# Patient Record
Sex: FEMALE | Race: WHITE | Employment: UNEMPLOYED | URBAN - METROPOLITAN AREA
[De-identification: names, ages, dates, MRNs, and addresses within clinical notes are randomized per-mention and may not be internally consistent; named-entity substitution may affect disease eponyms.]

---

## 2018-11-28 ENCOUNTER — HOSPITAL ENCOUNTER (OUTPATIENT)
Age: 83
Setting detail: SPECIMEN
Discharge: HOME | End: 2018-11-28
Payer: COMMERCIAL

## 2018-11-28 LAB
BACTERIA: ABNORMAL /HPF
BILIRUBIN URINE: NEGATIVE
BLOOD, URINE: NEGATIVE
CLARITY: ABNORMAL
COLOR: YELLOW
EPITHELIAL CELLS, UA: 18 /HPF (ref 0–5)
GLUCOSE URINE: NEGATIVE MG/DL
HYALINE CASTS: 5 /LPF (ref 0–8)
KETONES, URINE: NEGATIVE MG/DL
LEUKOCYTE ESTERASE, URINE: ABNORMAL
MICROSCOPIC EXAMINATION: YES
NITRITE, URINE: NEGATIVE
PH UA: 6.5
PROTEIN UA: NEGATIVE MG/DL
RBC UA: 2 /HPF (ref 0–4)
SPECIFIC GRAVITY UA: 1.01
URINE REFLEX TO CULTURE: YES
URINE TYPE: ABNORMAL
UROBILINOGEN, URINE: 1 E.U./DL
WBC UA: 11 /HPF (ref 0–5)

## 2018-11-28 PROCEDURE — 87086 URINE CULTURE/COLONY COUNT: CPT

## 2018-11-28 PROCEDURE — 81001 URINALYSIS AUTO W/SCOPE: CPT

## 2018-11-29 LAB — URINE CULTURE, ROUTINE: NORMAL

## 2019-04-08 ENCOUNTER — APPOINTMENT (OUTPATIENT)
Dept: GENERAL RADIOLOGY | Age: 84
End: 2019-04-08
Payer: COMMERCIAL

## 2019-04-08 ENCOUNTER — HOSPITAL ENCOUNTER (EMERGENCY)
Age: 84
Discharge: HOME | End: 2019-04-08
Attending: EMERGENCY MEDICINE
Payer: COMMERCIAL

## 2019-04-08 VITALS
OXYGEN SATURATION: 93 % | SYSTOLIC BLOOD PRESSURE: 121 MMHG | WEIGHT: 293 LBS | TEMPERATURE: 98.8 F | HEIGHT: 61 IN | HEART RATE: 85 BPM | RESPIRATION RATE: 18 BRPM | DIASTOLIC BLOOD PRESSURE: 44 MMHG | BODY MASS INDEX: 55.32 KG/M2

## 2019-04-08 DIAGNOSIS — J44.1 COPD EXACERBATION (HCC): Primary | ICD-10-CM

## 2019-04-08 DIAGNOSIS — J31.0 RHINITIS, UNSPECIFIED TYPE: ICD-10-CM

## 2019-04-08 DIAGNOSIS — J18.9 PNEUMONIA DUE TO ORGANISM: ICD-10-CM

## 2019-04-08 LAB
ANION GAP SERPL CALCULATED.3IONS-SCNC: 8 MMOL/L (ref 3–16)
BASOPHILS ABSOLUTE: 0.1 K/UL (ref 0–0.2)
BASOPHILS RELATIVE PERCENT: 1 %
BILIRUBIN URINE: NEGATIVE
BLOOD, URINE: ABNORMAL
BUN BLDV-MCNC: 17 MG/DL (ref 7–20)
CALCIUM SERPL-MCNC: 9.2 MG/DL (ref 8.3–10.6)
CHLORIDE BLD-SCNC: 95 MMOL/L (ref 99–110)
CLARITY: CLEAR
CO2: 37 MMOL/L (ref 21–32)
COLOR: YELLOW
CREAT SERPL-MCNC: 0.6 MG/DL (ref 0.6–1.2)
EKG ATRIAL RATE: 102 BPM
EKG DIAGNOSIS: NORMAL
EKG P AXIS: 44 DEGREES
EKG P-R INTERVAL: 188 MS
EKG Q-T INTERVAL: 344 MS
EKG QRS DURATION: 106 MS
EKG QTC CALCULATION (BAZETT): 448 MS
EKG R AXIS: -26 DEGREES
EKG T AXIS: 75 DEGREES
EKG VENTRICULAR RATE: 102 BPM
EOSINOPHILS ABSOLUTE: 0.3 K/UL (ref 0–0.6)
EOSINOPHILS RELATIVE PERCENT: 3.2 %
EPITHELIAL CELLS, UA: 4 /HPF (ref 0–5)
GFR AFRICAN AMERICAN: >60
GFR NON-AFRICAN AMERICAN: >60
GLUCOSE BLD-MCNC: 111 MG/DL (ref 70–99)
GLUCOSE URINE: NEGATIVE MG/DL
HCT VFR BLD CALC: 33.6 % (ref 36–48)
HEMOGLOBIN: 10.7 G/DL (ref 12–16)
HYALINE CASTS: 2 /LPF (ref 0–8)
KETONES, URINE: NEGATIVE MG/DL
LEUKOCYTE ESTERASE, URINE: ABNORMAL
LYMPHOCYTES ABSOLUTE: 1.5 K/UL (ref 1–5.1)
LYMPHOCYTES RELATIVE PERCENT: 18.8 %
MCH RBC QN AUTO: 28.3 PG (ref 26–34)
MCHC RBC AUTO-ENTMCNC: 31.9 G/DL (ref 31–36)
MCV RBC AUTO: 88.8 FL (ref 80–100)
MICROSCOPIC EXAMINATION: YES
MONOCYTES ABSOLUTE: 0.6 K/UL (ref 0–1.3)
MONOCYTES RELATIVE PERCENT: 7.5 %
NEUTROPHILS ABSOLUTE: 5.6 K/UL (ref 1.7–7.7)
NEUTROPHILS RELATIVE PERCENT: 69.5 %
NITRITE, URINE: NEGATIVE
PDW BLD-RTO: 18.1 % (ref 12.4–15.4)
PH UA: 8 (ref 5–8)
PLATELET # BLD: 183 K/UL (ref 135–450)
PMV BLD AUTO: 8.8 FL (ref 5–10.5)
POTASSIUM REFLEX MAGNESIUM: 4.3 MMOL/L (ref 3.5–5.1)
PRO-BNP: 87 PG/ML (ref 0–449)
PROTEIN UA: NEGATIVE MG/DL
RBC # BLD: 3.78 M/UL (ref 4–5.2)
RBC UA: 2 /HPF (ref 0–4)
SODIUM BLD-SCNC: 140 MMOL/L (ref 136–145)
SPECIFIC GRAVITY UA: 1.01 (ref 1–1.03)
TROPONIN: <0.01 NG/ML
URINE REFLEX TO CULTURE: YES
URINE TYPE: ABNORMAL
UROBILINOGEN, URINE: 0.2 E.U./DL
WBC # BLD: 8 K/UL (ref 4–11)
WBC UA: 2 /HPF (ref 0–5)

## 2019-04-08 PROCEDURE — 36415 COLL VENOUS BLD VENIPUNCTURE: CPT

## 2019-04-08 PROCEDURE — 94640 AIRWAY INHALATION TREATMENT: CPT

## 2019-04-08 PROCEDURE — 93005 ELECTROCARDIOGRAM TRACING: CPT | Performed by: NURSE PRACTITIONER

## 2019-04-08 PROCEDURE — 71046 X-RAY EXAM CHEST 2 VIEWS: CPT

## 2019-04-08 PROCEDURE — 6370000000 HC RX 637 (ALT 250 FOR IP): Performed by: NURSE PRACTITIONER

## 2019-04-08 PROCEDURE — 87186 SC STD MICRODIL/AGAR DIL: CPT

## 2019-04-08 PROCEDURE — 87086 URINE CULTURE/COLONY COUNT: CPT

## 2019-04-08 PROCEDURE — 84484 ASSAY OF TROPONIN QUANT: CPT

## 2019-04-08 PROCEDURE — 85025 COMPLETE CBC W/AUTO DIFF WBC: CPT

## 2019-04-08 PROCEDURE — 80048 BASIC METABOLIC PNL TOTAL CA: CPT

## 2019-04-08 PROCEDURE — 94760 N-INVAS EAR/PLS OXIMETRY 1: CPT

## 2019-04-08 PROCEDURE — 81001 URINALYSIS AUTO W/SCOPE: CPT

## 2019-04-08 PROCEDURE — 96374 THER/PROPH/DIAG INJ IV PUSH: CPT

## 2019-04-08 PROCEDURE — 99285 EMERGENCY DEPT VISIT HI MDM: CPT

## 2019-04-08 PROCEDURE — 6360000002 HC RX W HCPCS: Performed by: NURSE PRACTITIONER

## 2019-04-08 PROCEDURE — 87077 CULTURE AEROBIC IDENTIFY: CPT

## 2019-04-08 PROCEDURE — 93010 ELECTROCARDIOGRAM REPORT: CPT | Performed by: INTERNAL MEDICINE

## 2019-04-08 PROCEDURE — 83880 ASSAY OF NATRIURETIC PEPTIDE: CPT

## 2019-04-08 RX ORDER — IPRATROPIUM BROMIDE AND ALBUTEROL SULFATE 2.5; .5 MG/3ML; MG/3ML
1 SOLUTION RESPIRATORY (INHALATION) ONCE
Status: COMPLETED | OUTPATIENT
Start: 2019-04-08 | End: 2019-04-08

## 2019-04-08 RX ORDER — GUAIFENESIN 600 MG/1
1200 TABLET, EXTENDED RELEASE ORAL 2 TIMES DAILY
Qty: 28 TABLET | Refills: 0 | Status: SHIPPED | OUTPATIENT
Start: 2019-04-08 | End: 2019-04-15

## 2019-04-08 RX ORDER — METHYLPREDNISOLONE SODIUM SUCCINATE 125 MG/2ML
125 INJECTION, POWDER, LYOPHILIZED, FOR SOLUTION INTRAMUSCULAR; INTRAVENOUS ONCE
Status: COMPLETED | OUTPATIENT
Start: 2019-04-08 | End: 2019-04-08

## 2019-04-08 RX ORDER — PREDNISONE 10 MG/1
60 TABLET ORAL DAILY
Qty: 30 TABLET | Refills: 0 | Status: SHIPPED | OUTPATIENT
Start: 2019-04-08 | End: 2019-04-13

## 2019-04-08 RX ORDER — LEVOFLOXACIN 750 MG/1
750 TABLET ORAL DAILY
Qty: 7 TABLET | Refills: 0 | Status: SHIPPED | OUTPATIENT
Start: 2019-04-08 | End: 2019-04-15

## 2019-04-08 RX ADMIN — ALBUTEROL SULFATE 5 MG: 2.5 SOLUTION RESPIRATORY (INHALATION) at 10:36

## 2019-04-08 RX ADMIN — METHYLPREDNISOLONE SODIUM SUCCINATE 125 MG: 125 INJECTION, POWDER, FOR SOLUTION INTRAMUSCULAR; INTRAVENOUS at 11:00

## 2019-04-08 RX ADMIN — IPRATROPIUM BROMIDE AND ALBUTEROL SULFATE 1 AMPULE: .5; 3 SOLUTION RESPIRATORY (INHALATION) at 10:36

## 2019-04-08 ASSESSMENT — ENCOUNTER SYMPTOMS
RHINORRHEA: 0
SHORTNESS OF BREATH: 1
BLOOD IN STOOL: 0
ABDOMINAL PAIN: 0
SORE THROAT: 0
DIARRHEA: 0
VOMITING: 0
NAUSEA: 0
CONSTIPATION: 0

## 2019-04-08 NOTE — ED PROVIDER NOTES
[facility]        Pt Name: Kin Yeh  MRN: [id number]  [place] 1935  Date of evaluation: 4/8/2019  Provider: ADRIANO Hickey CNP  PCP: ADRIANO Valencia CNP      CHIEF COMPLAINT       Chief Complaint   Patient presents with    Shortness of Breath     pt brought in from home by squad. pt with hx of CHF states she feels like she \"isn't able to get air in\". pt normally on 3L o2 at home but feels it \"wasn't getting through\". HISTORY OF PRESENT ILLNESS   (Location/Symptom, Timing/Onset,Context/Setting, Quality, Duration, Modifying Factors, Severity)  Note limiting factors. Kin Yeh is a 82 y.o. female who presents here with concern for shortness of breath. History of CHF and COPD. She is on 3 L nasal cannula at home. She also reports feeling a sense of obstruction in her right near. Present for several weeks now. She reports, \"nobody believes me. \"  She reports that it feels like, \"a tin can. \" She denies fever, rash, headaches, dizziness, chest pain, cough, congestion, abdominal pain, nausea, vomiting, diarrhea, constipation, blood in the stool, or painful urination. One friend/family member at bedside. Nursing Notes triage note reviewed and agreed with or any disagreements were addressed  in the HPI. REVIEW OF SYSTEMS    (2-9 systems for level 4, 10 or more for level 5)     Review of Systems   Constitutional: Negative for chills and fever. HENT: Negative for postnasal drip, rhinorrhea and sore throat. Eyes: Negative for visual disturbance. Respiratory: Positive for shortness of breath. Cardiovascular: Negative for chest pain. Gastrointestinal: Negative for abdominal pain, blood in stool, constipation, diarrhea, nausea and vomiting. Genitourinary: Negative for dysuria, flank pain and hematuria. Skin: Negative for rash.    Neurological: Negative for weakness and headaches. All other systems reviewed and are negative. Positives and Pertinent negatives as per HPI. Except as noted above in the ROS, all other systems were reviewed and negative. PAST MEDICAL HISTORY     Past Medical History:   Diagnosis Date    COPD (chronic obstructive pulmonary disease) ([facility] [address]     Hernia of unspecified site of abdominal cavity without mention of obstruction or gangrene     Incontinence     Knee pain, bilateral     pt states no cartilage    Spinal stenosis          SURGICAL HISTORY     History reviewed. No pertinent surgical history. CURRENT MEDICATIONS       Discharge Medication List as of 4/8/2019 11:58 AM      CONTINUE these medications which have NOT CHANGED    Details   Chlorphen-Phenyleph-APAP (CORICIDIN D COLD/FLU/SINUS) 2-5-325 MG TABS Take 2 tablets by mouth 4 times daily as needed (cough), Disp-20 tablet, R-0Print      furosemide (LASIX) 20 MG tablet Take 10 mg by mouth every other day      rosuvastatin (CRESTOR) 40 MG tablet Take 40 mg by mouth every evening      solifenacin (VESICARE) 10 MG tablet Take 10 mg by mouth daily      docusate sodium (COLACE) 100 MG capsule Take 100 mg by mouth daily      senna (SENOKOT) 8.6 MG tablet Take 1 tablet by mouth daily      diclofenac (VOLTAREN) 50 MG EC tablet Take 50 mg by mouth 2 times daily      magnesium hydroxide (MILK OF MAGNESIA) 400 MG/5ML suspension Take 30 mLs by mouth daily as needed for Constipation, Disp-1 Bottle, R-0      levothyroxine (SYNTHROID) 125 MCG tablet Take 125 mcg by mouth daily. Fluticasone-Salmeterol (ADVAIR DISKUS IN) Inhale  into the lungs. fluoxetine (PROZAC) 20 MG capsule Take 40 mg by mouth daily. sumatriptan (IMITREX) 25 MG tablet Take 50 mg by mouth once as needed. pregabalin (LYRICA) 50 MG capsule Take 100 mg by mouth 2 times daily       spironolactone (ALDACTONE) 25 MG tablet Take 25 mg by mouth 2 times daily.         esomeprazole (NEXIUM) 40 MG capsule Take 40 mg by mouth every morning (before breakfast). ALLERGIES     Sulfa antibiotics    FAMILY HISTORY     History reviewed. No pertinent family history. SOCIAL HISTORY       Social History     Socioeconomic History    Marital status:      Spouse name: None    Number of children: None    Years of education: None    Highest education level: None   Occupational History    None   Social Needs    Financial resource strain: None    Food insecurity:     Worry: None     Inability: None    Transportation needs:     Medical: None     Non-medical: None   Tobacco Use    Smoking status: Never Smoker    Smokeless tobacco: Never Used   Substance and Sexual Activity    Alcohol use: No    Drug use: No    Sexual activity: None   Lifestyle    Physical activity:     Days per week: None     Minutes per session: None    Stress: None   Relationships    Social connections:     Talks on phone: None     Gets together: None     Attends Congregation service: None     Active member of club or organization: None     Attends meetings of clubs or organizations: None     Relationship status: None    Intimate partner violence:     Fear of current or ex partner: None     Emotionally abused: None     Physically abused: None     Forced sexual activity: None   Other Topics Concern    None   Social History Narrative    None       SCREENINGS             PHYSICAL EXAM  (up to 7 for level 4, 8 or more for level 5)     ED Triage Vitals   BP Temp Temp Source Pulse Resp SpO2 Height Weight   04/08/19 0928 04/08/19 0933 04/08/19 0933 04/08/19 0928 04/08/19 0928 04/08/19 0928 04/08/19 0928 04/08/19 0928   (!) 122/40 98.8 °F (37.1 °C) Oral 80 20 100 % 5' 1\" (1.549 m) 300 lb (136.1 kg)       Physical Exam   Constitutional: She is oriented to person, place, and time. She appears well-developed and well-nourished. No distress. Nasal cannula in place. HENT:   Head: Normocephalic and atraumatic.    Nose: Mucosal edema and [facility]  [address],  [place], [address]   Phone [phone]   URINE RT REFLEX TO CULTURE - Abnormal; Notable for the following components:    Blood, Urine MODERATE (*)     Leukocyte Esterase, Urine SMALL (*)     All other components within normal limits    Narrative:     Performed at:  [facility]  [address],  [place], [address] [place] [employer]   Phone [phone]   [address]    Narrative:     Performed at:  [facility]  [address],  [place], [address] [place] [employer]   Phone [phone]   TROPONIN    Narrative:     Performed at:  [facility]  [address],  [place], [address]   Phone [phone]   MICROSCOPIC URINALYSIS    Narrative:     Performed at:  [facility]  [address],  [place], [address]   Phone [phone]       All other labs werewithin normal range or not returned as of this dictation. EKG: All EKG's are interpreted by the Emergency Department Physician who either signs or Co-signs this chart in the absence of acardiologist.  Please see their note for interpretation of EKG. RADIOLOGY:   Interpretation per the Radiologist below, if available at the time of this note:    XR CHEST STANDARD (2 VW)   Final Result   1. Right upper lobe airspace disease concerning for pneumonia. Recommend   chest radiograph in 8 weeks to confirm resolution. No results found.       PROCEDURES   Unless otherwise noted below, none     Procedures    CRITICAL CARE TIME     n/a    CONSULTS:  None      EMERGENCY DEPARTMENT COURSE and DIFFERENTIAL DIAGNOSIS/MDM:   Vitals:    Vitals:    04/08/19 1036 04/08/19 1045 04/08/19 1134 04/08/19 1200   BP:  (!) 105/33  (!) 121/44   Pulse:   82 85   Resp:   17 18   Temp:       TempSrc:       SpO2: 96% 97% 93% 93%   Weight:       Height:           Honey Renee was given the following medications:  Medications   methylPREDNISolone sodium (SOLU-MEDROL) injection 125 mg (125 mg Intravenous Given 4/8/19 1100)   albuterol (PROVENTIL) nebulizer solution 5 mg (5 mg Nebulization Given 4/8/19 1036)   ipratropium-albuterol (DUONEB) nebulizer solution 1 ampule (1 ampule Inhalation Given 4/8/19 1036)       Karli Kearney was evaluated in the emergency department with concern for shortness of breath. She is not hypoxic on her home oxygen, however. She does have a history of COPD and was tight on exam.  She with 3 back-to-back respiratory treatments and IV steroids. Chest x-ray suggests pneumonia. The patient does complain of a foreign body sensation in her nose. She is a little bit of the coastal irritation on exam of the right nares with no evidence of polyps or foreign body. I do not feel imaging is warranted at this time as the patient denies any possible foreign body in her nose. I estimate there is LOW risk for acute coronary syndrome, respiratory failure/arrest, acute congestive heart failure, cardiac tamponade, pneumonia, pneumothorax, pericarditis, PE, aortic dissection, and ARDS,  thus I consider the discharge disposition reasonable. Karli Kearney is stable in the ER and safe to follow as an outpatient. The patient is discharged on the following medications. They were counseled on how to take the newly prescribed medications:  Discharge Medication List as of 4/8/2019 11:58 AM      START taking these medications    Details   levofloxacin (LEVAQUIN) 750 MG tablet Take 1 tablet by mouth daily for 7 days, Disp-7 tablet, R-0Print      predniSONE (DELTASONE) 10 MG tablet Take 6 tablets by mouth daily for 5 doses, Disp-30 tablet, R-0Print      guaiFENesin (MUCINEX) 600 MG extended release tablet Take 2 tablets by mouth 2 times daily for 7 days, Disp-28 tablet, R-0Print          .   Instructed to follow-up with:  Vernon Ferro, ADRIANO - CNP  [address] [facility] [address] [facility] [address]  [phone]    Schedule an appointment as soon as possible for a visit in 3 days  For a recheck    Return to the ER for new or worsening symptoms. This plan was discussed with the patient and all family available in the room at discharge who are all in agreement with the plan. The patient tolerated their visit well. They were seen and evaluated by the attending physician, No att. providers found , who agreed with the assessment and plan. The patient and / or the family were informed of the results of any tests, a time was given to answer questions, a plan was proposed and they agreed with plan. FINAL IMPRESSION      1. COPD exacerbation ([facility] [address]    2. Pneumonia due to organism    3.  Rhinitis, unspecified type          DISPOSITION/PLAN   DISPOSITION Decision To Discharge 04/08/2019 11:51:28 AM        DISCONTINUED MEDICATIONS:  Discharge Medication List as of 4/8/2019 11:58 AM                   (Please note that portions of this note were completed with a voice recognition program.  Efforts were made to edit the dictations but occasionally words are mis-transcribed.)    ADRIANO Daniels CNP (electronically signed)        ADRIANO Daniels CNP  04/08/19 4824

## 2019-04-08 NOTE — ED PROVIDER NOTES
x-ray but is on her baseline cannula with safe vital signs and is in no distress. Nebulizers given in the ER today which did help decrease her symptoms. But her main focus was really more on having difficulty breathing through the right nostril/nares. I cannot appreciate any mass here. We'll discharge home with Levaquin, prednisone, guaifenesin. Recommended ENT follow-up for more definitive care and visualization. SEE LIVAN NOTE      Patient Referrals:  Estee Foster, APRN - CNP  [address] [facility] [address] [address]  [phone]    Schedule an appointment as soon as possible for a visit in 3 days  For a recheck      Discharge Medications:  New Prescriptions    GUAIFENESIN (MUCINEX) 600 MG EXTENDED RELEASE TABLET    Take 2 tablets by mouth 2 times daily for 7 days    LEVOFLOXACIN (LEVAQUIN) 750 MG TABLET    Take 1 tablet by mouth daily for 7 days    PREDNISONE (DELTASONE) 10 MG TABLET    Take 6 tablets by mouth daily for 5 doses       FINAL IMPRESSION  1. COPD exacerbation ([address]    2. Pneumonia due to organism    3. Rhinitis, unspecified type        Blood pressure (!) 105/33, pulse 82, temperature 98.8 °F (37.1 °C), temperature source Oral, resp. rate 17, height 5' 1\" (1.549 m), weight 300 lb (136.1 kg), SpO2 93 %. For further details of Roseanna Pearce [facility] emergency department encounter, please see documentation by advanced practice provider.         Sharon Stafford III, DO  04/08/19 1864

## 2019-04-10 LAB
ORGANISM: ABNORMAL
URINE CULTURE, ROUTINE: ABNORMAL
URINE CULTURE, ROUTINE: ABNORMAL

## 2019-04-24 ENCOUNTER — APPOINTMENT (OUTPATIENT)
Dept: GENERAL RADIOLOGY | Age: 84
DRG: 193 | End: 2019-04-24
Payer: COMMERCIAL

## 2019-04-24 ENCOUNTER — APPOINTMENT (OUTPATIENT)
Dept: CT IMAGING | Age: 84
DRG: 193 | End: 2019-04-24
Payer: COMMERCIAL

## 2019-04-24 ENCOUNTER — HOSPITAL ENCOUNTER (INPATIENT)
Age: 84
LOS: 4 days | Discharge: HOME HEALTH | DRG: 193 | End: 2019-04-28
Attending: EMERGENCY MEDICINE | Admitting: INTERNAL MEDICINE
Payer: COMMERCIAL

## 2019-04-24 DIAGNOSIS — Z78.9 FAILURE OF OUTPATIENT TREATMENT: ICD-10-CM

## 2019-04-24 DIAGNOSIS — J44.1 COPD EXACERBATION (HCC): Primary | ICD-10-CM

## 2019-04-24 DIAGNOSIS — J18.9 PNEUMONIA DUE TO ORGANISM: ICD-10-CM

## 2019-04-24 LAB
A/G RATIO: 1 (ref 1.1–2.2)
ALBUMIN SERPL-MCNC: 3 G/DL (ref 3.4–5)
ALP BLD-CCNC: 90 U/L (ref 40–129)
ALT SERPL-CCNC: 13 U/L (ref 10–40)
ANION GAP SERPL CALCULATED.3IONS-SCNC: 6 MMOL/L (ref 3–16)
AST SERPL-CCNC: 14 U/L (ref 15–37)
BASE EXCESS ARTERIAL: 7.7 MMOL/L (ref -3–3)
BASOPHILS ABSOLUTE: 0 K/UL (ref 0–0.2)
BASOPHILS RELATIVE PERCENT: 0.5 %
BILIRUB SERPL-MCNC: <0.2 MG/DL (ref 0–1)
BILIRUBIN URINE: NEGATIVE
BLOOD, URINE: NEGATIVE
BUN BLDV-MCNC: 17 MG/DL (ref 7–20)
CALCIUM SERPL-MCNC: 8.7 MG/DL (ref 8.3–10.6)
CARBOXYHEMOGLOBIN ARTERIAL: 1.2 % (ref 0–1.5)
CHLORIDE BLD-SCNC: 101 MMOL/L (ref 99–110)
CLARITY: CLEAR
CO2: 35 MMOL/L (ref 21–32)
COLOR: YELLOW
CREAT SERPL-MCNC: 0.8 MG/DL (ref 0.6–1.2)
EKG ATRIAL RATE: 89 BPM
EKG DIAGNOSIS: NORMAL
EKG P AXIS: 44 DEGREES
EKG P-R INTERVAL: 176 MS
EKG Q-T INTERVAL: 380 MS
EKG QRS DURATION: 116 MS
EKG QTC CALCULATION (BAZETT): 462 MS
EKG R AXIS: -29 DEGREES
EKG T AXIS: 57 DEGREES
EKG VENTRICULAR RATE: 89 BPM
EOSINOPHILS ABSOLUTE: 0.3 K/UL (ref 0–0.6)
EOSINOPHILS RELATIVE PERCENT: 2.7 %
GFR AFRICAN AMERICAN: >60
GFR NON-AFRICAN AMERICAN: >60
GLOBULIN: 3.1 G/DL
GLUCOSE BLD-MCNC: 131 MG/DL (ref 70–99)
GLUCOSE URINE: NEGATIVE MG/DL
HCO3 ARTERIAL: 34.5 MMOL/L (ref 21–29)
HCT VFR BLD CALC: 32 % (ref 36–48)
HEMOGLOBIN, ART, EXTENDED: 9.8 G/DL (ref 12–16)
HEMOGLOBIN: 10.2 G/DL (ref 12–16)
KETONES, URINE: NEGATIVE MG/DL
LACTIC ACID: 1.1 MMOL/L (ref 0.4–2)
LEUKOCYTE ESTERASE, URINE: NEGATIVE
LYMPHOCYTES ABSOLUTE: 1 K/UL (ref 1–5.1)
LYMPHOCYTES RELATIVE PERCENT: 10.4 %
MCH RBC QN AUTO: 28.5 PG (ref 26–34)
MCHC RBC AUTO-ENTMCNC: 32 G/DL (ref 31–36)
MCV RBC AUTO: 89.1 FL (ref 80–100)
METHEMOGLOBIN ARTERIAL: 0.6 %
MICROSCOPIC EXAMINATION: NORMAL
MONOCYTES ABSOLUTE: 0.7 K/UL (ref 0–1.3)
MONOCYTES RELATIVE PERCENT: 7 %
NEUTROPHILS ABSOLUTE: 7.9 K/UL (ref 1.7–7.7)
NEUTROPHILS RELATIVE PERCENT: 79.4 %
NITRITE, URINE: NEGATIVE
O2 CONTENT ARTERIAL: 13 ML/DL
O2 SAT, ARTERIAL: 92.4 %
O2 THERAPY: ABNORMAL
PCO2 ARTERIAL: 60.5 MMHG (ref 35–45)
PDW BLD-RTO: 17.9 % (ref 12.4–15.4)
PH ARTERIAL: 7.36 (ref 7.35–7.45)
PH UA: 6 (ref 5–8)
PLATELET # BLD: 173 K/UL (ref 135–450)
PMV BLD AUTO: 8.5 FL (ref 5–10.5)
PO2 ARTERIAL: 62.1 MMHG (ref 75–108)
POTASSIUM SERPL-SCNC: 4 MMOL/L (ref 3.5–5.1)
PRO-BNP: 487 PG/ML (ref 0–449)
PROTEIN UA: NEGATIVE MG/DL
RBC # BLD: 3.6 M/UL (ref 4–5.2)
SODIUM BLD-SCNC: 142 MMOL/L (ref 136–145)
SPECIFIC GRAVITY UA: 1.01 (ref 1–1.03)
TCO2 ARTERIAL: 81.4 MMOL/L
TOTAL CK: 139 U/L (ref 26–192)
TOTAL PROTEIN: 6.1 G/DL (ref 6.4–8.2)
TROPONIN: 0.01 NG/ML
URINE REFLEX TO CULTURE: NORMAL
URINE TYPE: NORMAL
UROBILINOGEN, URINE: 0.2 E.U./DL
WBC # BLD: 9.9 K/UL (ref 4–11)

## 2019-04-24 PROCEDURE — 2500000003 HC RX 250 WO HCPCS: Performed by: INTERNAL MEDICINE

## 2019-04-24 PROCEDURE — 6370000000 HC RX 637 (ALT 250 FOR IP): Performed by: PHYSICIAN ASSISTANT

## 2019-04-24 PROCEDURE — 87040 BLOOD CULTURE FOR BACTERIA: CPT

## 2019-04-24 PROCEDURE — 71045 X-RAY EXAM CHEST 1 VIEW: CPT

## 2019-04-24 PROCEDURE — 94760 N-INVAS EAR/PLS OXIMETRY 1: CPT

## 2019-04-24 PROCEDURE — 6370000000 HC RX 637 (ALT 250 FOR IP): Performed by: INTERNAL MEDICINE

## 2019-04-24 PROCEDURE — 2580000003 HC RX 258: Performed by: EMERGENCY MEDICINE

## 2019-04-24 PROCEDURE — 93005 ELECTROCARDIOGRAM TRACING: CPT | Performed by: EMERGENCY MEDICINE

## 2019-04-24 PROCEDURE — 6360000002 HC RX W HCPCS: Performed by: INTERNAL MEDICINE

## 2019-04-24 PROCEDURE — 82803 BLOOD GASES ANY COMBINATION: CPT

## 2019-04-24 PROCEDURE — 84484 ASSAY OF TROPONIN QUANT: CPT

## 2019-04-24 PROCEDURE — 71250 CT THORAX DX C-: CPT

## 2019-04-24 PROCEDURE — 6360000002 HC RX W HCPCS: Performed by: PHYSICIAN ASSISTANT

## 2019-04-24 PROCEDURE — 99285 EMERGENCY DEPT VISIT HI MDM: CPT

## 2019-04-24 PROCEDURE — 81003 URINALYSIS AUTO W/O SCOPE: CPT

## 2019-04-24 PROCEDURE — 83880 ASSAY OF NATRIURETIC PEPTIDE: CPT

## 2019-04-24 PROCEDURE — 73030 X-RAY EXAM OF SHOULDER: CPT

## 2019-04-24 PROCEDURE — 93010 ELECTROCARDIOGRAM REPORT: CPT | Performed by: INTERNAL MEDICINE

## 2019-04-24 PROCEDURE — 85025 COMPLETE CBC W/AUTO DIFF WBC: CPT

## 2019-04-24 PROCEDURE — 83605 ASSAY OF LACTIC ACID: CPT

## 2019-04-24 PROCEDURE — 96374 THER/PROPH/DIAG INJ IV PUSH: CPT

## 2019-04-24 PROCEDURE — 72125 CT NECK SPINE W/O DYE: CPT

## 2019-04-24 PROCEDURE — 94640 AIRWAY INHALATION TREATMENT: CPT

## 2019-04-24 PROCEDURE — 80053 COMPREHEN METABOLIC PANEL: CPT

## 2019-04-24 PROCEDURE — 82550 ASSAY OF CK (CPK): CPT

## 2019-04-24 PROCEDURE — 70450 CT HEAD/BRAIN W/O DYE: CPT

## 2019-04-24 PROCEDURE — 36415 COLL VENOUS BLD VENIPUNCTURE: CPT

## 2019-04-24 PROCEDURE — 2700000000 HC OXYGEN THERAPY PER DAY

## 2019-04-24 PROCEDURE — 1200000000 HC SEMI PRIVATE

## 2019-04-24 RX ORDER — FUROSEMIDE 40 MG/1
40 TABLET ORAL 2 TIMES DAILY
Status: DISCONTINUED | OUTPATIENT
Start: 2019-04-25 | End: 2019-04-24

## 2019-04-24 RX ORDER — IPRATROPIUM BROMIDE AND ALBUTEROL SULFATE 2.5; .5 MG/3ML; MG/3ML
1 SOLUTION RESPIRATORY (INHALATION) ONCE
Status: COMPLETED | OUTPATIENT
Start: 2019-04-24 | End: 2019-04-24

## 2019-04-24 RX ORDER — DOCUSATE SODIUM 100 MG/1
100 CAPSULE, LIQUID FILLED ORAL DAILY
Status: DISCONTINUED | OUTPATIENT
Start: 2019-04-24 | End: 2019-04-28 | Stop reason: HOSPADM

## 2019-04-24 RX ORDER — ALBUTEROL SULFATE 2.5 MG/3ML
2.5 SOLUTION RESPIRATORY (INHALATION)
Status: DISCONTINUED | OUTPATIENT
Start: 2019-04-24 | End: 2019-04-28 | Stop reason: HOSPADM

## 2019-04-24 RX ORDER — METHYLPREDNISOLONE SODIUM SUCCINATE 40 MG/ML
40 INJECTION, POWDER, LYOPHILIZED, FOR SOLUTION INTRAMUSCULAR; INTRAVENOUS EVERY 6 HOURS
Status: DISCONTINUED | OUTPATIENT
Start: 2019-04-24 | End: 2019-04-27

## 2019-04-24 RX ORDER — SENNA PLUS 8.6 MG/1
1 TABLET ORAL DAILY
Status: DISCONTINUED | OUTPATIENT
Start: 2019-04-24 | End: 2019-04-28 | Stop reason: HOSPADM

## 2019-04-24 RX ORDER — SODIUM CHLORIDE 0.9 % (FLUSH) 0.9 %
10 SYRINGE (ML) INJECTION EVERY 12 HOURS SCHEDULED
Status: DISCONTINUED | OUTPATIENT
Start: 2019-04-24 | End: 2019-04-28 | Stop reason: HOSPADM

## 2019-04-24 RX ORDER — PANTOPRAZOLE SODIUM 40 MG/1
40 TABLET, DELAYED RELEASE ORAL
Status: DISCONTINUED | OUTPATIENT
Start: 2019-04-25 | End: 2019-04-28 | Stop reason: HOSPADM

## 2019-04-24 RX ORDER — ALBUTEROL SULFATE 2.5 MG/3ML
5 SOLUTION RESPIRATORY (INHALATION) ONCE
Status: COMPLETED | OUTPATIENT
Start: 2019-04-24 | End: 2019-04-24

## 2019-04-24 RX ORDER — SODIUM CHLORIDE 0.9 % (FLUSH) 0.9 %
10 SYRINGE (ML) INJECTION PRN
Status: DISCONTINUED | OUTPATIENT
Start: 2019-04-24 | End: 2019-04-28 | Stop reason: HOSPADM

## 2019-04-24 RX ORDER — FLUOXETINE HYDROCHLORIDE 20 MG/1
40 CAPSULE ORAL DAILY
Status: DISCONTINUED | OUTPATIENT
Start: 2019-04-24 | End: 2019-04-28 | Stop reason: HOSPADM

## 2019-04-24 RX ORDER — PREGABALIN 75 MG/1
150 CAPSULE ORAL 2 TIMES DAILY
Status: DISCONTINUED | OUTPATIENT
Start: 2019-04-24 | End: 2019-04-28 | Stop reason: HOSPADM

## 2019-04-24 RX ORDER — AZITHROMYCIN 250 MG/1
1000 TABLET, FILM COATED ORAL ONCE
Status: COMPLETED | OUTPATIENT
Start: 2019-04-24 | End: 2019-04-24

## 2019-04-24 RX ORDER — LEVOTHYROXINE SODIUM 0.12 MG/1
125 TABLET ORAL DAILY
Status: DISCONTINUED | OUTPATIENT
Start: 2019-04-25 | End: 2019-04-28 | Stop reason: HOSPADM

## 2019-04-24 RX ORDER — LISINOPRIL 10 MG/1
10 TABLET ORAL DAILY
Status: DISCONTINUED | OUTPATIENT
Start: 2019-04-25 | End: 2019-04-28 | Stop reason: HOSPADM

## 2019-04-24 RX ORDER — TROSPIUM CHLORIDE 20 MG/1
20 TABLET, FILM COATED ORAL
Status: DISCONTINUED | OUTPATIENT
Start: 2019-04-24 | End: 2019-04-28 | Stop reason: HOSPADM

## 2019-04-24 RX ORDER — OXYCODONE AND ACETAMINOPHEN 10; 325 MG/1; MG/1
1 TABLET ORAL EVERY 6 HOURS PRN
Status: DISCONTINUED | OUTPATIENT
Start: 2019-04-24 | End: 2019-04-28 | Stop reason: HOSPADM

## 2019-04-24 RX ORDER — GUAIFENESIN 600 MG/1
600 TABLET, EXTENDED RELEASE ORAL 2 TIMES DAILY
Status: DISCONTINUED | OUTPATIENT
Start: 2019-04-24 | End: 2019-04-28 | Stop reason: HOSPADM

## 2019-04-24 RX ORDER — LISINOPRIL 10 MG/1
10 TABLET ORAL DAILY
Status: ON HOLD | COMMUNITY
End: 2019-05-18 | Stop reason: HOSPADM

## 2019-04-24 RX ORDER — OXYCODONE AND ACETAMINOPHEN 10; 325 MG/1; MG/1
1 TABLET ORAL EVERY 6 HOURS PRN
Status: ON HOLD | COMMUNITY
End: 2019-05-18 | Stop reason: SDUPTHER

## 2019-04-24 RX ORDER — 0.9 % SODIUM CHLORIDE 0.9 %
1000 INTRAVENOUS SOLUTION INTRAVENOUS ONCE
Status: COMPLETED | OUTPATIENT
Start: 2019-04-24 | End: 2019-04-24

## 2019-04-24 RX ORDER — FUROSEMIDE 40 MG/1
40 TABLET ORAL DAILY
Status: DISCONTINUED | OUTPATIENT
Start: 2019-04-24 | End: 2019-04-24

## 2019-04-24 RX ORDER — ONDANSETRON 2 MG/ML
4 INJECTION INTRAMUSCULAR; INTRAVENOUS EVERY 6 HOURS PRN
Status: DISCONTINUED | OUTPATIENT
Start: 2019-04-24 | End: 2019-04-28 | Stop reason: HOSPADM

## 2019-04-24 RX ORDER — SUMATRIPTAN 25 MG/1
50 TABLET, FILM COATED ORAL
Status: ACTIVE | OUTPATIENT
Start: 2019-04-24 | End: 2019-04-24

## 2019-04-24 RX ORDER — FUROSEMIDE 10 MG/ML
40 INJECTION INTRAMUSCULAR; INTRAVENOUS 2 TIMES DAILY
Status: DISCONTINUED | OUTPATIENT
Start: 2019-04-24 | End: 2019-04-28 | Stop reason: HOSPADM

## 2019-04-24 RX ORDER — ROSUVASTATIN CALCIUM 40 MG/1
40 TABLET, COATED ORAL EVERY EVENING
Status: DISCONTINUED | OUTPATIENT
Start: 2019-04-24 | End: 2019-04-28 | Stop reason: HOSPADM

## 2019-04-24 RX ORDER — OXYCODONE HYDROCHLORIDE AND ACETAMINOPHEN 5; 325 MG/1; MG/1
2 TABLET ORAL ONCE
Status: COMPLETED | OUTPATIENT
Start: 2019-04-24 | End: 2019-04-24

## 2019-04-24 RX ORDER — PREGABALIN 100 MG/1
100 CAPSULE ORAL 2 TIMES DAILY
Status: DISCONTINUED | OUTPATIENT
Start: 2019-04-24 | End: 2019-04-24

## 2019-04-24 RX ORDER — SPIRONOLACTONE 25 MG/1
25 TABLET ORAL 2 TIMES DAILY
Status: DISCONTINUED | OUTPATIENT
Start: 2019-04-24 | End: 2019-04-28 | Stop reason: HOSPADM

## 2019-04-24 RX ORDER — SODIUM CHLORIDE 9 MG/ML
INJECTION, SOLUTION INTRAVENOUS
Status: DISPENSED
Start: 2019-04-24 | End: 2019-04-25

## 2019-04-24 RX ADMIN — METHYLPREDNISOLONE SODIUM SUCCINATE 40 MG: 40 INJECTION, POWDER, FOR SOLUTION INTRAMUSCULAR; INTRAVENOUS at 18:16

## 2019-04-24 RX ADMIN — IPRATROPIUM BROMIDE AND ALBUTEROL SULFATE 1 AMPULE: .5; 3 SOLUTION RESPIRATORY (INHALATION) at 13:24

## 2019-04-24 RX ADMIN — SODIUM CHLORIDE 1000 ML: 9 INJECTION, SOLUTION INTRAVENOUS at 12:07

## 2019-04-24 RX ADMIN — IPRATROPIUM BROMIDE AND ALBUTEROL SULFATE 1 AMPULE: .5; 3 SOLUTION RESPIRATORY (INHALATION) at 10:02

## 2019-04-24 RX ADMIN — PREGABALIN 150 MG: 75 CAPSULE ORAL at 21:36

## 2019-04-24 RX ADMIN — DOCUSATE SODIUM 100 MG: 100 CAPSULE, LIQUID FILLED ORAL at 21:37

## 2019-04-24 RX ADMIN — ALBUTEROL SULFATE 2.5 MG: 2.5 SOLUTION RESPIRATORY (INHALATION) at 17:22

## 2019-04-24 RX ADMIN — Medication 1 G: at 12:48

## 2019-04-24 RX ADMIN — ROSUVASTATIN CALCIUM 40 MG: 40 TABLET, FILM COATED ORAL at 21:37

## 2019-04-24 RX ADMIN — OXYCODONE AND ACETAMINOPHEN 1 TABLET: 10; 325 TABLET ORAL at 20:18

## 2019-04-24 RX ADMIN — SENNOSIDES 8.6 MG: 8.6 TABLET, FILM COATED ORAL at 21:37

## 2019-04-24 RX ADMIN — SPIRONOLACTONE 25 MG: 25 TABLET ORAL at 21:36

## 2019-04-24 RX ADMIN — ENOXAPARIN SODIUM 40 MG: 40 INJECTION SUBCUTANEOUS at 21:36

## 2019-04-24 RX ADMIN — TAZOBACTAM SODIUM AND PIPERACILLIN SODIUM 3.38 G: 375; 3 INJECTION, SOLUTION INTRAVENOUS at 23:51

## 2019-04-24 RX ADMIN — FLUOXETINE 40 MG: 20 CAPSULE ORAL at 21:37

## 2019-04-24 RX ADMIN — OXYCODONE AND ACETAMINOPHEN 2 TABLET: 5; 325 TABLET ORAL at 13:00

## 2019-04-24 RX ADMIN — AZITHROMYCIN 1000 MG: 250 TABLET, FILM COATED ORAL at 12:47

## 2019-04-24 RX ADMIN — METHYLPREDNISOLONE SODIUM SUCCINATE 40 MG: 40 INJECTION, POWDER, FOR SOLUTION INTRAMUSCULAR; INTRAVENOUS at 21:36

## 2019-04-24 RX ADMIN — TROSPIUM CHLORIDE 20 MG: 20 TABLET ORAL at 21:40

## 2019-04-24 RX ADMIN — GUAIFENESIN 600 MG: 600 TABLET, EXTENDED RELEASE ORAL at 21:37

## 2019-04-24 RX ADMIN — ALBUTEROL SULFATE 5 MG: 2.5 SOLUTION RESPIRATORY (INHALATION) at 10:02

## 2019-04-24 RX ADMIN — ALBUTEROL SULFATE 2.5 MG: 2.5 SOLUTION RESPIRATORY (INHALATION) at 21:07

## 2019-04-24 RX ADMIN — Medication 2 PUFF: at 21:07

## 2019-04-24 RX ADMIN — FUROSEMIDE 40 MG: 10 INJECTION, SOLUTION INTRAMUSCULAR; INTRAVENOUS at 21:37

## 2019-04-24 RX ADMIN — TAZOBACTAM SODIUM AND PIPERACILLIN SODIUM 3.38 G: 375; 3 INJECTION, SOLUTION INTRAVENOUS at 18:16

## 2019-04-24 ASSESSMENT — PAIN DESCRIPTION - ONSET
ONSET: ON-GOING
ONSET: ON-GOING

## 2019-04-24 ASSESSMENT — PAIN SCALES - GENERAL
PAINLEVEL_OUTOF10: 7
PAINLEVEL_OUTOF10: 5
PAINLEVEL_OUTOF10: 8
PAINLEVEL_OUTOF10: 6
PAINLEVEL_OUTOF10: 10
PAINLEVEL_OUTOF10: 7

## 2019-04-24 ASSESSMENT — PAIN DESCRIPTION - LOCATION
LOCATION: BACK
LOCATION: SHOULDER
LOCATION: BACK
LOCATION: BACK

## 2019-04-24 ASSESSMENT — ENCOUNTER SYMPTOMS
VOMITING: 0
DIARRHEA: 0
COUGH: 1
SHORTNESS OF BREATH: 0
RHINORRHEA: 0
ABDOMINAL PAIN: 0
NAUSEA: 0

## 2019-04-24 ASSESSMENT — PAIN DESCRIPTION - PROGRESSION: CLINICAL_PROGRESSION: GRADUALLY IMPROVING

## 2019-04-24 ASSESSMENT — PAIN DESCRIPTION - PAIN TYPE
TYPE: CHRONIC PAIN
TYPE: ACUTE PAIN
TYPE: ACUTE PAIN
TYPE: CHRONIC PAIN

## 2019-04-24 ASSESSMENT — PAIN DESCRIPTION - FREQUENCY
FREQUENCY: CONTINUOUS
FREQUENCY: CONTINUOUS

## 2019-04-24 ASSESSMENT — PAIN DESCRIPTION - DESCRIPTORS
DESCRIPTORS: PATIENT UNABLE TO DESCRIBE
DESCRIPTORS: PATIENT UNABLE TO DESCRIBE

## 2019-04-24 ASSESSMENT — PAIN DESCRIPTION - ORIENTATION
ORIENTATION: LEFT
ORIENTATION: MID
ORIENTATION: MID

## 2019-04-24 NOTE — ED NOTES
Patient placed back on  3L of oxygen for Sp02 0f 84% on RA. Sp02 now 92%.        Ranulfo Steel RN  04/24/19 8950

## 2019-04-24 NOTE — PROGRESS NOTES
Patient admitted from emergency department via stretcher on monitor. Transferred to  bed. Placed on monitors. Vital signs obtained. Orders reviewed and acknowledged. Admission completed. Oriented to room, call light and environment. Questions answered. Bed placed in low position. Call light explained and within reach. Pt's daughter is the care giver. Pt has open wound on Lt buttock per daughter. Pt has visitors, refused to turn to check bottom this time.

## 2019-04-24 NOTE — ED NOTES
Pt. Reports that her pain level has improved rating it a 5/10. Pt. Updated on plan of care for admission. Pt. Denies any concerns. Pt's. Daughter with her at bedside. Pt. Has purse, green night gown, and light blue robe with her.       Belle Bhatti RN  04/24/19 1534

## 2019-04-24 NOTE — ED NOTES
ABG obtained at this time via right radial and sent to lab. Pt. Repositioned for comfort with a pillow under her bottom, pillow behind her back, and one under her head. Pt's. Bottom noted to be red, pt. States that she has an open sore on her bottom. Daughter at pt. Bedside with her, pt. Denies any needs at this time, call light within reach, will continue to monitor.      Kade Chauhan RN  04/24/19 4426

## 2019-04-24 NOTE — H&P
Hospital Medicine History & Physical      PCP: ADRIANO Espinoza - CNP    Date of Admission: 4/24/2019    Date of Service: Pt seen/examined on 4/24/2019 and Admitted to Inpatient    Chief Complaint:  SOB       History Of Present Illness:    Tarik Alves is a 82 y.o. female who presents here with concern for shortness of breath. History of CHF and COPD. She is on 3 L nasal cannula at home. She also reports feeling a sense of obstruction in her right near. Present for several weeks now. She reports, \"nobody believes me. \"  She reports that it feels like, \"a tin can. \" She denies fever, rash, headaches, dizziness, chest pain, cough, congestion, abdominal pain, nausea, vomiting, diarrhea, constipation, blood in the stool, or painful urination. One friend/family member at bedside.       Past Medical History:        Diagnosis Date    [facility]ch[facility] pulmonary disease) ([facility] [address]     Hernia of unspecified site of abdominal cavity without mention of obstruction or gangrene     Incontinence     Knee pain, bilateral     pt states no cartilage    Spinal stenosis        Past Surgical History:        Procedure Laterality Date    CHOLECYSTECTOMY      PARTIAL HYSTERECTOMY         Medications Prior to Admission:    Prior to Admission medications    Medication Sig Start Date End Date Taking? Authorizing Provider   lisinopril (PRINIVIL;ZESTRIL) 10 MG tablet Take 10 mg by mouth daily   Yes Historical Provider, MD   oxyCODONE-acetaminophen (PERCOCET)  MG per tablet Take 1 tablet by mouth every 6 hours as needed for Pain.    Yes Historical Provider, MD   furosemide (LASIX) 20 MG tablet Take 40 mg by mouth daily    Yes Historical Provider, MD   diclofenac (VOLTAREN) 50 MG EC tablet Take 50 mg by mouth 2 times daily   Yes Historical Provider, MD   magnesium hydroxide (MILK OF MAGNESIA) 400 MG/5ML suspension Take 30 mLs by mouth daily as needed for Constipation 4/30/16  Yes ADRIANO Oshea CNP   levothyroxine (SYNTHROID) 125 MCG tablet Take 125 mcg by mouth daily. Yes Historical Provider, MD   Fluticasone-Salmeterol (ADVAIR DISKUS IN) Inhale  into the lungs. Yes Historical Provider, MD   fluoxetine (PROZAC) 20 MG capsule Take 40 mg by mouth daily. Yes Historical Provider, MD   sumatriptan (IMITREX) 25 MG tablet Take 50 mg by mouth once as needed. Yes Historical Provider, MD   pregabalin (LYRICA) 50 MG capsule Take 150 mg by mouth 2 times daily. Yes Historical Provider, MD   esomeprazole (NEXIUM) 40 MG capsule Take 40 mg by mouth every morning (before breakfast). Yes Historical Provider, MD   Chlorphen-Phenyleph-APAP (CORICIDIN D COLD/FLU/SINUS) 2-5-325 MG TABS Take 2 tablets by mouth 4 times daily as needed (cough) 6/25/18   Lori Estrada PA-C   rosuvastatin (CRESTOR) 40 MG tablet Take 40 mg by mouth every evening    Historical Provider, MD   solifenacin (VESICARE) 10 MG tablet Take 10 mg by mouth daily    Historical Provider, MD   docusate sodium (COLACE) 100 MG capsule Take 100 mg by mouth daily    Historical Provider, MD   senna (SENOKOT) 8.6 MG tablet Take 1 tablet by mouth daily    Historical Provider, MD   spironolactone (ALDACTONE) 25 MG tablet Take 25 mg by mouth 2 times daily. Historical Provider, MD       Allergies:  Sulfa antibiotics    Social History:  The patient currently lives at home     TOBACCO:   reports that she has never smoked. She has never used smokeless tobacco.  ETOH:   reports that she does not drink alcohol. Family History:  Reviewed in detail and negative for DM, Early CAD, Cancer, CVA. Positive as follows:    History reviewed. No pertinent family history. REVIEW OF SYSTEMS:   Positive for  and as noted in the HPI. All other systems reviewed and negative.     PHYSICAL EXAM:    BP (!) 105/51   Pulse 88   Temp 99.1 °F (37.3 °C) (Temporal)   Resp 16   Ht 5' 1.5\" (1.562 m)   Wt 300 lb (136.1 kg)   SpO2 92%   BMI 55.77 kg/m²     General appearance: No apparent distress appears stated age and cooperative. HEENT Normal cephalic, atraumatic without obvious deformity. Pupils equal, round, and reactive to light. Extra ocular muscles intact. Conjunctivae/corneas clear. Neck: Supple, No jugular venous distention/bruits. Trachea midline without thyromegaly or adenopathy with full range of motion. Lungs: Clear to auscultation, decreased air movement, no rales or rhonchi  Heart: Regular rate and rhythm with Normal S1/S2 without murmurs, rubs or gallops, point of maximum impulse non-displaced, has trace edema   Abdomen: Soft, non-tender or non-distended without rigidity or guarding and positive bowel sounds all four quadrants. Extremities: No clubbing, cyanosis, 1 + edema bilaterally. Full range of motion without deformity, gait is not tested. Skin: Skin color, texture, turgor normal.  No rashes or lesions. Neurologic: Alert and oriented X 3, neurovascularly intact with sensory/motor intact upper extremities/lower extremities, bilaterally. Cranial nerves: II-XII intact, grossly non-focal.  Mental status: Alert, oriented, thought content appropriate. Capillary Refill: Acceptable  < 3 seconds  Peripheral Pulses: +3 Easily felt, not easily obliterated with pressure      CXR:  I have reviewed the CXR with the following interpretation: substantially changed likely related to congestive heart failure.   EKG:  I have reviewed the EKG with the following interpretation: Normal sinus rhythmNon-specific intra-ventricular conduction delayLeft ventricular hypertrophy with QRS widening  suggestedAbnormal ECGNo significant change    CBC   Recent Labs     04/24/19  1112   WBC 9.9   HGB 10.2*   HCT 32.0*         RENAL  Recent Labs     04/24/19  1112      K 4.0      CO2 35*   BUN 17   CREATININE 0.8     LFT'S  Recent Labs 04/24/19  1112   AST 14*   ALT 13   BILITOT <0.2   ALKPHOS 90     COAG  No results for input(s): INR in the last 72 hours. CARDIAC ENZYMES  Recent Labs     04/24/19  1112   CKTOTAL 139   TROPONINI 0.01       U/A:    Lab Results   Component Value Date    COLORU YELLOW 04/24/2019    WBCUA 2 04/08/2019    RBCUA 2 04/08/2019    BACTERIA RARE 11/28/2018    CLARITYU Clear 04/24/2019    SPECGRAV 1.015 04/24/2019    LEUKOCYTESUR Negative 04/24/2019    BLOODU Negative 04/24/2019    GLUCOSEU Negative 04/24/2019    GLUCOSEU NEGATIVE 03/21/2011       ABG    Lab Results   Component Value Date    MAF2WGH 34.5 04/24/2019    BEART 7.7 04/24/2019    B6GEKVZX 92.4 04/24/2019    PHART 7.364 04/24/2019    USW7WGS 60.5 04/24/2019    PO2ART 62.1 04/24/2019    WBI5CUP 81.4 04/24/2019           Active Hospital Problems    Diagnosis Date Noted    Pneumonia [J18.9] 04/24/2019         PHYSICIANS CERTIFICATION:    I certify that Carmine Chaves is expected to be hospitalized for greater  than 2 midnights based on the following assessment and plan:      ASSESSMENT/PLAN:    1. Acute on chronic respiratory failure  - due to copd and failure of outpatient treatment for COPD  -rocpehin and azithromycin. 2. Acute exac of COPD  - NEBS  -STEROIDS  -Rocephin and azithromycin. 3. Pulm htn  - has had increased fluid on the past  - HX   -Seems compensated. - will give IV lasix in house. 4. Pneumonia  - present on imaging  - no fever, no white count. -  Covered with abx     5. Pt has stage II ulcer on coccyx.  -present on admission. DVT Prophylaxis: lovneox  Diet: DIET GENERAL;  Code Status: Full Code  PT/OT Eval Status: eval and treat     Dispo - in patient        Linda Leija MD    Thank you Lawrence Hodgkin, APRN - [place] for the opportunity to be involved in this patient's care. If you have any questions or concerns please feel free to contact me at [phone].

## 2019-04-24 NOTE — ED PROVIDER NOTES
right shoulder, she is currently rating her pain as a 7/10, pain is worse with touch and movement, no neck pain or back pain     Nursing Notes were all reviewed and agreed with or any disagreements were addressed  in the HPI. REVIEW OF SYSTEMS    (2-9 systems for level 4, 10 or more for level 5)     Review of Systems   Constitutional: Positive for fatigue. Negative for activity change, appetite change, chills and fever. HENT: Negative for congestion and rhinorrhea. Respiratory: Positive for cough. Negative for shortness of breath. Cardiovascular: Negative for chest pain. Gastrointestinal: Negative for abdominal pain, diarrhea, nausea and vomiting. Genitourinary: Negative for difficulty urinating, dysuria and hematuria. Musculoskeletal: Positive for arthralgias. Neurological: Negative for weakness and numbness. Positives and Pertinent negatives as per HPI. Except as noted abovein the ROS, all other systems were reviewed and negative. PAST MEDICAL HISTORY     Past Medical History:   Diagnosis Date    COPD (chronic obstructive pulmonary disease) ([facility] [address]     Hernia of unspecified site of abdominal cavity without mention of obstruction or gangrene     Incontinence     Knee pain, bilateral     pt states no cartilage    Spinal stenosis          SURGICAL HISTORY     Past Surgical History:   Procedure Laterality Date    CHOLECYSTECTOMY      PARTIAL HYSTERECTOMY           CURRENTMEDICATIONS       Previous Medications    CHLORPHEN-PHENYLEPH-APAP (CORICIDIN D COLD/FLU/SINUS) 2-5-325 MG TABS    Take 2 tablets by mouth 4 times daily as needed (cough)    DICLOFENAC (VOLTAREN) 50 MG EC TABLET    Take 50 mg by mouth 2 times daily    DOCUSATE SODIUM (COLACE) 100 MG CAPSULE    Take 100 mg by mouth daily    ESOMEPRAZOLE (NEXIUM) 40 MG CAPSULE    Take 40 mg by mouth every morning (before breakfast). FLUOXETINE (PROZAC) 20 MG CAPSULE    Take 40 mg by mouth daily.       FLUTICASONE-SALMETEROL (ADVAIR DISKUS IN)    Inhale  into the lungs. FUROSEMIDE (LASIX) 20 MG TABLET    Take 40 mg by mouth daily     LEVOTHYROXINE (SYNTHROID) 125 MCG TABLET    Take 125 mcg by mouth daily. LISINOPRIL (PRINIVIL;ZESTRIL) 10 MG TABLET    Take 10 mg by mouth daily    MAGNESIUM HYDROXIDE (MILK OF MAGNESIA) 400 MG/5ML SUSPENSION    Take 30 mLs by mouth daily as needed for Constipation    OXYCODONE-ACETAMINOPHEN (PERCOCET)  MG PER TABLET    Take 1 tablet by mouth every 6 hours as needed for Pain. PREGABALIN (LYRICA) 50 MG CAPSULE    Take 100 mg by mouth 2 times daily     ROSUVASTATIN (CRESTOR) 40 MG TABLET    Take 40 mg by mouth every evening    SENNA (SENOKOT) 8.6 MG TABLET    Take 1 tablet by mouth daily    SOLIFENACIN (VESICARE) 10 MG TABLET    Take 10 mg by mouth daily    SPIRONOLACTONE (ALDACTONE) 25 MG TABLET    Take 25 mg by mouth 2 times daily. SUMATRIPTAN (IMITREX) 25 MG TABLET    Take 50 mg by mouth once as needed. ALLERGIES     Sulfa antibiotics    FAMILYHISTORY     History reviewed. No pertinent family history.        SOCIAL HISTORY       Social History     Socioeconomic History    Marital status:      Spouse name: None    Number of children: None    Years of education: None    Highest education level: None   Occupational History    None   Social Needs    Financial resource strain: None    Food insecurity:     Worry: None     Inability: None    Transportation needs:     Medical: None     Non-medical: None   Tobacco Use    Smoking status: Never Smoker    Smokeless tobacco: Never Used   Substance and Sexual Activity    Alcohol use: No    Drug use: No    Sexual activity: None   Lifestyle    Physical activity:     Days per week: None     Minutes per session: None    Stress: None   Relationships    Social connections:     Talks on phone: None     Gets together: None     Attends Buddhism service: None     Active member of club or organization: None     Attends meetings of clubs or organizations: None     Relationship status: None    Intimate partner violence:     Fear of current or ex partner: None     Emotionally abused: None     Physically abused: None     Forced sexual activity: None   Other Topics Concern    None   Social History Narrative    None       SCREENINGS             PHYSICAL EXAM    (up to 7 for level 4, 8 or more for level 5)     ED Triage Vitals [04/24/19 0926]   BP Temp Temp Source Pulse Resp SpO2 Height Weight   110/81 97.3 °F (36.3 °C) Oral 86 20 93 % 5' 1.5\" (1.562 m) 300 lb (136.1 kg)       Physical Exam   Constitutional: She is oriented to person, place, and time. She appears well-developed and well-nourished. HENT:   Head: Normocephalic and atraumatic. Right Ear: External ear normal.   Left Ear: External ear normal.   Nose: Nose normal.   Eyes: Right eye exhibits no discharge. Left eye exhibits no discharge. Neck: Normal range of motion. Neck supple. No tracheal deviation present. Cardiovascular: Normal rate, regular rhythm and normal heart sounds. No murmur heard. Pulmonary/Chest: Effort normal. No respiratory distress. She has wheezes. She has rales. Diminished aeration    Abdominal: Soft. Bowel sounds are normal. She exhibits no distension. There is no tenderness. There is no guarding. Musculoskeletal: Normal range of motion. There is tenderness to palpation diffusely over the right shoulder, she has full passive range of motion of the shoulder. Radial pulses 2+. Normal sensation light touch. Neurovascularly   Neurological: She is alert and oriented to person, place, and time. Skin: Skin is warm and dry. She is not diaphoretic. Psychiatric: She has a normal mood and affect. Her behavior is normal.   Nursing note and vitals reviewed.       DIAGNOSTIC RESULTS   LABS:    Labs Reviewed   CBC WITH AUTO DIFFERENTIAL - Abnormal; Notable for the following components:       Result Value    RBC 3.60 (*)     Hemoglobin 10.2 (*) [facility]  [address] [facility],  [place], [address]   Phone [phone]       All other labs were within normal range or not returned as of this dictation. EKG: All EKG's are interpreted by the Emergency Department Physician who either signs orCo-signs this chart in the absence of a cardiologist.  Please see their note for interpretation of EKG. RADIOLOGY:   Non-plain film images such as CT, Ultrasound and MRI are read by the radiologist. Plain radiographic images are visualized andpreliminarily interpreted by the  ED Provider with the below findings:        Interpretation perthe Radiologist below, if available at the time of this note:    CT CHEST WO CONTRAST   Final Result   Bibasilar airspace disease, right greater than left, either atelectasis or   pneumonia. Scattered areas of bronchial wall thickening are seen      Mild mosaic attenuation in the apices, suggesting small airways disease or   air trapping      Stable tiny punctate pulmonary nodules      Increase in size of left renal nodule, presumably a cyst         CT CERVICAL SPINE WO CONTRAST   Final Result   No acute abnormality of the cervical spine. CT HEAD WO CONTRAST   Final Result   Motion limited. No acute traumatic intracranial abnormality. Mild periventricular white matter disease, likely due to small-vessel   ischemic change      Trace paranasal sinus disease         XR SHOULDER RIGHT (MIN 2 VIEWS)   Final Result   Concern for nondisplaced humeral head fracture. Recommend further evaluation   with CT of the right shoulder. XR CHEST PORTABLE   Final Result   Findings not substantially changed likely related to congestive heart failure. No results found.       PROCEDURES   Unless otherwise noted below, none     Procedures    CRITICAL CARE TIME   N/A    CONSULTS:  IP CONSULT TO HOSPITALIST      EMERGENCY DEPARTMENT COURSE and DIFFERENTIALDIAGNOSIS/MDM:   Vitals:    Vitals:    04/24/19 1252 04/24/19 1300 04/24/19 1330 04/24/19 1400   BP: (!) 113/50 (!) 114/41 (!) 118/49 (!) 99/57   Pulse: 85 85 84 92   Resp: 22 22 20 16   Temp:       TempSrc:       SpO2: 92% 96% 98% 92%   Weight:       Height:           Patient was given thefollowing medications:  Medications   ipratropium-albuterol (DUONEB) nebulizer solution 1 ampule (1 ampule Inhalation Given 4/24/19 1002)   albuterol (PROVENTIL) nebulizer solution 5 mg (5 mg Nebulization Given 4/24/19 1002)   0.9 % sodium chloride bolus (1,000 mLs Intravenous New Bag 4/24/19 1207)   cefTRIAXone (ROCEPHIN) 1 g in sterile water 10 mL IV syringe (1 g Intravenous Given 4/24/19 1248)   azithromycin (ZITHROMAX) tablet 1,000 mg (1,000 mg Oral Given 4/24/19 1247)   oxyCODONE-acetaminophen (PERCOCET) 5-325 MG per tablet 2 tablet (2 tablets Oral Given 4/24/19 1300)   ipratropium-albuterol (DUONEB) nebulizer solution 1 ampule (1 ampule Inhalation Given 4/24/19 1324)       The patient presents to the emergency department for evaluation for fall. Patient states that she got up to use the restroom, and she states that her legs give out, and she states that she fell on the floor. Daughter states that she came in and she found the patient on the floor, unknown how long that she only believes that it was a couple hours. Patient states that she has been feeling fatigued increasingly over the past 3 days. She was in the ED at the beginning of April for COPD exacerbation she was sent home on antibiotics which she finished 3 days ago as well. Patient is on 3 L of oxygen continuously, she denies feeling chest pain, shortness of breath, dizziness or lightheadedness before her fall, she is just complaining of general fatigue. Patient has a dry intermittent cough, no sputum production or hemoptysis. She denies a fever or chills. No abdominal pain. No nausea, vomiting or diarrhea. She denies any dysuria hematuria. Patient is unsure if she hit her head, she is not on any blood thinners. She is having some pain to her right shoulder, she is currently rating her pain as a 7/10, pain is worse with touch and movement, no neck pain or back pain     On physical exam she has diminished aeration throughout with wheezing, patient was giving breathing treatments for this    ED does show some hypercapnia of 60 however patient is awake alert and talkative, do not feel that she needs BiPAP at this time. Urine shows no evidence of infection or blood. CBC shows no evidence of leukocytosis, mild anemia. CMP unremarkable. Troponin negative. Lactic acid is normal.  CT chest shows bibasilar airspace disease, she was covered with Rocephin and Zithromax. CT of head shows no acute process, x-ray initially possible humeral head fracture however there is no report of any fracture on the CT result. Due to patient's failure of outpatient treatment of pneumonia and COPD exacerbation I believe that she'll need to be admitted for further care and evaluation. The patient is updated and agreeable with plan. Stable for admission. Hospitalist has agreed for admission    FINAL IMPRESSION      1. COPD exacerbation ([address]    2. Pneumonia due to organism    3.  Failure of outpatient treatment          DISPOSITION/PLAN   DISPOSITION Admitted 04/24/2019 01:44:37 PM      PATIENT REFERREDTO:  Americo Montgomery, APRN - CNP  [address] [address]  [phone]            DISCHARGE MEDICATIONS:  New Prescriptions    No medications on file       DISCONTINUED MEDICATIONS:  Discontinued Medications    No medications on file              (Please note that portions ofthis note were completed with a voice recognition program.  Efforts were made to edit the dictations but occasionally words are mis-transcribed.)    Donny Figueredo PA-C (electronically signed)            Donny Figueredo PA-C  04/24/19 1500

## 2019-04-24 NOTE — ED PROVIDER NOTES
I independently performed a history and physical on Benson Vera. All diagnostic, treatment, and disposition decisions were made by myself in conjunction with the advanced practice provider. Briefly, this is a 82 y.o. female here for apparently a fall. Patient was on the ground for an unknown amount of time. Patient is quite sleepy at this time and is not a great historian. Full HPI is incredibly limited because of this. Daughter is at bedside. She came to check on her this morning and found her on the floor. See LIVAN note    On exam:  Constitutional:  Well developed, well nourished, sleepy  Eyes:  PERRL, conjunctiva normal , pupils 2mm and symmetrical  HENT:  Atraumatic, external ears normal, nosenormal, oropharynx DRY, no pharyngeal exudates. Neck- normal range of motion, supple   Respiratory:  No respiratory distress, no rales, no wheezing , coarse BS  Cardiovascular:  Normal rate, normal rhythm, no murmurs,   GI:  Soft, nondistended, nontender, obese  Musculoskeletal:  No tenderness, no deformities. Integument:  no rashes on exposed surfaces  Neurologic:  Alert & oriented x 3,  normalmotor function, normal sensory function, no focal deficits noted   Psychiatric:  Sleepy, but easily arouseable. Fiesty at times . Speech and behavior appropriate. No agitation.       EKG     EKG 12 Lead (Final result)    Component (Lab Inquiry)   Collection Time Result Time Ventricular Rate Atrial Rate P-R Interval QRS Duration Q-T Interval   04/24/19 11:23:49 04/24/19 13:44:09 89 89 176 116 380       Collection Time Result Time QTc Calculation (Bazett) P Axis R Axis T Axis Diagnosis   04/24/19 11:23:49 04/24/19 13:44:09 462 44 -29 57 Normal sinus rhythmNon-specific intra-ventricular conduction delayLeft ventricular hypertrophy with QRS widening suggestedAbnormal ECGNo significant changeConfirmed            Screenings   [place] Coma Scale  Eye Opening: Spontaneous  Best Verbal Response: Oriented  Best Motor Response: Obeys commands  [place] Coma Scale Score: 15        MDM  Plan to admit. Pt is O2 dependant we find, with prob COPD hx. CXR shows PNA with failure of outpt tx for her PNA. Will give ABx and admit. She seems easily arouseable enough in the ED setting today. SEE LIVAN NOTE      Critical Care  There was a high probability of life-threatening clinical deterioration in the patient's condition requiring my urgent intervention. Total critical care time with the patient was 35 minutes excluding separately reportable procedures. Critical care required due to patients multifocal PNA      Patient Referrals:  ADRIANO Seo   [phone]            Discharge Medications:  Current Discharge Medication List          FINAL IMPRESSION  1. COPD exacerbation ([facility] [address]    2. Pneumonia due to organism    3. Failure of outpatient treatment        Blood pressure (!) 105/51, pulse 88, temperature 99.1 °F (37.3 °C), temperature source Temporal, resp. rate 16, height 5' 1.5\" (1.562 m), weight 300 lb (136.1 kg), SpO2 96 %. For further details of [facility] emergency department encounter, please see documentation by advanced practice provider.        Von Show III, DO  04/24/19 9808

## 2019-04-25 LAB
ANION GAP SERPL CALCULATED.3IONS-SCNC: 9 MMOL/L (ref 3–16)
BUN BLDV-MCNC: 15 MG/DL (ref 7–20)
CALCIUM SERPL-MCNC: 8.6 MG/DL (ref 8.3–10.6)
CHLORIDE BLD-SCNC: 99 MMOL/L (ref 99–110)
CO2: 34 MMOL/L (ref 21–32)
CREAT SERPL-MCNC: 0.8 MG/DL (ref 0.6–1.2)
GFR AFRICAN AMERICAN: >60
GFR NON-AFRICAN AMERICAN: >60
GLUCOSE BLD-MCNC: 219 MG/DL (ref 70–99)
HCT VFR BLD CALC: 32.3 % (ref 36–48)
HEMOGLOBIN: 10.3 G/DL (ref 12–16)
MCH RBC QN AUTO: 28.4 PG (ref 26–34)
MCHC RBC AUTO-ENTMCNC: 31.9 G/DL (ref 31–36)
MCV RBC AUTO: 89.2 FL (ref 80–100)
PDW BLD-RTO: 17.6 % (ref 12.4–15.4)
PLATELET # BLD: 179 K/UL (ref 135–450)
PMV BLD AUTO: 8.7 FL (ref 5–10.5)
POTASSIUM REFLEX MAGNESIUM: 4.1 MMOL/L (ref 3.5–5.1)
RBC # BLD: 3.62 M/UL (ref 4–5.2)
SODIUM BLD-SCNC: 142 MMOL/L (ref 136–145)
WBC # BLD: 8.3 K/UL (ref 4–11)

## 2019-04-25 PROCEDURE — 1200000000 HC SEMI PRIVATE

## 2019-04-25 PROCEDURE — 94640 AIRWAY INHALATION TREATMENT: CPT

## 2019-04-25 PROCEDURE — 36415 COLL VENOUS BLD VENIPUNCTURE: CPT

## 2019-04-25 PROCEDURE — 6370000000 HC RX 637 (ALT 250 FOR IP): Performed by: INTERNAL MEDICINE

## 2019-04-25 PROCEDURE — 94760 N-INVAS EAR/PLS OXIMETRY 1: CPT

## 2019-04-25 PROCEDURE — 6360000002 HC RX W HCPCS: Performed by: INTERNAL MEDICINE

## 2019-04-25 PROCEDURE — 2700000000 HC OXYGEN THERAPY PER DAY

## 2019-04-25 PROCEDURE — 85027 COMPLETE CBC AUTOMATED: CPT

## 2019-04-25 PROCEDURE — 2500000003 HC RX 250 WO HCPCS: Performed by: INTERNAL MEDICINE

## 2019-04-25 PROCEDURE — 80048 BASIC METABOLIC PNL TOTAL CA: CPT

## 2019-04-25 PROCEDURE — 2580000003 HC RX 258: Performed by: INTERNAL MEDICINE

## 2019-04-25 RX ORDER — ACETAMINOPHEN 80 MG
TABLET,CHEWABLE ORAL
Status: DISPENSED
Start: 2019-04-25 | End: 2019-04-25

## 2019-04-25 RX ADMIN — FUROSEMIDE 40 MG: 10 INJECTION, SOLUTION INTRAMUSCULAR; INTRAVENOUS at 08:31

## 2019-04-25 RX ADMIN — SPIRONOLACTONE 25 MG: 25 TABLET ORAL at 18:02

## 2019-04-25 RX ADMIN — TROSPIUM CHLORIDE 20 MG: 20 TABLET ORAL at 07:01

## 2019-04-25 RX ADMIN — FLUOXETINE 40 MG: 20 CAPSULE ORAL at 08:31

## 2019-04-25 RX ADMIN — ALBUTEROL SULFATE 2.5 MG: 2.5 SOLUTION RESPIRATORY (INHALATION) at 21:15

## 2019-04-25 RX ADMIN — OXYCODONE AND ACETAMINOPHEN 1 TABLET: 10; 325 TABLET ORAL at 10:24

## 2019-04-25 RX ADMIN — LEVOTHYROXINE SODIUM 125 MCG: 0.12 TABLET ORAL at 07:01

## 2019-04-25 RX ADMIN — SENNOSIDES 8.6 MG: 8.6 TABLET, FILM COATED ORAL at 21:00

## 2019-04-25 RX ADMIN — Medication 10 ML: at 08:32

## 2019-04-25 RX ADMIN — ALBUTEROL SULFATE 2.5 MG: 2.5 SOLUTION RESPIRATORY (INHALATION) at 11:41

## 2019-04-25 RX ADMIN — ALBUTEROL SULFATE 2.5 MG: 2.5 SOLUTION RESPIRATORY (INHALATION) at 07:42

## 2019-04-25 RX ADMIN — OXYCODONE AND ACETAMINOPHEN 1 TABLET: 10; 325 TABLET ORAL at 04:22

## 2019-04-25 RX ADMIN — METHYLPREDNISOLONE SODIUM SUCCINATE 40 MG: 40 INJECTION, POWDER, FOR SOLUTION INTRAMUSCULAR; INTRAVENOUS at 12:05

## 2019-04-25 RX ADMIN — SPIRONOLACTONE 25 MG: 25 TABLET ORAL at 08:30

## 2019-04-25 RX ADMIN — TAZOBACTAM SODIUM AND PIPERACILLIN SODIUM 3.38 G: 375; 3 INJECTION, SOLUTION INTRAVENOUS at 16:33

## 2019-04-25 RX ADMIN — PREGABALIN 150 MG: 75 CAPSULE ORAL at 21:00

## 2019-04-25 RX ADMIN — OXYCODONE AND ACETAMINOPHEN 1 TABLET: 10; 325 TABLET ORAL at 16:33

## 2019-04-25 RX ADMIN — PANTOPRAZOLE SODIUM 40 MG: 40 TABLET, DELAYED RELEASE ORAL at 07:01

## 2019-04-25 RX ADMIN — ALBUTEROL SULFATE 2.5 MG: 2.5 SOLUTION RESPIRATORY (INHALATION) at 15:26

## 2019-04-25 RX ADMIN — DOCUSATE SODIUM 100 MG: 100 CAPSULE, LIQUID FILLED ORAL at 21:00

## 2019-04-25 RX ADMIN — METHYLPREDNISOLONE SODIUM SUCCINATE 40 MG: 40 INJECTION, POWDER, FOR SOLUTION INTRAMUSCULAR; INTRAVENOUS at 16:37

## 2019-04-25 RX ADMIN — ROSUVASTATIN CALCIUM 40 MG: 40 TABLET, FILM COATED ORAL at 16:40

## 2019-04-25 RX ADMIN — GUAIFENESIN 600 MG: 600 TABLET, EXTENDED RELEASE ORAL at 08:30

## 2019-04-25 RX ADMIN — TROSPIUM CHLORIDE 20 MG: 20 TABLET ORAL at 16:34

## 2019-04-25 RX ADMIN — Medication 2 PUFF: at 21:15

## 2019-04-25 RX ADMIN — GUAIFENESIN 600 MG: 600 TABLET, EXTENDED RELEASE ORAL at 21:01

## 2019-04-25 RX ADMIN — LISINOPRIL 10 MG: 10 TABLET ORAL at 08:30

## 2019-04-25 RX ADMIN — METHYLPREDNISOLONE SODIUM SUCCINATE 40 MG: 40 INJECTION, POWDER, FOR SOLUTION INTRAMUSCULAR; INTRAVENOUS at 04:12

## 2019-04-25 RX ADMIN — ENOXAPARIN SODIUM 40 MG: 40 INJECTION SUBCUTANEOUS at 21:01

## 2019-04-25 RX ADMIN — Medication 2 PUFF: at 07:42

## 2019-04-25 RX ADMIN — PREGABALIN 150 MG: 75 CAPSULE ORAL at 08:30

## 2019-04-25 RX ADMIN — FUROSEMIDE 40 MG: 10 INJECTION, SOLUTION INTRAMUSCULAR; INTRAVENOUS at 18:02

## 2019-04-25 RX ADMIN — TAZOBACTAM SODIUM AND PIPERACILLIN SODIUM 3.38 G: 375; 3 INJECTION, SOLUTION INTRAVENOUS at 08:31

## 2019-04-25 RX ADMIN — Medication 10 ML: at 21:02

## 2019-04-25 ASSESSMENT — PAIN DESCRIPTION - PAIN TYPE
TYPE: CHRONIC PAIN

## 2019-04-25 ASSESSMENT — PAIN DESCRIPTION - LOCATION
LOCATION: GENERALIZED
LOCATION: GENERALIZED
LOCATION: LEG
LOCATION: GENERALIZED
LOCATION: GENERALIZED

## 2019-04-25 ASSESSMENT — PAIN SCALES - GENERAL
PAINLEVEL_OUTOF10: 8
PAINLEVEL_OUTOF10: 7
PAINLEVEL_OUTOF10: 7
PAINLEVEL_OUTOF10: 6
PAINLEVEL_OUTOF10: 7
PAINLEVEL_OUTOF10: 8
PAINLEVEL_OUTOF10: 7
PAINLEVEL_OUTOF10: 6
PAINLEVEL_OUTOF10: 8
PAINLEVEL_OUTOF10: 5

## 2019-04-25 ASSESSMENT — PAIN DESCRIPTION - DESCRIPTORS: DESCRIPTORS: ACHING

## 2019-04-25 ASSESSMENT — PAIN DESCRIPTION - ORIENTATION: ORIENTATION: RIGHT;LEFT

## 2019-04-25 NOTE — PROGRESS NOTES
Temp 98.5 °F (36.9 °C) (Temporal)   Resp 18   Ht 5' 1.5\" (1.562 m)   Wt 289 lb 11 oz (131.4 kg)   SpO2 91%   BMI 53.85 kg/m²     General appearance: No apparent distress, appears stated age and cooperative. HEENT: Pupils equal, round, and reactive to light. Conjunctivae/corneas clear. Neck: Supple, with full range of motion. No jugular venous distention. Trachea midline. Respiratory:  Normal respiratory effort. Clear to auscultation, bilaterally without Rales/Wheezes/Rhonchi. Cardiovascular: Regular rate and rhythm with normal S1/S2 without murmurs, rubs or gallops. Abdomen: Soft, non-tender, non-distended with normal bowel sounds. Musculoskeletal: No clubbing, cyanosis or edema bilaterally. Full range of motion without deformity. Skin: Skin color, texture, turgor normal.  No rashes or lesions. Neurologic:  Neurovascularly intact without any focal sensory/motor deficits. Cranial nerves: II-XII intact, grossly non-focal.  Psychiatric: Alert and oriented, thought content appropriate, normal insight  Capillary Refill: Brisk,< 3 seconds   Peripheral Pulses: +2 palpable, equal bilaterally       Labs:   Recent Labs     04/24/19  1112 04/25/19  0550   WBC 9.9 8.3   HGB 10.2* 10.3*   HCT 32.0* 32.3*    179     Recent Labs     04/24/19  1112 04/25/19  0550    142   K 4.0 4.1    99   CO2 35* 34*   BUN 17 15   CREATININE 0.8 0.8   CALCIUM 8.7 8.6     Recent Labs     04/24/19  1112   AST 14*   ALT 13   BILITOT <0.2   ALKPHOS 90     No results for input(s): INR in the last 72 hours.   Recent Labs     04/24/19  1112   CKTOTAL 139   TROPONINI 0.01       Urinalysis:      Lab Results   Component Value Date    NITRU Negative 04/24/2019    WBCUA 2 04/08/2019    BACTERIA RARE 11/28/2018    RBCUA 2 04/08/2019    BLOODU Negative 04/24/2019    SPECGRAV 1.015 04/24/2019    GLUCOSEU Negative 04/24/2019    GLUCOSEU NEGATIVE 03/21/2011       Radiology:  CT CHEST WO CONTRAST   Final Result   Bibasilar airspace disease, right greater than left, either atelectasis or   pneumonia. Scattered areas of bronchial wall thickening are seen      Mild mosaic attenuation in the apices, suggesting small airways disease or   air trapping      Stable tiny punctate pulmonary nodules      Increase in size of left renal nodule, presumably a cyst         CT CERVICAL SPINE WO CONTRAST   Final Result   No acute abnormality of the cervical spine. CT HEAD WO CONTRAST   Final Result   Motion limited. No acute traumatic intracranial abnormality. Mild periventricular white matter disease, likely due to small-vessel   ischemic change      Trace paranasal sinus disease         XR SHOULDER RIGHT (MIN 2 VIEWS)   Final Result   Concern for nondisplaced humeral head fracture. Recommend further evaluation   with CT of the right shoulder. XR CHEST PORTABLE   Final Result   Findings not substantially changed likely related to congestive heart failure. Assessment/Plan:    Active Hospital Problems    Diagnosis Date Noted    Pneumonia [J18.9] 04/24/2019     . Acute on chronic respiratory failure  - due to copd and failure of outpatient treatment for COPD  -rocpehin and azithromycin.     2. Acute exac of COPD  - NEBS  -STEROIDS  -Rocephin and azithromycin.     3 Pulm HTN   - HX   -no sign of chf  - will give IV lasix in house.     4. Pneumonia  - present on imaging  - no fever, no white count. -  Covered with abx      5.  Pt has stage II ulcer on coccyx.  -present on admission.         DVT Prophylaxis: lovenox  Diet: DIET GENERAL;  Code Status: Full Code    PT/OT Eval Status: eval and treat     Dispo - Minerva Urias MD

## 2019-04-25 NOTE — PROGRESS NOTES
Shift assessment done, see flow sheet. Medication administrated, see MAR. Fall precautions in place, call light in reach. Pt [id number] needs at this time. Daughter at bedside.  Will continue to minitor

## 2019-04-25 NOTE — PROGRESS NOTES
Percocet given x 1 for pain. Ngo output noted. IV re-taped after tegaderm was loose. No changes noted and no needs expressed. Will continue to monitor.

## 2019-04-25 NOTE — PROGRESS NOTES
Wound picture taken by clinical admin. No visible drainage noted or on old mepilex. Area around wound bruised. New mepilex applied. No other areas noted. Assessment as documented. Zosyn infusing. No other needs or concerns expressed. Will continue to monitor.

## 2019-04-25 NOTE — PROGRESS NOTES
Shift assessment complete. A&O x4. Vital signs stable. 92% on 3L NC. Expiratory wheezes present bilaterally. +1, pitting edema in bilateral lower extremities. Open wound to coccyx. Mepilex placed. Repositioned for comfort. Dyspnea with exertion. Night meds given whole with water. The care plan and education has been reviewed and mutually agreed upon with the patient. Fall precautions in place. Bed alarm on. Call light within reach. All needs met at this time. Will continue to monitor.

## 2019-04-25 NOTE — PLAN OF CARE
Problem: Pain:  Goal: Pain level will decrease  Description  Pain level will decrease  4/25/2019 1731 by Cinda Walker RN  Outcome: Ongoing  4/25/2019 1436 by Cinda Walker RN  Outcome: Ongoing  Goal: Control of acute pain  Description  Control of acute pain  4/25/2019 1731 by Cinda Walker RN  Outcome: Ongoing  4/25/2019 1436 by Cinda Walker RN  Outcome: Ongoing  Goal: Control of chronic pain  Description  Control of chronic pain  4/25/2019 1731 by Cinda Walker RN  Outcome: Ongoing  4/25/2019 1436 by Cinda Walker RN  Outcome: Ongoing     Problem: Risk for Impaired Skin Integrity  Goal: Tissue integrity - skin and mucous membranes  Description  Structural intactness and normal physiological function of skin and  mucous membranes.   Outcome: Ongoing

## 2019-04-26 PROCEDURE — 2700000000 HC OXYGEN THERAPY PER DAY

## 2019-04-26 PROCEDURE — 6360000002 HC RX W HCPCS: Performed by: INTERNAL MEDICINE

## 2019-04-26 PROCEDURE — 2500000003 HC RX 250 WO HCPCS: Performed by: INTERNAL MEDICINE

## 2019-04-26 PROCEDURE — 2580000003 HC RX 258: Performed by: INTERNAL MEDICINE

## 2019-04-26 PROCEDURE — 97165 OT EVAL LOW COMPLEX 30 MIN: CPT

## 2019-04-26 PROCEDURE — 97530 THERAPEUTIC ACTIVITIES: CPT

## 2019-04-26 PROCEDURE — 1200000000 HC SEMI PRIVATE

## 2019-04-26 PROCEDURE — 97161 PT EVAL LOW COMPLEX 20 MIN: CPT

## 2019-04-26 PROCEDURE — 94760 N-INVAS EAR/PLS OXIMETRY 1: CPT

## 2019-04-26 PROCEDURE — 97535 SELF CARE MNGMENT TRAINING: CPT

## 2019-04-26 PROCEDURE — 6370000000 HC RX 637 (ALT 250 FOR IP): Performed by: INTERNAL MEDICINE

## 2019-04-26 PROCEDURE — 94640 AIRWAY INHALATION TREATMENT: CPT

## 2019-04-26 RX ADMIN — GUAIFENESIN 600 MG: 600 TABLET, EXTENDED RELEASE ORAL at 09:11

## 2019-04-26 RX ADMIN — SPIRONOLACTONE 25 MG: 25 TABLET ORAL at 17:29

## 2019-04-26 RX ADMIN — METHYLPREDNISOLONE SODIUM SUCCINATE 40 MG: 40 INJECTION, POWDER, FOR SOLUTION INTRAMUSCULAR; INTRAVENOUS at 17:30

## 2019-04-26 RX ADMIN — TROSPIUM CHLORIDE 20 MG: 20 TABLET ORAL at 17:29

## 2019-04-26 RX ADMIN — ENOXAPARIN SODIUM 40 MG: 40 INJECTION SUBCUTANEOUS at 19:56

## 2019-04-26 RX ADMIN — OXYCODONE AND ACETAMINOPHEN 1 TABLET: 10; 325 TABLET ORAL at 19:04

## 2019-04-26 RX ADMIN — ALBUTEROL SULFATE 2.5 MG: 2.5 SOLUTION RESPIRATORY (INHALATION) at 15:43

## 2019-04-26 RX ADMIN — ALBUTEROL SULFATE 2.5 MG: 2.5 SOLUTION RESPIRATORY (INHALATION) at 07:34

## 2019-04-26 RX ADMIN — LISINOPRIL 10 MG: 10 TABLET ORAL at 09:11

## 2019-04-26 RX ADMIN — TAZOBACTAM SODIUM AND PIPERACILLIN SODIUM 3.38 G: 375; 3 INJECTION, SOLUTION INTRAVENOUS at 17:30

## 2019-04-26 RX ADMIN — FLUOXETINE 40 MG: 20 CAPSULE ORAL at 09:11

## 2019-04-26 RX ADMIN — PANTOPRAZOLE SODIUM 40 MG: 40 TABLET, DELAYED RELEASE ORAL at 05:36

## 2019-04-26 RX ADMIN — ROSUVASTATIN CALCIUM 40 MG: 40 TABLET, FILM COATED ORAL at 17:29

## 2019-04-26 RX ADMIN — TROSPIUM CHLORIDE 20 MG: 20 TABLET ORAL at 05:36

## 2019-04-26 RX ADMIN — Medication 10 ML: at 19:57

## 2019-04-26 RX ADMIN — TAZOBACTAM SODIUM AND PIPERACILLIN SODIUM 3.38 G: 375; 3 INJECTION, SOLUTION INTRAVENOUS at 00:40

## 2019-04-26 RX ADMIN — OXYCODONE AND ACETAMINOPHEN 1 TABLET: 10; 325 TABLET ORAL at 12:43

## 2019-04-26 RX ADMIN — FUROSEMIDE 40 MG: 10 INJECTION, SOLUTION INTRAMUSCULAR; INTRAVENOUS at 17:30

## 2019-04-26 RX ADMIN — TAZOBACTAM SODIUM AND PIPERACILLIN SODIUM 3.38 G: 375; 3 INJECTION, SOLUTION INTRAVENOUS at 09:10

## 2019-04-26 RX ADMIN — PREGABALIN 150 MG: 75 CAPSULE ORAL at 19:55

## 2019-04-26 RX ADMIN — SENNOSIDES 8.6 MG: 8.6 TABLET, FILM COATED ORAL at 19:56

## 2019-04-26 RX ADMIN — Medication 2 PUFF: at 07:34

## 2019-04-26 RX ADMIN — LEVOTHYROXINE SODIUM 125 MCG: 0.12 TABLET ORAL at 05:37

## 2019-04-26 RX ADMIN — METHYLPREDNISOLONE SODIUM SUCCINATE 40 MG: 40 INJECTION, POWDER, FOR SOLUTION INTRAMUSCULAR; INTRAVENOUS at 05:36

## 2019-04-26 RX ADMIN — GUAIFENESIN 600 MG: 600 TABLET, EXTENDED RELEASE ORAL at 19:56

## 2019-04-26 RX ADMIN — SPIRONOLACTONE 25 MG: 25 TABLET ORAL at 09:17

## 2019-04-26 RX ADMIN — OXYCODONE AND ACETAMINOPHEN 1 TABLET: 10; 325 TABLET ORAL at 06:03

## 2019-04-26 RX ADMIN — METHYLPREDNISOLONE SODIUM SUCCINATE 40 MG: 40 INJECTION, POWDER, FOR SOLUTION INTRAMUSCULAR; INTRAVENOUS at 00:16

## 2019-04-26 RX ADMIN — Medication 2 PUFF: at 20:44

## 2019-04-26 RX ADMIN — ALBUTEROL SULFATE 2.5 MG: 2.5 SOLUTION RESPIRATORY (INHALATION) at 20:44

## 2019-04-26 RX ADMIN — METHYLPREDNISOLONE SODIUM SUCCINATE 40 MG: 40 INJECTION, POWDER, FOR SOLUTION INTRAMUSCULAR; INTRAVENOUS at 12:42

## 2019-04-26 RX ADMIN — METHYLPREDNISOLONE SODIUM SUCCINATE 40 MG: 40 INJECTION, POWDER, FOR SOLUTION INTRAMUSCULAR; INTRAVENOUS at 21:42

## 2019-04-26 RX ADMIN — FUROSEMIDE 40 MG: 10 INJECTION, SOLUTION INTRAMUSCULAR; INTRAVENOUS at 09:11

## 2019-04-26 RX ADMIN — ALBUTEROL SULFATE 2.5 MG: 2.5 SOLUTION RESPIRATORY (INHALATION) at 12:29

## 2019-04-26 RX ADMIN — PREGABALIN 150 MG: 75 CAPSULE ORAL at 09:11

## 2019-04-26 RX ADMIN — DOCUSATE SODIUM 100 MG: 100 CAPSULE, LIQUID FILLED ORAL at 19:56

## 2019-04-26 RX ADMIN — OXYCODONE AND ACETAMINOPHEN 1 TABLET: 10; 325 TABLET ORAL at 00:17

## 2019-04-26 RX ADMIN — Medication 10 ML: at 09:11

## 2019-04-26 ASSESSMENT — PAIN SCALES - GENERAL
PAINLEVEL_OUTOF10: 0
PAINLEVEL_OUTOF10: 7
PAINLEVEL_OUTOF10: 8
PAINLEVEL_OUTOF10: 7
PAINLEVEL_OUTOF10: 9
PAINLEVEL_OUTOF10: 7
PAINLEVEL_OUTOF10: 6
PAINLEVEL_OUTOF10: 7
PAINLEVEL_OUTOF10: 8
PAINLEVEL_OUTOF10: 0
PAINLEVEL_OUTOF10: 8

## 2019-04-26 ASSESSMENT — PAIN DESCRIPTION - LOCATION
LOCATION: GENERALIZED;BACK
LOCATION: GENERALIZED
LOCATION: LEG

## 2019-04-26 ASSESSMENT — PAIN DESCRIPTION - PAIN TYPE
TYPE: ACUTE PAIN
TYPE: CHRONIC PAIN
TYPE: CHRONIC PAIN

## 2019-04-26 NOTE — CONSULTS
diet, taking medications as prescribed, joining rehab when able. Patient provided with CHF Zone Management tool and CHF symptoms magnet. Discussed importance of lifestyle changes:  Agrees to calling early with symptoms    PATIENT/CAREGIVER TEACHING:    Level of patient/caregiver understanding able to:   [x ] Verbalize understanding [ ] Demonstrate understanding [ ] Teach back   [ ] Needs reinforcement [ ] Other:       Time spent teachin mins    1. WEIGHT: Admit Weight: 300 lb (136.1 kg)      Today  Weight: 280 lb (127 kg)   2. I/O     Intake/Output Summary (Last 24 hours) at 2019 1828  Last data filed at 2019 1347  Gross per 24 hour   Intake --   Output 3700 ml   Net -3700 ml       Recommendations:   1. She should follow with her MD within a week of discharge. 2. Educate further on fluid restriction 48 oz- 64 oz during inpatient stay so she can understand how to measure intake at home. 3. Continue to educate on S/S.   4. Emphasize daily weights, diet, and knowing when and who to call  5. Provided patient with CHF Resource Line for questions and concerns.        RADHA OTERO 2019 6:28 PM

## 2019-04-26 NOTE — PROGRESS NOTES
Physical/Occupational Therapy  Jocelyne Pump  Orders received, chart reviewed. Attempted PT/OT evaluation this date. Per chart review, XR imaging noted concern for nondisplaced fracture of R humeral head fracture however no follow-up imaging or orthopedic consult has occurred. Discussed imaging findings with RN, Galindo Shankar, who is reporting no restrictions noted for shoulder however agrees that no further follow-up has occurred. Will hold therapy evaluations at this time until status of R shoulder is established. Will re-attempt evaluation later this date as schedule allows.    Thanks,   Alycia Moura PT, DPT [id number]   Jane Guthrie, OTR/L, [id number]

## 2019-04-26 NOTE — PROGRESS NOTES
xray shows nondisplaced fracture of R humeral head fracture. Is it okay for PT & OT work with pt ?  Perfect serve message sent to hospitalist.

## 2019-04-26 NOTE — DISCHARGE INSTR - COC
Continuity of Care Form    Patient Name: Gage Harrington   :  1935  MRN:  [id number]    6 [facility] date:  2019  Discharge date:      Code Status Order: Full Code   Advance Directives:   Advance Care Flowsheet Documentation     Date/Time Healthcare Directive Type of Healthcare Directive Copy in [address] Agent's Name Healthcare Agent's Phone Number    19 [phone]  No, patient does not have an advance directive for healthcare treatment -- -- -- -- --          Admitting Physician:  Lynda Anderson MD  PCP: Meribeth Mcardle, APRN - CNP    Discharging Nurse: [facility] Unit/Room#: C2D-1946/7683-59  Discharging Unit Phone Number: [phone]    Emergency Contact:   Extended Emergency Contact Information  Primary Emergency Contact: Wiley Geiger Phone: [phone]  Relation: Child  Secondary Emergency Contact: [address] Phone: [phone]  Relation: Child    Past Surgical History:  Past Surgical History:   Procedure Laterality Date    CHOLECYSTECTOMY      PARTIAL HYSTERECTOMY         Immunization History:   Immunization History   Administered Date(s) Administered    Influenza Virus Vaccine 10/24/2018    Pneumococcal 13-valent Conjugate (Lenn Feli) 2013       Active Problems:  Patient Active Problem List   Diagnosis Code    Pneumonia J18.9       Isolation/Infection:   Isolation          No Isolation            Nurse Assessment:  Last Vital Signs: /65   Pulse 77   Temp 98.2 °F (36.8 °C) (Temporal)   Resp 18   Ht 5' 1.5\" (1.562 m)   Wt 280 lb (127 kg)   SpO2 90%   BMI 52.05 kg/m²     Last documented pain score (0-10 scale): Pain Level: 7(6-7)  Last Weight:   Wt Readings from Last 1 Encounters:   19 280 lb (127 kg)     Mental Status:  alert, coherent, logical and thought processes intact    IV Access:  - None    Nursing Mobility/ADLs:  Walking   Assisted  Transfer  Assisted  Bathing  Assisted  Dressing Assisted  Toileting  Assisted  Feeding  Assisted  Med Admin  Assisted  Med Delivery   whole    Wound Care Documentation and Therapy:  Wound 04/24/19 Coccyx Mid open  (Active)   Wound Image   4/24/2019 11:45 PM   Wound Pressure Stage  3 4/25/2019  3:47 PM   Dressing Status Clean;Dry; Intact 4/26/2019 12:00 PM   Dressing Changed Changed/New 4/26/2019 12:00 PM   Dressing/Treatment Foam 4/26/2019 12:00 PM   Wound Length (cm) 2.4 cm 4/24/2019  9:21 PM   Wound Width (cm) 1 cm 4/24/2019  9:21 PM   Wound Depth (cm) 0.2 cm 4/24/2019  9:21 PM   Wound Surface Area (cm^2) 2.4 cm^2 4/24/2019  9:21 PM   Wound Volume (cm^3) 0.48 cm^3 4/24/2019  9:21 PM   Wound Assessment Red 4/26/2019 12:00 PM   Drainage Amount None 4/26/2019 12:00 PM   Drainage Description Serous 4/24/2019  9:21 PM   Odor None 4/26/2019 12:00 PM   Jessica-wound Assessment Ecchymosis 4/26/2019 12:00 PM   Number of days: 1        Elimination:  Continence:   · Bowel: Yes  · Bladder: Yes  Urinary Catheter: Removal Date t   Colostomy/Ileostomy/Ileal Conduit: No       Date of Last BM: 4/28    Intake/Output Summary (Last 24 hours) at 4/26/2019 1832  Last data filed at 4/26/2019 1347  Gross per 24 hour   Intake --   Output 3700 ml   Net -3700 ml     I/O last 3 completed shifts: In: 5 [P.O.:420]  Out: 4300 [Urine:4300]    Safety Concerns: At Risk for Falls    Impairments/Disabilities:      None    Nutrition Therapy:  Current Nutrition Therapy:   - Oral Diet:  General    Routes of Feeding: Oral  Liquids: Thin Liquids  Daily Fluid Restriction: 900ml  Last Modified Barium Swallow with Video (Video Swallowing Test): not done    Treatments at the Time of Hospital Discharge:   Respiratory Treatments: nebulizer  Oxygen Therapy:  is on oxygen at 3 L/min per nasal cannula.   Ventilator:    - No ventilator support    Rehab Therapies: Physical Therapy and Occupational Therapy  Weight Bearing Status/Restrictions: No weight bearing restirctions  Other Medical Equipment (for information only, NOT a DME order):  walker  Other Treatments: Daily weights  Low sodium diet- 64 oz fluid limitation    Patient's personal belongings (please select all that are sent with patient):  None    RN SIGNATURE:  Electronically signed by Ambrosio Mark RN on 4/28/19 at 2:32 PM    CASE MANAGEMENT/SOCIAL WORK SECTION    Inpatient Status Date: ***    Readmission Risk Assessment Score:  Readmission Risk              Risk of Unplanned Readmission:        11           Discharging to Facility/ Agency   · Name:   · Address:  · Phone:  · Fax:    Dialysis Facility (if applicable)   · Name:  · Address:  · Dialysis Schedule:  · Phone:  · Fax:    / signature: {Esignature:[id number]}    PHYSICIAN SECTION    Prognosis: Good    Condition at Discharge: Stable    Rehab Potential (if transferring to Rehab): Fair    Recommended Labs or Other Treatments After Discharge: wear your cpap, follow up with pulm and or PCP    Physician Certification: I certify the above information and transfer of Luzma Gooden  is necessary for the continuing treatment of the diagnosis listed and that she requires Home Care for less 30 days.      Update Admission H&P: No change in H&P    PHYSICIAN SIGNATURE:  Electronically signed by Solo Jacobson MD on 4/28/19 at 1:48 PM

## 2019-04-26 NOTE — PLAN OF CARE
Problem: Falls - Risk of:  Goal: Will remain free from falls  Description  Will remain free from falls  Outcome: Ongoing   Fall precautions in place, hourly rounding, call light and belongings in reach, bed in lowest position, wheels locked in place, side rails up x 2, walkways free of clutter    Problem: Pain:  Goal: Pain level will decrease  Description  Pain level will decrease  4/26/2019 0100 by Zion White RN  Outcome: Ongoing  4/25/2019 1731 by Fabiano Burt RN  Outcome: Ongoing  4/25/2019 1436 by Fabiano Burt RN  Outcome: Ongoing   Patient able to report pain, 0-10 scale used, pharmacologic and non pharmacologic  Interventions in use and discussed with patient. Problem: Risk for Impaired Skin Integrity  Goal: Tissue integrity - skin and mucous membranes  Description  Structural intactness and normal physiological function of skin and  mucous membranes. 4/26/2019 0100 by Zion White RN  Outcome: Ongoing  4/25/2019 1436 by Fabiano Burt RN  Outcome: Ongoing   Bariatric bed with alternating pressure relief placed this shift. Stage 2 wound on buttocks covered with foam border. Patient encouraged to reposition every 2 hours and is assisted to do so when unable to reposition independently.

## 2019-04-26 NOTE — CARE COORDINATION
4/26/2019- Per therapy recommendations SNF, list provided for choices. Patient apprehensive, explained importance and benefits, will think about it and make decision SNF vs HHC.

## 2019-04-26 NOTE — PROGRESS NOTES
Shift assessment completed patient has her new bariatric bed. Lung sounds diminished in all lobes.  Fall precautions in place, hourly rounding, call light and belongings in reach, bed in lowest position, wheels locked in place, side rails up x 2, walkways free of clutter

## 2019-04-26 NOTE — PROGRESS NOTES
alarm in place;Nurse notified;Gait belt(Grandson's girlfriend present)           Patient Diagnosis(es): The primary encounter diagnosis was COPD exacerbation ([address] Diagnoses of Pneumonia due to organism and Failure of outpatient treatment were also pertinent to this visit. has a past medical history of COPD (chronic obstructive pulmonary disease) ([address] Hernia of unspecified site of abdominal cavity without mention of obstruction or gangrene, Incontinence, Knee pain, bilateral, and Spinal stenosis. has a past surgical history that includes Cholecystectomy and partial hysterectomy (cervix not removed). Treatment Diagnosis: Decreased ADL status, endurance, and functional mobility associated with PNA      Restrictions  Restrictions/Precautions  Restrictions/Precautions: Fall Risk(HIGH FALL RISK)  Position Activity Restriction  Other position/activity restrictions: Rina Gibson is a 82 y.o. female who presents to the emergency department for evaluation for fall. Patient states that she got up to use the restroom, and she states that her legs give out, and she states that she fell on the floor. Daughter states that she came in and she found the patient on the floor, unknown how long that she only believes that it was a couple hours. Patient states that she has been feeling fatigued increasingly over the past 3 days. She was in the ED at the beginning of April for COPD exacerbation she was sent home on antibiotics which she finished 3 days ago as well.   Patient is on 3 L of oxygen continuously    Subjective   General  Chart Reviewed: Yes  Family / Caregiver Present: Yes(Son's granddaughter, Alissa)  Referring Practitioner: Vanessa Carmona MD, for d/c planning  Diagnosis: PNA  Subjective  Subjective: Pt seated in chair, pleasant and agreeable to OT eval.   Pain Assessment  Pain Assessment: 0-10  Pain Level: 7(6-7)  Pain Type: Chronic pain  Pain Location: Generalized;Back  Social/Functional History  Social/Functional History  Lives With: Alone(daughter provides care ~53 hours per pt report)  Type of Home: Apartment  Home Layout: One level  Home Access: Level entry  Bathroom Shower/Tub: (pt performs sponge baths--states she use to take stair lift to walk-in tub shower)  Bathroom Toilet: Bedside commode  Home Equipment: Lift chair, Rolling walker, Hospital bed(pt reports she sleeps in lift chair and has BSC near it )  Receives Help From: Family(Daughter is present 830am-230pm and then 530pm-8pm)  ADL Assistance: Needs assistance(Daughter provides assist with sponge bathing and dressing as pt needs)  Homemaking Assistance: Needs assistance(Daughter does laundry, cooking, and cleaning)  Ambulation Assistance: Needs assistance(pt reports ambulation distance has decreased over last 6 months to year; states she does pivots to/from lift chair and BSC with RW and assist from daughter)  Transfer Assistance: Independent(lift chair; daughter helps as needed however pt typically able to perform pviots with RW without assist)  Active : No  Patient's  Info: daughter  Occupation: Retired  Leisure & Hobbies: reading, watch TV  Additional Comments: Pt reports she has \"not been walking outside of my lift chair too much cause I'm afraid. \" Pt reports no recent falls. Pt reports she is feeling weaker, especially in BLE. Pt's grandson's girlfriend reports that pt sits in her chair all day and never gets out of it.        Objective   Vision: Impaired  Vision Exceptions: Wears glasses at all times  Hearing: Exceptions to [facility]  Hearing Exceptions: Hard of hearing/hearing concerns    Orientation  Overall Orientation Status: Within Functional Limits     Balance  Sitting Balance: Contact guard assistance(on EOB d/t pt close to EOB)  Standing Balance: Contact guard assistance(w/RW)  Standing Balance  Time: ~30-45 sec X 3 trials  Activity: static standing & transfer recliner to EOB w/RW; transfer EOB to recliner; static all ADL surfaces w/RW  Short term goal 2: SBA for functional mobility w/RW for ADL activity  Short term goal 3: Min A for LB bathing w/AE as needed  Short term goal 4: Min A for LB dressing w/AE as needed  Short term goal 5: Pt to tolerate standing 2-3 min for ADL activity/functional mobility   Patient Goals   Patient goals :  To return home with daughter assisting as needed       Therapy Time   Individual Concurrent Group Co-treatment   Time In 5743         Time Out 1452         Minutes 57               Timed Code Treatment Minutes:   42    Total Treatment Minutes: [address] [facility], [place] [address], OTR/L, [id number]

## 2019-04-26 NOTE — PROGRESS NOTES
Physical Therapy    Facility/Department: [address]  Initial Assessment    NAME: Benson Vera  : 1935  MRN: [id number]    Date of Service: 2019    Discharge Recommendations: Benson Vera scored a  on the AM-PAC short mobility form. Current research shows that an AM-PAC score of 17 or less is typically not associated with a discharge to the patient's home setting. Based on the patients AM-PAC score, their current functional mobility deficits, lack of  supervision/assist at home, and recent fall, it is recommended that the patient have 3-5 sessions per week of Physical Therapy at d/c to increase the patients independence. Should pt refuse therapy recommendations, recommend HHPT (S3) to address functional mobility deficits in the home environment. HOME HEALTH CARE: LEVEL 3 SAFETY  - Initial home health evaluation to occur within 24-48 hours, in patient home   - Therapy evaluations in home within 24-48 hours of discharge; including DME and home safety   - Frontload therapy 5 days, then 3x a week   - Therapy to evaluate if patient has [address] needs for personal care   -  evaluation within 24-48 hours, includes evaluation of resources and insurance to determine [place], [place], LTC, and Medicaid options       PT Equipment Recommendations  Equipment Needed: No    Assessment   Body structures, Functions, Activity limitations: Decreased functional mobility ; Decreased strength;Decreased endurance;Decreased balance  Assessment: Pt presents with impaired functional strength and endurance and decreased standing balance impairing her ability to perform functional mobility safely and independently. Pt would benefit from acute PT to address deficits.    Treatment Diagnosis: impaired gait, transfers, and balance  Prognosis: Good  Decision Making: Low Complexity  Clinical Presentation: stable  Patient Education: PT POC and amy d/c recommendations, safe use of RW for transfers and ambulation  Barriers to Learning: none  REQUIRES PT FOLLOW UP: Yes  Activity Tolerance  Activity Tolerance: Patient limited by fatigue;Patient limited by endurance  Activity Tolerance: Required increased time to perform all tasks this date due to reported weakness in BLE. Patient Diagnosis(es): The primary encounter diagnosis was COPD exacerbation ([address] Diagnoses of Pneumonia due to organism and Failure of outpatient treatment were also pertinent to this visit. has a past medical history of COPD (chronic obstructive pulmonary disease) ([address] Hernia of unspecified site of abdominal cavity without mention of obstruction or gangrene, Incontinence, Knee pain, bilateral, and Spinal stenosis. has a past surgical history that includes Cholecystectomy and partial hysterectomy (cervix not removed). Restrictions  Restrictions/Precautions  Restrictions/Precautions: Fall Risk(HIGH FALL RISK)  Position Activity Restriction  Other position/activity restrictions: Luzma Gooden is a 82 y.o. female who presents to the emergency department for evaluation for fall. Patient states that she got up to use the restroom, and she states that her legs give out, and she states that she fell on the floor. Daughter states that she came in and she found the patient on the floor, unknown how long that she only believes that it was a couple hours. Patient states that she has been feeling fatigued increasingly over the past 3 days. She was in the ED at the beginning of April for COPD exacerbation she was sent home on antibiotics which she finished 3 days ago as well.   Patient is on 3 L of oxygen continuously     Vision/Hearing  Vision: Impaired  Vision Exceptions: Wears glasses at all times  Hearing: Exceptions to [facility]  Hearing Exceptions: Hard of hearing/hearing concerns       Subjective  General  Chart Reviewed: Yes  Family / Caregiver Present: No  Diagnosis: PNA  Follows Commands: Within Functional Limits  General Control  Gross Motor?: WFL  Sensation  Overall Sensation Status: WFL(pt reports history of neuropathy in BLE however intact to light touch upon testing)  Bed mobility  Supine to Sit: Maximum assistance(2x; pt required increased time to perform with max VC for positioning. Pt pulling on PT's arm during transfer despite VC/education to use bedrail )  Scooting: Contact guard assistance  Transfers  Sit to Stand: Moderate Assistance(x2 EOB; During initial stand pt reporting weakness in BLE and stating \"they're going to give out\" and returned to seated at EOB prior to transfer to recliner)  Stand to sit: Minimal Assistance(x1 EOB, x1 recliner)  Bed to Chair: Minimal assistance(required increased time for EOB>chair transfer with B knee bucklings 1-2x )  Comment: Performed all transfers with RW. Ambulation  Ambulation?: No  Stairs/Curb  Stairs?: No     Balance  Posture: Good  Sitting - Static: Good  Sitting - Dynamic: Good(CGA seated at EOB with feet unsupported ~2 minutes total while pt attempting to scoot)  Standing - Static: Fair;-(Min A standing with RW for UE support)  Standing - Dynamic: Poor        Plan   Plan  Times per week: 3-5x  Times per day: Daily  Current Treatment Recommendations: Strengthening, Balance Training, Functional Mobility Training, Transfer Training, Endurance Training, Gait Training, Neuromuscular Re-education, Safety Education & Training, Modalities, Positioning, Patient/Caregiver Education & Training  Safety Devices  Type of devices:  All fall risk precautions in place, Left in chair, Call light within reach, Chair alarm in place, Gait belt, Patient at risk for falls, Nurse notified  Restraints  Initially in place: No    G-Code       OutComes Score                                                  AM-PAC Score  AM-PAC Inpatient Mobility Raw Score : 11  AM-PAC Inpatient T-Scale Score : 33.86  Mobility Inpatient CMS 0-100% Score: 72.57  Mobility Inpatient CMS G-Code Modifier : CL

## 2019-04-26 NOTE — PROGRESS NOTES
Hospitalist Progress Note      PCP: Tyler Sawant, APRN - CNP    Date of Admission: 4/24/2019    Chief Complaint: [facility] Course:   Mega Rutherford a 82 y.o. female who presents here with concern for shortness of breath.  History of CHF and COPD.  She is on 3 L nasal cannula at home. Myles Godwin also reports feeling a sense of obstruction in her right near. [facility] for several weeks now. Myles Godwin reports, \"nobody believes me. \"  She reports that it feels like, \"a tin can. \" She denies fever, rash, headaches, dizziness, chest pain, cough, congestion, abdominal pain, nausea, vomiting, diarrhea, constipation, blood in the stool, or painful urination.  One friend/family member at bedside.           Subjective:   Not much improvement  Complains of knee pain bilat.       Medications:  Reviewed    Infusion Medications   Scheduled Medications    spironolactone  25 mg Oral BID    rosuvastatin  40 mg Oral QPM    trospium  20 mg Oral BID AC    docusate sodium  100 mg Oral Daily    senna  1 tablet Oral Daily    pantoprazole  40 mg Oral QAM AC    FLUoxetine  40 mg Oral Daily    levothyroxine  125 mcg Oral Daily    lisinopril  10 mg Oral Daily    sodium chloride flush  10 mL Intravenous 2 times per day    enoxaparin  40 mg Subcutaneous Nightly    albuterol  2.5 mg Nebulization Q4H WA    piperacillin-tazobactam  3.375 g Intravenous Q8H    mometasone-formoterol  2 puff Inhalation BID    pregabalin  150 mg Oral BID    methylPREDNISolone  40 mg Intravenous Q6H    guaiFENesin  600 mg Oral BID    furosemide  40 mg Intravenous BID     PRN Meds: Chlorphen-Phenyleph-APAP, magnesium hydroxide, oxyCODONE-acetaminophen, sodium chloride flush, magnesium hydroxide, ondansetron      Intake/Output Summary (Last 24 hours) at 4/26/2019 1750  Last data filed at 4/26/2019 1347  Gross per 24 hour   Intake 240 ml   Output 4300 ml   Net -4060 ml       Physical Exam Performed:    /65   Pulse 77   Temp 98.2 °F (36.8 °C) (Temporal)   Resp 18   Ht 5' 1.5\" (1.562 m)   Wt 280 lb (127 kg)   SpO2 90%   BMI 52.05 kg/m²     General appearance: No apparent distress, appears stated age and cooperative. HEENT: Pupils equal, round, and reactive to light. Conjunctivae/corneas clear. Neck: Supple, with full range of motion. No jugular venous distention. Trachea midline. Respiratory:  Normal respiratory effort. Clear to auscultation, bilaterally without Rales/Wheezes/Rhonchi. Cardiovascular: Regular rate and rhythm with normal S1/S2 without murmurs, rubs or gallops. Abdomen: Soft, non-tender, non-distended with normal bowel sounds. Musculoskeletal: No clubbing, cyanosis or edema bilaterally. Full range of motion without deformity. Skin: Skin color, texture, turgor normal.  No rashes or lesions. Neurologic:  Neurovascularly intact without any focal sensory/motor deficits. Cranial nerves: II-XII intact, grossly non-focal.  Psychiatric: Alert and oriented, thought content appropriate, normal insight  Capillary Refill: Brisk,< 3 seconds   Peripheral Pulses: +2 palpable, equal bilaterally       Labs:   Recent Labs     04/24/19  1112 04/25/19  0550   WBC 9.9 8.3   HGB 10.2* 10.3*   HCT 32.0* 32.3*    179     Recent Labs     04/24/19  1112 04/25/19  0550    142   K 4.0 4.1    99   CO2 35* 34*   BUN 17 15   CREATININE 0.8 0.8   CALCIUM 8.7 8.6     Recent Labs     04/24/19  1112   AST 14*   ALT 13   BILITOT <0.2   ALKPHOS 90     No results for input(s): INR in the last 72 hours.   Recent Labs     04/24/19  1112   CKTOTAL 139   TROPONINI 0.01       Urinalysis:      Lab Results   Component Value Date    NITRU Negative 04/24/2019    WBCUA 2 04/08/2019    BACTERIA RARE 11/28/2018    RBCUA 2 04/08/2019    BLOODU Negative 04/24/2019    SPECGRAV 1.015 04/24/2019    GLUCOSEU Negative 04/24/2019    GLUCOSEU NEGATIVE 03/21/2011       Radiology:  CT CHEST WO CONTRAST   Final Result   Bibasilar airspace disease, right greater than left, either atelectasis or   pneumonia. Scattered areas of bronchial wall thickening are seen      Mild mosaic attenuation in the apices, suggesting small airways disease or   air trapping      Stable tiny punctate pulmonary nodules      Increase in size of left renal nodule, presumably a cyst         CT CERVICAL SPINE WO CONTRAST   Final Result   No acute abnormality of the cervical spine. CT HEAD WO CONTRAST   Final Result   Motion limited. No acute traumatic intracranial abnormality. Mild periventricular white matter disease, likely due to small-vessel   ischemic change      Trace paranasal sinus disease         XR SHOULDER RIGHT (MIN 2 VIEWS)   Final Result   Concern for nondisplaced humeral head fracture. Recommend further evaluation   with CT of the right shoulder. XR CHEST PORTABLE   Final Result   Findings not substantially changed likely related to congestive heart failure. Assessment/Plan:    Active Hospital Problems    Diagnosis Date Noted    Pneumonia [J18.9] 04/24/2019     . Acute on chronic respiratory failure  - due to copd and failure of outpatient treatment for COPD  -rocpehin and azithromycin.     2. Acute exac of COPD  - NEBS  -STEROIDS  -Rocephin and azithromycin.     3. Pulm HTN   - HX   -no sign of chf  - will give IV lasix in house.     4. Pneumonia  - present on imaging  - no fever, no white count. -  Covered with abx      5.  Pt has stage II ulcer on coccyx.  -present on admission.     Right shoulder fracture ruled out after further study    DVT Prophylaxis: lovenox  Diet: DIET GENERAL;  Code Status: Full Code    PT/OT Eval Status: eval and treat     Dispo - Donnie Calderon MD

## 2019-04-27 PROCEDURE — 6360000002 HC RX W HCPCS: Performed by: INTERNAL MEDICINE

## 2019-04-27 PROCEDURE — 6370000000 HC RX 637 (ALT 250 FOR IP): Performed by: INTERNAL MEDICINE

## 2019-04-27 PROCEDURE — 2500000003 HC RX 250 WO HCPCS: Performed by: INTERNAL MEDICINE

## 2019-04-27 PROCEDURE — 2580000003 HC RX 258: Performed by: INTERNAL MEDICINE

## 2019-04-27 PROCEDURE — 94640 AIRWAY INHALATION TREATMENT: CPT

## 2019-04-27 PROCEDURE — 2700000000 HC OXYGEN THERAPY PER DAY

## 2019-04-27 PROCEDURE — 94760 N-INVAS EAR/PLS OXIMETRY 1: CPT

## 2019-04-27 PROCEDURE — 1200000000 HC SEMI PRIVATE

## 2019-04-27 RX ORDER — PREDNISONE 20 MG/1
40 TABLET ORAL 2 TIMES DAILY
Status: DISCONTINUED | OUTPATIENT
Start: 2019-04-27 | End: 2019-04-28 | Stop reason: HOSPADM

## 2019-04-27 RX ADMIN — GUAIFENESIN 600 MG: 600 TABLET, EXTENDED RELEASE ORAL at 08:35

## 2019-04-27 RX ADMIN — OXYCODONE AND ACETAMINOPHEN 1 TABLET: 10; 325 TABLET ORAL at 08:45

## 2019-04-27 RX ADMIN — TAZOBACTAM SODIUM AND PIPERACILLIN SODIUM 3.38 G: 375; 3 INJECTION, SOLUTION INTRAVENOUS at 08:45

## 2019-04-27 RX ADMIN — LEVOTHYROXINE SODIUM 125 MCG: 0.12 TABLET ORAL at 06:07

## 2019-04-27 RX ADMIN — SENNOSIDES 8.6 MG: 8.6 TABLET, FILM COATED ORAL at 20:19

## 2019-04-27 RX ADMIN — PREGABALIN 150 MG: 75 CAPSULE ORAL at 20:19

## 2019-04-27 RX ADMIN — OXYCODONE AND ACETAMINOPHEN 1 TABLET: 10; 325 TABLET ORAL at 14:58

## 2019-04-27 RX ADMIN — FUROSEMIDE 40 MG: 10 INJECTION, SOLUTION INTRAMUSCULAR; INTRAVENOUS at 17:31

## 2019-04-27 RX ADMIN — TAZOBACTAM SODIUM AND PIPERACILLIN SODIUM 3.38 G: 375; 3 INJECTION, SOLUTION INTRAVENOUS at 23:41

## 2019-04-27 RX ADMIN — ALBUTEROL SULFATE 2.5 MG: 2.5 SOLUTION RESPIRATORY (INHALATION) at 09:45

## 2019-04-27 RX ADMIN — SPIRONOLACTONE 25 MG: 25 TABLET ORAL at 08:45

## 2019-04-27 RX ADMIN — ROSUVASTATIN CALCIUM 40 MG: 40 TABLET, FILM COATED ORAL at 17:31

## 2019-04-27 RX ADMIN — PREGABALIN 150 MG: 75 CAPSULE ORAL at 08:35

## 2019-04-27 RX ADMIN — METHYLPREDNISOLONE SODIUM SUCCINATE 40 MG: 40 INJECTION, POWDER, FOR SOLUTION INTRAMUSCULAR; INTRAVENOUS at 04:17

## 2019-04-27 RX ADMIN — ENOXAPARIN SODIUM 40 MG: 40 INJECTION SUBCUTANEOUS at 20:19

## 2019-04-27 RX ADMIN — LISINOPRIL 10 MG: 10 TABLET ORAL at 08:35

## 2019-04-27 RX ADMIN — Medication 10 ML: at 08:36

## 2019-04-27 RX ADMIN — TAZOBACTAM SODIUM AND PIPERACILLIN SODIUM 3.38 G: 375; 3 INJECTION, SOLUTION INTRAVENOUS at 01:13

## 2019-04-27 RX ADMIN — Medication 2 PUFF: at 09:45

## 2019-04-27 RX ADMIN — PREDNISONE 40 MG: 20 TABLET ORAL at 12:49

## 2019-04-27 RX ADMIN — GUAIFENESIN 600 MG: 600 TABLET, EXTENDED RELEASE ORAL at 20:19

## 2019-04-27 RX ADMIN — PANTOPRAZOLE SODIUM 40 MG: 40 TABLET, DELAYED RELEASE ORAL at 06:07

## 2019-04-27 RX ADMIN — FLUOXETINE 40 MG: 20 CAPSULE ORAL at 08:35

## 2019-04-27 RX ADMIN — TAZOBACTAM SODIUM AND PIPERACILLIN SODIUM 3.38 G: 375; 3 INJECTION, SOLUTION INTRAVENOUS at 14:59

## 2019-04-27 RX ADMIN — Medication 10 ML: at 20:20

## 2019-04-27 RX ADMIN — FUROSEMIDE 40 MG: 10 INJECTION, SOLUTION INTRAMUSCULAR; INTRAVENOUS at 08:35

## 2019-04-27 RX ADMIN — TROSPIUM CHLORIDE 20 MG: 20 TABLET ORAL at 14:59

## 2019-04-27 RX ADMIN — PREDNISONE 40 MG: 20 TABLET ORAL at 20:19

## 2019-04-27 RX ADMIN — DOCUSATE SODIUM 100 MG: 100 CAPSULE, LIQUID FILLED ORAL at 20:19

## 2019-04-27 RX ADMIN — OXYCODONE AND ACETAMINOPHEN 1 TABLET: 10; 325 TABLET ORAL at 21:18

## 2019-04-27 RX ADMIN — ALBUTEROL SULFATE 2.5 MG: 2.5 SOLUTION RESPIRATORY (INHALATION) at 21:23

## 2019-04-27 RX ADMIN — Medication 2 PUFF: at 21:25

## 2019-04-27 RX ADMIN — TROSPIUM CHLORIDE 20 MG: 20 TABLET ORAL at 06:07

## 2019-04-27 RX ADMIN — SPIRONOLACTONE 25 MG: 25 TABLET ORAL at 17:31

## 2019-04-27 RX ADMIN — ALBUTEROL SULFATE 2.5 MG: 2.5 SOLUTION RESPIRATORY (INHALATION) at 13:16

## 2019-04-27 RX ADMIN — OXYCODONE AND ACETAMINOPHEN 1 TABLET: 10; 325 TABLET ORAL at 01:36

## 2019-04-27 ASSESSMENT — PAIN SCALES - GENERAL
PAINLEVEL_OUTOF10: 7
PAINLEVEL_OUTOF10: 6
PAINLEVEL_OUTOF10: 0
PAINLEVEL_OUTOF10: 8
PAINLEVEL_OUTOF10: 7
PAINLEVEL_OUTOF10: 8
PAINLEVEL_OUTOF10: 0
PAINLEVEL_OUTOF10: 8
PAINLEVEL_OUTOF10: 0

## 2019-04-27 ASSESSMENT — PAIN DESCRIPTION - LOCATION: LOCATION: BACK

## 2019-04-27 NOTE — PROGRESS NOTES
Patient alert and oriented. Lungs clear to auscultation, breathing even and unlabored, chest expansion symmetrical, no noted use of accessory muscles, and patient denies any SOB while on supplemental oxygen. Bowel sounds present in all quadrants, patient denies any nausea, vomiting and diarrhea at this time. Peripheral pulses present in all extremities, non pitting BLE edema, border dressing applied to coccyx, rest of skin intact and no other  area of concern to note at this time. Assist to a comfortable position in bed and call light within reach.

## 2019-04-27 NOTE — PLAN OF CARE
Problem: Falls - Risk of:  Goal: Will remain free from falls  Description  Will remain free from falls  Outcome: Ongoing     Problem: Pain:  Goal: Pain level will decrease  Description  Pain level will decrease  Outcome: Ongoing     Problem: Risk for Impaired Skin Integrity  Goal: Tissue integrity - skin and mucous membranes  Description  Structural intactness and normal physiological function of skin and  mucous membranes.   Outcome: Ongoing     Problem: OXYGENATION/RESPIRATORY FUNCTION  Goal: Patient will maintain patent airway  Outcome: Ongoing     Problem: HEMODYNAMIC STATUS  Goal: Patient has stable vital signs and fluid balance  Outcome: Ongoing     Problem: FLUID AND ELECTROLYTE IMBALANCE  Goal: Fluid and electrolyte balance are achieved/maintained  Outcome: Ongoing     Problem: ACTIVITY INTOLERANCE/IMPAIRED MOBILITY  Goal: Mobility/activity is maintained at optimum level for patient  Outcome: Ongoing

## 2019-04-27 NOTE — PROGRESS NOTES
Hospitalist Progress Note      PCP: Giovany Wallace, ADRIANO - CNP    Date of Admission: 4/24/2019    Chief Complaint: [facility] Course:   Evan Aguayo a 82 y.o. female who presents here with concern for shortness of breath.  History of CHF and COPD.  She is on 3 L nasal cannula at home. Omkar Urrutia also reports feeling a sense of obstruction in her right near. [facility] for several weeks now. Omkar Urrutia reports, \"nobody believes me. \"  She reports that it feels like, \"a tin can. \" She denies fever, rash, headaches, dizziness, chest pain, cough, congestion, abdominal pain, nausea, vomiting, diarrhea, constipation, blood in the stool, or painful urination.  One friend/family member at bedside.           Subjective:   Not much improvement  Complains of knee pain bilat.       Medications:  Reviewed    Infusion Medications   Scheduled Medications    predniSONE  40 mg Oral BID    spironolactone  25 mg Oral BID    rosuvastatin  40 mg Oral QPM    trospium  20 mg Oral BID AC    docusate sodium  100 mg Oral Daily    senna  1 tablet Oral Daily    pantoprazole  40 mg Oral QAM AC    FLUoxetine  40 mg Oral Daily    levothyroxine  125 mcg Oral Daily    lisinopril  10 mg Oral Daily    sodium chloride flush  10 mL Intravenous 2 times per day    enoxaparin  40 mg Subcutaneous Nightly    albuterol  2.5 mg Nebulization Q4H WA    piperacillin-tazobactam  3.375 g Intravenous Q8H    mometasone-formoterol  2 puff Inhalation BID    pregabalin  150 mg Oral BID    guaiFENesin  600 mg Oral BID    furosemide  40 mg Intravenous BID     PRN Meds: Chlorphen-Phenyleph-APAP, magnesium hydroxide, oxyCODONE-acetaminophen, sodium chloride flush, magnesium hydroxide, ondansetron      Intake/Output Summary (Last 24 hours) at 4/27/2019 1712  Last data filed at 4/27/2019 1505  Gross per 24 hour   Intake 720 ml   Output 5600 ml   Net -4880 ml       Physical Exam Performed:    BP (!) 110/52   Pulse 82   Temp 97.8 °F (36.6 °C) (Temporal) Resp 16   Ht 5' 1.5\" (1.562 m)   Wt 281 lb (127.5 kg)   SpO2 90%   BMI 52.23 kg/m²     General appearance: No apparent distress, appears stated age and cooperative. HEENT: Pupils equal, round, and reactive to light. Conjunctivae/corneas clear. Neck: Supple, with full range of motion. No jugular venous distention. Trachea midline. Respiratory:  Normal respiratory effort. Clear to auscultation, bilaterally without Rales/Wheezes/Rhonchi. Cardiovascular: Regular rate and rhythm with normal S1/S2 without murmurs, rubs or gallops. Abdomen: Soft, non-tender, non-distended with normal bowel sounds. Musculoskeletal: No clubbing, cyanosis or edema bilaterally. Full range of motion without deformity. Skin: Skin color, texture, turgor normal.  No rashes or lesions. Neurologic:  Neurovascularly intact without any focal sensory/motor deficits. Cranial nerves: II-XII intact, grossly non-focal.  Psychiatric: Alert and oriented, thought content appropriate, normal insight  Capillary Refill: Brisk,< 3 seconds   Peripheral Pulses: +2 palpable, equal bilaterally       Labs:   Recent Labs     04/25/19  0550   WBC 8.3   HGB 10.3*   HCT 32.3*        Recent Labs     04/25/19  0550      K 4.1   CL 99   CO2 34*   BUN 15   CREATININE 0.8   CALCIUM 8.6     No results for input(s): AST, ALT, BILIDIR, BILITOT, ALKPHOS in the last 72 hours. No results for input(s): INR in the last 72 hours. No results for input(s): Gudelia Homero in the last 72 hours. Urinalysis:      Lab Results   Component Value Date    NITRU Negative 04/24/2019    WBCUA 2 04/08/2019    BACTERIA RARE 11/28/2018    RBCUA 2 04/08/2019    BLOODU Negative 04/24/2019    SPECGRAV 1.015 04/24/2019    GLUCOSEU Negative 04/24/2019    GLUCOSEU NEGATIVE 03/21/2011       Radiology:  CT CHEST WO CONTRAST   Final Result   Bibasilar airspace disease, right greater than left, either atelectasis or   pneumonia.   Scattered areas of bronchial wall thickening are seen      Mild mosaic attenuation in the apices, suggesting small airways disease or   air trapping      Stable tiny punctate pulmonary nodules      Increase in size of left renal nodule, presumably a cyst         CT CERVICAL SPINE WO CONTRAST   Final Result   No acute abnormality of the cervical spine. CT HEAD WO CONTRAST   Final Result   Motion limited. No acute traumatic intracranial abnormality. Mild periventricular white matter disease, likely due to small-vessel   ischemic change      Trace paranasal sinus disease         XR SHOULDER RIGHT (MIN 2 VIEWS)   Final Result   Concern for nondisplaced humeral head fracture. Recommend further evaluation   with CT of the right shoulder. XR CHEST PORTABLE   Final Result   Findings not substantially changed likely related to congestive heart failure. Assessment/Plan:    Active Hospital Problems    Diagnosis Date Noted    Pneumonia [J18.9] 04/24/2019     . Acute on chronic respiratory failure  - due to copd and failure of outpatient treatment for COPD  -rocpehin and azithromycin.     2. Acute exac of COPD  - NEBS  -STEROIDS  -Rocephin and azithromycin.     3. Pulm HTN   - HX   -no sign of chf  - will give IV lasix in house.     4. Pneumonia  - present on imaging  - no fever, no white count. -  Covered with abx      5. Pt has stage II ulcer on coccyx.  -present on admission.     Right shoulder fracture ruled out after further study    DVT Prophylaxis: lovenox  Diet: DIET GENERAL; No Added Salt (3-4 GM)  Code Status: Full Code    PT/OT Eval Status: eval and treat     Dispo - she needs snf but patient and family refusing. Anticapte d/c home with home care soon.     Solo Jacobson MD

## 2019-04-28 VITALS
RESPIRATION RATE: 16 BRPM | OXYGEN SATURATION: 95 % | HEART RATE: 65 BPM | WEIGHT: 281.5 LBS | DIASTOLIC BLOOD PRESSURE: 74 MMHG | SYSTOLIC BLOOD PRESSURE: 113 MMHG | BODY MASS INDEX: 51.8 KG/M2 | TEMPERATURE: 97.6 F | HEIGHT: 62 IN

## 2019-04-28 PROCEDURE — 94640 AIRWAY INHALATION TREATMENT: CPT

## 2019-04-28 PROCEDURE — 6360000002 HC RX W HCPCS: Performed by: INTERNAL MEDICINE

## 2019-04-28 PROCEDURE — 6370000000 HC RX 637 (ALT 250 FOR IP): Performed by: INTERNAL MEDICINE

## 2019-04-28 PROCEDURE — 2500000003 HC RX 250 WO HCPCS: Performed by: INTERNAL MEDICINE

## 2019-04-28 PROCEDURE — 2700000000 HC OXYGEN THERAPY PER DAY

## 2019-04-28 PROCEDURE — 94760 N-INVAS EAR/PLS OXIMETRY 1: CPT

## 2019-04-28 PROCEDURE — 2580000003 HC RX 258: Performed by: INTERNAL MEDICINE

## 2019-04-28 RX ORDER — PREDNISONE 20 MG/1
40 TABLET ORAL DAILY
Qty: 10 TABLET | Refills: 0 | Status: SHIPPED | OUTPATIENT
Start: 2019-04-28 | End: 2019-05-03

## 2019-04-28 RX ORDER — FLUTICASONE PROPIONATE 50 MCG
1 SPRAY, SUSPENSION (ML) NASAL DAILY
Status: DISCONTINUED | OUTPATIENT
Start: 2019-04-28 | End: 2019-04-28 | Stop reason: HOSPADM

## 2019-04-28 RX ORDER — ALBUTEROL SULFATE 2.5 MG/3ML
2.5 SOLUTION RESPIRATORY (INHALATION)
Qty: 120 EACH | Refills: 3 | Status: ON HOLD | OUTPATIENT
Start: 2019-04-28 | End: 2019-05-18 | Stop reason: HOSPADM

## 2019-04-28 RX ORDER — FUROSEMIDE 20 MG/1
40 TABLET ORAL 2 TIMES DAILY
Qty: 60 TABLET | Refills: 3 | Status: ON HOLD | OUTPATIENT
Start: 2019-04-28 | End: 2019-05-18 | Stop reason: HOSPADM

## 2019-04-28 RX ORDER — LEVOFLOXACIN 750 MG/1
750 TABLET ORAL DAILY
Qty: 5 TABLET | Refills: 0 | Status: SHIPPED | OUTPATIENT
Start: 2019-04-28 | End: 2019-05-03

## 2019-04-28 RX ADMIN — PREGABALIN 150 MG: 75 CAPSULE ORAL at 08:29

## 2019-04-28 RX ADMIN — GUAIFENESIN 600 MG: 600 TABLET, EXTENDED RELEASE ORAL at 08:29

## 2019-04-28 RX ADMIN — PANTOPRAZOLE SODIUM 40 MG: 40 TABLET, DELAYED RELEASE ORAL at 06:03

## 2019-04-28 RX ADMIN — ALBUTEROL SULFATE 2.5 MG: 2.5 SOLUTION RESPIRATORY (INHALATION) at 16:26

## 2019-04-28 RX ADMIN — FLUTICASONE PROPIONATE 1 SPRAY: 50 SPRAY, METERED NASAL at 13:56

## 2019-04-28 RX ADMIN — LEVOTHYROXINE SODIUM 125 MCG: 0.12 TABLET ORAL at 06:03

## 2019-04-28 RX ADMIN — ALBUTEROL SULFATE 2.5 MG: 2.5 SOLUTION RESPIRATORY (INHALATION) at 13:37

## 2019-04-28 RX ADMIN — ALBUTEROL SULFATE 2.5 MG: 2.5 SOLUTION RESPIRATORY (INHALATION) at 09:55

## 2019-04-28 RX ADMIN — OXYCODONE AND ACETAMINOPHEN 1 TABLET: 10; 325 TABLET ORAL at 09:20

## 2019-04-28 RX ADMIN — OXYCODONE AND ACETAMINOPHEN 1 TABLET: 10; 325 TABLET ORAL at 03:22

## 2019-04-28 RX ADMIN — PREDNISONE 40 MG: 20 TABLET ORAL at 08:29

## 2019-04-28 RX ADMIN — Medication 2 PUFF: at 09:58

## 2019-04-28 RX ADMIN — TROSPIUM CHLORIDE 20 MG: 20 TABLET ORAL at 06:03

## 2019-04-28 RX ADMIN — OXYCODONE AND ACETAMINOPHEN 1 TABLET: 10; 325 TABLET ORAL at 15:13

## 2019-04-28 RX ADMIN — FLUOXETINE 40 MG: 20 CAPSULE ORAL at 08:29

## 2019-04-28 RX ADMIN — TAZOBACTAM SODIUM AND PIPERACILLIN SODIUM 3.38 G: 375; 3 INJECTION, SOLUTION INTRAVENOUS at 08:29

## 2019-04-28 RX ADMIN — LISINOPRIL 10 MG: 10 TABLET ORAL at 08:29

## 2019-04-28 RX ADMIN — FUROSEMIDE 40 MG: 10 INJECTION, SOLUTION INTRAMUSCULAR; INTRAVENOUS at 08:29

## 2019-04-28 RX ADMIN — SPIRONOLACTONE 25 MG: 25 TABLET ORAL at 08:29

## 2019-04-28 RX ADMIN — Medication 10 ML: at 06:04

## 2019-04-28 ASSESSMENT — PAIN SCALES - GENERAL
PAINLEVEL_OUTOF10: 6
PAINLEVEL_OUTOF10: 9
PAINLEVEL_OUTOF10: 9
PAINLEVEL_OUTOF10: 7

## 2019-04-28 ASSESSMENT — PAIN DESCRIPTION - LOCATION: LOCATION: BACK

## 2019-04-28 ASSESSMENT — PAIN DESCRIPTION - FREQUENCY: FREQUENCY: CONTINUOUS

## 2019-04-28 ASSESSMENT — PAIN DESCRIPTION - PAIN TYPE: TYPE: CHRONIC PAIN

## 2019-04-28 ASSESSMENT — PAIN DESCRIPTION - ORIENTATION: ORIENTATION: RIGHT;LEFT

## 2019-04-28 NOTE — DISCHARGE SUMMARY
Hospital Medicine Discharge Summary    Patient: Toro De La O     Gender: female  : 1935   Age: 82 y.o. MRN: [id number]    Admitting Physician: Chinmay Wise MD  Discharge Physician: Chinmay Wise MD     Code Status: Full Code     Admit Date: 2019   Discharge Date:   2019    Disposition:  Home with  [facility] [address]    Discharge Diagnoses: Active Hospital Problems    Diagnosis Date Noted    Pneumonia [J18.9] 2019       Follow-up appointments:  one week    Outpatient to do list: FOLLOW up with pcp and pulm if you have one. Condition at Discharge:  Stable    Hospital Course:   Bennett Hurtado a 82 y.o. female who presents here with concern for shortness of breath.  History of CHF and COPD.  She is on 3 L nasal cannula at home. Yue Hunt also reports feeling a sense of obstruction in her right near. [facility] for several weeks now. Yue Hunt reports, \"nobody believes me. \"  She reports that it feels like, \"a tin can. \" She denies fever, rash, headaches, dizziness, chest pain, cough, congestion, abdominal pain, nausea, vomiting, diarrhea, constipation, blood in the stool, or painful urination.  One friend/family member at bedside  She was diagnosed with acute on chronic respiratory failure, pneumonia, Acute exacerbation of COPD and Pulmonary HTN causing Pulm edema. She was admitted, started on iv ABX and started on IV lasix in addition to steroids and breathing treatment. She improved slowly over the next few days. She is not back at her baseline in regard to respiratory status. She was seen by PT/ot and scored 11/24 and 15 out of 24 respectively. She is refusing SNF placement as is her daughter. She will be arranged for home care and set up. I have ordered a home nebulizer as well.      Acute on chronic respiratory failure  - due to copd and failure of outpatient treatment for COPD  She was on zosyn, will change to oral levaquin at d/c     2.  Acute exac of COPD  - Fluticasone-Salmeterol (ADVAIR DISKUS IN) Inhale  into the lungs. fluoxetine (PROZAC) 20 MG capsule Take 40 mg by mouth daily. sumatriptan (IMITREX) 25 MG tablet Take 50 mg by mouth once as needed. pregabalin (LYRICA) 50 MG capsule Take 150 mg by mouth 2 times daily. esomeprazole (NEXIUM) 40 MG capsule Take 40 mg by mouth every morning (before breakfast). Chlorphen-Phenyleph-APAP (CORICIDIN D COLD/FLU/SINUS) 2-5-325 MG TABS Take 2 tablets by mouth 4 times daily as needed (cough)  Qty: 20 tablet, Refills: 0      rosuvastatin (CRESTOR) 40 MG tablet Take 40 mg by mouth every evening      solifenacin (VESICARE) 10 MG tablet Take 10 mg by mouth daily      docusate sodium (COLACE) 100 MG capsule Take 100 mg by mouth daily      senna (SENOKOT) 8.6 MG tablet Take 1 tablet by mouth daily      spironolactone (ALDACTONE) 25 MG tablet Take 25 mg by mouth 2 times daily. Current Discharge Medication List          Discharge ROS:  A complete review of systems was asked and negative except for little complaints like things she needs at home. Discharge Exam:    /74   Pulse 65   Temp 97.6 °F (36.4 °C) (Temporal)   Resp 16   Ht 5' 1.5\" (1.562 m)   Wt 281 lb 8 oz (127.7 kg)   SpO2 96%   BMI 52.33 kg/m²   General appearance:  NAD  HEENT:   Normal cephalic, atraumatic, moist mucous membranes, no oropharyngeal erythema or exudate  Neck: Supple, trachea midline, no anterior cervical or SC LAD  Heart[de-identified] Normal s1/s2, RRR, no murmurs, gallops, or rubs. 1+leg edema  Lungs:  Mostly clear, she is has some slight rhonchi. Slight rales on the left. Abdomen: Soft, non-tender, non-distended, bowel sounds present, no masses  Musculoskeletal:  No clubbing, no cyanosis, 1+ edema  Skin: No lesion or masses  Neurologic:  Neurovascularly intact without any focal sensory/motor deficits.  Cranial nerves: II-XII intact, grossly non-focal.  Psychiatric:  A & O x3  Neuro: Grossly intact, moves all nondisplaced humeral head fracture. Recommend further evaluation with CT of the right shoulder. Ct Head Wo Contrast    Result Date: 4/24/2019  EXAMINATION: CT OF THE HEAD WITHOUT CONTRAST  4/24/2019 10:23 am TECHNIQUE: CT of the head was performed without the administration of intravenous contrast. Dose modulation, iterative reconstruction, and/or weight based adjustment of the mA/kV was utilized to reduce the radiation dose to as low as reasonably achievable. COMPARISON: None. HISTORY: ORDERING SYSTEM PROVIDED HISTORY: fall TECHNOLOGIST PROVIDED HISTORY: Has a \"code stroke\" or \"stroke alert\" been called? ->No FINDINGS: Motion artifact degrades the study BRAIN/VENTRICLES: Ventricles are midline in position. No intra cerebral masses are identified. No mass effect. No midline shift. No acute intracranial hemorrhage is seen. There is mild periventricular white matter disease, likely due to small-vessel ischemic change ORBITS: The visualized portion of the orbits demonstrate no acute abnormality. SINUSES: No air-fluid level seen in the frontal sinuses. Trace mucosal thickening is seen in the ethmoid air cells. No significant mastoid opacification. SOFT TISSUES/SKULL:  There is hyperostosis frontalis     Motion limited. No acute traumatic intracranial abnormality. Mild periventricular white matter disease, likely due to small-vessel ischemic change Trace paranasal sinus disease     Ct Chest Wo Contrast    Result Date: 4/24/2019  EXAMINATION: CT OF THE CHEST WITHOUT CONTRAST 4/24/2019 9:46 am TECHNIQUE: CT of the chest was performed without the administration of intravenous contrast. Multiplanar reformatted images are provided for review. Dose modulation, iterative reconstruction, and/or weight based adjustment of the mA/kV was utilized to reduce the radiation dose to as low as reasonably achievable.  COMPARISON: 06/11/2013 HISTORY: ORDERING SYSTEM PROVIDED HISTORY: r/o pneumonia FINDINGS: Mediastinum: Atherosclerotic change seen in aorta. Thyroid gland appears normal.  Scattered small mediastinal nodes are noted. Coronary artery calcification is seen. Small hiatal hernia is seen. There is nonspecific thickening at the GE junction Lungs/pleura: Trace pleural fluid is seen bilaterally, right greater than left. There is adjacent consolidation of lung bases Respiratory motion artifact limits evaluation of fine pulmonary parenchymal change Mild mosaic attenuation is seen in the apices suggesting small airways disease or air trapping There is a tiny punctate pulmonary nodule in the right upper lobe measuring 2-3 mm, similar to prior Tiny punctate pulmonary nodule in left upper lobe is also unchanged Scattered areas of bronchial wall thickening are also seen Upper Abdomen: There is mild thickening of the adrenal glands either due to adenoma or hyperplasia. Hypodense nodule projects medially off the left kidney measuring 2.6 cm. This is slightly increased in size compared to prior. Hypodense nodule seen liver, left hepatic lobe measuring 7- 8 mm. This is too small to characterize, but most likely represents cyst or hemangioma in the absence of a known primary. This is unchanged Soft Tissues/Bones: Multilevel degenerative changes are seen in the spine. Degenerative changes are seen in the shoulder joints     Bibasilar airspace disease, right greater than left, either atelectasis or pneumonia. Scattered areas of bronchial wall thickening are seen Mild mosaic attenuation in the apices, suggesting small airways disease or air trapping Stable tiny punctate pulmonary nodules Increase in size of left renal nodule, presumably a cyst     Ct Cervical Spine Wo Contrast    Result Date: 4/24/2019  EXAMINATION: CT OF THE CERVICAL SPINE WITHOUT CONTRAST 4/24/2019 10:24 am TECHNIQUE: CT of the cervical spine was performed without the administration of intravenous contrast. Multiplanar reformatted images are provided for review. Dose modulation, iterative reconstruction, and/or weight based adjustment of the mA/kV was utilized to reduce the radiation dose to as low as reasonably achievable. COMPARISON: None. HISTORY: ORDERING SYSTEM PROVIDED HISTORY: fall Initial evaluation of acute traumatic cervical spine pain. FINDINGS: BONES/ALIGNMENT: There is no evidence of an acute cervical spine fracture. There is normal alignment of the cervical spine. DEGENERATIVE CHANGES: Multilevel severe degenerative changes are present in the cervical spine with severe disc height loss at C4-5, C5-6 and C6-7. Multilevel facet degenerative changes. SOFT TISSUES: There is no prevertebral soft tissue swelling. No acute abnormality of the cervical spine. Xr Chest Portable    Result Date: 4/24/2019  EXAMINATION: SINGLE XRAY VIEW OF THE CHEST 4/24/2019 9:46 am COMPARISON: April 8, 2019 HISTORY: ORDERING SYSTEM PROVIDED HISTORY: SOB TECHNOLOGIST PROVIDED HISTORY: Reason for exam:->SOB Ordering Physician Provided Reason for Exam: Fall (Pt. comes in by [facility] after she fell earlier this morning. Daughter came to check on pt. this morning and found pt. on floor. Pt. was on floor for unknown amount of time. Pt. reports left shoulder pain. Unsure if she hit her head or lost consciousness.) Pt is a large sized woman and was in and out of being aware of what was going on. Unable to help with getting xrays. Acuity: Acute Type of Exam: Unknown FINDINGS: Cardiac silhouette is upper limits of normal in size. No pneumothorax. No pleural effusion. Pulmonary vascular congestion and hazy airspace disease centrally, not substantially changed. No acute bony abnormality. Findings not substantially changed likely related to congestive heart failure. The patient was seen and examined on day of discharge and this discharge summary is in conjunction with any daily progress note from day of discharge. Time Spent on discharge is 45 minutes  in

## 2019-04-28 NOTE — PROGRESS NOTES
8:36 AM: Morning assessment completed, patient denies needs at this time, call light in reach, will continue to monitor. The care plan and education has been reviewed and mutually agreed upon with the patient. Educated patient on the potential for falls during their hospital stay. Reviewed current fall safety protocol. Reviewed indication for use of bed alarm. Patient verbalized understanding. Patient resting in bed, Eating breakfast. Puente patent.

## 2019-04-28 NOTE — PROGRESS NOTES
Complete bed bath given. Ngo care provided. Patient helped to chair with 2 people assist and a walker. Patient tolerated well. Up to chair with call light within reach.

## 2019-04-28 NOTE — PROGRESS NOTES
Provided patient with supplis for skin protection upon discharged. Gave d/c instructions with list of active meds and when they are next due. Reviewed discharge instructions at bedside and provided printed copy of same. Pt verbalized understanding of all instructions. Denied additional questions. To home as passenger in private vehicle, taken to vehicle by staff transporter.

## 2019-04-28 NOTE — CARE COORDINATION
Discharge order called faxed to [facility]   [phone]        Fax: [phone]        Patient is active with [facility], [phone], [phone] and her daughter is her caregiver.

## 2019-04-29 LAB
BLOOD CULTURE, ROUTINE: NORMAL
CULTURE, BLOOD 2: NORMAL

## 2019-04-29 NOTE — PROGRESS NOTES
Physical Therapy  Physical Therapy Discharge Summary    Name: Lanie Leal  : 1935    The pt was evaluated by PT on 19 and seen for 0 treatment sessions prior to DC to home on 19 per MD order. The pt's acute therapy goals were:  Short term goals  Time Frame for Short term goals: upon d/c  Short term goal 1: Pt will perform bed mobility with mod Ax1  Short term goal 2: Pt will perform transfers with RW and CGA  Short term goal 3: Pt will sit EOB ~10 minutes with SBA  Short term goal 4: Pt will ambulate 5' with RW and mod A       Patient met 0 goals during stay. Number of Refusals:0  Number of Holds: 1  During this hospitalization, the patient was educated on:  Patient Education: PT POC and amy, d/c recommendations, safe use of RW for transfers and ambulation    DC pt from PT caseload at this time. Thank you!     Thanks, Nory Dickey, [address] [facility], DPT [id number]

## 2019-04-30 NOTE — PROGRESS NOTES
Occupational Therapy Discharge Summary    Name: Angela Tyler  : 1935    The pt was evaluated by OT on 19 and seen for initial evaluation only, prior to DC home on 19, per MD order. The pt's acute therapy goals were:  Short term goals  Time Frame for Short term goals: Discharge  Short term goal 1: SBA for functional transfers to all ADL surfaces w/RW  Short term goal 2: SBA for functional mobility w/RW for ADL activity  Short term goal 3: Min A for LB bathing w/AE as needed  Short term goal 4: Min A for LB dressing w/AE as needed  Short term goal 5: Pt to tolerate standing 2-3 min for ADL activity/functional mobility         Patient met 0 goals during stay. Number of Refusals:0  Number of Holds: 1  During this hospitalization, the patient was educated on:  Patient Education: OT amy, SUNITA, d/c recommendation, safe functional transfer, benefits of rehab at a SNF    DC pt from OT caseload at this time. Thank you!     Cornelius Prado, OTR/L, [id number]

## 2019-05-02 ENCOUNTER — TELEPHONE (OUTPATIENT)
Dept: OTHER | Age: 84
End: 2019-05-02

## 2019-05-09 ENCOUNTER — HOSPITAL ENCOUNTER (INPATIENT)
Age: 84
LOS: 8 days | Discharge: HOME HEALTH | DRG: 291 | End: 2019-05-18
Attending: EMERGENCY MEDICINE | Admitting: INTERNAL MEDICINE
Payer: COMMERCIAL

## 2019-05-09 DIAGNOSIS — J44.1 COPD EXACERBATION (HCC): Primary | ICD-10-CM

## 2019-05-09 DIAGNOSIS — G89.4 CHRONIC PAIN SYNDROME: ICD-10-CM

## 2019-05-09 PROCEDURE — 99285 EMERGENCY DEPT VISIT HI MDM: CPT

## 2019-05-09 PROCEDURE — 94640 AIRWAY INHALATION TREATMENT: CPT

## 2019-05-09 PROCEDURE — 6360000002 HC RX W HCPCS: Performed by: PHYSICIAN ASSISTANT

## 2019-05-09 PROCEDURE — 94760 N-INVAS EAR/PLS OXIMETRY 1: CPT

## 2019-05-09 PROCEDURE — 6370000000 HC RX 637 (ALT 250 FOR IP): Performed by: PHYSICIAN ASSISTANT

## 2019-05-09 PROCEDURE — 93005 ELECTROCARDIOGRAM TRACING: CPT | Performed by: PHYSICIAN ASSISTANT

## 2019-05-09 PROCEDURE — 2700000000 HC OXYGEN THERAPY PER DAY

## 2019-05-09 RX ORDER — IPRATROPIUM BROMIDE AND ALBUTEROL SULFATE 2.5; .5 MG/3ML; MG/3ML
1 SOLUTION RESPIRATORY (INHALATION) ONCE
Status: COMPLETED | OUTPATIENT
Start: 2019-05-09 | End: 2019-05-09

## 2019-05-09 RX ORDER — ALBUTEROL SULFATE 2.5 MG/3ML
5 SOLUTION RESPIRATORY (INHALATION) ONCE
Status: COMPLETED | OUTPATIENT
Start: 2019-05-09 | End: 2019-05-09

## 2019-05-09 RX ORDER — METHYLPREDNISOLONE SODIUM SUCCINATE 125 MG/2ML
125 INJECTION, POWDER, LYOPHILIZED, FOR SOLUTION INTRAMUSCULAR; INTRAVENOUS ONCE
Status: COMPLETED | OUTPATIENT
Start: 2019-05-09 | End: 2019-05-10

## 2019-05-09 RX ADMIN — IPRATROPIUM BROMIDE AND ALBUTEROL SULFATE 1 AMPULE: .5; 3 SOLUTION RESPIRATORY (INHALATION) at 23:44

## 2019-05-09 RX ADMIN — ALBUTEROL SULFATE 5 MG: 2.5 SOLUTION RESPIRATORY (INHALATION) at 23:44

## 2019-05-09 ASSESSMENT — PAIN DESCRIPTION - PAIN TYPE: TYPE: CHRONIC PAIN

## 2019-05-09 ASSESSMENT — PAIN SCALES - GENERAL: PAINLEVEL_OUTOF10: 9

## 2019-05-10 ENCOUNTER — APPOINTMENT (OUTPATIENT)
Dept: GENERAL RADIOLOGY | Age: 84
DRG: 291 | End: 2019-05-10
Payer: COMMERCIAL

## 2019-05-10 ENCOUNTER — APPOINTMENT (OUTPATIENT)
Dept: CT IMAGING | Age: 84
DRG: 291 | End: 2019-05-10
Payer: COMMERCIAL

## 2019-05-10 PROBLEM — J44.1 COPD EXACERBATION (HCC): Status: ACTIVE | Noted: 2019-05-10

## 2019-05-10 PROBLEM — R60.9 PERIPHERAL EDEMA: Status: ACTIVE | Noted: 2019-05-10

## 2019-05-10 PROBLEM — J44.9 COPD (CHRONIC OBSTRUCTIVE PULMONARY DISEASE) (HCC): Status: ACTIVE | Noted: 2019-05-10

## 2019-05-10 PROBLEM — I50.31 ACUTE DIASTOLIC CHF (CONGESTIVE HEART FAILURE), NYHA CLASS 1 (HCC): Status: ACTIVE | Noted: 2019-05-10

## 2019-05-10 LAB
A/G RATIO: 1.1 (ref 1.1–2.2)
ALBUMIN SERPL-MCNC: 3.6 G/DL (ref 3.4–5)
ALP BLD-CCNC: 88 U/L (ref 40–129)
ALT SERPL-CCNC: 16 U/L (ref 10–40)
ANION GAP SERPL CALCULATED.3IONS-SCNC: 10 MMOL/L (ref 3–16)
ANION GAP SERPL CALCULATED.3IONS-SCNC: 9 MMOL/L (ref 3–16)
APTT: 22.3 SEC (ref 26–36)
AST SERPL-CCNC: 18 U/L (ref 15–37)
BASOPHILS ABSOLUTE: 0.1 K/UL (ref 0–0.2)
BASOPHILS RELATIVE PERCENT: 0.7 %
BILIRUB SERPL-MCNC: 0.4 MG/DL (ref 0–1)
BUN BLDV-MCNC: 12 MG/DL (ref 7–20)
BUN BLDV-MCNC: 14 MG/DL (ref 7–20)
CALCIUM SERPL-MCNC: 9.5 MG/DL (ref 8.3–10.6)
CALCIUM SERPL-MCNC: 9.6 MG/DL (ref 8.3–10.6)
CHLORIDE BLD-SCNC: 92 MMOL/L (ref 99–110)
CHLORIDE BLD-SCNC: 94 MMOL/L (ref 99–110)
CO2: 36 MMOL/L (ref 21–32)
CO2: 38 MMOL/L (ref 21–32)
CREAT SERPL-MCNC: 0.6 MG/DL (ref 0.6–1.2)
CREAT SERPL-MCNC: 0.7 MG/DL (ref 0.6–1.2)
EKG ATRIAL RATE: 107 BPM
EKG DIAGNOSIS: NORMAL
EKG P AXIS: 61 DEGREES
EKG P-R INTERVAL: 168 MS
EKG Q-T INTERVAL: 350 MS
EKG QRS DURATION: 106 MS
EKG QTC CALCULATION (BAZETT): 467 MS
EKG R AXIS: -22 DEGREES
EKG T AXIS: 75 DEGREES
EKG VENTRICULAR RATE: 107 BPM
EOSINOPHILS ABSOLUTE: 0.1 K/UL (ref 0–0.6)
EOSINOPHILS RELATIVE PERCENT: 1.1 %
GFR AFRICAN AMERICAN: >60
GFR AFRICAN AMERICAN: >60
GFR NON-AFRICAN AMERICAN: >60
GFR NON-AFRICAN AMERICAN: >60
GLOBULIN: 3.3 G/DL
GLUCOSE BLD-MCNC: 164 MG/DL (ref 70–99)
GLUCOSE BLD-MCNC: 219 MG/DL (ref 70–99)
HCT VFR BLD CALC: 35.3 % (ref 36–48)
HEMOGLOBIN: 11.1 G/DL (ref 12–16)
INR BLD: 0.91 (ref 0.86–1.14)
LACTIC ACID: 1.6 MMOL/L (ref 0.4–2)
LEFT VENTRICULAR EJECTION FRACTION HIGH VALUE: 60 %
LEFT VENTRICULAR EJECTION FRACTION MODE: NORMAL
LV EF: 55 %
LV EF: 58 %
LVEF MODALITY: NORMAL
LYMPHOCYTES ABSOLUTE: 1.4 K/UL (ref 1–5.1)
LYMPHOCYTES RELATIVE PERCENT: 9.8 %
MAGNESIUM: 2 MG/DL (ref 1.8–2.4)
MCH RBC QN AUTO: 28.3 PG (ref 26–34)
MCHC RBC AUTO-ENTMCNC: 31.3 G/DL (ref 31–36)
MCV RBC AUTO: 90.3 FL (ref 80–100)
MONOCYTES ABSOLUTE: 1.1 K/UL (ref 0–1.3)
MONOCYTES RELATIVE PERCENT: 8.1 %
NEUTROPHILS ABSOLUTE: 11.2 K/UL (ref 1.7–7.7)
NEUTROPHILS RELATIVE PERCENT: 80.3 %
PDW BLD-RTO: 16.8 % (ref 12.4–15.4)
PLATELET # BLD: 153 K/UL (ref 135–450)
PMV BLD AUTO: 8.5 FL (ref 5–10.5)
POTASSIUM SERPL-SCNC: 3.7 MMOL/L (ref 3.5–5.1)
POTASSIUM SERPL-SCNC: 4.2 MMOL/L (ref 3.5–5.1)
PRO-BNP: 392 PG/ML (ref 0–449)
PROTHROMBIN TIME: 10.4 SEC (ref 9.8–13)
RBC # BLD: 3.91 M/UL (ref 4–5.2)
SODIUM BLD-SCNC: 139 MMOL/L (ref 136–145)
SODIUM BLD-SCNC: 140 MMOL/L (ref 136–145)
TOTAL PROTEIN: 6.9 G/DL (ref 6.4–8.2)
TROPONIN: 0.01 NG/ML
TROPONIN: 0.02 NG/ML
TROPONIN: <0.01 NG/ML
TSH SERPL DL<=0.05 MIU/L-ACNC: 1.31 UIU/ML (ref 0.27–4.2)
WBC # BLD: 13.9 K/UL (ref 4–11)

## 2019-05-10 PROCEDURE — 6360000002 HC RX W HCPCS: Performed by: PHYSICIAN ASSISTANT

## 2019-05-10 PROCEDURE — 2700000000 HC OXYGEN THERAPY PER DAY

## 2019-05-10 PROCEDURE — 85610 PROTHROMBIN TIME: CPT

## 2019-05-10 PROCEDURE — 6370000000 HC RX 637 (ALT 250 FOR IP): Performed by: INTERNAL MEDICINE

## 2019-05-10 PROCEDURE — 6360000002 HC RX W HCPCS: Performed by: INTERNAL MEDICINE

## 2019-05-10 PROCEDURE — 83605 ASSAY OF LACTIC ACID: CPT

## 2019-05-10 PROCEDURE — 94760 N-INVAS EAR/PLS OXIMETRY 1: CPT

## 2019-05-10 PROCEDURE — 99223 1ST HOSP IP/OBS HIGH 75: CPT | Performed by: INTERNAL MEDICINE

## 2019-05-10 PROCEDURE — 83880 ASSAY OF NATRIURETIC PEPTIDE: CPT

## 2019-05-10 PROCEDURE — 6360000004 HC RX CONTRAST MEDICATION: Performed by: PHYSICIAN ASSISTANT

## 2019-05-10 PROCEDURE — 85025 COMPLETE CBC W/AUTO DIFF WBC: CPT

## 2019-05-10 PROCEDURE — 71260 CT THORAX DX C+: CPT

## 2019-05-10 PROCEDURE — 2060000000 HC ICU INTERMEDIATE R&B

## 2019-05-10 PROCEDURE — 96374 THER/PROPH/DIAG INJ IV PUSH: CPT

## 2019-05-10 PROCEDURE — 84484 ASSAY OF TROPONIN QUANT: CPT

## 2019-05-10 PROCEDURE — 71045 X-RAY EXAM CHEST 1 VIEW: CPT

## 2019-05-10 PROCEDURE — 36415 COLL VENOUS BLD VENIPUNCTURE: CPT

## 2019-05-10 PROCEDURE — 94640 AIRWAY INHALATION TREATMENT: CPT

## 2019-05-10 PROCEDURE — 2580000003 HC RX 258: Performed by: EMERGENCY MEDICINE

## 2019-05-10 PROCEDURE — 6370000000 HC RX 637 (ALT 250 FOR IP): Performed by: PHYSICIAN ASSISTANT

## 2019-05-10 PROCEDURE — 93010 ELECTROCARDIOGRAM REPORT: CPT | Performed by: INTERNAL MEDICINE

## 2019-05-10 PROCEDURE — 83735 ASSAY OF MAGNESIUM: CPT

## 2019-05-10 PROCEDURE — 6360000002 HC RX W HCPCS: Performed by: EMERGENCY MEDICINE

## 2019-05-10 PROCEDURE — 80053 COMPREHEN METABOLIC PANEL: CPT

## 2019-05-10 PROCEDURE — 96361 HYDRATE IV INFUSION ADD-ON: CPT

## 2019-05-10 PROCEDURE — 85730 THROMBOPLASTIN TIME PARTIAL: CPT

## 2019-05-10 PROCEDURE — 2580000003 HC RX 258: Performed by: INTERNAL MEDICINE

## 2019-05-10 PROCEDURE — 84443 ASSAY THYROID STIM HORMONE: CPT

## 2019-05-10 PROCEDURE — 93308 TTE F-UP OR LMTD: CPT

## 2019-05-10 RX ORDER — LEVOTHYROXINE SODIUM 0.12 MG/1
125 TABLET ORAL DAILY
Status: DISCONTINUED | OUTPATIENT
Start: 2019-05-10 | End: 2019-05-18 | Stop reason: HOSPADM

## 2019-05-10 RX ORDER — FUROSEMIDE 10 MG/ML
40 INJECTION INTRAMUSCULAR; INTRAVENOUS ONCE
Status: COMPLETED | OUTPATIENT
Start: 2019-05-10 | End: 2019-05-10

## 2019-05-10 RX ORDER — SENNA PLUS 8.6 MG/1
1 TABLET ORAL DAILY
Status: DISCONTINUED | OUTPATIENT
Start: 2019-05-10 | End: 2019-05-18 | Stop reason: HOSPADM

## 2019-05-10 RX ORDER — HYDROCODONE BITARTRATE AND ACETAMINOPHEN 5; 325 MG/1; MG/1
2 TABLET ORAL EVERY 6 HOURS PRN
Status: DISCONTINUED | OUTPATIENT
Start: 2019-05-10 | End: 2019-05-18 | Stop reason: HOSPADM

## 2019-05-10 RX ORDER — ALBUTEROL SULFATE 2.5 MG/3ML
2.5 SOLUTION RESPIRATORY (INHALATION) ONCE
Status: COMPLETED | OUTPATIENT
Start: 2019-05-10 | End: 2019-05-10

## 2019-05-10 RX ORDER — FLUOXETINE HYDROCHLORIDE 20 MG/1
40 CAPSULE ORAL DAILY
Status: DISCONTINUED | OUTPATIENT
Start: 2019-05-10 | End: 2019-05-18 | Stop reason: HOSPADM

## 2019-05-10 RX ORDER — ESOMEPRAZOLE MAGNESIUM 40 MG/1
40 CAPSULE, DELAYED RELEASE ORAL
Status: DISCONTINUED | OUTPATIENT
Start: 2019-05-10 | End: 2019-05-10 | Stop reason: CLARIF

## 2019-05-10 RX ORDER — SPIRONOLACTONE 25 MG/1
25 TABLET ORAL 2 TIMES DAILY
Status: DISCONTINUED | OUTPATIENT
Start: 2019-05-10 | End: 2019-05-18 | Stop reason: HOSPADM

## 2019-05-10 RX ORDER — FUROSEMIDE 10 MG/ML
40 INJECTION INTRAMUSCULAR; INTRAVENOUS 2 TIMES DAILY
Status: DISCONTINUED | OUTPATIENT
Start: 2019-05-10 | End: 2019-05-12

## 2019-05-10 RX ORDER — SODIUM CHLORIDE 0.9 % (FLUSH) 0.9 %
10 SYRINGE (ML) INJECTION PRN
Status: DISCONTINUED | OUTPATIENT
Start: 2019-05-10 | End: 2019-05-18 | Stop reason: HOSPADM

## 2019-05-10 RX ORDER — 0.9 % SODIUM CHLORIDE 0.9 %
1000 INTRAVENOUS SOLUTION INTRAVENOUS ONCE
Status: COMPLETED | OUTPATIENT
Start: 2019-05-10 | End: 2019-05-10

## 2019-05-10 RX ORDER — OXYMETAZOLINE HYDROCHLORIDE 0.05 G/100ML
2 SPRAY NASAL 2 TIMES DAILY
Status: DISPENSED | OUTPATIENT
Start: 2019-05-10 | End: 2019-05-13

## 2019-05-10 RX ORDER — ROSUVASTATIN CALCIUM 20 MG/1
40 TABLET, COATED ORAL EVERY EVENING
Status: DISCONTINUED | OUTPATIENT
Start: 2019-05-10 | End: 2019-05-18 | Stop reason: HOSPADM

## 2019-05-10 RX ORDER — ACETAMINOPHEN 80 MG
TABLET,CHEWABLE ORAL
Status: COMPLETED
Start: 2019-05-10 | End: 2019-05-10

## 2019-05-10 RX ORDER — ONDANSETRON 2 MG/ML
4 INJECTION INTRAMUSCULAR; INTRAVENOUS EVERY 6 HOURS PRN
Status: DISCONTINUED | OUTPATIENT
Start: 2019-05-10 | End: 2019-05-18 | Stop reason: HOSPADM

## 2019-05-10 RX ORDER — PREGABALIN 75 MG/1
150 CAPSULE ORAL 2 TIMES DAILY
Status: DISCONTINUED | OUTPATIENT
Start: 2019-05-10 | End: 2019-05-18 | Stop reason: HOSPADM

## 2019-05-10 RX ORDER — SODIUM CHLORIDE 0.9 % (FLUSH) 0.9 %
10 SYRINGE (ML) INJECTION EVERY 12 HOURS SCHEDULED
Status: DISCONTINUED | OUTPATIENT
Start: 2019-05-10 | End: 2019-05-18 | Stop reason: HOSPADM

## 2019-05-10 RX ORDER — LISINOPRIL 5 MG/1
2.5 TABLET ORAL DAILY
Status: DISCONTINUED | OUTPATIENT
Start: 2019-05-10 | End: 2019-05-10

## 2019-05-10 RX ORDER — PANTOPRAZOLE SODIUM 40 MG/1
40 TABLET, DELAYED RELEASE ORAL
Status: DISCONTINUED | OUTPATIENT
Start: 2019-05-10 | End: 2019-05-18 | Stop reason: HOSPADM

## 2019-05-10 RX ORDER — HYDROCODONE BITARTRATE AND ACETAMINOPHEN 5; 325 MG/1; MG/1
1 TABLET ORAL EVERY 6 HOURS PRN
Status: DISCONTINUED | OUTPATIENT
Start: 2019-05-10 | End: 2019-05-10

## 2019-05-10 RX ORDER — LISINOPRIL 5 MG/1
5 TABLET ORAL DAILY
Status: DISCONTINUED | OUTPATIENT
Start: 2019-05-11 | End: 2019-05-18 | Stop reason: HOSPADM

## 2019-05-10 RX ORDER — IPRATROPIUM BROMIDE AND ALBUTEROL SULFATE 2.5; .5 MG/3ML; MG/3ML
1 SOLUTION RESPIRATORY (INHALATION)
Status: DISCONTINUED | OUTPATIENT
Start: 2019-05-10 | End: 2019-05-18 | Stop reason: HOSPADM

## 2019-05-10 RX ADMIN — IPRATROPIUM BROMIDE AND ALBUTEROL SULFATE 1 AMPULE: .5; 3 SOLUTION RESPIRATORY (INHALATION) at 09:45

## 2019-05-10 RX ADMIN — FLUOXETINE 40 MG: 20 CAPSULE ORAL at 09:05

## 2019-05-10 RX ADMIN — FUROSEMIDE 40 MG: 10 INJECTION, SOLUTION INTRAMUSCULAR; INTRAVENOUS at 09:06

## 2019-05-10 RX ADMIN — METHYLPREDNISOLONE SODIUM SUCCINATE 125 MG: 125 INJECTION, POWDER, FOR SOLUTION INTRAMUSCULAR; INTRAVENOUS at 00:10

## 2019-05-10 RX ADMIN — PANTOPRAZOLE SODIUM 40 MG: 40 TABLET, DELAYED RELEASE ORAL at 06:03

## 2019-05-10 RX ADMIN — IPRATROPIUM BROMIDE AND ALBUTEROL SULFATE 1 AMPULE: .5; 3 SOLUTION RESPIRATORY (INHALATION) at 15:51

## 2019-05-10 RX ADMIN — SPIRONOLACTONE 25 MG: 25 TABLET ORAL at 09:05

## 2019-05-10 RX ADMIN — Medication 2 PUFF: at 20:28

## 2019-05-10 RX ADMIN — HYDROCODONE BITARTRATE AND ACETAMINOPHEN 2 TABLET: 5; 325 TABLET ORAL at 16:44

## 2019-05-10 RX ADMIN — IPRATROPIUM BROMIDE AND ALBUTEROL SULFATE 1 AMPULE: .5; 3 SOLUTION RESPIRATORY (INHALATION) at 12:17

## 2019-05-10 RX ADMIN — HYDROCODONE BITARTRATE AND ACETAMINOPHEN 2 TABLET: 5; 325 TABLET ORAL at 22:49

## 2019-05-10 RX ADMIN — LISINOPRIL 2.5 MG: 5 TABLET ORAL at 09:06

## 2019-05-10 RX ADMIN — FUROSEMIDE 40 MG: 10 INJECTION, SOLUTION INTRAMUSCULAR; INTRAVENOUS at 22:50

## 2019-05-10 RX ADMIN — OXYMETAZOLINE HYDROCHLORIDE 2 SPRAY: 5 SPRAY NASAL at 13:59

## 2019-05-10 RX ADMIN — FUROSEMIDE 40 MG: 10 INJECTION, SOLUTION INTRAMUSCULAR; INTRAVENOUS at 06:03

## 2019-05-10 RX ADMIN — IPRATROPIUM BROMIDE AND ALBUTEROL SULFATE 1 AMPULE: .5; 3 SOLUTION RESPIRATORY (INHALATION) at 20:28

## 2019-05-10 RX ADMIN — SENNOSIDES 8.6 MG: 8.6 TABLET, FILM COATED ORAL at 09:07

## 2019-05-10 RX ADMIN — Medication 2 PUFF: at 09:45

## 2019-05-10 RX ADMIN — PREGABALIN 150 MG: 75 CAPSULE ORAL at 09:05

## 2019-05-10 RX ADMIN — LEVOTHYROXINE SODIUM 125 MCG: 125 TABLET ORAL at 09:06

## 2019-05-10 RX ADMIN — ALBUTEROL SULFATE 2.5 MG: 2.5 SOLUTION RESPIRATORY (INHALATION) at 02:42

## 2019-05-10 RX ADMIN — IOPAMIDOL 75 ML: 755 INJECTION, SOLUTION INTRAVENOUS at 02:01

## 2019-05-10 RX ADMIN — SPIRONOLACTONE 25 MG: 25 TABLET ORAL at 22:50

## 2019-05-10 RX ADMIN — Medication: at 11:36

## 2019-05-10 RX ADMIN — Medication 10 ML: at 09:06

## 2019-05-10 RX ADMIN — Medication 10 ML: at 22:51

## 2019-05-10 RX ADMIN — ROSUVASTATIN CALCIUM 40 MG: 20 TABLET, FILM COATED ORAL at 22:50

## 2019-05-10 RX ADMIN — ENOXAPARIN SODIUM 40 MG: 40 INJECTION SUBCUTANEOUS at 09:06

## 2019-05-10 RX ADMIN — SODIUM CHLORIDE 1000 ML: 9 INJECTION, SOLUTION INTRAVENOUS at 02:40

## 2019-05-10 RX ADMIN — HYDROCODONE BITARTRATE AND ACETAMINOPHEN 1 TABLET: 5; 325 TABLET ORAL at 11:55

## 2019-05-10 RX ADMIN — PREGABALIN 150 MG: 75 CAPSULE ORAL at 22:50

## 2019-05-10 ASSESSMENT — ENCOUNTER SYMPTOMS
WHEEZING: 1
RHINORRHEA: 0
NAUSEA: 0
SHORTNESS OF BREATH: 1
CHEST TIGHTNESS: 1
COUGH: 1
ABDOMINAL PAIN: 0
VOMITING: 0
CHOKING: 0
DIARRHEA: 0

## 2019-05-10 ASSESSMENT — PAIN DESCRIPTION - LOCATION
LOCATION: GENERALIZED
LOCATION: BACK
LOCATION: GENERALIZED

## 2019-05-10 ASSESSMENT — PAIN DESCRIPTION - PAIN TYPE
TYPE: CHRONIC PAIN

## 2019-05-10 ASSESSMENT — PAIN SCALES - GENERAL
PAINLEVEL_OUTOF10: 9
PAINLEVEL_OUTOF10: 8
PAINLEVEL_OUTOF10: 10
PAINLEVEL_OUTOF10: 9
PAINLEVEL_OUTOF10: 6

## 2019-05-10 NOTE — ED PROVIDER NOTES
I have personally performed a face to face diagnostic evaluation on this patient. I have fully participated in the care of this patient. I have reviewed and agree with all pertinent clinical informationincluding history, physical exam, diagnostic tests, and the plan. History and Physical as follows:  HPI    To the emergency room worsening shortness of breath after she was unhooked from her oxygen for further period time. She has a nonproductive cough    Physical Exam    GENERAL: Patient appeared well-developed, well-nourished, vital signs reviewed. MENTAL STATUS: Patient is awake and alert   HEAD AND FACE :Head is normalcephalic. Face normal appearance  CV: Heart regular rate and rhythm without murmur,  LUNGS: Lungs decreased breath sounds with scattered rhonchi   CHEST WALL: normal appearance. normal motion  PSYCHIATRIC:mood and affect normal    NEUROLOGIC: no weakness or numbness noted   This is a computerized dictation.  I have made every effort to proofread this chart, however, transcription errors may exist.     Patient presents with COPD exacerbation improvement with ER treatment     Stephany Nichols DO  05/10/19 0425

## 2019-05-10 NOTE — DISCHARGE INSTR - COC
Continuity of Care Form    Diastolic HF    _x_ Daily Visits x 3     _x_Cardiovascular Assessment.  Titrate O2 to keep SaO2 greater than 90%    _x_ Daily Weights- Baseline Wt: 290lbs   Call MD if:   3 pound weight gain or loss in one day OR 5 pound weight gain in one week       _x_ Labs:   BMP,BNP     Please start on 19   Frequency: Weekly x 4              Fax results to: [facility]F Clinic: [phone]      _x_ Med List attached:   Hold Coreg/Metoprolol if HR less than 45 or patient symptomatic*   Hold ACE/ARB if SBP less than 85 or patient symptomatic*   Do not hold Spironolactone (aldactone) for hypotension/bradycardia   Call MD for questions: CHF Clinic: [phone]    _x_Follow up appointment with cardiology: date/time: 19            Patient Name: Carmine Chaves   :  1935  MRN:  [id number]    Admit date:  2019  Discharge date:  19    Code Status Order: Full Code   Advance Directives:   [place] [place] [address] Directive Type of Healthcare Directive Copy in [address]  [address] Agent's Name Healthcare Agent's Phone Number    05/10/19 8144  No, patient does not have an advance directive for healthcare treatment -- -- -- -- --          Admitting Physician:  Di Oliver MD  PCP: Lawrence Hodgkin, APRN - CNP    Discharging Nurse: [address] Unit/Room#: 0JX-1181/6986-15  Discharging Unit Phone Number: [phone]    Emergency Contact:   Extended Emergency Contact Information  Primary Emergency Contact: [address] Phone: [phone]  Relation: Child  Secondary Emergency Contact: [address] Phone: [phone]  Relation: Child    Past Surgical History:  Past Surgical History:   Procedure Laterality Date    CHOLECYSTECTOMY      PARTIAL HYSTERECTOMY         Immunization History:   Immunization History   Administered Date(s) Administered    Influenza Virus Vaccine 10/24/2018    Pneumococcal 13-valent Conjugate Charles Aceves) 04/24/2013       Active Problems:  Patient Active Problem List   Diagnosis Code    Pneumonia J18.9    Acute on chronic diastolic heart failure ([facility]) I50.33    Peripheral edema R60.9    COPD exacerbation ([facility]) J44.1    Essential hypertension I10    Hyperlipidemia E78.5    Morbid obesity with BMI of 50.0-59.9, adult ([facility]) E66.01, Z68.43       Isolation/Infection:   Isolation          No Isolation            Nurse Assessment:  Last Vital Signs: /64   Pulse 77   Temp 97.9 °F (36.6 °C) (Oral)   Resp 18   Ht 5' 1.5\" (1.562 m)   Wt 291 lb (132 kg)   SpO2 (!) 80% Comment: wuth up to [facility]   BMI 54.09 kg/m²     Last documented pain score (0-10 scale): Pain Level: 4  Last Weight:   Wt Readings from Last 1 Encounters:   05/14/19 291 lb (132 kg)     Mental Status:  oriented, alert, coherent, logical, thought processes intact and able to concentrate and follow conversation    IV Access:  - None    Nursing Mobility/ADLs:  Walking   Assisted  Transfer  Assisted  Bathing  Dependent  Dressing  Assisted  Toileting  Assisted  Feeding  Independent  Med Admin  Independent  Med Delivery   whole    Wound Care Documentation and Therapy:  Wound 04/24/19 Coccyx Mid open  (Active)   Wound Image   4/24/2019 11:45 PM   Wound Pressure Stage  3 4/25/2019  3:47 PM   Dressing Status Clean;Dry; Intact 4/28/2019  8:36 AM   Dressing Changed Changed/New 4/28/2019  8:36 AM   Dressing/Treatment Foam 4/28/2019  8:36 AM   Wound Length (cm) 2.4 cm 4/24/2019  9:21 PM   Wound Width (cm) 1 cm 4/24/2019  9:21 PM   Wound Depth (cm) 0.2 cm 4/24/2019  9:21 PM   Wound Surface Area (cm^2) 2.4 cm^2 4/24/2019  9:21 PM   Wound Volume (cm^3) 0.48 cm^3 4/24/2019  9:21 PM   Wound Assessment Red 4/28/2019  8:36 AM   Drainage Amount None 4/28/2019  8:36 AM   Drainage Description Serous 4/24/2019  9:21 PM   Odor None 4/28/2019  8:36 AM   Jessica-wound Assessment Ecchymosis 4/28/2019  8:36 AM   Number of days: 19 Wound 05/10/19 stage 2 left buttocks (Active)   Wound Pressure Stage  2 5/14/2019  8:12 AM   Dressing Status Clean;Dry; Intact 5/12/2019  4:00 AM   Dressing/Treatment Moisture barrier;Open to air 5/14/2019  8:12 AM   Wound Cleansed Wound cleanser 5/11/2019  3:14 PM   Wound Assessment Pink 5/12/2019  4:44 PM   Drainage Amount None 5/14/2019  8:12 AM   Odor None 5/12/2019  4:44 PM   Jessica-wound Assessment Clean; Intact; Pink 5/14/2019  8:12 AM   Number of days: 4       Wound 05/12/19 Abdomen Lateral;Lower;Right ubder abdominal pannus (Active)   Wound Skin Tear 5/12/2019 11:41 PM   Dressing Status Clean;Dry; Intact 5/14/2019  3:26 PM   Wound Cleansed Wound cleanser 5/12/2019 12:06 PM   Wound Assessment Clean;Dry;Bleeding;Red 5/12/2019  4:44 PM   Drainage Amount Small 5/12/2019  4:44 PM   Drainage Description Green 5/12/2019  4:44 PM   Odor None 5/12/2019  4:44 PM   Jessica-wound Assessment Pink 5/12/2019  4:44 PM   Number of days: 2        Elimination:  Continence:   · Bowel: Yes  · Bladder: Yes  Urinary Catheter: None   Colostomy/Ileostomy/Ileal Conduit: No       Date of Last BM: 5/17/19    Intake/Output Summary (Last 24 hours) at 5/14/2019 1553  Last data filed at 5/14/2019 1440  Gross per 24 hour   Intake 1450 ml   Output 2975 ml   Net -1525 ml     I/O last 3 completed shifts: In: 3063 [P.O.:1450]  Out: 2975 [Urine:2975]    Safety Concerns: At Risk for Falls    Impairments/Disabilities:      Hearing    Nutrition Therapy:  Current Nutrition Therapy:   - Oral Diet:  Cardiac    Routes of Feeding: Oral  Liquids: Thin Liquids  Daily Fluid Restriction: yes - amount 1500cc  Last Modified Barium Swallow with Video (Video Swallowing Test): not done    Treatments at the Time of Hospital Discharge:   Respiratory Treatments: Hand held nebulizers  Oxygen Therapy:  is on oxygen at 3 L/min per nasal cannula.   Ventilator:    - No ventilator support    Rehab Therapies: Physical Therapy, Occupational Therapy and Speech/Language

## 2019-05-10 NOTE — PLAN OF CARE
Problem: Falls - Risk of:  Goal: Absence of physical injury  Description  Absence of physical injury  Outcome: Ongoing     Problem: OXYGENATION/RESPIRATORY FUNCTION  Goal: Patient will achieve/maintain normal respiratory rate/effort  Description  Respiratory rate and effort will be within normal limits for the patient  Outcome: Ongoing  Intervention: ASSESS OXYGENATION AS ORDERED  Note:   91% on 5 liters O2 via nasal cannula     Problem: FLUID AND ELECTROLYTE IMBALANCE  Goal: Fluid and electrolyte balance are achieved/maintained  Outcome: Ongoing  Intervention: ADMINISTER TREATMENTS AS ORDERED  Note:   Pt given 40 mg iv lasxi, see MAR

## 2019-05-10 NOTE — ED PROVIDER NOTES
Cardiovascular: Negative for chest pain. Gastrointestinal: Negative for abdominal pain, diarrhea, nausea and vomiting. Genitourinary: Negative for difficulty urinating, dysuria and hematuria. Positives and Pertinent negatives as per HPI. Except as noted abovein the ROS, all other systems were reviewed and negative. PAST MEDICAL HISTORY     Past Medical History:   Diagnosis Date    COPD [facility]chronic obstructive pulmonary disease) ([facility] [address]     Hernia of unspecified site of abdominal cavity without mention of obstruction or gangrene     Incontinence     Knee pain, bilateral     pt states no cartilage    Spinal stenosis          SURGICAL HISTORY     Past Surgical History:   Procedure Laterality Date    CHOLECYSTECTOMY      PARTIAL HYSTERECTOMY           CURRENTMEDICATIONS       Previous Medications    ALBUTEROL (PROVENTIL) (2.5 MG/3ML) 0.083% NEBULIZER SOLUTION    Take 3 mLs by nebulization every 4 hours (while awake)    CHLORPHEN-PHENYLEPH-APAP (CORICIDIN D COLD/FLU/SINUS) 2-5-325 MG TABS    Take 2 tablets by mouth 4 times daily as needed (cough)    DICLOFENAC (VOLTAREN) 50 MG EC TABLET    Take 50 mg by mouth 2 times daily    DOCUSATE SODIUM (COLACE) 100 MG CAPSULE    Take 100 mg by mouth daily    ESOMEPRAZOLE (NEXIUM) 40 MG CAPSULE    Take 40 mg by mouth every morning (before breakfast). FLUOXETINE (PROZAC) 20 MG CAPSULE    Take 40 mg by mouth daily. FLUTICASONE-SALMETEROL (ADVAIR DISKUS IN)    Inhale  into the lungs. FUROSEMIDE (LASIX) 20 MG TABLET    Take 2 tablets by mouth 2 times daily    LEVOTHYROXINE (SYNTHROID) 125 MCG TABLET    Take 125 mcg by mouth daily. LISINOPRIL (PRINIVIL;ZESTRIL) 10 MG TABLET    Take 10 mg by mouth daily    MAGNESIUM HYDROXIDE (MILK OF MAGNESIA) 400 MG/5ML SUSPENSION    Take 30 mLs by mouth daily as needed for Constipation    OXYCODONE-ACETAMINOPHEN (PERCOCET)  MG PER TABLET    Take 1 tablet by mouth every 6 hours as needed for Pain. 05/09/19 2344 -- --   (!) 122/42   95 22 91 %         Physical Exam   Constitutional: She is oriented to person, place, and time. She appears well-developed and well-nourished. HENT:   Head: Normocephalic and atraumatic. Right Ear: External ear normal.   Left Ear: External ear normal.   Nose: Nose normal.   Eyes: Right eye exhibits no discharge. Left eye exhibits no discharge. Neck: Normal range of motion. Neck supple. No tracheal deviation present. Cardiovascular: Normal rate, regular rhythm and normal heart sounds. No murmur heard. Pulmonary/Chest: Effort normal. No respiratory distress. She has wheezes. Wheezing, coarse aeration throughout all lung field   Abdominal: Soft. Bowel sounds are normal. She exhibits no distension. There is no tenderness. There is no guarding. Musculoskeletal: Normal range of motion. Neurological: She is alert and oriented to person, place, and time. Skin: Skin is warm and dry. She is not diaphoretic. Psychiatric: She has a normal mood and affect. Her behavior is normal.   Nursing note and vitals reviewed.       DIAGNOSTIC RESULTS   LABS:    Labs Reviewed   CBC WITH AUTO DIFFERENTIAL - Abnormal; Notable for the following components:       Result Value    WBC 13.9 (*)     RBC 3.91 (*)     Hemoglobin 11.1 (*)     Hematocrit 35.3 (*)     RDW 16.8 (*)     Neutrophils # 11.2 (*)     All other components within normal limits    Narrative:     Performed at:  [facility] [address], [place], [facility] [place]   Phone [phone]   COMPREHENSIVE METABOLIC PANEL - Abnormal; Notable for the following components:    Chloride 92 (*)     CO2 38 (*)     Glucose 164 (*)     All other components within normal limits    Narrative:     Performed at:  [facility] [address], [place], [facility] [place]   Phone [phone]   TROPONIN - Abnormal; Notable for the following components:    Troponin 0.02 (*)     All other components within normal limits    Narrative:     Performed at:  [facility]  [address] [place],  [place], [address]   Phone [phone]   APTT - Abnormal; Notable for the following components:    aPTT 22.3 (*)     All other components within normal limits    Narrative:     Performed at:  [facility]  [address] [place],  [place], [address]   Phone [phone]   PROTIME-INR    Narrative:     Performed at:  [facility]  [address] [place],  [place], [address]   Phone [phone]   BRAIN NATRIURETIC PEPTIDE    Narrative:     Performed at:  [facility] [address] [place],  [place], [address]   Phone [phone]   LACTIC ACID, PLASMA    Narrative:     Performed at:  [facility] [address] [place],  [place], [address]   Phone [phone]       All other labs were within normal range or not returned as of this dictation. EKG: All EKG's are interpreted by the Emergency Department Physician who either signs orCo-signs this chart in the absence of a cardiologist.  Please see their note for interpretation of EKG. RADIOLOGY:   Non-plain film images such as CT, Ultrasound and MRI are read by the radiologist. Plain radiographic images are visualized andpreliminarily interpreted by the  ED Provider with the below findings:        Interpretation perthe Radiologist below, if available at the time of this note:    XR CHEST PORTABLE   Final Result   Possible pulmonary edema.          CT CHEST PULMONARY EMBOLISM W CONTRAST    (Results Pending)     Xr Chest Portable    Result Date: 5/10/2019  EXAMINATION: ONE XRAY VIEW OF THE CHEST 5/10/2019 12:07 am COMPARISON: 04/24/2019 HISTORY: ORDERING SYSTEM PROVIDED HISTORY: SOB TECHNOLOGIST PROVIDED HISTORY: Reason for exam:->SOB Ordering Physician Provided Reason for Exam: sob Acuity: Unknown Type of Exam: Unknown nausea or vomiting or diarrhea. No urinary symptoms. She has no other complaints at this time. Physical exam she has coarse aeration and wheezing throughout all lung fields. She is requiring 5 L of oxygen here, instead of her normal 3. She is a leukocytosis of 51.7, could certainly be due to her recent steroid use. CMP is unremarkable. She is hypercapnic with a CO2 of 38, troponin is elevated at 0.02 she has no chest pain. Chest x-ray shows no acute process however CTA was performed due to her elevated troponin, worsening hypoxia, and recent admission to the hospital.  Patient was given multiple breathing treatments, she continues to have wheezing believe that she will need to be admitted for further care and evaluation, this marks the end of my shift. Please see attending note for details. FINAL IMPRESSION      1. COPD exacerbation (HCC)          DISPOSITION/PLAN   DISPOSITION        PATIENT REFERREDTO:  No follow-up provider specified.     DISCHARGE MEDICATIONS:  New Prescriptions    No medications on file       DISCONTINUED MEDICATIONS:  Discontinued Medications    No medications on file              (Please note that portions ofthis note were completed with a voice recognition program.  Efforts were made to edit the dictations but occasionally words are mis-transcribed.)    Porsha Leblanc PA-C (electronically signed)            Porsha Leblanc PA-C  05/10/19 7311

## 2019-05-10 NOTE — PROGRESS NOTES
Patient arrived via stretcher from ED with nurse, Booker.  Patient moved to bed from stretcher by nursing staff. Tele in place , heart rate 80, sinus rhythm. Patient 4 liters via nasal cannula. SpO2 89%, O2 increased to 5 Liters, pt now 92%. Pt oriented to room, bed controls and call light. Pt verbalized understanding.  Rigoberto Herring RN

## 2019-05-10 NOTE — ED NOTES
Report given to Chidi Dias on 3T, pt confirmed HR 86 NSR on [address] [facility], RN  05/10/19 9726

## 2019-05-10 NOTE — ED NOTES
Attempted blood draw x2 by Cristin Richardson RN, unsuccessful, lab called to do blood.       [facility], RN  05/10/19 4050

## 2019-05-10 NOTE — CONSULTS
[facility]  Advanced CHF/Pulmonary Hypertension   Cardiac Evaluation      Jyoti Fernandes  YOB: 1935    REquesting PHysician:  Dr. Kristin Ramos      Chief Complaint   Patient presents with    Shortness of Breath     Pt found 82% on RA, unsure of when oxygen become unhooked, wears 3 LPM at all times and was disconnected at the hub. Pt given duo neb in squad, takes every 4 hours. History of Present Illness:  Rambo Bautista is an 83 yo female who presents to the ED today for evaluation for shortness of breath. She has a history of COPD and is on 3 liters oxygen continuously. She was just admitted and discharged last week for COPD. She had one episode of being disconnected from her oxygen and was without it for several hours. She has had a cough productive of dry sputum. No hemoptysis. No chest pressure. No fever or chills. No abdominal pain. No nausea, vomiting, diarrhea. She has a history of COPD, obesity, spinal stenosis. She has history of fluid overload on lasix and aldactone. Chest xray this admission suggests pulmonary edema. CTPA negative for PE. She has pitting peripheral edema. EKG showed sinus tachycardia. She diuresed overnight. She lives at home with her daughter. She is not very ambulatory.             Allergies   Allergen Reactions    Sulfa Antibiotics      Unknown reaction     Current Facility-Administered Medications   Medication Dose Route Frequency Provider Last Rate Last Dose    FLUoxetine (PROZAC) capsule 40 mg  40 mg Oral Daily Demetrius Stewart MD   40 mg at 05/10/19 0905    levothyroxine (SYNTHROID) tablet 125 mcg  125 mcg Oral Daily Demetrius Stewart MD   125 mcg at 05/10/19 0906    lisinopril (PRINIVIL;ZESTRIL) tablet 2.5 mg  2.5 mg Oral Daily Demetrius Stewart MD   2.5 mg at 05/10/19 0906    pregabalin (LYRICA) capsule 150 mg  150 mg Oral BID Demetrius Stewart MD   150 mg at 05/10/19 0905    rosuvastatin (CRESTOR) tablet pertinent family history. Social History     Socioeconomic History    Marital status:      Spouse name: Not on file    Number of children: Not on file    Years of education: Not on file    Highest education level: Not on file   Occupational History    Not on file   Social Needs    Financial resource strain: Not on file    Food insecurity:     Worry: Not on file     Inability: Not on file    Transportation needs:     Medical: Not on file     Non-medical: Not on file   Tobacco Use    Smoking status: Never Smoker    Smokeless tobacco: Never Used   Substance and Sexual Activity    Alcohol use: No    Drug use: No    Sexual activity: Not on file   Lifestyle    Physical activity:     Days per week: Not on file     Minutes per session: Not on file    Stress: Not on file   Relationships    Social connections:     Talks on phone: Not on file     Gets together: Not on file     Attends Denominational service: Not on file     Active member of club or organization: Not on file     Attends meetings of clubs or organizations: Not on file     Relationship status: Not on file    Intimate partner violence:     Fear of current or ex partner: Not on file     Emotionally abused: Not on file     Physically abused: Not on file     Forced sexual activity: Not on file   Other Topics Concern    Not on file   Social History Narrative    Not on file       Review of Systems:   · Constitutional: there has been no unanticipated weight loss. There's been no change in energy level, sleep pattern, or activity level. · Eyes: No visual changes or diplopia. No scleral icterus. · ENT: No Headaches, hearing loss or vertigo. No mouth sores or sore throat. · Cardiovascular: Reviewed in HPI  · Respiratory: No cough or wheezing, no sputum production. No hematemesis. · Gastrointestinal: No abdominal pain, appetite loss, blood in stools. No change in bowel or bladder habits.   · Genitourinary: No dysuria, trouble voiding, or hematuria. · Musculoskeletal:  No gait disturbance, weakness or joint complaints. · Integumentary: No rash or pruritis. · Neurological: No headache, diplopia, change in muscle strength, numbness or tingling. No change in gait, balance, coordination, mood, affect, memory, mentation, behavior. · Psychiatric: No anxiety, no depression. · Endocrine: No malaise, fatigue or temperature intolerance. No excessive thirst, fluid intake, or urination. No tremor. · Hematologic/Lymphatic: No abnormal bruising or bleeding, blood clots or swollen lymph nodes. · Allergic/Immunologic: No nasal congestion or hives. Physical Examination:    Vitals:    05/10/19 0745 05/10/19 0945 05/10/19 1217 05/10/19 1221   BP: 134/71      Pulse: 96      Resp: 18 20     Temp: 98.4 °F (36.9 °C)      TempSrc: Axillary      SpO2: 92% 92% (!) 85% 91%   Weight:       Height:         Body mass index is 54.21 kg/m². Wt Readings from Last 3 Encounters:   05/10/19 291 lb 9.6 oz (132.3 kg)   04/28/19 281 lb 8 oz (127.7 kg)   04/08/19 300 lb (136.1 kg)     BP Readings from Last 3 Encounters:   05/10/19 134/71   04/28/19 113/74   04/08/19 (!) 121/44     Constitutional and General Appearance:   Chronically ill appearing, obese female in NAD  HEENT:  NC/AT  SCAR[place]TT  No problems with hearing  Skin:  Warm, dry  Respiratory:  · Normal excursion and expansion without use of accessory muscles  · Resp Auscultation: Normal breath sounds without dullness  Cardiovascular:  · The apical impulses not displaced  · Heart tones are crisp and normal  · Cervical veins are not engorged  · The carotid upstroke is normal in amplitude and contour without delay or bruit  · JVP 9-10 cm H2O  RRR with nl S1 and S2 without m,r,g  · Peripheral pulses are symmetrical and full  · There is no clubbing, cyanosis of the extremities.   · 2+ bilateral edema  · Femoral Arteries: 2+ and equal  · Pedal Pulses: 2+ and equal   Neck:  · No thyromegaly  Abdomen:  · No masses or tenderness  · Liver/Spleen: No Abnormalities Noted  Neurological/Psychiatric:  · Alert and oriented in all spheres  · Moves all extremities well  · Exhibits normal gait balance and coordination  · No abnormalities of mood, affect, memory, mentation, or behavior are noted    Echo:  5/10/19:   This was a very technically difficult and limited exam. Patient was in pain   and unable to lay still.   -Normal left ventricle size, wall thickness, and systolic function with an   estimated ejection fraction of 55-60%.  -No obvious regional wall motion abnormalities are seen. There is abnormal   septal motion.   -The mitral valve normal in structure and function.   -No evidence of mitral regurgitation or stenosis.   -The right ventricle is normal in size and function. Labs were reviewed including labs from other hospital systems through [facility]. Cardiac testing was reviewed including echos, nuclear scans, cardiac catheterization, including from other hospital systems through [facility]. Assessment:    1. COPD exacerbation ([facility] [address]     2. Acute on chronic diastolic HF  3. Hypertension  4.  hyperlipidemia    Likely that steroid treatment of COPD contributed to fluid retention. Plan:  1. Continue IV lasix bid  2. Continue lisinopril, increase to 5 mg po qd (on 10 mg at home)  3. Continue spironolactone  4. No reason to do ischemia work up  5. CHF education reinforced. ~salt restriction  ~fluid restriction  ~medication compliance  ~daily weights and notify of any significant weight gain/loss  ~establish with CHF nurse  ~outpatient follow-up with our CHF team    I spent 70 minutes of face to face time with the patient with more than 50% of the time spent counseling and coordinating care for     I appreciate the opportunity of cooperating in the care of this patient.     Traci Root M.D., [address] [facility]

## 2019-05-10 NOTE — PROGRESS NOTES
4 Eyes Skin Assessment     The patient is being assess for  Admission    I agree that 2 RN's have performed a thorough Head to Toe Skin Assessment on the patient. ALL assessment sites listed below have been assessed. Areas assessed by both nurses: Shayla Ebben  [x]   Head, Face, and Ears   [x]   Shoulders, Back, and Chest  [x]   Arms, Elbows, and Hands   [x]   Coccyx, Sacrum, and IschIum  [x]   Legs, Feet, and Heels        Does the Patient have Skin Breakdown?   Yes LDA WOUND CARE was Initiated documentation include the Jessica-wound, Wound Assessment, Measurements, Dressing Treatment, Drainage, and Color\",         Jorge Luis Prevention initiated:  Yes   Wound Care Orders initiated:  Yes      [id number] nurse consulted for Pressure Injury (Stage 3,4, Unstageable, DTI, NWPT, and Complex wounds), New and Established Ostomies:  No      Nurse 1 eSignature: Electronically signed by Rigoberto Herring RN on 5/10/19 at 7:27 AM    **SHARE this note so that the co-signing nurse is able to place an eSignature**    Nurse 2 eSignature: Electronically signed by Daniel Lanza RN on 5/10/19 at 7:28 AM

## 2019-05-10 NOTE — PROGRESS NOTES
Attempted heart failure teaching. Unable to arouse patient. Will return at another time for teaching.

## 2019-05-10 NOTE — ED NOTES
Bed: 23  Expected date:   Expected time:   Means of arrival:   Comments:  Wendy Gotti RN  05/09/19 8850

## 2019-05-10 NOTE — H&P
nebulization every 4 hours (while awake) 4/28/19   Mimi Lyons MD   lisinopril (PRINIVIL;ZESTRIL) 10 MG tablet Take 10 mg by mouth daily    Historical Provider, MD   oxyCODONE-acetaminophen (PERCOCET)  MG per tablet Take 1 tablet by mouth every 6 hours as needed for Pain. Historical Provider, MD   Chlorphen-Phenyleph-APAP (CORICIDIN D COLD/FLU/SINUS) 2-5-325 MG TABS Take 2 tablets by mouth 4 times daily as needed (cough) 6/25/18   Amira Estrada PA-C   rosuvastatin (CRESTOR) 40 MG tablet Take 40 mg by mouth every evening    Historical Provider, MD   solifenacin (VESICARE) 10 MG tablet Take 10 mg by mouth daily    Historical Provider, MD   docusate sodium (COLACE) 100 MG capsule Take 100 mg by mouth daily    Historical Provider, MD   senna (SENOKOT) 8.6 MG tablet Take 1 tablet by mouth daily    Historical Provider, MD   diclofenac (VOLTAREN) 50 MG EC tablet Take 50 mg by mouth 2 times daily    Historical Provider, MD   magnesium hydroxide (MILK OF MAGNESIA) 400 MG/5ML suspension Take 30 mLs by mouth daily as needed for Constipation 4/30/16   ADRIANO Thrasher - CNP   levothyroxine (SYNTHROID) 125 MCG tablet Take 125 mcg by mouth daily. Historical Provider, MD   Fluticasone-Salmeterol (ADVAIR DISKUS IN) Inhale  into the lungs. Historical Provider, MD   fluoxetine (PROZAC) 20 MG capsule Take 40 mg by mouth daily. Historical Provider, MD   sumatriptan (IMITREX) 25 MG tablet Take 50 mg by mouth once as needed. Historical Provider, MD   pregabalin (LYRICA) 50 MG capsule Take 150 mg by mouth 2 times daily. Historical Provider, MD   spironolactone (ALDACTONE) 25 MG tablet Take 25 mg by mouth 2 times daily. Historical Provider, MD   esomeprazole (NEXIUM) 40 MG capsule Take 40 mg by mouth every morning (before breakfast).       Historical Provider, MD       Allergies:  Sulfa antibiotics    Social History:  The patient currently lives  Alone with daughter checking on her during Component Value Date    COLORU YELLOW 04/24/2019    WBCUA 2 04/08/2019    RBCUA 2 04/08/2019    BACTERIA RARE 11/28/2018    CLARITYU Clear 04/24/2019    SPECGRAV 1.015 04/24/2019    LEUKOCYTESUR Negative 04/24/2019    BLOODU Negative 04/24/2019    GLUCOSEU Negative 04/24/2019    GLUCOSEU NEGATIVE 03/21/2011       ABG    Lab Results   Component Value Date    SKB8XHO 34.5 04/24/2019    BEART 7.7 04/24/2019    J7MQXQJZ 92.4 04/24/2019    PHART 7.364 04/24/2019    FAR3JJR 60.5 04/24/2019    PO2ART 62.1 04/24/2019    PGH9TNK 81.4 04/24/2019           Active Hospital Problems    Diagnosis Date Noted    Acute diastolic CHF (congestive heart failure), NYHA class 1 ([facility]) [I50.31] 05/10/2019    Peripheral edema [R60.9] 05/10/2019    COPD (chronic obstructive pulmonary disease) ([facility] [address] [J44.9] 05/10/2019         ASSESSMENT/PLAN:  4 y.o. female  With a history of COPD, obesity, spinal stenosis , History of fluid overload on who presented with acute shortness of breath at rest with Orthopnea, Peripheral edema Chest x-ray was concerning for pulmonary edema. I suspect fluid overload with acute CHF exacerbation        Plan:  -IV diuretic with Lasix 40 mg twice a day  -Monitor intake and output  -Monitor daily weight  -Low-salt diet  -Cardiology consult  -CHF coordinator consult  -Monitor renal function and electrolytes and replace as appropriate  -Trend troponins  -get Echocardiogram   -Continue home nebulizer therapy        DVT Prophylaxis:  Subcutaneous enoxaparin  Diet:  General diet  Code Status:  Full code       Ann Mascorro MD    Thank you ADRIANO Christina - PAYTON for the opportunity to be involved in this patient's care. If you have any questions or concerns please feel free to contact me at [phone].

## 2019-05-10 NOTE — PROGRESS NOTES
[facility] PROGRESS NOTE    5/10/2019 11:07 AM        Name: Honey Chaudhary Admitted: 5/9/2019  Primary Care Provider: ADRIANO Dale CNP (Tel: [phone])      Subjective:  Shortness of breath at Rest . Has generalized body pain.  No Other Complaint        Reviewed interval ancillary notes    Current Medications    lidocaine-EPINEPHrine 1 percent-1:[id number] injection    FLUoxetine (PROZAC) capsule 40 mg Daily   levothyroxine (SYNTHROID) tablet 125 mcg Daily   lisinopril (PRINIVIL;ZESTRIL) tablet 2.5 mg Daily   pregabalin (LYRICA) capsule 150 mg BID   rosuvastatin (CRESTOR) tablet 40 mg QPM   senna (SENOKOT) tablet 8.6 mg Daily   spironolactone (ALDACTONE) tablet 25 mg BID   sodium chloride flush 0.9 % injection 10 mL 2 times per day   sodium chloride flush 0.9 % injection 10 mL PRN   magnesium hydroxide (MILK OF MAGNESIA) 400 MG/5ML suspension 30 mL Daily PRN   ondansetron (ZOFRAN) injection 4 mg Q6H PRN   enoxaparin (LOVENOX) injection 40 mg Daily   ipratropium-albuterol (DUONEB) nebulizer solution 1 ampule Q4H WA   furosemide (LASIX) injection 40 mg BID   pantoprazole (PROTONIX) tablet 40 mg QAM AC   mometasone-formoterol (DULERA) 100-5 MCG/ACT inhaler 2 puff BID   pill splitter    HYDROcodone-acetaminophen (NORCO) 5-325 MG per tablet 1 tablet Q6H PRN       Objective:  /71   Pulse 96   Temp 98.4 °F (36.9 °C) (Axillary) Comment (Src): pt would not close mouth for oral reading  Resp 20   Ht 5' 1.5\" (1.562 m)   Wt 291 lb 9.6 oz (132.3 kg)   SpO2 92%   BMI 54.21 kg/m²     Intake/Output Summary (Last 24 hours) at 5/10/2019 1107  Last data filed at 5/10/2019 0658  Gross per 24 hour   Intake --   Output 150 ml   Net -150 ml    Wt Readings from Last 3 Encounters:   05/10/19 291 lb 9.6 oz (132.3 kg)   04/28/19 281 lb 8 oz (127.7 kg)   04/08/19 300 lb (136.1 kg)       General appearance:  Appears comfortable  Eyes: Sclera clear. Pupils equal.  ENT: Moist oral mucosa. Trachea midline, no adenopathy. Cardiovascular: Regular rhythm, normal S1, S2. No murmur. No edema in lower extremities  Respiratory: Not using accessory muscles. Good inspiratory effort. Clear to auscultation bilaterally, no wheeze or crackles. GI: Abdomen soft, no tenderness, not distended, normal bowel sounds  Musculoskeletal: No cyanosis in digits, neck supple  Neurology: CN 2-12 grossly intact. No speech or motor deficits  Psych: Normal affect. Alert and oriented in time, place and person  Skin: Warm, dry, normal turgor  Extremity exam shows brisk capillary refill.  swelling    Labs and Tests:  CBC:   Recent Labs     05/10/19  0044   WBC 13.9*   HGB 11.1*        BMP:  Recent Labs     05/10/19  0044 05/10/19  0631    139   K 3.7 4.2   CL 92* 94*   CO2 38* 36*   BUN 14 12   CREATININE 0.7 0.6   GLUCOSE 164* 219*     Hepatic: Recent Labs     05/10/19  0044   AST 18   ALT 16   BILITOT 0.4   ALKPHOS 80     CT CHEST PULMONARY EMBOLISM W CONTRAST   Final Result   Evaluation of the pulmonary arteries is degraded by suboptimal contrast   opacification and motion artifact. Given this, no obvious evidence of acute   pulmonary thromboembolic disease. No pulmonary hypertension or right   ventricular strain. Low lung volume with respiratory motion and bilateral atelectasis. No   suspicious pulmonary mass or consolidation. Underlying pulmonary edema or   infection is difficult to entirely exclude. No pleural effusion or pneumothorax. XR CHEST PORTABLE   Final Result   Possible pulmonary edema. Problem List  Principal Problem:    Acute diastolic CHF (congestive heart failure), NYHA class 1 ([facility])  Active Problems:    Peripheral edema    COPD (chronic obstructive pulmonary disease) ([facility])  Resolved Problems:    * No resolved hospital problems.  *       Assessment & Plan:     82 y.o. female  With a history of

## 2019-05-11 LAB
ANION GAP SERPL CALCULATED.3IONS-SCNC: 7 MMOL/L (ref 3–16)
BUN BLDV-MCNC: 16 MG/DL (ref 7–20)
CALCIUM SERPL-MCNC: 9.2 MG/DL (ref 8.3–10.6)
CHLORIDE BLD-SCNC: 93 MMOL/L (ref 99–110)
CHOLESTEROL, TOTAL: 199 MG/DL (ref 0–199)
CO2: 44 MMOL/L (ref 21–32)
CREAT SERPL-MCNC: 0.7 MG/DL (ref 0.6–1.2)
GFR AFRICAN AMERICAN: >60
GFR NON-AFRICAN AMERICAN: >60
GLUCOSE BLD-MCNC: 129 MG/DL (ref 70–99)
HDLC SERPL-MCNC: 37 MG/DL (ref 40–60)
LDL CHOLESTEROL CALCULATED: 141 MG/DL
MAGNESIUM: 2.1 MG/DL (ref 1.8–2.4)
POTASSIUM SERPL-SCNC: 3.7 MMOL/L (ref 3.5–5.1)
SODIUM BLD-SCNC: 144 MMOL/L (ref 136–145)
TRIGL SERPL-MCNC: 103 MG/DL (ref 0–150)
VLDLC SERPL CALC-MCNC: 21 MG/DL

## 2019-05-11 PROCEDURE — 83735 ASSAY OF MAGNESIUM: CPT

## 2019-05-11 PROCEDURE — 94640 AIRWAY INHALATION TREATMENT: CPT

## 2019-05-11 PROCEDURE — 80048 BASIC METABOLIC PNL TOTAL CA: CPT

## 2019-05-11 PROCEDURE — 6370000000 HC RX 637 (ALT 250 FOR IP): Performed by: INTERNAL MEDICINE

## 2019-05-11 PROCEDURE — 94760 N-INVAS EAR/PLS OXIMETRY 1: CPT

## 2019-05-11 PROCEDURE — 2700000000 HC OXYGEN THERAPY PER DAY

## 2019-05-11 PROCEDURE — 36415 COLL VENOUS BLD VENIPUNCTURE: CPT

## 2019-05-11 PROCEDURE — 2060000000 HC ICU INTERMEDIATE R&B

## 2019-05-11 PROCEDURE — 6370000000 HC RX 637 (ALT 250 FOR IP): Performed by: PHYSICIAN ASSISTANT

## 2019-05-11 PROCEDURE — 99232 SBSQ HOSP IP/OBS MODERATE 35: CPT | Performed by: NURSE PRACTITIONER

## 2019-05-11 PROCEDURE — 80061 LIPID PANEL: CPT

## 2019-05-11 PROCEDURE — 6360000002 HC RX W HCPCS: Performed by: INTERNAL MEDICINE

## 2019-05-11 PROCEDURE — 2580000003 HC RX 258: Performed by: INTERNAL MEDICINE

## 2019-05-11 RX ADMIN — FLUOXETINE 40 MG: 20 CAPSULE ORAL at 09:40

## 2019-05-11 RX ADMIN — SENNOSIDES 8.6 MG: 8.6 TABLET, FILM COATED ORAL at 09:40

## 2019-05-11 RX ADMIN — PANTOPRAZOLE SODIUM 40 MG: 40 TABLET, DELAYED RELEASE ORAL at 06:30

## 2019-05-11 RX ADMIN — SPIRONOLACTONE 25 MG: 25 TABLET ORAL at 22:11

## 2019-05-11 RX ADMIN — SPIRONOLACTONE 25 MG: 25 TABLET ORAL at 09:42

## 2019-05-11 RX ADMIN — LISINOPRIL 5 MG: 5 TABLET ORAL at 09:40

## 2019-05-11 RX ADMIN — LEVOTHYROXINE SODIUM 125 MCG: 125 TABLET ORAL at 09:40

## 2019-05-11 RX ADMIN — HYDROCODONE BITARTRATE AND ACETAMINOPHEN 2 TABLET: 5; 325 TABLET ORAL at 06:13

## 2019-05-11 RX ADMIN — PREGABALIN 150 MG: 75 CAPSULE ORAL at 22:11

## 2019-05-11 RX ADMIN — Medication 2 PUFF: at 07:23

## 2019-05-11 RX ADMIN — OXYMETAZOLINE HYDROCHLORIDE 2 SPRAY: 5 SPRAY NASAL at 22:08

## 2019-05-11 RX ADMIN — IPRATROPIUM BROMIDE AND ALBUTEROL SULFATE 1 AMPULE: .5; 3 SOLUTION RESPIRATORY (INHALATION) at 11:42

## 2019-05-11 RX ADMIN — Medication 10 ML: at 09:41

## 2019-05-11 RX ADMIN — OXYMETAZOLINE HYDROCHLORIDE 2 SPRAY: 5 SPRAY NASAL at 09:43

## 2019-05-11 RX ADMIN — IPRATROPIUM BROMIDE AND ALBUTEROL SULFATE 1 AMPULE: .5; 3 SOLUTION RESPIRATORY (INHALATION) at 07:22

## 2019-05-11 RX ADMIN — PREGABALIN 150 MG: 75 CAPSULE ORAL at 09:40

## 2019-05-11 RX ADMIN — ENOXAPARIN SODIUM 40 MG: 40 INJECTION SUBCUTANEOUS at 09:39

## 2019-05-11 RX ADMIN — ROSUVASTATIN CALCIUM 40 MG: 20 TABLET, FILM COATED ORAL at 17:18

## 2019-05-11 RX ADMIN — Medication 2 PUFF: at 20:34

## 2019-05-11 RX ADMIN — Medication 10 ML: at 22:11

## 2019-05-11 RX ADMIN — HYDROCODONE BITARTRATE AND ACETAMINOPHEN 2 TABLET: 5; 325 TABLET ORAL at 12:23

## 2019-05-11 RX ADMIN — FUROSEMIDE 40 MG: 10 INJECTION, SOLUTION INTRAMUSCULAR; INTRAVENOUS at 22:11

## 2019-05-11 RX ADMIN — IPRATROPIUM BROMIDE AND ALBUTEROL SULFATE 1 AMPULE: .5; 3 SOLUTION RESPIRATORY (INHALATION) at 20:34

## 2019-05-11 RX ADMIN — IPRATROPIUM BROMIDE AND ALBUTEROL SULFATE 1 AMPULE: .5; 3 SOLUTION RESPIRATORY (INHALATION) at 15:19

## 2019-05-11 RX ADMIN — FUROSEMIDE 40 MG: 10 INJECTION, SOLUTION INTRAMUSCULAR; INTRAVENOUS at 09:40

## 2019-05-11 ASSESSMENT — PAIN SCALES - GENERAL
PAINLEVEL_OUTOF10: 0
PAINLEVEL_OUTOF10: 0
PAINLEVEL_OUTOF10: 4
PAINLEVEL_OUTOF10: 10
PAINLEVEL_OUTOF10: 3
PAINLEVEL_OUTOF10: 10

## 2019-05-11 ASSESSMENT — PAIN DESCRIPTION - DESCRIPTORS: DESCRIPTORS: ACHING

## 2019-05-11 ASSESSMENT — PAIN DESCRIPTION - LOCATION
LOCATION: GENERALIZED
LOCATION: GENERALIZED

## 2019-05-11 ASSESSMENT — PAIN DESCRIPTION - PAIN TYPE
TYPE: CHRONIC PAIN

## 2019-05-11 ASSESSMENT — PAIN DESCRIPTION - ORIENTATION: ORIENTATION: LOWER

## 2019-05-11 NOTE — PROGRESS NOTES
Purewick catheter removed pt pt request. She refused to wear it despite education of its purpose.  Electronically signed by Dede Tyler RN on 5/11/2019 at 10:22 AM

## 2019-05-11 NOTE — PLAN OF CARE
Problem: Falls - Risk of:  Goal: Will remain free from falls  Description  Will remain free from falls  Outcome: Met This Shift  Note:   Patient has remained safe. Bed alarm on      Problem: Risk for Impaired Skin Integrity  Goal: Tissue integrity - skin and mucous membranes  Description  Structural intactness and normal physiological function of skin and  mucous membranes. 5/11/2019 1750 by Yahir Franco RN  Outcome: Met This Shift  Note:   Has stage 2 pressure ulcer on left buttocks. Protective barrier cream applied.       Problem: OXYGENATION/RESPIRATORY FUNCTION  Goal: Patient will maintain patent airway  Outcome: Ongoing  Note:   O2 sats low 90s on 5-6 L O2 high flow NC

## 2019-05-11 NOTE — PROGRESS NOTES
[facility]              Progress Note      Admit Date 2019  HPI: presents to the ED for evaluation for shortness of breath. She has a history of COPD and is on 3 liters oxygen continuously. She was just admitted and discharged last week for COPD. She had one episode of being disconnected from her oxygen and was without it for several hours. She has had a cough productive of dry sputum.       She has history of fluid overload on lasix and aldactone. Chest xray this admission suggests pulmonary edema. CTPA negative for PE. She has pitting peripheral edema. EKG showed sinus tachycardia. Interval history:  desat to 85% yesterday; O2 increased to 7L at 93%     Net loss 1 L ; wt unchanged    Ms. Durán denies chest pain, palpitations  Subjective: feels sick and miserable. C/O SOB / wheezing. Would like to get up to chair as more comfortable sitting PTA. Has a prod cough of dark sputum       Scheduled Meds:   FLUoxetine  40 mg Oral Daily    levothyroxine  125 mcg Oral Daily    pregabalin  150 mg Oral BID    rosuvastatin  40 mg Oral QPM    senna  1 tablet Oral Daily    spironolactone  25 mg Oral BID    sodium chloride flush  10 mL Intravenous 2 times per day    enoxaparin  40 mg Subcutaneous Daily    ipratropium-albuterol  1 ampule Inhalation Q4H WA    furosemide  40 mg Intravenous BID    pantoprazole  40 mg Oral QAM AC    mometasone-formoterol  2 puff Inhalation BID    oxymetazoline  2 spray Each Nare BID    lisinopril  5 mg Oral Daily     Continuous Infusions:  PRN Meds:sodium chloride flush, magnesium hydroxide, ondansetron, HYDROcodone 5 mg - acetaminophen       Objective:      Wt Readings from Last 3 Encounters:   19 290 lb (131.5 kg)   19 281 lb 8 oz (127.7 kg)   19 300 lb (136.1 kg)   Admit weight: Weight: 291 lb 9.6 oz (132.3 kg)      Temperature range over 24hrs:   Temp  Av.9 °F (36.6 °C)  Min: 97.4 °F (36.3 °C)  Max: 98.1 °F (36.7 °C)  Current PT/ INR   Lab Results   Component Value Date    INR 0.91 05/10/2019    INR 0.90 08/20/2012    PROTIME 10.4 05/10/2019    PROTIME 10.3 08/20/2012     PTT No results found for: PTT   Lab Results   Component Value Date    MG 2.10 05/11/2019      Lab Results   Component Value Date    TSH 1.31 05/10/2019      Ref. Range 5/10/2019 00:44   Pro-BNP Latest Ref Range: 0 - 449 pg/mL 392       Assessment/Plan:     Patient Active Problem List   Diagnosis        Acute diastolic CHF (congestive heart failure), NYHA class 1 ([facility])  ~pul edema on XRY; BNP > 300  ~net loss 1 L   ~lisinopril increased to 5 mg daily ; aldactone 25 mg bid ; lasix 40 mg IV bid  ~unremarkable echo for LV dysfunction or valvular disease    Peripheral edema  ~improved / none appreciated to exam today    COPD exacerbation ([address]  ~O2 increased to 7 L for SaPO2 85% yesterday. At 6L 93%.    ~lungs coarse; loose cough  ~afebrile / WBC 13.9    Essential hypertension  ~controlled       Plan: continue IV lasix

## 2019-05-11 NOTE — PROGRESS NOTES
Pt resting between care. No needs expressed. Small mepilex applied to abrasion on buttock. Will continue to monitor.

## 2019-05-11 NOTE — PROGRESS NOTES
Message sent via [employer] to hospitalist: Pt normally on 3 L/min NC. Desat to 85, flow increased to 6. Currently at 5 L/min with sats 93%. Pt states mucous is thick. Can we have a Q4 PRN respiratory treatment and maybe mucinex to thin the secretions? Please advise.  Thank you    Waiting response

## 2019-05-11 NOTE — PROGRESS NOTES
[facility] PROGRESS NOTE    5/11/2019 4:12 PM        Name: Jose G Veloz . Admitted: 5/9/2019  Primary Care Provider: ADRIANO Sanabria CNP (Tel: [phone])        Subjective:  . Continues to have SOB on Minimal Exertion. Oxygen Requirement has Increased to 5 liters. No chest pain         Reviewed interval ancillary notes    Current Medications    FLUoxetine (PROZAC) capsule 40 mg Daily   levothyroxine (SYNTHROID) tablet 125 mcg Daily   pregabalin (LYRICA) capsule 150 mg BID   rosuvastatin (CRESTOR) tablet 40 mg QPM   senna (SENOKOT) tablet 8.6 mg Daily   spironolactone (ALDACTONE) tablet 25 mg BID   sodium chloride flush 0.9 % injection 10 mL 2 times per day   sodium chloride flush 0.9 % injection 10 mL PRN   magnesium hydroxide (MILK OF MAGNESIA) 400 MG/5ML suspension 30 mL Daily PRN   ondansetron (ZOFRAN) injection 4 mg Q6H PRN   enoxaparin (LOVENOX) injection 40 mg Daily   ipratropium-albuterol (DUONEB) nebulizer solution 1 ampule Q4H WA   furosemide (LASIX) injection 40 mg BID   pantoprazole (PROTONIX) tablet 40 mg QAM AC   mometasone-formoterol (DULERA) 100-5 MCG/ACT inhaler 2 puff BID   oxymetazoline (AFRIN) 0.05 % nasal spray 2 spray BID   HYDROcodone-acetaminophen (NORCO) 5-325 MG per tablet 2 tablet Q6H PRN   lisinopril (PRINIVIL;ZESTRIL) tablet 5 mg Daily       Objective:  /68   Pulse 86   Temp 98.2 °F (36.8 °C) (Oral)   Resp 18   Ht 5' 1.5\" (1.562 m)   Wt 290 lb (131.5 kg)   SpO2 96%   BMI 53.91 kg/m²     Intake/Output Summary (Last 24 hours) at 5/11/2019 1612  Last data filed at 5/11/2019 4081  Gross per 24 hour   Intake --   Output 100 ml   Net -100 ml    Wt Readings from Last 3 Encounters:   05/11/19 290 lb (131.5 kg)   04/28/19 281 lb 8 oz (127.7 kg)   04/08/19 300 lb (136.1 kg)       General appearance:  Appears comfortable  Eyes: Sclera clear.  Pupils equal.  ENT: Moist oral mucosa. Trachea midline, no adenopathy. Cardiovascular: Regular rhythm, normal S1, S2. No murmur. No edema in lower extremities  Respiratory Bilateral Few Crepts   GI: Abdomen soft, no tenderness, not distended, normal bowel sounds  Musculoskeletal: No cyanosis in digits, neck supple  Neurology: CN 2-12 grossly intact. No speech or motor deficits  Psych: Normal affect. Alert and oriented in time, place and person  Skin: Warm, dry, normal turgor  Extremity exam shows brisk capillary refill. Peripheral pulses are palpable in lower extremities     Labs and Tests:  CBC:   Recent Labs     05/10/19  0044   WBC 13.9*   HGB 11.1*        BMP:  Recent Labs     05/10/19  0044 05/10/19  0631 05/11/19  0514    139 144   K 3.7 4.2 3.7   CL 92* 94* 93*   CO2 38* 36* 44*   BUN 14 12 16   CREATININE 0.7 0.6 0.7   GLUCOSE 164* 219* 129*     Hepatic: Recent Labs     05/10/19  0044   AST 18   ALT 16   BILITOT 0.4   ALKPHOS 80     CT CHEST PULMONARY EMBOLISM W CONTRAST   Final Result   Evaluation of the pulmonary arteries is degraded by suboptimal contrast   opacification and motion artifact. Given this, no obvious evidence of acute   pulmonary thromboembolic disease. No pulmonary hypertension or right   ventricular strain. Low lung volume with respiratory motion and bilateral atelectasis. No   suspicious pulmonary mass or consolidation. Underlying pulmonary edema or   infection is difficult to entirely exclude. No pleural effusion or pneumothorax. XR CHEST PORTABLE   Final Result   Possible pulmonary edema. Problem List  Principal Problem:    Acute diastolic CHF (congestive heart failure), NYHA class 1 ([facility])  Active Problems:    Peripheral edema    COPD exacerbation ([facility])    Essential hypertension  Resolved Problems:    * No resolved hospital problems.  *       Assessment & Plan:     Acute diastolic CHF (congestive heart failure), NYHA class 1 (HCC)  ~pul edema on XRY; BNP > 600  ~net loss 1 L   ~lisinopril increased to 5 mg daily ; aldactone 25 mg bid ; lasix 40 mg IV bid  ~unremarkable echo for LV dysfunction or valvular disease     Peripheral edema  ~improved / none appreciated to exam today    COPD exacerbation ([address]  ~O2 increased to 7 L for SaPO2 85% yesterday. At 6L 93%. ~lungs coarse; loose cough  ~afebrile / WBC 13.9    Essential hypertension  ~controlled        COPD on Home oxygen Therapy. Will Continue. No evidence of PE    Morbid Obesity    Pain management     Pain management               Diet: DIET NO SALT ADDED (3-4 GM);   Code:Full Code  DVT PPX Lovenox  Disposition [address] MD Kian   5/11/2019 4:12 PM

## 2019-05-12 LAB
ANION GAP SERPL CALCULATED.3IONS-SCNC: 6 MMOL/L (ref 3–16)
BACTERIA: ABNORMAL /HPF
BILIRUBIN URINE: NEGATIVE
BLOOD, URINE: NEGATIVE
BUN BLDV-MCNC: 15 MG/DL (ref 7–20)
CALCIUM SERPL-MCNC: 9.4 MG/DL (ref 8.3–10.6)
CHLORIDE BLD-SCNC: 93 MMOL/L (ref 99–110)
CLARITY: ABNORMAL
CO2: 44 MMOL/L (ref 21–32)
COLOR: YELLOW
CREAT SERPL-MCNC: 0.7 MG/DL (ref 0.6–1.2)
EPITHELIAL CELLS, UA: 3 /HPF (ref 0–5)
GFR AFRICAN AMERICAN: >60
GFR NON-AFRICAN AMERICAN: >60
GLUCOSE BLD-MCNC: 130 MG/DL (ref 70–99)
GLUCOSE URINE: NEGATIVE MG/DL
HYALINE CASTS: 2 /LPF (ref 0–8)
KETONES, URINE: NEGATIVE MG/DL
LEUKOCYTE ESTERASE, URINE: ABNORMAL
MAGNESIUM: 2 MG/DL (ref 1.8–2.4)
MICROSCOPIC EXAMINATION: YES
NITRITE, URINE: NEGATIVE
PH UA: 7.5 (ref 5–8)
POTASSIUM SERPL-SCNC: 4.2 MMOL/L (ref 3.5–5.1)
PRO-BNP: 501 PG/ML (ref 0–449)
PROTEIN UA: NEGATIVE MG/DL
RBC UA: 57 /HPF (ref 0–4)
RBC UA: ABNORMAL /HPF (ref 0–2)
SODIUM BLD-SCNC: 143 MMOL/L (ref 136–145)
SPECIFIC GRAVITY UA: 1.01 (ref 1–1.03)
URINE REFLEX TO CULTURE: YES
URINE TYPE: ABNORMAL
UROBILINOGEN, URINE: 1 E.U./DL
WBC UA: 15 /HPF (ref 0–5)
YEAST: PRESENT /HPF

## 2019-05-12 PROCEDURE — 6370000000 HC RX 637 (ALT 250 FOR IP): Performed by: PHYSICIAN ASSISTANT

## 2019-05-12 PROCEDURE — 6370000000 HC RX 637 (ALT 250 FOR IP): Performed by: INTERNAL MEDICINE

## 2019-05-12 PROCEDURE — 87086 URINE CULTURE/COLONY COUNT: CPT

## 2019-05-12 PROCEDURE — 83880 ASSAY OF NATRIURETIC PEPTIDE: CPT

## 2019-05-12 PROCEDURE — 81001 URINALYSIS AUTO W/SCOPE: CPT

## 2019-05-12 PROCEDURE — 2060000000 HC ICU INTERMEDIATE R&B

## 2019-05-12 PROCEDURE — 87077 CULTURE AEROBIC IDENTIFY: CPT

## 2019-05-12 PROCEDURE — 6360000002 HC RX W HCPCS: Performed by: INTERNAL MEDICINE

## 2019-05-12 PROCEDURE — 2700000000 HC OXYGEN THERAPY PER DAY

## 2019-05-12 PROCEDURE — 99232 SBSQ HOSP IP/OBS MODERATE 35: CPT | Performed by: INTERNAL MEDICINE

## 2019-05-12 PROCEDURE — 80048 BASIC METABOLIC PNL TOTAL CA: CPT

## 2019-05-12 PROCEDURE — 36415 COLL VENOUS BLD VENIPUNCTURE: CPT

## 2019-05-12 PROCEDURE — 94760 N-INVAS EAR/PLS OXIMETRY 1: CPT

## 2019-05-12 PROCEDURE — 51702 INSERT TEMP BLADDER CATH: CPT

## 2019-05-12 PROCEDURE — 2580000003 HC RX 258: Performed by: INTERNAL MEDICINE

## 2019-05-12 PROCEDURE — 94640 AIRWAY INHALATION TREATMENT: CPT

## 2019-05-12 PROCEDURE — 87186 SC STD MICRODIL/AGAR DIL: CPT

## 2019-05-12 PROCEDURE — 83735 ASSAY OF MAGNESIUM: CPT

## 2019-05-12 RX ORDER — PHENAZOPYRIDINE HYDROCHLORIDE 100 MG/1
100 TABLET, FILM COATED ORAL
Status: DISPENSED | OUTPATIENT
Start: 2019-05-12 | End: 2019-05-14

## 2019-05-12 RX ORDER — FUROSEMIDE 10 MG/ML
80 INJECTION INTRAMUSCULAR; INTRAVENOUS 2 TIMES DAILY
Status: DISCONTINUED | OUTPATIENT
Start: 2019-05-12 | End: 2019-05-12

## 2019-05-12 RX ORDER — CEFUROXIME AXETIL 250 MG/1
500 TABLET ORAL EVERY 12 HOURS SCHEDULED
Status: DISCONTINUED | OUTPATIENT
Start: 2019-05-12 | End: 2019-05-14

## 2019-05-12 RX ADMIN — PHENAZOPYRIDINE HYDROCHLORIDE 100 MG: 100 TABLET ORAL at 17:27

## 2019-05-12 RX ADMIN — PHENAZOPYRIDINE HYDROCHLORIDE 100 MG: 100 TABLET ORAL at 12:41

## 2019-05-12 RX ADMIN — FUROSEMIDE 5 MG/HR: 10 INJECTION, SOLUTION INTRAVENOUS at 17:27

## 2019-05-12 RX ADMIN — CEFUROXIME AXETIL 500 MG: 250 TABLET ORAL at 09:14

## 2019-05-12 RX ADMIN — IPRATROPIUM BROMIDE AND ALBUTEROL SULFATE 1 AMPULE: .5; 3 SOLUTION RESPIRATORY (INHALATION) at 11:45

## 2019-05-12 RX ADMIN — SPIRONOLACTONE 25 MG: 25 TABLET ORAL at 20:46

## 2019-05-12 RX ADMIN — CEFUROXIME AXETIL 500 MG: 250 TABLET ORAL at 20:46

## 2019-05-12 RX ADMIN — ENOXAPARIN SODIUM 40 MG: 40 INJECTION SUBCUTANEOUS at 09:15

## 2019-05-12 RX ADMIN — PREGABALIN 150 MG: 75 CAPSULE ORAL at 20:46

## 2019-05-12 RX ADMIN — SENNOSIDES 8.6 MG: 8.6 TABLET, FILM COATED ORAL at 09:15

## 2019-05-12 RX ADMIN — HYDROCODONE BITARTRATE AND ACETAMINOPHEN 2 TABLET: 5; 325 TABLET ORAL at 22:42

## 2019-05-12 RX ADMIN — IPRATROPIUM BROMIDE AND ALBUTEROL SULFATE 1 AMPULE: .5; 3 SOLUTION RESPIRATORY (INHALATION) at 07:27

## 2019-05-12 RX ADMIN — PANTOPRAZOLE SODIUM 40 MG: 40 TABLET, DELAYED RELEASE ORAL at 06:17

## 2019-05-12 RX ADMIN — ROSUVASTATIN CALCIUM 40 MG: 20 TABLET, FILM COATED ORAL at 17:27

## 2019-05-12 RX ADMIN — Medication 2 PUFF: at 07:27

## 2019-05-12 RX ADMIN — Medication 2 PUFF: at 20:15

## 2019-05-12 RX ADMIN — HYDROCODONE BITARTRATE AND ACETAMINOPHEN 2 TABLET: 5; 325 TABLET ORAL at 06:14

## 2019-05-12 RX ADMIN — Medication 10 ML: at 09:16

## 2019-05-12 RX ADMIN — HYDROCODONE BITARTRATE AND ACETAMINOPHEN 2 TABLET: 5; 325 TABLET ORAL at 12:41

## 2019-05-12 RX ADMIN — SPIRONOLACTONE 25 MG: 25 TABLET ORAL at 09:15

## 2019-05-12 RX ADMIN — FUROSEMIDE 80 MG: 10 INJECTION, SOLUTION INTRAMUSCULAR; INTRAVENOUS at 09:14

## 2019-05-12 RX ADMIN — IPRATROPIUM BROMIDE AND ALBUTEROL SULFATE 1 AMPULE: .5; 3 SOLUTION RESPIRATORY (INHALATION) at 17:00

## 2019-05-12 RX ADMIN — PREGABALIN 150 MG: 75 CAPSULE ORAL at 09:15

## 2019-05-12 RX ADMIN — Medication 10 ML: at 20:47

## 2019-05-12 RX ADMIN — FLUOXETINE 40 MG: 20 CAPSULE ORAL at 09:15

## 2019-05-12 RX ADMIN — LISINOPRIL 5 MG: 5 TABLET ORAL at 09:15

## 2019-05-12 RX ADMIN — OXYMETAZOLINE HYDROCHLORIDE 2 SPRAY: 5 SPRAY NASAL at 09:16

## 2019-05-12 RX ADMIN — LEVOTHYROXINE SODIUM 125 MCG: 125 TABLET ORAL at 09:15

## 2019-05-12 RX ADMIN — IPRATROPIUM BROMIDE AND ALBUTEROL SULFATE 1 AMPULE: .5; 3 SOLUTION RESPIRATORY (INHALATION) at 20:15

## 2019-05-12 ASSESSMENT — PAIN DESCRIPTION - LOCATION
LOCATION: LEG
LOCATION: GENERALIZED

## 2019-05-12 ASSESSMENT — PAIN SCALES - GENERAL
PAINLEVEL_OUTOF10: 10
PAINLEVEL_OUTOF10: 7
PAINLEVEL_OUTOF10: 8
PAINLEVEL_OUTOF10: 10
PAINLEVEL_OUTOF10: 4
PAINLEVEL_OUTOF10: 10
PAINLEVEL_OUTOF10: 8
PAINLEVEL_OUTOF10: 0
PAINLEVEL_OUTOF10: 10

## 2019-05-12 ASSESSMENT — PAIN DESCRIPTION - PAIN TYPE
TYPE: CHRONIC PAIN

## 2019-05-12 ASSESSMENT — PAIN DESCRIPTION - ORIENTATION: ORIENTATION: LEFT;RIGHT

## 2019-05-12 NOTE — PROGRESS NOTES
[facility]   Progress Note  Cardiology    Chief Complaint   Patient presents with    Shortness of Breath     Pt found 82% on RA, unsure of when oxygen become unhooked, wears 3 LPM at all times and was disconnected at the hub. Pt given duo neb in squad, takes every 4 hours. HPI: appears chronically ill and debilitated. Rhonchi and states she cannot cough up mucous. She cannot stand and wants to go home. She is very obese. It is unclear if she has diuresed since admit since her weight is up and the I/O are not accurate.      Medications/Labs all Reviewed    Recent Labs     05/10/19  0044   WBC 13.9*   HGB 11.1*   HCT 35.3*   MCV 90.3        Recent Labs     05/12/19 0524   CREATININE 0.7   BUN 15      K 4.2   CL 93*   CO2 44*     Recent Labs     05/10/19  0044   INR 0.91   PROTIME 10.4        MEDICATIONS:    furosemide  80 mg Intravenous BID    cefUROXime  500 mg Oral 2 times per day    phenazopyridine  100 mg Oral TID WC    FLUoxetine  40 mg Oral Daily    levothyroxine  125 mcg Oral Daily    pregabalin  150 mg Oral BID    rosuvastatin  40 mg Oral QPM    senna  1 tablet Oral Daily    spironolactone  25 mg Oral BID    sodium chloride flush  10 mL Intravenous 2 times per day    enoxaparin  40 mg Subcutaneous Daily    ipratropium-albuterol  1 ampule Inhalation Q4H WA    pantoprazole  40 mg Oral QAM AC    mometasone-formoterol  2 puff Inhalation BID    oxymetazoline  2 spray Each Nare BID    lisinopril  5 mg Oral Daily         Physical Examination:    /68   Pulse 68   Temp 98 °F (36.7 °C) (Oral)   Resp 18   Ht 5' 1.5\" (1.562 m)   Wt 293 lb 6.9 oz (133.1 kg)   SpO2 91%   BMI 54.55 kg/m²     Respiratory:  · Resp Assessment: Normal respiratory effort  · Resp Auscultation: rhonchi  Cardiovascular:  · Auscultation: regular rate and rhythm, normal S1S2, no murmur, rub or gallop  · Palpation:  Nl PMI  · JVP:  normal  · Extremities: diffuse Edema  Abdomen:  · Soft, non-tender  · Normal bowel sounds  Extremities:  ·  No Cyanosis or Clubbing  Neurological/Psychiatric:  · Very [place]  Skin Warm and dry    Assessment:    Patient Active Hospital Problem List:  Diastolic heart failure - obesity COPD - not responsive to IV bolus lasix. Will try a drip. Obese and immobile - I do not see how she can be dismissed to her home.   Looks like she needs [facility]            Maurene Lennox, MD, 5/12/2019 1:04 PM

## 2019-05-12 NOTE — PLAN OF CARE
Problem: Falls - Risk of:  Goal: Will remain free from falls  Description  Will remain free from falls  5/11/2019 2354 by Grupo Manley RN  Outcome: Ongoing  5/11/2019 1750 by Rocky Coombs RN  Outcome: Met This Shift  Note:   Patient has remained safe. Bed alarm on   Goal: Absence of physical injury  Description  Absence of physical injury  Outcome: Ongoing     Problem: Risk for Impaired Skin Integrity  Goal: Tissue integrity - skin and mucous membranes  Description  Structural intactness and normal physiological function of skin and  mucous membranes. 5/11/2019 2354 by Grupo Manley RN  Outcome: Ongoing  5/11/2019 1750 by Rocky Coombs RN  Outcome: Met This Shift  Note:   Has stage 2 pressure ulcer on left buttocks. Protective barrier cream applied.       Problem: OXYGENATION/RESPIRATORY FUNCTION  Goal: Patient will maintain patent airway  5/11/2019 2354 by Grupo Manley RN  Outcome: Ongoing  5/11/2019 1750 by Rocky Coombs RN  Outcome: Ongoing  Note:   O2 sats low 90s on 5-6 L O2 high flow NC  Goal: Patient will achieve/maintain normal respiratory rate/effort  Description  Respiratory rate and effort will be within normal limits for the patient  Outcome: Ongoing     Problem: HEMODYNAMIC STATUS  Goal: Patient has stable vital signs and fluid balance  Outcome: Ongoing     Problem: FLUID AND ELECTROLYTE IMBALANCE  Goal: Fluid and electrolyte balance are achieved/maintained  Outcome: Ongoing     Problem: ACTIVITY INTOLERANCE/IMPAIRED MOBILITY  Goal: Mobility/activity is maintained at optimum level for patient  Outcome: Ongoing     Problem: Pain:  Goal: Pain level will decrease  Description  Pain level will decrease  Outcome: Ongoing  Goal: Control of acute pain  Description  Control of acute pain  Outcome: Ongoing  Goal: Control of chronic pain  Description  Control of chronic pain  Outcome: Ongoing

## 2019-05-12 NOTE — PROGRESS NOTES
Pt does have home unit with her but she does not want to be placed on home unit tonight she does understand that if oxygen levels drop or pt gets SOB she will have to be placed on home unit for the night.

## 2019-05-12 NOTE — PROGRESS NOTES
[facility] PROGRESS NOTE    5/12/2019 9:58 AM        Name: Yolette Zavala . Admitted: 5/9/2019  Primary Care Provider: ADRIANO Denton CNP (Tel: [phone])      Subjective:  Has SOB on minimal Exertion. Complaints of Burning in the Urine. Continues ti be On 5 Liters of Oxygen. .       Reviewed interval ancillary notes    Current Medications    furosemide (LASIX) injection 80 mg BID   cefUROXime (CEFTIN) tablet 500 mg 2 times per day   phenazopyridine (PYRIDIUM) tablet 100 mg TID WC   FLUoxetine (PROZAC) capsule 40 mg Daily   levothyroxine (SYNTHROID) tablet 125 mcg Daily   pregabalin (LYRICA) capsule 150 mg BID   rosuvastatin (CRESTOR) tablet 40 mg QPM   senna (SENOKOT) tablet 8.6 mg Daily   spironolactone (ALDACTONE) tablet 25 mg BID   sodium chloride flush 0.9 % injection 10 mL 2 times per day   sodium chloride flush 0.9 % injection 10 mL PRN   magnesium hydroxide (MILK OF MAGNESIA) 400 MG/5ML suspension 30 mL Daily PRN   ondansetron (ZOFRAN) injection 4 mg Q6H PRN   enoxaparin (LOVENOX) injection 40 mg Daily   ipratropium-albuterol (DUONEB) nebulizer solution 1 ampule Q4H WA   pantoprazole (PROTONIX) tablet 40 mg QAM AC   mometasone-formoterol (DULERA) 100-5 MCG/ACT inhaler 2 puff BID   oxymetazoline (AFRIN) 0.05 % nasal spray 2 spray BID   HYDROcodone-acetaminophen (NORCO) 5-325 MG per tablet 2 tablet Q6H PRN   lisinopril (PRINIVIL;ZESTRIL) tablet 5 mg Daily       Objective:  /75   Pulse 88   Temp 98.2 °F (36.8 °C) (Oral)   Resp 18   Ht 5' 1.5\" (1.562 m)   Wt 293 lb 6.9 oz (133.1 kg)   SpO2 94%   BMI 54.55 kg/m²     Intake/Output Summary (Last 24 hours) at 5/12/2019 0958  Last data filed at 5/12/2019 0433  Gross per 24 hour   Intake 10 ml   Output 50 ml   Net -40 ml    Wt Readings from Last 3 Encounters:   05/12/19 293 lb 6.9 oz (133.1 kg)   04/28/19 281 lb 8 oz (127.7 kg)   04/08/19 300 lb (136.1 kg)       General appearance:  Appears comfortable  Eyes: Sclera clear. Pupils equal.  ENT: Moist oral mucosa. Trachea midline, no adenopathy. Cardiovascular: Regular rhythm, normal S1, S2. No murmur. No edema in lower extremities  Respiratory: Not using accessory muscles. Good inspiratory effort. Clear to auscultation bilaterally, no wheeze or crackles. GI: Abdomen soft, no tenderness, not distended, normal bowel sounds  Musculoskeletal: No cyanosis in digits, neck supple  Neurology: CN 2-12 grossly intact. No speech or motor deficits  Psych: Normal affect. Alert and oriented in time, place and person  Skin: Warm, dry, normal turgor  Extremity exam shows brisk capillary refill. Peripheral pulses are palpable in lower extremities , edema Present    Labs and Tests:  CBC:   Recent Labs     05/10/19  0044   WBC 13.9*   HGB 11.1*        BMP:  Recent Labs     05/10/19  0631 05/11/19  0514 05/12/19  0524    144 143   K 4.2 3.7 4.2   CL 94* 93* 93*   CO2 36* 44* 44*   BUN 12 16 15   CREATININE 0.6 0.7 0.7   GLUCOSE 219* 129* 130*     Hepatic: Recent Labs     05/10/19  0044   AST 18   ALT 16   BILITOT 0.4   ALKPHOS 80     CT CHEST PULMONARY EMBOLISM W CONTRAST   Final Result   Evaluation of the pulmonary arteries is degraded by suboptimal contrast   opacification and motion artifact. Given this, no obvious evidence of acute   pulmonary thromboembolic disease. No pulmonary hypertension or right   ventricular strain. Low lung volume with respiratory motion and bilateral atelectasis. No   suspicious pulmonary mass or consolidation. Underlying pulmonary edema or   infection is difficult to entirely exclude. No pleural effusion or pneumothorax. XR CHEST PORTABLE   Final Result   Possible pulmonary edema.                Problem List  Principal Problem:    Acute diastolic CHF (congestive heart failure), NYHA class 1 ([facility])  Active Problems:    Peripheral edema    COPD exacerbation [facility])    Essential hypertension  Resolved Problems:    * No resolved hospital problems. *       Assessment & Plan:     Acute diastolic CHF (congestive heart failure), NYHA class 1 (HCC)  ~pul edema on XRY; BNP > 600  ~net loss 1 L   ~lisinopril increased to 5 mg daily ; aldactone 25 mg bid ; lasix 40 mg IV bid  ~unremarkable echo for LV dysfunction or valvular disease      Peripheral edema  ~improved / none appreciated to exam today    COPD exacerbation ([address]  ~O2 increased to 7 L for SaPO2 85% yesterday. At 6L 93%. ~lungs coarse; loose cough  ~afebrile / WBC 13.9    Essential hypertension  ~controlled     Increase Lasix To 80 Mg iv BID     COPD on Home oxygen Therapy. Will Continue. No evidence of PE     Morbid Obesity     Pain management      UTI will obtain urine Culture. Start Pyridium and Ceftin              Diet: DIET NO SALT ADDED (3-4 GM); Code:Full Code  DVT PPx Lovenox  Disposition Home Tomorrow.        Nan Mckeon MD   5/12/2019 9:58 AM

## 2019-05-12 NOTE — PROGRESS NOTES
Pt verbally calling out for help, this RN went in to check on her. Pt states she doesn't feel well, but was noted to have her oxygen off, currently supposed to be on 5 L. Replaced oxygen, offered to change and reposition pt, and offered pain medication. Pt states she is ok at this time and doesn't need anything else, \"just wants to go back to sleep\". Placed covers back over pt, turned off lights, left door open, call light within reach, will continue to monitor.   [employer]

## 2019-05-12 NOTE — PROGRESS NOTES
Messagegerson SMITH to review urinalysis results and updated him on pt's status and that she is still on 5L O2.  Electronically signed by Minerva Ly RN on 5/12/2019 at 7:54 AM

## 2019-05-13 PROBLEM — I50.33 ACUTE ON CHRONIC DIASTOLIC HEART FAILURE (HCC): Status: ACTIVE | Noted: 2019-05-10

## 2019-05-13 LAB
ANION GAP SERPL CALCULATED.3IONS-SCNC: 5 MMOL/L (ref 3–16)
BASE EXCESS ARTERIAL: 26.4 MMOL/L (ref -3–3)
BUN BLDV-MCNC: 15 MG/DL (ref 7–20)
CALCIUM SERPL-MCNC: 9.1 MG/DL (ref 8.3–10.6)
CARBOXYHEMOGLOBIN ARTERIAL: 1.5 % (ref 0–1.5)
CHLORIDE BLD-SCNC: 92 MMOL/L (ref 99–110)
CO2: 49 MMOL/L (ref 21–32)
CREAT SERPL-MCNC: 0.8 MG/DL (ref 0.6–1.2)
GFR AFRICAN AMERICAN: >60
GFR NON-AFRICAN AMERICAN: >60
GLUCOSE BLD-MCNC: 132 MG/DL (ref 70–99)
HCO3 ARTERIAL: 54.7 MMOL/L (ref 21–29)
HEMOGLOBIN, ART, EXTENDED: 10.5 G/DL (ref 12–16)
MAGNESIUM: 1.9 MG/DL (ref 1.8–2.4)
METHEMOGLOBIN ARTERIAL: 0.4 %
O2 CONTENT ARTERIAL: 13 ML/DL
O2 SAT, ARTERIAL: 89.6 %
O2 THERAPY: ABNORMAL
PCO2 ARTERIAL: 79.8 MMHG (ref 35–45)
PH ARTERIAL: 7.45 (ref 7.35–7.45)
PO2 ARTERIAL: 54.1 MMHG (ref 75–108)
POTASSIUM SERPL-SCNC: 3.5 MMOL/L (ref 3.5–5.1)
SODIUM BLD-SCNC: 146 MMOL/L (ref 136–145)
TCO2 ARTERIAL: 128.2 MMOL/L

## 2019-05-13 PROCEDURE — 6370000000 HC RX 637 (ALT 250 FOR IP): Performed by: INTERNAL MEDICINE

## 2019-05-13 PROCEDURE — 82803 BLOOD GASES ANY COMBINATION: CPT

## 2019-05-13 PROCEDURE — 2700000000 HC OXYGEN THERAPY PER DAY

## 2019-05-13 PROCEDURE — 2060000000 HC ICU INTERMEDIATE R&B

## 2019-05-13 PROCEDURE — 6370000000 HC RX 637 (ALT 250 FOR IP): Performed by: PHYSICIAN ASSISTANT

## 2019-05-13 PROCEDURE — 94762 N-INVAS EAR/PLS OXIMTRY CONT: CPT

## 2019-05-13 PROCEDURE — 94640 AIRWAY INHALATION TREATMENT: CPT

## 2019-05-13 PROCEDURE — 83735 ASSAY OF MAGNESIUM: CPT

## 2019-05-13 PROCEDURE — 2580000003 HC RX 258: Performed by: INTERNAL MEDICINE

## 2019-05-13 PROCEDURE — 99232 SBSQ HOSP IP/OBS MODERATE 35: CPT | Performed by: CLINICAL NURSE SPECIALIST

## 2019-05-13 PROCEDURE — 94760 N-INVAS EAR/PLS OXIMETRY 1: CPT

## 2019-05-13 PROCEDURE — 6360000002 HC RX W HCPCS: Performed by: INTERNAL MEDICINE

## 2019-05-13 PROCEDURE — 36600 WITHDRAWAL OF ARTERIAL BLOOD: CPT

## 2019-05-13 PROCEDURE — 36415 COLL VENOUS BLD VENIPUNCTURE: CPT

## 2019-05-13 PROCEDURE — 80048 BASIC METABOLIC PNL TOTAL CA: CPT

## 2019-05-13 PROCEDURE — 99222 1ST HOSP IP/OBS MODERATE 55: CPT | Performed by: INTERNAL MEDICINE

## 2019-05-13 PROCEDURE — 94660 CPAP INITIATION&MGMT: CPT

## 2019-05-13 RX ORDER — ALPRAZOLAM 0.5 MG/1
0.5 TABLET ORAL EVERY 6 HOURS PRN
Status: DISCONTINUED | OUTPATIENT
Start: 2019-05-13 | End: 2019-05-18 | Stop reason: HOSPADM

## 2019-05-13 RX ORDER — ACETAZOLAMIDE 250 MG/1
250 TABLET ORAL ONCE
Status: COMPLETED | OUTPATIENT
Start: 2019-05-13 | End: 2019-05-13

## 2019-05-13 RX ORDER — METHYLPREDNISOLONE SODIUM SUCCINATE 40 MG/ML
40 INJECTION, POWDER, LYOPHILIZED, FOR SOLUTION INTRAMUSCULAR; INTRAVENOUS EVERY 6 HOURS
Status: DISCONTINUED | OUTPATIENT
Start: 2019-05-13 | End: 2019-05-15

## 2019-05-13 RX ADMIN — PHENAZOPYRIDINE HYDROCHLORIDE 100 MG: 100 TABLET ORAL at 15:45

## 2019-05-13 RX ADMIN — METHYLPREDNISOLONE SODIUM SUCCINATE 40 MG: 40 INJECTION, POWDER, FOR SOLUTION INTRAMUSCULAR; INTRAVENOUS at 21:58

## 2019-05-13 RX ADMIN — LISINOPRIL 5 MG: 5 TABLET ORAL at 09:21

## 2019-05-13 RX ADMIN — SPIRONOLACTONE 25 MG: 25 TABLET ORAL at 09:21

## 2019-05-13 RX ADMIN — ALPRAZOLAM 0.5 MG: 0.5 TABLET ORAL at 15:45

## 2019-05-13 RX ADMIN — PREGABALIN 150 MG: 75 CAPSULE ORAL at 09:21

## 2019-05-13 RX ADMIN — HYDROCODONE BITARTRATE AND ACETAMINOPHEN 2 TABLET: 5; 325 TABLET ORAL at 09:21

## 2019-05-13 RX ADMIN — IPRATROPIUM BROMIDE AND ALBUTEROL SULFATE 1 AMPULE: .5; 3 SOLUTION RESPIRATORY (INHALATION) at 16:58

## 2019-05-13 RX ADMIN — PREGABALIN 150 MG: 75 CAPSULE ORAL at 21:03

## 2019-05-13 RX ADMIN — Medication 2 PUFF: at 19:52

## 2019-05-13 RX ADMIN — PHENAZOPYRIDINE HYDROCHLORIDE 100 MG: 100 TABLET ORAL at 09:21

## 2019-05-13 RX ADMIN — IPRATROPIUM BROMIDE AND ALBUTEROL SULFATE 1 AMPULE: .5; 3 SOLUTION RESPIRATORY (INHALATION) at 19:52

## 2019-05-13 RX ADMIN — Medication 2 PUFF: at 08:37

## 2019-05-13 RX ADMIN — Medication 10 ML: at 21:03

## 2019-05-13 RX ADMIN — SENNOSIDES 8.6 MG: 8.6 TABLET, FILM COATED ORAL at 09:22

## 2019-05-13 RX ADMIN — HYDROCODONE BITARTRATE AND ACETAMINOPHEN 2 TABLET: 5; 325 TABLET ORAL at 15:45

## 2019-05-13 RX ADMIN — IPRATROPIUM BROMIDE AND ALBUTEROL SULFATE 1 AMPULE: .5; 3 SOLUTION RESPIRATORY (INHALATION) at 08:37

## 2019-05-13 RX ADMIN — HYDROCODONE BITARTRATE AND ACETAMINOPHEN 2 TABLET: 5; 325 TABLET ORAL at 21:58

## 2019-05-13 RX ADMIN — METHYLPREDNISOLONE SODIUM SUCCINATE 40 MG: 40 INJECTION, POWDER, FOR SOLUTION INTRAMUSCULAR; INTRAVENOUS at 17:05

## 2019-05-13 RX ADMIN — ACETAZOLAMIDE 250 MG: 250 TABLET ORAL at 18:21

## 2019-05-13 RX ADMIN — ALPRAZOLAM 0.5 MG: 0.5 TABLET ORAL at 21:58

## 2019-05-13 RX ADMIN — IPRATROPIUM BROMIDE AND ALBUTEROL SULFATE 1 AMPULE: .5; 3 SOLUTION RESPIRATORY (INHALATION) at 12:19

## 2019-05-13 RX ADMIN — LEVOTHYROXINE SODIUM 125 MCG: 125 TABLET ORAL at 09:22

## 2019-05-13 RX ADMIN — FLUOXETINE 40 MG: 20 CAPSULE ORAL at 09:21

## 2019-05-13 RX ADMIN — SPIRONOLACTONE 25 MG: 25 TABLET ORAL at 21:03

## 2019-05-13 RX ADMIN — CEFUROXIME AXETIL 500 MG: 250 TABLET ORAL at 21:03

## 2019-05-13 RX ADMIN — CEFUROXIME AXETIL 500 MG: 250 TABLET ORAL at 09:22

## 2019-05-13 RX ADMIN — ENOXAPARIN SODIUM 40 MG: 40 INJECTION SUBCUTANEOUS at 09:22

## 2019-05-13 RX ADMIN — PANTOPRAZOLE SODIUM 40 MG: 40 TABLET, DELAYED RELEASE ORAL at 07:07

## 2019-05-13 ASSESSMENT — PAIN SCALES - GENERAL
PAINLEVEL_OUTOF10: 6
PAINLEVEL_OUTOF10: 9
PAINLEVEL_OUTOF10: 9
PAINLEVEL_OUTOF10: 7
PAINLEVEL_OUTOF10: 4
PAINLEVEL_OUTOF10: 6
PAINLEVEL_OUTOF10: 6

## 2019-05-13 ASSESSMENT — PAIN DESCRIPTION - ORIENTATION
ORIENTATION: RIGHT

## 2019-05-13 ASSESSMENT — PAIN DESCRIPTION - PROGRESSION
CLINICAL_PROGRESSION: RAPIDLY IMPROVING
CLINICAL_PROGRESSION: RAPIDLY IMPROVING

## 2019-05-13 ASSESSMENT — PAIN DESCRIPTION - PAIN TYPE
TYPE: CHRONIC PAIN

## 2019-05-13 ASSESSMENT — PAIN DESCRIPTION - LOCATION
LOCATION: SHOULDER
LOCATION: GENERALIZED
LOCATION: SHOULDER

## 2019-05-13 ASSESSMENT — PAIN DESCRIPTION - FREQUENCY: FREQUENCY: CONTINUOUS

## 2019-05-13 ASSESSMENT — PAIN DESCRIPTION - ONSET: ONSET: ON-GOING

## 2019-05-13 ASSESSMENT — PAIN DESCRIPTION - DESCRIPTORS: DESCRIPTORS: ACHING

## 2019-05-13 NOTE — PROGRESS NOTES
ABG drawn x  1 attempt(s) from Left Radial. Patient had positive modified Mo's Test with good collateral circulation. Patient was on 50% O2 via Venturi face mask at time of puncture. Pressure held for 5 minutes. No bleeding or bruising noted at puncture site.   Patient tolerated procedure well

## 2019-05-13 NOTE — CONSULTS
problems, tremors and weakness  Behavioral/Psych: negative for anxiety, behavior problems, depression, fatigue and sleep disturbance  Endocrine: negative for diabetic symptoms including none, neuropathy, polyphagia, polyuria, polydipsia, vomiting and diarrhea and temperature intolerance  Allergic/Immunologic: negative for anaphylaxis, angioedema, hay fever and urticaria      Objective:     Patient Vitals for the past 8 hrs:   BP Temp Temp src Pulse Resp SpO2   05/13/19 1515 (!) 101/53 98 °F (36.7 °C) Axillary 70 20 98 %   05/13/19 1247 -- -- -- -- 17 95 %   05/13/19 1230 104/67 98.2 °F (36.8 °C) Axillary 72 20 96 %   05/13/19 0931 -- -- -- -- 20 94 %   05/13/19 0930 116/67 98.2 °F (36.8 °C) Axillary 68 20 (!) 89 %   05/13/19 0840 -- -- -- -- 20 --     I/O last 3 completed shifts: In: 99.9 [I.V.:99.9]  Out: 2950 [Urine:2950]  No intake/output data recorded.     Physical Exam:  General Appearance: alert and oriented to person, place and time, well developed and well- nourished, in no acute distress  Skin: warm and dry, no rash or erythema  Head: normocephalic and atraumatic  Eyes: pupils equal, round, and reactive to light, extraocular eye movements intact, conjunctivae normal  ENT: external ear and ear canal normal bilaterally, nose without deformity, nasal mucosa and turbinates normal  Neck: supple and non-tender without mass, no cervical lymphadenopathy  Pulmonary/Chest: wheezing present- bilateral, poor air entry  Cardiovascular: normal rate, regular rhythm,  no murmurs, rubs, distal pulses intact, no carotid bruits  Abdomen: soft, non-tender, non-distended, normal bowel sounds, no masses or organomegaly  Lymph Nodes: Cervical, supraclavicular normal  Extremities: no cyanosis, clubbing or edema  Musculoskeletal: normal range of motion, no joint swelling, deformity or tenderness  Neurologic: alert, no focal neurologic deficits    Data Review:  CBC:   Lab Results   Component Value Date    WBC 13.9 05/10/2019    RBC 3.91 05/10/2019     BMP:   Lab Results   Component Value Date    GLUCOSE 132 05/13/2019    CO2 49 05/13/2019    BUN 15 05/13/2019    CREATININE 0.8 05/13/2019    CALCIUM 9.1 05/13/2019     ABG:   Lab Results   Component Value Date    LMU7WVS 54.7 05/13/2019    BEART 26.4 05/13/2019    Q0DXZJLX 89.6 05/13/2019    PHART 7.445 05/13/2019    HLX8NBT 79.8 05/13/2019    PO2ART 54.1 05/13/2019    XFC9IGD 128.2 05/13/2019       Radiology: All pertinent images / reports were reviewed as a part of this visit. Narrative   EXAMINATION:   CTA OF THE CHEST 5/10/2019 1:27 am       TECHNIQUE:   CTA of the chest was performed after the administration of intravenous   contrast.  Multiplanar reformatted images are provided for review.  MIP   images are provided for review. Dose modulation, iterative reconstruction,   and/or weight based adjustment of the mA/kV was utilized to reduce the   radiation dose to as low as reasonably achievable.       COMPARISON:   CT chest done 04/24/2019.       HISTORY:   ORDERING SYSTEM PROVIDED HISTORY: r/o pe   TECHNOLOGIST PROVIDED HISTORY:   Ordering Physician Provided Reason for Exam: shortness of breath   Acuity: Unknown   Relevant Medical/Surgical History: (Pt found 82% on RA, unsure of when oxygen   become unhooked, wears 3 LPM at all times and was disconnected at the hub.  Pt   given duo neb in squad, takes every 4 hours. )       FINDINGS:   Pulmonary Arteries: Evaluation of the pulmonary arteries is degraded by   suboptimal contrast opacification.  Measured average attenuation values in   the main pulmonary artery of 220-240 Hounsfield units.  Suboptimal evaluation   of the subsegmental and more peripheral pulmonary arteries due to motion   artifact.  Given this, no obvious evidence of acute pulmonary thromboembolic   disease.  No pulmonary hypertension.  No right ventricular strain.       Mediastinum: No cardiomegaly or pericardial effusion.  Coronary artery   calcifications.  Atherosclerosis use BiPAP as needed and at nighttime. Patient is awake and alert, suggestive of the fact that hypercapnia is chronic. Reviewed CT imaging which shows no clear evidence of infiltrates. Discussed with hospitalist about the plan.     Dejuan Sosa MD

## 2019-05-13 NOTE — PLAN OF CARE
Problem: Falls - Risk of:  Goal: Will remain free from falls  Description  Will remain free from falls  5/12/2019 2151 by Iván Grimm RN  Outcome: Ongoing  Note:   Pt remains free from falls. Safety precautions in place. Bed in lowest position, bed wheels locked, side rails up 2x call light within reach, bed alarm on, yellow cloth in place, fall risk wrist band on. Will continue to monitor. Problem: Falls - Risk of:  Goal: Absence of physical injury  Description  Absence of physical injury  Outcome: Ongoing  Note:   No incidents throughout shift.

## 2019-05-13 NOTE — PLAN OF CARE
On bipap - awake oriented to person place and situation -some confusion to time- 02 sat improved on bipap 02 sat now 96% - blood pressure stable- pain improvement after po Norco given for pain-call light in reach

## 2019-05-13 NOTE — PROGRESS NOTES
[facility] PROGRESS NOTE    5/13/2019 10:51 AM        Name: Delmi Jackson . Admitted: 5/9/2019  Primary Care Provider: ADRIANO Mirza CNP (Tel: [phone])      Subjective:   Developed Hypoxemia. On 4 Liters. Put on 40% face mask. Now On BIPAP. Lasix Drip was started yesterday. Patient does not have SOB at rest. No Other Complaints. Swelling of legs have subsided. Reviewed interval ancillary notes    Current Medications    cefUROXime (CEFTIN) tablet 500 mg 2 times per day   phenazopyridine (PYRIDIUM) tablet 100 mg TID WC   FLUoxetine (PROZAC) capsule 40 mg Daily   levothyroxine (SYNTHROID) tablet 125 mcg Daily   pregabalin (LYRICA) capsule 150 mg BID   rosuvastatin (CRESTOR) tablet 40 mg QPM   senna (SENOKOT) tablet 8.6 mg Daily   spironolactone (ALDACTONE) tablet 25 mg BID   sodium chloride flush 0.9 % injection 10 mL 2 times per day   sodium chloride flush 0.9 % injection 10 mL PRN   magnesium hydroxide (MILK OF MAGNESIA) 400 MG/5ML suspension 30 mL Daily PRN   ondansetron (ZOFRAN) injection 4 mg Q6H PRN   enoxaparin (LOVENOX) injection 40 mg Daily   ipratropium-albuterol (DUONEB) nebulizer solution 1 ampule Q4H WA   pantoprazole (PROTONIX) tablet 40 mg QAM AC   mometasone-formoterol (DULERA) 100-5 MCG/ACT inhaler 2 puff BID   HYDROcodone-acetaminophen (NORCO) 5-325 MG per tablet 2 tablet Q6H PRN   lisinopril (PRINIVIL;ZESTRIL) tablet 5 mg Daily       Objective:  /67   Pulse 68   Temp 98.2 °F (36.8 °C) (Axillary)   Resp 20   Ht 5' 1.5\" (1.562 m)   Wt 291 lb 5 oz (132.1 kg)   SpO2 94% Comment: Simultaneous filing. User may not have seen previous data.   BMI 54.15 kg/m²     Intake/Output Summary (Last 24 hours) at 5/13/2019 1051  Last data filed at 5/13/2019 0715  Gross per 24 hour   Intake 10 ml   Output 2950 ml   Net -2940 ml    Wt Readings from Last 3 Encounters:   05/13/19 291 lb 5 oz (132.1 kg)   04/28/19 281 lb 8 oz (127.7 kg)   04/08/19 300 lb (136.1 kg)       General appearance:  Appears comfortable  Eyes: Sclera clear. Pupils equal.  ENT: Moist oral mucosa. Trachea midline, no adenopathy. Cardiovascular: Regular rhythm, normal S1, S2. No murmur. No edema in lower extremities  Respiratory: Not using accessory muscles. Good inspiratory effort. Clear to auscultation bilaterally, no wheeze or crackles. GI: Abdomen soft, no tenderness, not distended, normal bowel sounds  Musculoskeletal: No cyanosis in digits, neck supple  Neurology: CN 2-12 grossly intact. No speech or motor deficits  Psych: Normal affect. Alert and oriented in time, place and person  Skin: Warm, dry, normal turgor  Extremity exam shows brisk capillary refill. Peripheral pulses are palpable in lower extremities     Labs and Tests:  CBC: No results for input(s): WBC, HGB, PLT in the last 72 hours. BMP:  Recent Labs     05/11/19  0514 05/12/19  0524 05/13/19  0422    143 146*   K 3.7 4.2 3.5   CL 93* 93* 92*   CO2 44* 44* 49*   BUN 16 15 15   CREATININE 0.7 0.7 0.8   GLUCOSE 129* 130* 132*     Hepatic: No results for input(s): AST, ALT, ALB, BILITOT, ALKPHOS in the last 72 hours. CT CHEST PULMONARY EMBOLISM W CONTRAST   Final Result   Evaluation of the pulmonary arteries is degraded by suboptimal contrast   opacification and motion artifact. Given this, no obvious evidence of acute   pulmonary thromboembolic disease. No pulmonary hypertension or right   ventricular strain. Low lung volume with respiratory motion and bilateral atelectasis. No   suspicious pulmonary mass or consolidation. Underlying pulmonary edema or   infection is difficult to entirely exclude. No pleural effusion or pneumothorax. XR CHEST PORTABLE   Final Result   Possible pulmonary edema.                Problem List  Principal Problem:    Acute diastolic CHF (congestive heart failure), NYHA class 1 (HCC)  Active Problems: Peripheral edema    COPD exacerbation ([facility]    Essential hypertension  Resolved Problems:    * No resolved hospital problems. *       Assessment & Plan:   The patient is a 82 y.o. female  With a history of COPD, obesity, spinal stenosis  was recently  discharged from hospital with pneumonia and COPD exacerbation and presents with acute shortness of breath at rest with Orthopnea        Acute diastolic CHF (congestive heart failure), NYHA class 1 (HCC)  ~pul edema on XRY; BNP > 600    ~lisinopril increased to 5 mg daily ; aldactone 25 mg bid ; lasix 40 mg IV bid  ~unremarkable echo for LV dysfunction or valvular disease      Peripheral edema  ~improved / none appreciated to exam today    COPD exacerbation ([address]      Essential hypertension  ~controlled     Started On lasix Drip which was stopped as Per Pulmonary recommendation as patient was Dry      COPD on Home oxygen Therapy. Will Continue. No evidence of PE. Acute Hypoxemia. Started on BIPAP. Does not appear To be in COPD exacerbation. Pulmonary Consult     Morbid Obesity     Pain management    urine Culture. Start Pyridium and Ceftin      ABG noted    Acute On Chronic Hypoxic Respiratory failure with Hypoxia and hypercapnia. Diet: DIET NO SALT ADDED (3-4 GM);   Code:Full Code  DVT PPX Lovenox  Disposition  Pending Clinical Recovery      Opal Fonseca MD   5/13/2019 10:51 AM

## 2019-05-13 NOTE — PROGRESS NOTES
[address]   Daily Progress Note      Admit Date:  5/9/2019    HPI:    Ms. GONSALEZ [facility] is an 82year old female with history of COPD and is on 3 L of oxygen continuously at home. Obesity, spinal stenosis and dHF    She is admitted for SOB and was just discharged last week for COPD. She had been disconnected from her oxygen and was without it for several hours. cxr suggests pulmonary edema. CTPA negative for PE. She is being diuresed. probnp [id number]->[id number]    Subjective:  Patient is being seen for acute on chronic dHF. There were no acute overnight cardiac events. Creat 0.8 potassium 3.5 and sodium 146 weight stable at 191 (down 2 pounds from yesterday), off lasix drip. She is currently on bipap and states she is feeling better today. Objective:   /67   Pulse 72   Temp 98.2 °F (36.8 °C) (Axillary)   Resp 17   Ht 5' 1.5\" (1.562 m)   Wt 291 lb 5 oz (132.1 kg)   SpO2 95%   BMI 54.15 kg/m²       Intake/Output Summary (Last 24 hours) at 5/13/2019 1258  Last data filed at 5/13/2019 1126  Gross per 24 hour   Intake 99.92 ml   Output 2950 ml   Net -2850.08 ml          Physical Exam:  General:  Awake, alert, oriented in NAD  Skin:  Warm and dry. No unusual bruising or rash  Neck:  Supple. No JVD or carotid bruit appreciated  Chest:  Normal effort.   Clear to auscultation, expiratory wheezing  Cardiovascular:  RRR, S1/S2, no murmur/gallop/rub  Abdomen:  Soft, nontender, +bowel sounds, morbidly obese  Extremities:  No edema  Neurological: No focal deficits  Psychological: Normal mood and affect      Medications:    cefUROXime  500 mg Oral 2 times per day    phenazopyridine  100 mg Oral TID WC    FLUoxetine  40 mg Oral Daily    levothyroxine  125 mcg Oral Daily    pregabalin  150 mg Oral BID    rosuvastatin  40 mg Oral QPM    senna  1 tablet Oral Daily    spironolactone  25 mg Oral BID    sodium chloride flush  10 mL Intravenous 2 times per day    enoxaparin  40 mg Subcutaneous Daily  ipratropium-albuterol  1 ampule Inhalation Q4H WA    pantoprazole  40 mg Oral QAM AC    mometasone-formoterol  2 puff Inhalation BID    lisinopril  5 mg Oral Daily         Lab Data:  CBC: No results for input(s): WBC, HGB, PLT in the last 72 hours. BMP:    Recent Labs     05/11/19  0514 05/12/19  0524 05/13/19  0422    143 146*   K 3.7 4.2 3.5   CO2 44* 44* 49*   BUN 16 15 15   CREATININE 0.7 0.7 0.8     INR:  No results for input(s): INR in the last 72 hours. BNP:    Recent Labs     05/12/19  0524   PROBNP 501*         Diagnostics:  Echo 5/9/19  This was a very technically difficult and limited exam. Patient was in pain   and unable to lay still.   -Normal left ventricle size, wall thickness, and systolic function with an   estimated ejection fraction of 55-60%.  -No obvious regional wall motion abnormalities are seen. There is abnormal   septal motion.   -The mitral valve normal in structure and function.   -No evidence of mitral regurgitation or stenosis.   -The right ventricle is normal in size and function. Assessment:    1. COPD exacerbation  2. Acute on chronic diastolic heart failure, compensated  3. Essential hypertension, controlled  4. Hyperlipidemia  5. Morbid obesity    Plan:    1. Continue to hold Lasix   2. Lisinopril 5 mg daily (10 mg at home)  3. Continue Aldactone 25 mg bid  4. CHF nurse following for diet education, fluid restriction and daily weights  5. Pulmonary following    Recommend facility at discharge    Discussed with patient who is agreeable with plan of care. Thank you for allowing me to participate in the care of your patient.     Maribel Garcia, CNS

## 2019-05-13 NOTE — PROGRESS NOTES
CO2 49 MD notified. At bedside,    \"Critical lab value: CO2 49 pt sleeping easily aroused. Please advise! \"

## 2019-05-13 NOTE — PLAN OF CARE
Awake but drowsy - RT in and 02 sats down in 60's -placed on venturi mask at 50 % 02 sat up to 91 % -repositioned in bed 02 sat up to 92%- am meds given whole with water one at a time- 2 po Norco given for right shoulder pain 9/10- tolerated pills with no choking or coughing - hospitalist in to assess and ordered stat ABGs also ordered bipap to see if patient can tolerate- - lungs diminished bilateral - blood pressure stable- RT at bedside - call light in reach - bed alarm on

## 2019-05-14 LAB
ANION GAP SERPL CALCULATED.3IONS-SCNC: 7 MMOL/L (ref 3–16)
BASE EXCESS ARTERIAL: 15 MMOL/L (ref -3–3)
BUN BLDV-MCNC: 20 MG/DL (ref 7–20)
CALCIUM SERPL-MCNC: 9.8 MG/DL (ref 8.3–10.6)
CARBOXYHEMOGLOBIN ARTERIAL: 1.1 % (ref 0–1.5)
CHLORIDE BLD-SCNC: 90 MMOL/L (ref 99–110)
CO2: 43 MMOL/L (ref 21–32)
CREAT SERPL-MCNC: 0.9 MG/DL (ref 0.6–1.2)
GFR AFRICAN AMERICAN: >60
GFR NON-AFRICAN AMERICAN: 60
GLUCOSE BLD-MCNC: 209 MG/DL (ref 70–99)
HCO3 ARTERIAL: 43.5 MMOL/L (ref 21–29)
HEMOGLOBIN, ART, EXTENDED: 10.6 G/DL (ref 12–16)
MAGNESIUM: 2.1 MG/DL (ref 1.8–2.4)
METHEMOGLOBIN ARTERIAL: 0.7 %
O2 CONTENT ARTERIAL: 14 ML/DL
O2 SAT, ARTERIAL: 93.6 %
O2 THERAPY: ABNORMAL
ORGANISM: ABNORMAL
ORGANISM: ABNORMAL
PCO2 ARTERIAL: 78.4 MMHG (ref 35–45)
PH ARTERIAL: 7.35 (ref 7.35–7.45)
PO2 ARTERIAL: 70.1 MMHG (ref 75–108)
POTASSIUM SERPL-SCNC: 3.7 MMOL/L (ref 3.5–5.1)
PRO-BNP: 294 PG/ML (ref 0–449)
SODIUM BLD-SCNC: 140 MMOL/L (ref 136–145)
TCO2 ARTERIAL: 102.9 MMOL/L
URINE CULTURE, ROUTINE: ABNORMAL

## 2019-05-14 PROCEDURE — 6370000000 HC RX 637 (ALT 250 FOR IP): Performed by: INTERNAL MEDICINE

## 2019-05-14 PROCEDURE — 6360000002 HC RX W HCPCS: Performed by: INTERNAL MEDICINE

## 2019-05-14 PROCEDURE — 82803 BLOOD GASES ANY COMBINATION: CPT

## 2019-05-14 PROCEDURE — 94762 N-INVAS EAR/PLS OXIMTRY CONT: CPT

## 2019-05-14 PROCEDURE — 83735 ASSAY OF MAGNESIUM: CPT

## 2019-05-14 PROCEDURE — 94640 AIRWAY INHALATION TREATMENT: CPT

## 2019-05-14 PROCEDURE — 2700000000 HC OXYGEN THERAPY PER DAY

## 2019-05-14 PROCEDURE — 36600 WITHDRAWAL OF ARTERIAL BLOOD: CPT

## 2019-05-14 PROCEDURE — 94660 CPAP INITIATION&MGMT: CPT

## 2019-05-14 PROCEDURE — 36415 COLL VENOUS BLD VENIPUNCTURE: CPT

## 2019-05-14 PROCEDURE — 99232 SBSQ HOSP IP/OBS MODERATE 35: CPT | Performed by: INTERNAL MEDICINE

## 2019-05-14 PROCEDURE — 2580000003 HC RX 258: Performed by: INTERNAL MEDICINE

## 2019-05-14 PROCEDURE — 99232 SBSQ HOSP IP/OBS MODERATE 35: CPT | Performed by: CLINICAL NURSE SPECIALIST

## 2019-05-14 PROCEDURE — 83880 ASSAY OF NATRIURETIC PEPTIDE: CPT

## 2019-05-14 PROCEDURE — 6370000000 HC RX 637 (ALT 250 FOR IP): Performed by: FAMILY MEDICINE

## 2019-05-14 PROCEDURE — 6370000000 HC RX 637 (ALT 250 FOR IP): Performed by: PHYSICIAN ASSISTANT

## 2019-05-14 PROCEDURE — 2060000000 HC ICU INTERMEDIATE R&B

## 2019-05-14 PROCEDURE — 6360000002 HC RX W HCPCS: Performed by: FAMILY MEDICINE

## 2019-05-14 PROCEDURE — 80048 BASIC METABOLIC PNL TOTAL CA: CPT

## 2019-05-14 RX ORDER — GUAIFENESIN 600 MG/1
600 TABLET, EXTENDED RELEASE ORAL 2 TIMES DAILY
Status: DISCONTINUED | OUTPATIENT
Start: 2019-05-14 | End: 2019-05-18 | Stop reason: HOSPADM

## 2019-05-14 RX ORDER — BISACODYL 10 MG
10 SUPPOSITORY, RECTAL RECTAL DAILY PRN
Status: DISCONTINUED | OUTPATIENT
Start: 2019-05-14 | End: 2019-05-18 | Stop reason: HOSPADM

## 2019-05-14 RX ADMIN — Medication 2 PUFF: at 19:55

## 2019-05-14 RX ADMIN — HYDROCODONE BITARTRATE AND ACETAMINOPHEN 2 TABLET: 5; 325 TABLET ORAL at 04:48

## 2019-05-14 RX ADMIN — ALPRAZOLAM 0.5 MG: 0.5 TABLET ORAL at 23:25

## 2019-05-14 RX ADMIN — PHENAZOPYRIDINE HYDROCHLORIDE 100 MG: 100 TABLET ORAL at 08:41

## 2019-05-14 RX ADMIN — HYDROCODONE BITARTRATE AND ACETAMINOPHEN 2 TABLET: 5; 325 TABLET ORAL at 23:24

## 2019-05-14 RX ADMIN — HYDROCODONE BITARTRATE AND ACETAMINOPHEN 2 TABLET: 5; 325 TABLET ORAL at 17:03

## 2019-05-14 RX ADMIN — ROSUVASTATIN CALCIUM 40 MG: 20 TABLET, FILM COATED ORAL at 22:18

## 2019-05-14 RX ADMIN — ALPRAZOLAM 0.5 MG: 0.5 TABLET ORAL at 11:06

## 2019-05-14 RX ADMIN — HYDROCODONE BITARTRATE AND ACETAMINOPHEN 2 TABLET: 5; 325 TABLET ORAL at 11:06

## 2019-05-14 RX ADMIN — IPRATROPIUM BROMIDE AND ALBUTEROL SULFATE 1 AMPULE: .5; 3 SOLUTION RESPIRATORY (INHALATION) at 11:41

## 2019-05-14 RX ADMIN — SPIRONOLACTONE 25 MG: 25 TABLET ORAL at 22:19

## 2019-05-14 RX ADMIN — IPRATROPIUM BROMIDE AND ALBUTEROL SULFATE 1 AMPULE: .5; 3 SOLUTION RESPIRATORY (INHALATION) at 19:54

## 2019-05-14 RX ADMIN — SPIRONOLACTONE 25 MG: 25 TABLET ORAL at 08:41

## 2019-05-14 RX ADMIN — Medication 10 ML: at 08:42

## 2019-05-14 RX ADMIN — BISACODYL 10 MG: 10 SUPPOSITORY RECTAL at 14:57

## 2019-05-14 RX ADMIN — METHYLPREDNISOLONE SODIUM SUCCINATE 40 MG: 40 INJECTION, POWDER, FOR SOLUTION INTRAMUSCULAR; INTRAVENOUS at 04:48

## 2019-05-14 RX ADMIN — MAGNESIUM HYDROXIDE 30 ML: 400 SUSPENSION ORAL at 15:48

## 2019-05-14 RX ADMIN — IPRATROPIUM BROMIDE AND ALBUTEROL SULFATE 1 AMPULE: .5; 3 SOLUTION RESPIRATORY (INHALATION) at 07:25

## 2019-05-14 RX ADMIN — PANTOPRAZOLE SODIUM 40 MG: 40 TABLET, DELAYED RELEASE ORAL at 06:22

## 2019-05-14 RX ADMIN — GUAIFENESIN 600 MG: 600 TABLET, EXTENDED RELEASE ORAL at 22:19

## 2019-05-14 RX ADMIN — CEFUROXIME AXETIL 500 MG: 250 TABLET ORAL at 08:39

## 2019-05-14 RX ADMIN — LEVOTHYROXINE SODIUM 125 MCG: 125 TABLET ORAL at 08:40

## 2019-05-14 RX ADMIN — METHYLPREDNISOLONE SODIUM SUCCINATE 40 MG: 40 INJECTION, POWDER, FOR SOLUTION INTRAMUSCULAR; INTRAVENOUS at 12:11

## 2019-05-14 RX ADMIN — IPRATROPIUM BROMIDE AND ALBUTEROL SULFATE 1 AMPULE: .5; 3 SOLUTION RESPIRATORY (INHALATION) at 16:50

## 2019-05-14 RX ADMIN — Medication 1 G: at 22:20

## 2019-05-14 RX ADMIN — ENOXAPARIN SODIUM 40 MG: 40 INJECTION SUBCUTANEOUS at 08:42

## 2019-05-14 RX ADMIN — Medication 10 ML: at 22:20

## 2019-05-14 RX ADMIN — ALPRAZOLAM 0.5 MG: 0.5 TABLET ORAL at 04:57

## 2019-05-14 RX ADMIN — Medication 2 PUFF: at 07:25

## 2019-05-14 RX ADMIN — PREGABALIN 150 MG: 75 CAPSULE ORAL at 22:19

## 2019-05-14 RX ADMIN — ALPRAZOLAM 0.5 MG: 0.5 TABLET ORAL at 17:03

## 2019-05-14 RX ADMIN — SENNOSIDES 8.6 MG: 8.6 TABLET, FILM COATED ORAL at 08:41

## 2019-05-14 RX ADMIN — METHYLPREDNISOLONE SODIUM SUCCINATE 40 MG: 40 INJECTION, POWDER, FOR SOLUTION INTRAMUSCULAR; INTRAVENOUS at 17:03

## 2019-05-14 RX ADMIN — FLUOXETINE 40 MG: 20 CAPSULE ORAL at 08:40

## 2019-05-14 RX ADMIN — PREGABALIN 150 MG: 75 CAPSULE ORAL at 08:40

## 2019-05-14 ASSESSMENT — PAIN SCALES - GENERAL
PAINLEVEL_OUTOF10: 10
PAINLEVEL_OUTOF10: 9
PAINLEVEL_OUTOF10: 4
PAINLEVEL_OUTOF10: 6
PAINLEVEL_OUTOF10: 9
PAINLEVEL_OUTOF10: 6
PAINLEVEL_OUTOF10: 4
PAINLEVEL_OUTOF10: 9
PAINLEVEL_OUTOF10: 4

## 2019-05-14 ASSESSMENT — PAIN DESCRIPTION - PAIN TYPE
TYPE: CHRONIC PAIN

## 2019-05-14 ASSESSMENT — PAIN DESCRIPTION - LOCATION
LOCATION: BACK;GENERALIZED;SHOULDER
LOCATION: GENERALIZED
LOCATION: BACK;GENERALIZED
LOCATION: BACK;GENERALIZED
LOCATION: BACK;GENERALIZED;SHOULDER
LOCATION: SHOULDER

## 2019-05-14 ASSESSMENT — PAIN DESCRIPTION - PROGRESSION
CLINICAL_PROGRESSION: RAPIDLY IMPROVING
CLINICAL_PROGRESSION: RAPIDLY IMPROVING

## 2019-05-14 ASSESSMENT — PAIN DESCRIPTION - ORIENTATION
ORIENTATION: RIGHT

## 2019-05-14 NOTE — PROGRESS NOTES
alert, no focal neurologic deficits    Data Review:  CBC:   Lab Results   Component Value Date    WBC 13.9 05/10/2019    RBC 3.91 05/10/2019     BMP:   Lab Results   Component Value Date    GLUCOSE 209 05/14/2019    CO2 43 05/14/2019    BUN 20 05/14/2019    CREATININE 0.9 05/14/2019    CALCIUM 9.8 05/14/2019     ABG:   Lab Results   Component Value Date    KIK3BMN 43.5 05/14/2019    BEART 15.0 05/14/2019    X2PIHHJJ 93.6 05/14/2019    PHART 7.353 05/14/2019    NWQ7IPK 78.4 05/14/2019    PO2ART 70.1 05/14/2019    XQB0SDG 102.9 05/14/2019       Radiology: All pertinent images / reports were reviewed as a part of this visit. Narrative   EXAMINATION:   CTA OF THE CHEST 5/10/2019 1:27 am       TECHNIQUE:   CTA of the chest was performed after the administration of intravenous   contrast.  Multiplanar reformatted images are provided for review.  MIP   images are provided for review. Dose modulation, iterative reconstruction,   and/or weight based adjustment of the mA/kV was utilized to reduce the   radiation dose to as low as reasonably achievable.       COMPARISON:   CT chest done 04/24/2019.       HISTORY:   ORDERING SYSTEM PROVIDED HISTORY: r/o pe   TECHNOLOGIST PROVIDED HISTORY:   Ordering Physician Provided Reason for Exam: shortness of breath   Acuity: Unknown   Relevant Medical/Surgical History: (Pt found 82% on RA, unsure of when oxygen   become unhooked, wears 3 LPM at all times and was disconnected at the hub.  Pt   given duo neb in squad, takes every 4 hours. )       FINDINGS:   Pulmonary Arteries: Evaluation of the pulmonary arteries is degraded by   suboptimal contrast opacification.  Measured average attenuation values in   the main pulmonary artery of 220-240 Hounsfield units.  Suboptimal evaluation   of the subsegmental and more peripheral pulmonary arteries due to motion   artifact.  Given this, no obvious evidence of acute pulmonary thromboembolic   disease.  No pulmonary hypertension.  No right ventricular strain.       Mediastinum: No cardiomegaly or pericardial effusion.  Coronary artery   calcifications.  Atherosclerosis of the tortuous thoracic aorta.  No thoracic   aortic aneurysm.  Calcified left hilar/perihilar lymph nodes.  No   intrathoracic lymphadenopathy.  Small thyroid gland may be atrophic.  Normal   esophagus.       Lungs/pleura: Respiratory motion.  Low lung volume.  Expiratory imaging. Bilateral dependent and basilar subsegmental atelectasis.  No suspicious   pulmonary mass or consolidation.  No endoluminal masslike lesion.  No pleural   effusion or pneumothorax.       Upper Abdomen: Atherosclerosis of the visualized abdominal aorta and its   branches.  Partially visualized cyst in the superior left kidney.  Otherwise,   the visualized upper abdomen demonstrates no obvious acute abnormality.       Soft Tissues/Bones: Multilevel degenerative disc disease.  Osseous fusion of   T6-T7.           Impression   Evaluation of the pulmonary arteries is degraded by suboptimal contrast   opacification and motion artifact.  Given this, no obvious evidence of acute   pulmonary thromboembolic disease.  No pulmonary hypertension or right   ventricular strain.       Low lung volume with respiratory motion and bilateral atelectasis.  No   suspicious pulmonary mass or consolidation.  Underlying pulmonary edema or   infection is difficult to entirely exclude.       No pleural effusion or pneumothorax. Problem List:     Chronic hypoxic/hypercapnic respiratory failure  COPD exacerbation  Diastolic CHF    Assessment/Plan:     Patient is more alert today, ABGs suggest persistent hypercapnia. Bicarbonate is improved to 43, Lasix drip was discontinued yesterday. Patient also received Diamox 250 mg ×1. Hypercapnia appears to be multi-factorial, related to OHS/COPD. Continue BiPAP with sleep and at nighttime. Doubt patient's compliance. Continue to hold Lasix.   Continue Solu-Medrol and DuoNeb breathing treatments. Pulmonary will follow.     João Jorge MD

## 2019-05-14 NOTE — PLAN OF CARE
Am medications given whole with water- no choking or coughing- daughter at bedside- call light in reach

## 2019-05-14 NOTE — PROGRESS NOTES
Hospital Medicine Progress Note     Date:  5/14/2019    PCP: ADRIANO Larios CNP (Tel: [phone])    Date of Admission: 5/9/2019      Brief hospital course: Pt presented to the ER w/ acute SOB w/ orthopnea. She was DC from [facility] on 4/29/19 after being treated for COPD AE, acute on chronic resp failure w/ hypoxia. Pt being treated with Bipap for hypercapnia. Subjective  On Bipap, resting, no new complaints. Objective  Physical exam:  Vitals: /64   Pulse 77   Temp 97.9 °F (36.6 °C) (Oral)   Resp 18   Ht 5' 1.5\" (1.562 m)   Wt 291 lb (132 kg)   SpO2 98%   BMI 54.09 kg/m²   Gen: Not in distress. Alert. Head: Normocephalic. Atraumatic. Eyes: EOMI. Good acuity. ENT: Oral mucosa moist  Neck: No JVD. No obvious thyromegaly. CVS: Nml S1S2, no MRG, RRR  Pulmomary: diminished bilaterally. No w, r, r  Gastrointestinal: Soft, NT/ND. Positive bowel sounds. Musculoskeletal: No edema. Warm  Neuro: No focal deficit. Moves extremity spontaneously. Psychiatry: Appropriate affect. Not agitated. Skin: Warm, dry with normal turgor. No rash      24HR INTAKE/OUTPUT:      Intake/Output Summary (Last 24 hours) at 5/14/2019 1734  Last data filed at 5/14/2019 1705  Gross per 24 hour   Intake 1690 ml   Output 2975 ml   Net -1285 ml     I/O last 3 completed shifts:   In: 1450 [P.O.:1450]  Out: 2975 [Urine:2975]  I/O this shift:  In: 240 [P.O.:240]  Out: -       Meds:    cefTRIAXone (ROCEPHIN) IV  1 g Intravenous Q24H    methylPREDNISolone  40 mg Intravenous Q6H    FLUoxetine  40 mg Oral Daily    levothyroxine  125 mcg Oral Daily    pregabalin  150 mg Oral BID    rosuvastatin  40 mg Oral QPM    senna  1 tablet Oral Daily    spironolactone  25 mg Oral BID    sodium chloride flush  10 mL Intravenous 2 times per day    enoxaparin  40 mg Subcutaneous Daily    ipratropium-albuterol  1 ampule Inhalation Q4H WA    pantoprazole  40 mg Oral QAM AC    mometasone-formoterol  2 puff Inhalation BID

## 2019-05-14 NOTE — PROGRESS NOTES
ABG drawn x  1 attempt(s) from Right Radial. Patient had positive modified Mo's Test with good collateral circulation. Patient was on 9 liters/min via nasal cannula at time of puncture. Pressure held for 5 minutes. No bleeding or bruising noted at puncture site.   Patient tolerated procedure well

## 2019-05-14 NOTE — PLAN OF CARE
Up to [facility] with max assist and use of standard walker- no BM - asking for suppository - message sent to physician - new order for suppository - suppository now given to help stimulate BM -return to chair -02 sat dropped with ambulation increased to 14 L high flow with ambulation due to 02 sat down to 76% with up to [facility]- back up to 95 %on 14 L high flow-return to chair -reduced back to 10 L high flow-02 sat now 97% on 10 L high flow -reduced to 8 L high flow -chair alarm on call light in reach

## 2019-05-14 NOTE — PROGRESS NOTES
Patient up to [facility] - large hard BM  - states she feels she has to go more (after suppository already given) - now with MOM given along with 4 oz prune juice- still up on [facility]- nurse at chairside- call light in reach

## 2019-05-14 NOTE — PLAN OF CARE
Blood pressure improvement 124/70 prior to up to chair with max assist and use of walker - now in chair -chair alarm in place- 02 sat 92 % on 9 L high flow -pillow support - call light in reach - alert and oriented x 4-

## 2019-05-14 NOTE — CARE COORDINATION
Spoke with patient's daughter again today to offer assistance with discharge planning. The daughter is still expecting to take her home by car, and resume [facility] pending a home care order.

## 2019-05-14 NOTE — PLAN OF CARE
With chronic right shoulder and back and generalized pain 9/10- 2 po Norco now given for pain along with po xanax to relax- daughter at bedside- call light in reach

## 2019-05-14 NOTE — PROGRESS NOTES
[facility]   Daily Progress Note      Admit Date:  5/9/2019    HPI:    Ms. Beth Guthrie is an 82year old female with history of COPD and is on 3 L of oxygen continuously at home. Obesity, spinal stenosis and dHF    She is admitted for SOB and was just discharged last week for COPD. She had been disconnected from her oxygen and was without it for several hours. cxr suggests pulmonary edema. CTPA negative for PE. She was diuresed. probnp 392->501->294  Ecoli in urine on antibiotics    Subjective:  Patient is being seen for acute on chronic dHF. There were no acute overnight cardiac events. Creat 0.9 potassium 3.7 probnp 294 co2 43 and being treated with diamox, weight stable at 291, sitting up in the chair and is very sleepy with pco2 78 and o2 70 currently on bipap    Objective:   /70   Pulse 70   Temp 98.2 °F (36.8 °C) (Axillary)   Resp 18   Ht 5' 1.5\" (1.562 m)   Wt 291 lb (132 kg)   SpO2 95%   BMI 54.09 kg/m²       Intake/Output Summary (Last 24 hours) at 5/14/2019 1256  Last data filed at 5/14/2019 0900  Gross per 24 hour   Intake 1450 ml   Output 2425 ml   Net -975 ml          Physical Exam:  General:  Awakens but very sleepy  Skin:  Warm and dry. No unusual bruising or rash  Neck:  Supple. No JVD or carotid bruit appreciated  Chest:  Normal effort.   Clear to auscultation, expiratory wheezing  Cardiovascular:  RRR, S1/S2, no murmur/gallop/rub  Abdomen:  Soft, nontender, +bowel sounds, morbidly obese  Extremities:  No edema  Neurological: No focal deficits  Psychological: Normal mood and affect      Medications:    cefTRIAXone (ROCEPHIN) IV  1 g Intravenous Q24H    methylPREDNISolone  40 mg Intravenous Q6H    FLUoxetine  40 mg Oral Daily    levothyroxine  125 mcg Oral Daily    pregabalin  150 mg Oral BID    rosuvastatin  40 mg Oral QPM    senna  1 tablet Oral Daily    spironolactone  25 mg Oral BID    sodium chloride flush  10 mL Intravenous 2 times per day    enoxaparin  40 mg Subcutaneous Daily    ipratropium-albuterol  1 ampule Inhalation Q4H WA    pantoprazole  40 mg Oral QAM AC    mometasone-formoterol  2 puff Inhalation BID    lisinopril  5 mg Oral Daily         Lab Data:  CBC: No results for input(s): WBC, HGB, PLT in the last 72 hours. BMP:    Recent Labs     05/12/19  0524 05/13/19  0422 05/14/19  0432    146* 140   K 4.2 3.5 3.7   CO2 44* 49* 43*   BUN 15 15 20   CREATININE 0.7 0.8 0.9     INR:  No results for input(s): INR in the last 72 hours. BNP:    Recent Labs     05/12/19  0524 05/14/19  0432   PROBNP 501* 294         Diagnostics:  Echo 5/9/19  This was a very technically difficult and limited exam. Patient was in pain   and unable to lay still.   -Normal left ventricle size, wall thickness, and systolic function with an   estimated ejection fraction of 55-60%.  -No obvious regional wall motion abnormalities are seen. There is abnormal   septal motion.   -The mitral valve normal in structure and function.   -No evidence of mitral regurgitation or stenosis.   -The right ventricle is normal in size and function. Assessment:    1. COPD exacerbation  2. Acute on chronic diastolic heart failure, compensated  3. Essential hypertension, controlled  4. Hyperlipidemia  5. Morbid obesity  6. UTI on antibiotics    Plan:    1. Continue to hold Lasix. Received some diamox yesterday  2. Continue Lisinopril 5 mg daily (10 mg at home)  3. Continue Aldactone 25 mg bid  4. CHF nurse following for diet education, fluid restriction and daily weights  5. Pulmonary following    Recommend facility at discharge    Discussed with patient who is agreeable with plan of care. Thank you for allowing me to participate in the care of your patient.     Guzman Ware, CNS

## 2019-05-14 NOTE — PLAN OF CARE
PC02 78.4 --placed back on bipap - up in chair - currently with bipap in place-02 sat 94%- sleeping- call light in reach

## 2019-05-15 LAB
ANION GAP SERPL CALCULATED.3IONS-SCNC: 7 MMOL/L (ref 3–16)
BUN BLDV-MCNC: 30 MG/DL (ref 7–20)
CALCIUM SERPL-MCNC: 9.6 MG/DL (ref 8.3–10.6)
CHLORIDE BLD-SCNC: 89 MMOL/L (ref 99–110)
CO2: 40 MMOL/L (ref 21–32)
CREAT SERPL-MCNC: 0.8 MG/DL (ref 0.6–1.2)
GFR AFRICAN AMERICAN: >60
GFR NON-AFRICAN AMERICAN: >60
GLUCOSE BLD-MCNC: 214 MG/DL (ref 70–99)
HCT VFR BLD CALC: 33.5 % (ref 36–48)
HEMOGLOBIN: 10.5 G/DL (ref 12–16)
MCH RBC QN AUTO: 27.9 PG (ref 26–34)
MCHC RBC AUTO-ENTMCNC: 31.3 G/DL (ref 31–36)
MCV RBC AUTO: 89.2 FL (ref 80–100)
PDW BLD-RTO: 15.9 % (ref 12.4–15.4)
PLATELET # BLD: 192 K/UL (ref 135–450)
PMV BLD AUTO: 8.8 FL (ref 5–10.5)
POTASSIUM REFLEX MAGNESIUM: 3.9 MMOL/L (ref 3.5–5.1)
RBC # BLD: 3.76 M/UL (ref 4–5.2)
SODIUM BLD-SCNC: 136 MMOL/L (ref 136–145)
WBC # BLD: 15.3 K/UL (ref 4–11)

## 2019-05-15 PROCEDURE — 6360000002 HC RX W HCPCS: Performed by: INTERNAL MEDICINE

## 2019-05-15 PROCEDURE — 6370000000 HC RX 637 (ALT 250 FOR IP): Performed by: INTERNAL MEDICINE

## 2019-05-15 PROCEDURE — 99232 SBSQ HOSP IP/OBS MODERATE 35: CPT | Performed by: CLINICAL NURSE SPECIALIST

## 2019-05-15 PROCEDURE — 94762 N-INVAS EAR/PLS OXIMTRY CONT: CPT

## 2019-05-15 PROCEDURE — 80048 BASIC METABOLIC PNL TOTAL CA: CPT

## 2019-05-15 PROCEDURE — 97165 OT EVAL LOW COMPLEX 30 MIN: CPT

## 2019-05-15 PROCEDURE — 97162 PT EVAL MOD COMPLEX 30 MIN: CPT

## 2019-05-15 PROCEDURE — 99233 SBSQ HOSP IP/OBS HIGH 50: CPT | Performed by: INTERNAL MEDICINE

## 2019-05-15 PROCEDURE — 2700000000 HC OXYGEN THERAPY PER DAY

## 2019-05-15 PROCEDURE — 97535 SELF CARE MNGMENT TRAINING: CPT

## 2019-05-15 PROCEDURE — 97530 THERAPEUTIC ACTIVITIES: CPT

## 2019-05-15 PROCEDURE — 94640 AIRWAY INHALATION TREATMENT: CPT

## 2019-05-15 PROCEDURE — 6360000002 HC RX W HCPCS: Performed by: FAMILY MEDICINE

## 2019-05-15 PROCEDURE — 36415 COLL VENOUS BLD VENIPUNCTURE: CPT

## 2019-05-15 PROCEDURE — 6370000000 HC RX 637 (ALT 250 FOR IP): Performed by: PHYSICIAN ASSISTANT

## 2019-05-15 PROCEDURE — 2580000003 HC RX 258: Performed by: INTERNAL MEDICINE

## 2019-05-15 PROCEDURE — 94660 CPAP INITIATION&MGMT: CPT

## 2019-05-15 PROCEDURE — 2060000000 HC ICU INTERMEDIATE R&B

## 2019-05-15 PROCEDURE — 85027 COMPLETE CBC AUTOMATED: CPT

## 2019-05-15 PROCEDURE — 6370000000 HC RX 637 (ALT 250 FOR IP): Performed by: FAMILY MEDICINE

## 2019-05-15 RX ORDER — METHYLPREDNISOLONE SODIUM SUCCINATE 40 MG/ML
40 INJECTION, POWDER, LYOPHILIZED, FOR SOLUTION INTRAMUSCULAR; INTRAVENOUS EVERY 12 HOURS
Status: DISCONTINUED | OUTPATIENT
Start: 2019-05-15 | End: 2019-05-16

## 2019-05-15 RX ADMIN — PREGABALIN 150 MG: 75 CAPSULE ORAL at 23:41

## 2019-05-15 RX ADMIN — METHYLPREDNISOLONE SODIUM SUCCINATE 40 MG: 40 INJECTION, POWDER, FOR SOLUTION INTRAMUSCULAR; INTRAVENOUS at 04:55

## 2019-05-15 RX ADMIN — IPRATROPIUM BROMIDE AND ALBUTEROL SULFATE 1 AMPULE: .5; 3 SOLUTION RESPIRATORY (INHALATION) at 16:13

## 2019-05-15 RX ADMIN — LISINOPRIL 5 MG: 5 TABLET ORAL at 09:17

## 2019-05-15 RX ADMIN — METHYLPREDNISOLONE SODIUM SUCCINATE 40 MG: 40 INJECTION, POWDER, FOR SOLUTION INTRAMUSCULAR; INTRAVENOUS at 00:09

## 2019-05-15 RX ADMIN — HYDROCODONE BITARTRATE AND ACETAMINOPHEN 2 TABLET: 5; 325 TABLET ORAL at 09:16

## 2019-05-15 RX ADMIN — ENOXAPARIN SODIUM 40 MG: 40 INJECTION SUBCUTANEOUS at 09:31

## 2019-05-15 RX ADMIN — IPRATROPIUM BROMIDE AND ALBUTEROL SULFATE 1 AMPULE: .5; 3 SOLUTION RESPIRATORY (INHALATION) at 19:56

## 2019-05-15 RX ADMIN — Medication 1 G: at 23:45

## 2019-05-15 RX ADMIN — GUAIFENESIN 600 MG: 600 TABLET, EXTENDED RELEASE ORAL at 23:41

## 2019-05-15 RX ADMIN — Medication 10 ML: at 09:15

## 2019-05-15 RX ADMIN — SPIRONOLACTONE 25 MG: 25 TABLET ORAL at 23:42

## 2019-05-15 RX ADMIN — ROSUVASTATIN CALCIUM 40 MG: 20 TABLET, FILM COATED ORAL at 23:41

## 2019-05-15 RX ADMIN — SPIRONOLACTONE 25 MG: 25 TABLET ORAL at 09:16

## 2019-05-15 RX ADMIN — PANTOPRAZOLE SODIUM 40 MG: 40 TABLET, DELAYED RELEASE ORAL at 06:28

## 2019-05-15 RX ADMIN — IPRATROPIUM BROMIDE AND ALBUTEROL SULFATE 1 AMPULE: .5; 3 SOLUTION RESPIRATORY (INHALATION) at 07:59

## 2019-05-15 RX ADMIN — Medication 2 PUFF: at 19:56

## 2019-05-15 RX ADMIN — ALPRAZOLAM 0.5 MG: 0.5 TABLET ORAL at 09:32

## 2019-05-15 RX ADMIN — METHYLPREDNISOLONE SODIUM SUCCINATE 40 MG: 40 INJECTION, POWDER, FOR SOLUTION INTRAMUSCULAR; INTRAVENOUS at 09:31

## 2019-05-15 RX ADMIN — SENNOSIDES 8.6 MG: 8.6 TABLET, FILM COATED ORAL at 09:16

## 2019-05-15 RX ADMIN — PREGABALIN 150 MG: 75 CAPSULE ORAL at 09:16

## 2019-05-15 RX ADMIN — FLUOXETINE 40 MG: 20 CAPSULE ORAL at 09:16

## 2019-05-15 RX ADMIN — METHYLPREDNISOLONE SODIUM SUCCINATE 40 MG: 40 INJECTION, POWDER, FOR SOLUTION INTRAMUSCULAR; INTRAVENOUS at 23:41

## 2019-05-15 RX ADMIN — Medication 2 PUFF: at 07:59

## 2019-05-15 RX ADMIN — GUAIFENESIN 600 MG: 600 TABLET, EXTENDED RELEASE ORAL at 09:16

## 2019-05-15 RX ADMIN — Medication 10 ML: at 23:42

## 2019-05-15 RX ADMIN — HYDROCODONE BITARTRATE AND ACETAMINOPHEN 2 TABLET: 5; 325 TABLET ORAL at 23:44

## 2019-05-15 RX ADMIN — IPRATROPIUM BROMIDE AND ALBUTEROL SULFATE 1 AMPULE: .5; 3 SOLUTION RESPIRATORY (INHALATION) at 11:50

## 2019-05-15 RX ADMIN — LEVOTHYROXINE SODIUM 125 MCG: 125 TABLET ORAL at 09:16

## 2019-05-15 ASSESSMENT — PAIN SCALES - GENERAL
PAINLEVEL_OUTOF10: 4
PAINLEVEL_OUTOF10: 8
PAINLEVEL_OUTOF10: 5
PAINLEVEL_OUTOF10: 8
PAINLEVEL_OUTOF10: 7
PAINLEVEL_OUTOF10: 8
PAINLEVEL_OUTOF10: 9
PAINLEVEL_OUTOF10: 7

## 2019-05-15 ASSESSMENT — PAIN DESCRIPTION - LOCATION
LOCATION: BACK;FOOT;LEG;SHOULDER
LOCATION: FOOT;LEG
LOCATION: GENERALIZED
LOCATION: GENERALIZED

## 2019-05-15 ASSESSMENT — PAIN DESCRIPTION - ORIENTATION: ORIENTATION: LEFT;RIGHT;LOWER

## 2019-05-15 ASSESSMENT — PAIN DESCRIPTION - FREQUENCY
FREQUENCY: CONTINUOUS
FREQUENCY: CONTINUOUS

## 2019-05-15 ASSESSMENT — PAIN DESCRIPTION - PROGRESSION: CLINICAL_PROGRESSION: NOT CHANGED

## 2019-05-15 ASSESSMENT — PAIN DESCRIPTION - ONSET
ONSET: ON-GOING
ONSET: ON-GOING

## 2019-05-15 ASSESSMENT — PAIN DESCRIPTION - PAIN TYPE: TYPE: CHRONIC PAIN

## 2019-05-15 NOTE — PROGRESS NOTES
Physical Therapy    Facility/Department: [address]  Initial Assessment    NAME: Renato Grimaldo  : 1935  MRN: [id number]    Date of Service: 5/15/2019    Discharge Recommendations: Renato Grimaldo scored a 13/24 on the AM-PAC short mobility form. Current research shows that an AM-PAC score of 17 or less is typically not associated with a discharge to the patient's home setting. Based on the patients AM-PAC score and their current functional mobility deficits, it is recommended that the patient have 3-5 sessions per week of Physical Therapy at d/c to increase the patients independence. Pt. Family will likely refuse if so, recommendation is as follows with 24 hr assist/supervision:   HOME HEALTH CARE: LEVEL 3 SAFETY     - Initial home health evaluation to occur within 24-48 hours, in patient home   - Therapy evaluations in home within 24-48 hours of discharge; including DME and home safety   - Frontload therapy 5 days, then 3x a week   - Therapy to evaluate if patient has [address] needs for personal care   -  evaluation within 24-48 hours, includes evaluation of resources and insurance to determine [place], [place], LTC, and Medicaid options       PT Equipment Recommendations  Equipment Needed: No    Assessment   Body structures, Functions, Activity limitations: Decreased functional mobility ; Decreased endurance;Decreased strength;Decreased cognition;Decreased balance;Decreased safe awareness;Decreased coordination; Increased Pain  Assessment: pt. presents with above deficits and will benefit from skilled PT to improve overall function and safely return to PLOF  Treatment Diagnosis: impaired strength, endurance, and functional mobility   Prognosis: Good  Decision Making: Medium Complexity  History: COPD, CHF, lives alone   Exam: functional mobility, balance, AMPAC  Clinical Presentation: evolving   Patient Education: reason for PT eval, discharge plan   Barriers to Learning: cognitive   REQUIRES PT FOLLOW UP: Yes  Activity Tolerance  Activity Tolerance: Patient limited by fatigue;Patient limited by pain; Patient limited by endurance       Patient Diagnosis(es): The encounter diagnosis was COPD exacerbation ([facility] [address] has a past medical history of COPD (chronic obstructive pulmonary disease) ([facility] [address] Hernia of unspecified site of abdominal cavity without mention of obstruction or gangrene, Incontinence, Knee pain, bilateral, and Spinal stenosis. has a past surgical history that includes Cholecystectomy and partial hysterectomy (cervix not removed). Restrictions  Restrictions/Precautions  Restrictions/Precautions: Fall Risk(high)  Required Braces or Orthoses?: No  Position Activity Restriction  Other position/activity restrictions:  82 y.o. female presents to the emergency department today for evaluation for shortness of breath. The patient has a history of COPD, and she is on 3 L of oxygen continuously. The patient states that today her to got disconnected from the oxygen and she states was without oxygen for several hours. Patient states that because she did not have her oxygen she noticed some wheezing, chest tightness and increased work of breathing. Patient states that she has had a cough which is productive of dry sputum, she denies any hemoptysis. She denies any chest pressure. No fever or chills. She denies any abdominal pain. No nausea or vomiting or diarrhea. No urinary symptoms. She has no other complaints at this time.    Vision/Hearing  Hearing: Exceptions to [facility]  Hearing Exceptions: Hard of hearing/hearing concerns(very hard of hearing-- makes it hard to communicate )     Subjective  General  Chart Reviewed: Yes  Patient assessed for rehabilitation services?: Yes  Additional Pertinent Hx: hernia, incotinence, chronic back pain, pulmonary edema, COPD  Response To Previous Treatment: Not applicable  Family / Caregiver Present: Yes(daughter at end of session)  Diagnosis: COPD exacerbation, CHF   Follows Commands: Within Functional Limits  General Comment  Comments: pt. was in recliner upon arrival; pt able to continue with evaluation   Subjective  Subjective: pt. was agreeable to PT eval on this date   Pain Screening  Patient Currently in Pain: Yes  (Pain Assessment  Pain Assessment: 0-10  Pain Level: 9  Pain Type: Chronic painPain Location: Back;Foot;Leg;Shoulder(reports always hurting all over )  Pain Orientation: Left;Right; Lower  Pain Frequency: Continuous  Pain Onset: On-going  Clinical Progression: Not changed  Vital Signs  Patient Currently in Pain: Yes     Orientation  Orientation  Overall Orientation Status: Within Normal Limits(slow to respond, jumbles stories but a&o x4)  Social/Functional History  Social/Functional History  Lives With: Alone(pt lives alone, daughter provides 53 hours)  Type of Home: Apartment  Home Layout: Two level(pt has stair lift)  Home Access: Level entry  Bathroom Shower/Tub: (pt takes sponge baths, used to use chair lift to shower)  Bathroom Toilet: Bedside commode  Home Equipment: Oxygen, Rolling walker, Hospital bed  Receives Help From: Family(daughter assists [phone] and from 530-8pm)  ADL Assistance: Needs assistance(daughter assists with sponge bath and dressing)  Homemaking Assistance: Needs assistance(daughter assists with cooking cleaning laundry)  Ambulation Assistance: Needs assistance(pt reports ambulation has decreased over the past 6 months. pt performs stand pivot transfers to and from [facility] with use of RW)  Transfer Assistance: Independent(pt reports she is able to complete transfers with RW)  Occupation: Retired  Leisure & Hobbies: reading, watching tv  Additional Comments: Pt reports she has \"not been walking outside of my lift chair too much cause I'm afraid. \" Pt reports no recent falls. Pt reports she is feeling weaker, especially in BLE.  Pt's grandson's girlfriend reports that pt sits in her chair all day and never gets out of it. Objective     Observation/Palpation  Posture: Fair  Observation: flexed at the hips in stance at recliner, rounded shoulders     AROM RLE (degrees)  RLE AROM: WFL  AROM LLE (degrees)  LLE AROM : WFL  Strength RLE  Strength RLE: WFL  Comment: not specifically tested, stood with CGA from recliner- has not been ambulating for awhile   Strength LLE  Strength LLE: WFL  Comment: not specifically tested, stood with CGA from recliner- has not been ambulating for awhile      Sensation  Overall Sensation Status: WFL  Bed mobility  Comment: not observed, pt. was in recliner upon arrival and returned to recliner at end of session  Transfers  Sit to Stand: Contact guard assistance  Stand to sit: Contact guard assistance  Stand Pivot Transfers: Contact guard assistance(stand pivot transfer to [address] with CGA, needed arms of BSC and recliner to assist herself with BSC right in front of recliner )  Comment: increased time required for transfers, arm of chair and BSC needed for transfer assistance-- o2 sats dropped to 81% with transfer back to recliner, after 1 min rest it returned to 93% (o2 sats did not drop with transfer to [address])  Ambulation  Ambulation?: No(ambulation not safe at this time-- pt. has not ambulated in awhile )  Stairs/Curb  Stairs?: No     Balance  Posture: Fair  Sitting - Static: Fair;+  Sitting - Dynamic: Fair  Standing - Static: Fair  Standing - Dynamic: Fair;-(needs to be holding onto a rail or something to keep self standing )        Plan   Plan  Times per week: 3-5  Times per day: Daily  Current Treatment Recommendations: Strengthening, Transfer Training, Endurance Training, Pain Management, Balance Training, Gait Training, Home Exercise Program, Functional Mobility Training, Positioning  Plan Comment: pt. remains in chair most of time, wishes she never stopped using RW to ambulate-- has not ambulated in awhile   Safety Devices  Type of devices:  All fall risk precautions in place, Call light within reach, Chair alarm in place, Gait belt, Nurse notified, Left in chair, Patient at risk for falls  Restraints  Initially in place: No    AM-PAC Score  AM-PAC Inpatient Mobility Raw Score : 13  AM-PAC Inpatient T-Scale Score : 36.74  Mobility Inpatient CMS 0-100% Score: 64.91  Mobility Inpatient CMS G-Code Modifier : CL          Goals  Short term goals  Time Frame for Short term goals: by discharge   Short term goal 1: pt. will perform bed mobility with mod A   Short term goal 2: pt. will perform transfer to [facility] with LRAD and SBA   Short term goal 3: pt. will perform STS transfer with LRAD and SBA   Short term goal 4: pt. will ambulate 5ft with LRAD and min A   Long term goals  Time Frame for Long term goals : STG=LTG  Patient Goals   Patient goals : to return home        Therapy Time   Individual Concurrent Group Co-treatment   Time In 0825         Time Out 0904         Minutes 39         Timed Code Treatment Minutes: [address] [facility], [id number]  PT present/supervised evaluation. Agrees with above note.     Chanda Adorno, SPT

## 2019-05-15 NOTE — PROGRESS NOTES
Hospital Medicine Progress Note     Date:  5/15/2019    PCP: ADRIANO Fink CNP (Tel: [phone])    Date of Admission: 5/9/2019      Brief hospital course: Pt presented to the ER w/ acute SOB w/ orthopnea. She was DC from [facility] on 4/29/19 after being treated for COPD AE, acute on chronic resp failure w/ hypoxia. Pt being treated with Bipap for hypercapnia. Subjective  On Bipap, sitting up in chair. Objective  Physical exam:  Vitals: /77   Pulse 62   Temp 98.5 °F (36.9 °C) (Oral)   Resp 18   Ht 5' 1.5\" (1.562 m)   Wt 291 lb (132 kg)   SpO2 94%   BMI 54.09 kg/m²   Gen: Not in distress. Alert. Head: Normocephalic. Atraumatic. Eyes: EOMI. Good acuity. ENT: Oral mucosa moist  Neck: No JVD. No obvious thyromegaly. CVS: Nml S1S2, no MRG, RRR  Pulmomary: diminished bilaterally. No w, r, r  Gastrointestinal: Soft, NT/ND. Positive bowel sounds. Musculoskeletal: No edema. Warm  Neuro: No focal deficit. Moves extremity spontaneously. Psychiatry: Appropriate affect. Not agitated. Skin: Warm, dry with normal turgor. No rash      24HR INTAKE/OUTPUT:      Intake/Output Summary (Last 24 hours) at 5/15/2019 1149  Last data filed at 5/15/2019 0725  Gross per 24 hour   Intake 740 ml   Output 2025 ml   Net -1285 ml     I/O last 3 completed shifts:   In: 1220 [P.O.:1220]  Out: 1000 [Urine:1000]  I/O this shift:  In: -   Out: 1025 [Urine:1025]      Meds:    methylPREDNISolone  40 mg Intravenous Q12H    cefTRIAXone (ROCEPHIN) IV  1 g Intravenous Q24H    guaiFENesin  600 mg Oral BID    FLUoxetine  40 mg Oral Daily    levothyroxine  125 mcg Oral Daily    pregabalin  150 mg Oral BID    rosuvastatin  40 mg Oral QPM    senna  1 tablet Oral Daily    spironolactone  25 mg Oral BID    sodium chloride flush  10 mL Intravenous 2 times per day    enoxaparin  40 mg Subcutaneous Daily    ipratropium-albuterol  1 ampule Inhalation Q4H WA    pantoprazole  40 mg Oral QAM AC    mometasone-formoterol  2 puff Inhalation BID    lisinopril  5 mg Oral Daily       Infusions:       PRN Meds: bisacodyl, ALPRAZolam, sodium chloride flush, magnesium hydroxide, ondansetron, HYDROcodone 5 mg - acetaminophen    Labs/imaging:  CBC:   Recent Labs     05/15/19  0433   WBC 15.3*   HGB 10.5*            BMP:    Recent Labs     05/13/19  0422 05/14/19 0432 05/15/19  0432   * 140 136   K 3.5 3.7 3.9   CL 92* 90* 89*   CO2 49* 43* 40*   BUN 15 20 30*   CREATININE 0.8 0.9 0.8   GLUCOSE 132* 209* 214*         Hepatic: No results for input(s): AST, ALT, ALB, BILITOT, ALKPHOS in the last 72 hours. Troponin: No results for input(s): TROPONINI in the last 72 hours. BNP: No results for input(s): BNP in the last 72 hours. INR: No results for input(s): INR in the last 72 hours. Reviewed imaging and reports noted      Assessment:  Principal Problem:    Acute on chronic diastolic heart failure (HCC)  Active Problems:    Peripheral edema    COPD exacerbation (HCC)    Essential hypertension    Hyperlipidemia    Morbid obesity with BMI of 50.0-59.9, adult (HCC)    Acute on chronic respiratory failure with hypercapnia (HCC)  Resolved Problems:    * No resolved hospital problems. *        Plan:  COPD AE  - Pulm following  - cont abx, duonebs, steroids, dulera, mucinex, prn Bipap      Acute on Chronic resp failure w/ hypoxia and hypercapnia  - treat as above, Pulm following      Acute on Chronic dCHF  - stable, Cardio following, CPM, CTM      HTN  - stable, CPM, CTM      UTI  - on rocephin, U cx grew E COli > 100K      Morbid Obesity      Diet: DIET NO SALT ADDED (3-4 GM);     Activity: up with assist  Prophylaxis: lovenox    Code status: Full Code     ----------        Reema Medina MD  -------------------------------  Rounding hospitalist

## 2019-05-15 NOTE — PROGRESS NOTES
[address]   Daily Progress Note      Admit Date:  5/9/2019    HPI:    Ms. Brittany Dickey is an 82year old female with history of COPD and is on 3 L of oxygen continuously at home. Obesity, spinal stenosis and dHF    She is admitted for SOB and was just discharged last week for COPD. She had been disconnected from her oxygen and was without it for several hours. cxr suggests pulmonary edema. CTPA negative for PE. She was diuresed. probnp 392->501->294  Ecoli in urine on antibiotics  Very [place]    Subjective:  Patient is being seen for acute on chronic dHF. There were no acute overnight cardiac events. She is sitting up in the chair on 6 L of oxygen with sats 94%. She complains that \"I hurt everywhere. \"  Requesting more pain medications. Weight is stable, -6L out potassium 3.9 creat 0.8    Objective:   BP (!) 93/53   Pulse 78   Temp 98.2 °F (36.8 °C) (Oral)   Resp 18   Ht 5' 1.5\" (1.562 m)   Wt 291 lb (132 kg)   SpO2 94%   BMI 54.09 kg/m²       Intake/Output Summary (Last 24 hours) at 5/15/2019 1457  Last data filed at 5/15/2019 0725  Gross per 24 hour   Intake 740 ml   Output 1475 ml   Net -735 ml          Physical Exam:  General:  Awake and alert, very [place]  Skin:  Warm and dry. No unusual bruising or rash  Neck:  Supple. No JVD or carotid bruit appreciated  Chest:  Normal effort.   Clear to auscultation, no rales or rhonchi  Cardiovascular:  RRR, S1/S2, no murmur/gallop/rub  Abdomen:  Soft, nontender, +bowel sounds, morbidly obese  Extremities:  No edema  Neurological: No focal deficits  Psychological: Normal mood and affect      Medications:    methylPREDNISolone  40 mg Intravenous Q12H    cefTRIAXone (ROCEPHIN) IV  1 g Intravenous Q24H    guaiFENesin  600 mg Oral BID    FLUoxetine  40 mg Oral Daily    levothyroxine  125 mcg Oral Daily    pregabalin  150 mg Oral BID    rosuvastatin  40 mg Oral QPM    senna  1 tablet Oral Daily    spironolactone  25 mg Oral BID    sodium chloride flush  10 mL Intravenous 2 times per day    enoxaparin  40 mg Subcutaneous Daily    ipratropium-albuterol  1 ampule Inhalation Q4H WA    pantoprazole  40 mg Oral QAM AC    mometasone-formoterol  2 puff Inhalation BID    lisinopril  5 mg Oral Daily         Lab Data:  CBC:   Recent Labs     05/15/19  0433   WBC 15.3*   HGB 10.5*        BMP:    Recent Labs     05/13/19 0422 05/14/19  0432 05/15/19  0432   * 140 136   K 3.5 3.7 3.9   CO2 49* 43* 40*   BUN 15 20 30*   CREATININE 0.8 0.9 0.8     INR:  No results for input(s): INR in the last 72 hours. BNP:    Recent Labs     05/14/19 0432   PROBNP 294         Diagnostics:  Echo 5/9/19  This was a very technically difficult and limited exam. Patient was in pain   and unable to lay still.   -Normal left ventricle size, wall thickness, and systolic function with an   estimated ejection fraction of 55-60%.  -No obvious regional wall motion abnormalities are seen. There is abnormal   septal motion.   -The mitral valve normal in structure and function.   -No evidence of mitral regurgitation or stenosis.   -The right ventricle is normal in size and function. Assessment:    1. COPD exacerbation, improving  2. Acute on chronic diastolic heart failure, compensated  3. Essential hypertension, controlled  4. Hyperlipidemia  5. Morbid obesity  6. UTI on antibiotics    Plan:    1. Continue to hold Lasix  2. Continue Lisinopril 5 mg daily (10 mg at home)  3. Continue Aldactone 25 mg bid  4. CHF nurse following for diet education, fluid restriction and daily weights  5. Pulmonary following    Recommend facility at discharge which she is declining. High risk for readmission. No family at bedside    Discussed with patient who is agreeable with plan of care. Thank you for allowing me to participate in the care of your patient.     Warden Jalloh, CNS

## 2019-05-15 NOTE — CARE COORDINATION
5/15/2019-   Per conversation with daughter declines SNF active with [facility] and would like patient to discharge with resumption of care. Will need home care order.

## 2019-05-15 NOTE — PROGRESS NOTES
[facility] Pulmonary [facility]    Follow Up Note    Subjective:   CHIEF COMPLAINT / HPI:   Chief Complaint   Patient presents with    Shortness of Breath     Pt found 82% on RA, unsure of when oxygen become unhooked, wears 3 LPM at all times and was disconnected at the hub. Pt given duo neb in squad, takes every 4 hours. She is sitting up in the chair. Answers questions appropriately. She is very hard of hearing. Past Medical History:    Reviewed; no changes    Social History:    Reviewed; no changes    REVIEW OF SYSTEMS:    CONSTITUTIONAL:  negative for fevers and chills  RESPIRATORY:  See HPI  CARDIOVASCULAR:  negative for chest pain, palpitations, edema  GASTROINTESTINAL:  negative for nausea, vomiting, diarrhea, constipation and abdominal pain    Objective:   PHYSICAL EXAM:        VITALS:  /77   Pulse 62   Temp 98.5 °F (36.9 °C) (Oral)   Resp 18   Ht 5' 1.5\" (1.562 m)   Wt 291 lb (132 kg)   SpO2 94%   BMI 54.09 kg/m²  on 3L NC    24HR INTAKE/OUTPUT:      Intake/Output Summary (Last 24 hours) at 5/15/2019 1042  Last data filed at 5/15/2019 0725  Gross per 24 hour   Intake 740 ml   Output 2025 ml   Net -1285 ml       CONSTITUTIONAL:  awake, alert,  no apparent distress, and appears stated age  LUNGS:  No increased work of breathing and clear to auscultation, no crackles or wheezes  CARDIOVASCULAR: S1 and S2 and no JVD  ABDOMEN:  normal bowel sounds, non-distended and non-tender to palpation  EXT: No edema, no calf tenderness. Pulses are present bilaterally. NEUROLOGIC:  Mental Status Exam:  Level of Alertness:   awake  Orientation:   person, place, time.  Non focal  SKIN:  normal skin color, texture, turgor, no redness, warmth, or swelling at IV sites    DATA:    CBC:  Recent Labs     05/15/19  0433   WBC 15.3*   RBC 3.76*   HGB 10.5*   HCT 33.5*      MCV 89.2   MCH 27.9   MCHC 31.3   RDW 15.9*      BMP:  Recent Labs     05/13/19  0422 05/14/19  0432 05/15/19  0432   * 140 136 K 3.5 3.7 3.9   CL 92* 90* 89*   CO2 49* 43* 40*   BUN 15 20 30*   CREATININE 0.8 0.9 0.8   CALCIUM 9.1 9.8 9.6   GLUCOSE 132* 209* 214*      ABG:  Recent Labs     05/13/19  0921 05/14/19  1120   PHART 7.445 7. 353   TIM5OVZ 79.8* 78.4*   PO2ART 54.1* 70.1*   BWP7ZFG 54.7* 43.5*   [id number] 89.6* 93.6   BEART 26.4* 15.0*       Cultures:    Abx:    Radiology Review:  Pertinent images / reports were reviewed as a part of this visit. Assessment:     1. Chronic hypoxemic and hypercapnic respiratory failure  2. COPD exacerbation  3. Diastolic congestive heart failure    Has improved with BiPAP mask ventilation at night. She has a BiPAP mask at home but is unable to wear it.   It may not be functioning properly  She is off the Lasix drip  Minimal wheeze  Taper steroids

## 2019-05-15 NOTE — PROGRESS NOTES
Attempted heart failure teaching. Patient dosing off and on and did not remain awake for teaching. Will return.

## 2019-05-15 NOTE — PROGRESS NOTES
any chest pressure. No fever or chills. She denies any abdominal pain. No nausea or vomiting or diarrhea. No urinary symptoms. She has no other complaints at this time. Subjective   General  Chart Reviewed: Yes  Patient assessed for rehabilitation services?: Yes(Simultaneous filing. User may not have seen previous data.)  Additional Pertinent Hx: COPD, spinal stenosis, pt on O2 at home  Family / Caregiver Present: Yes(daughter arrived at end of session)  Diagnosis: CHF exacerbation. pt just discharged from hospital with PNA. reports falling at home and was without O2 for time. Subjective  Subjective: pt up in recliner upon arrival and finishing breakfast. agreeable to OT/PT eval   Pain Assessment  Pain Assessment: 0-10  Pain Level: 9  Patient's Stated Pain Goal: 3  Pain Type: Chronic pain  Pain Location: Back;Foot;Leg;Shoulder(reports always hurting all over   Pain Orientation: Left;Right; Lower  Pain Frequency: Continuous  Pain Onset: On-going  Clinical Progression: Not changed  Response to Pain Intervention: Patient Satisfied; Other (Comment)(norco given )  Social/Functional History  Social/Functional History  Lives With: Alone(pt lives alone, daughter provides 53 hours)  Type of Home: Apartment  Home Layout: Two level(pt has stair lift)  Home Access: Level entry  Bathroom Shower/Tub: (pt takes sponge baths, used to use chair lift to shower)  Bathroom Toilet: Bedside commode  Home Equipment: Oxygen, Rolling walker, Hospital bed  Receives Help From: Family(daughter assists [phone] and from 530-8pm)  ADL Assistance: Needs assistance(daughter assists with sponge bath and dressing)  Homemaking Assistance: Needs assistance(daughter assists with cooking cleaning laundry)  Ambulation Assistance: Needs assistance(pt reports ambulation has decreased over the past 6 months.  pt performs stand pivot transfers to and from [facility] with use of RW)  Transfer Assistance: Independent(pt reports she is able to complete transfers with RW)  Occupation: Retired  Leisure & Hobbies: reading, watching tv  Additional Comments: Pt reports she has \"not been walking outside of my lift chair too much cause I'm afraid. \" Pt reports no recent falls. Pt reports she is feeling weaker, especially in BLE. Pt's grandson's girlfriend reports that pt sits in her chair all day and never gets out of it. Objective        Orientation  Overall Orientation Status: Within Functional Limits  Observation/Palpation  Posture: Fair  Observation: flexed at the hips in stance at recliner, rounded shoulders   Balance  Sitting Balance: Supervision  Standing Balance: Contact guard assistance  Standing Balance  Time: ~30 seconds x 2   Activity: stand pivot transfer to/from [facility] from the recliner  Comment: pt reports that commode is placed in front of recliner for transfers. pt uses arm rests for transfer and no longer transfers with RW  Functional Mobility  Functional Mobility Comments: pt reports she does not use the RW anymore but would like to be able to walk a few steps with the rw  ADL  Feeding: Setup  LE Dressing: Maximum assistance(maxA with donning brief)  Toileting: Dependent/Total(venessa care, pt requesting mepilex to be placed on pt)  Additional Comments: pt transferred to/from [facility] with CGA. O2 saturation decreasing to 75% returning back to the recliner. pt able to recover in ~1-2 minutes with cues for pursed lip breathing  Tone RUE  RUE Tone: Normotonic  Tone LUE  LUE Tone: Normotonic  Coordination  Movements Are Fluid And Coordinated: Yes     Bed mobility  Comment: not assessed, pt in chair at beginning and end of session        Cognition  Overall Cognitive Status: Exceptions  Arousal/Alertness: Delayed responses to stimuli  Following Commands:  Follows one step commands consistently  Attention Span: Attends with cues to redirect  Memory: Decreased recall of recent events  Insights: Decreased awareness of deficits  Initiation: Requires cues for

## 2019-05-16 LAB — PRO-BNP: 148 PG/ML (ref 0–449)

## 2019-05-16 PROCEDURE — 6360000002 HC RX W HCPCS: Performed by: INTERNAL MEDICINE

## 2019-05-16 PROCEDURE — 97530 THERAPEUTIC ACTIVITIES: CPT

## 2019-05-16 PROCEDURE — 6370000000 HC RX 637 (ALT 250 FOR IP): Performed by: INTERNAL MEDICINE

## 2019-05-16 PROCEDURE — 99232 SBSQ HOSP IP/OBS MODERATE 35: CPT | Performed by: INTERNAL MEDICINE

## 2019-05-16 PROCEDURE — 94640 AIRWAY INHALATION TREATMENT: CPT

## 2019-05-16 PROCEDURE — 6370000000 HC RX 637 (ALT 250 FOR IP): Performed by: FAMILY MEDICINE

## 2019-05-16 PROCEDURE — 36415 COLL VENOUS BLD VENIPUNCTURE: CPT

## 2019-05-16 PROCEDURE — 2060000000 HC ICU INTERMEDIATE R&B

## 2019-05-16 PROCEDURE — 2580000003 HC RX 258: Performed by: INTERNAL MEDICINE

## 2019-05-16 PROCEDURE — 6370000000 HC RX 637 (ALT 250 FOR IP): Performed by: PHYSICIAN ASSISTANT

## 2019-05-16 PROCEDURE — 97535 SELF CARE MNGMENT TRAINING: CPT

## 2019-05-16 PROCEDURE — 6360000002 HC RX W HCPCS: Performed by: FAMILY MEDICINE

## 2019-05-16 PROCEDURE — 83880 ASSAY OF NATRIURETIC PEPTIDE: CPT

## 2019-05-16 PROCEDURE — 99232 SBSQ HOSP IP/OBS MODERATE 35: CPT | Performed by: CLINICAL NURSE SPECIALIST

## 2019-05-16 RX ORDER — METHYLPREDNISOLONE SODIUM SUCCINATE 40 MG/ML
40 INJECTION, POWDER, LYOPHILIZED, FOR SOLUTION INTRAMUSCULAR; INTRAVENOUS DAILY
Status: DISCONTINUED | OUTPATIENT
Start: 2019-05-16 | End: 2019-05-17

## 2019-05-16 RX ADMIN — IPRATROPIUM BROMIDE AND ALBUTEROL SULFATE 1 AMPULE: .5; 3 SOLUTION RESPIRATORY (INHALATION) at 11:52

## 2019-05-16 RX ADMIN — Medication 2 PUFF: at 19:59

## 2019-05-16 RX ADMIN — BISACODYL 10 MG: 10 SUPPOSITORY RECTAL at 13:23

## 2019-05-16 RX ADMIN — LEVOTHYROXINE SODIUM 125 MCG: 125 TABLET ORAL at 11:40

## 2019-05-16 RX ADMIN — HYDROCODONE BITARTRATE AND ACETAMINOPHEN 2 TABLET: 5; 325 TABLET ORAL at 19:25

## 2019-05-16 RX ADMIN — PREGABALIN 150 MG: 75 CAPSULE ORAL at 11:40

## 2019-05-16 RX ADMIN — IPRATROPIUM BROMIDE AND ALBUTEROL SULFATE 1 AMPULE: .5; 3 SOLUTION RESPIRATORY (INHALATION) at 19:59

## 2019-05-16 RX ADMIN — GUAIFENESIN 600 MG: 600 TABLET, EXTENDED RELEASE ORAL at 19:18

## 2019-05-16 RX ADMIN — METHYLPREDNISOLONE SODIUM SUCCINATE 40 MG: 40 INJECTION, POWDER, FOR SOLUTION INTRAMUSCULAR; INTRAVENOUS at 11:42

## 2019-05-16 RX ADMIN — Medication 10 ML: at 22:13

## 2019-05-16 RX ADMIN — GUAIFENESIN 600 MG: 600 TABLET, EXTENDED RELEASE ORAL at 11:41

## 2019-05-16 RX ADMIN — ENOXAPARIN SODIUM 40 MG: 40 INJECTION SUBCUTANEOUS at 11:40

## 2019-05-16 RX ADMIN — SENNOSIDES 8.6 MG: 8.6 TABLET, FILM COATED ORAL at 11:41

## 2019-05-16 RX ADMIN — SALINE NASAL SPRAY 1 SPRAY: 1.5 SOLUTION NASAL at 19:20

## 2019-05-16 RX ADMIN — LISINOPRIL 5 MG: 5 TABLET ORAL at 11:41

## 2019-05-16 RX ADMIN — ROSUVASTATIN CALCIUM 40 MG: 20 TABLET, FILM COATED ORAL at 19:18

## 2019-05-16 RX ADMIN — FLUOXETINE 40 MG: 20 CAPSULE ORAL at 11:41

## 2019-05-16 RX ADMIN — Medication 1 G: at 19:19

## 2019-05-16 RX ADMIN — SPIRONOLACTONE 25 MG: 25 TABLET ORAL at 19:18

## 2019-05-16 RX ADMIN — IPRATROPIUM BROMIDE AND ALBUTEROL SULFATE 1 AMPULE: .5; 3 SOLUTION RESPIRATORY (INHALATION) at 09:45

## 2019-05-16 RX ADMIN — ALPRAZOLAM 0.5 MG: 0.5 TABLET ORAL at 02:47

## 2019-05-16 RX ADMIN — HYDROCODONE BITARTRATE AND ACETAMINOPHEN 2 TABLET: 5; 325 TABLET ORAL at 11:41

## 2019-05-16 RX ADMIN — SPIRONOLACTONE 25 MG: 25 TABLET ORAL at 11:41

## 2019-05-16 RX ADMIN — PREGABALIN 150 MG: 75 CAPSULE ORAL at 19:18

## 2019-05-16 RX ADMIN — Medication 10 ML: at 09:14

## 2019-05-16 RX ADMIN — Medication 2 PUFF: at 09:51

## 2019-05-16 ASSESSMENT — PAIN SCALES - GENERAL
PAINLEVEL_OUTOF10: 10
PAINLEVEL_OUTOF10: 9

## 2019-05-16 ASSESSMENT — PAIN DESCRIPTION - PAIN TYPE
TYPE: CHRONIC PAIN

## 2019-05-16 ASSESSMENT — PAIN DESCRIPTION - LOCATION
LOCATION: GENERALIZED
LOCATION: GENERALIZED
LOCATION: BACK;GENERALIZED;LEG

## 2019-05-16 ASSESSMENT — PAIN DESCRIPTION - ORIENTATION: ORIENTATION: RIGHT;LEFT

## 2019-05-16 NOTE — PROGRESS NOTES
Hospital Medicine Progress Note     Date:  5/16/2019    PCP: ADRIANO Dubois CNP (Tel: [phone])    Date of Admission: 5/9/2019      Brief hospital course: Pt presented to the ER w/ acute SOB w/ orthopnea. She was DC from [facility] on 4/29/19 after being treated for COPD AE, acute on chronic resp failure w/ hypoxia. Pt being treated with Bipap for hypercapnia. Subjective  On O2 NC, sitting up in chair. Objective  Physical exam:  Vitals: /68   Pulse 78   Temp 97.4 °F (36.3 °C) (Temporal)   Resp 16   Ht 5' 1.5\" (1.562 m)   Wt 265 lb 3.4 oz (120.3 kg)   SpO2 94%   BMI 49.30 kg/m²   Gen: Not in distress. Alert. Head: Normocephalic. Atraumatic. Eyes: EOMI. Good acuity. ENT: Oral mucosa moist  Neck: No JVD. No obvious thyromegaly. CVS: Nml S1S2, no MRG, RRR  Pulmomary: diminished bilaterally. No w, r, r  Gastrointestinal: Soft, NT/ND. Positive bowel sounds. Musculoskeletal: No edema. Warm  Neuro: No focal deficit. Moves extremity spontaneously. Psychiatry: Appropriate affect. Not agitated. Skin: Warm, dry with normal turgor. No rash      24HR INTAKE/OUTPUT:      Intake/Output Summary (Last 24 hours) at 5/16/2019 1101  Last data filed at 5/16/2019 0716  Gross per 24 hour   Intake 1260 ml   Output 2750 ml   Net -1490 ml     I/O last 3 completed shifts:   In: 1440 [P.O.:1440]  Out: 3775 [[id number]:[id number]]  I/O this shift:  In: 180 [P.O.:180]  Out: -       Meds:    methylPREDNISolone  40 mg Intravenous Daily    cefTRIAXone (ROCEPHIN) IV  1 g Intravenous Q24H    guaiFENesin  600 mg Oral BID    FLUoxetine  40 mg Oral Daily    levothyroxine  125 mcg Oral Daily    pregabalin  150 mg Oral BID    rosuvastatin  40 mg Oral QPM    senna  1 tablet Oral Daily    spironolactone  25 mg Oral BID    sodium chloride flush  10 mL Intravenous 2 times per day    enoxaparin  40 mg Subcutaneous Daily    ipratropium-albuterol  1 ampule Inhalation Q4H WA    pantoprazole  40 mg Oral QAM AC    mometasone-formoterol  2 puff Inhalation BID    lisinopril  5 mg Oral Daily       Infusions:       PRN Meds: bisacodyl, ALPRAZolam, sodium chloride flush, magnesium hydroxide, ondansetron, HYDROcodone 5 mg - acetaminophen    Labs/imaging:  CBC:   Recent Labs     05/15/19  0433   WBC 15.3*   HGB 10.5*            BMP:    Recent Labs     05/14/19  0432 05/15/19  0432    136   K 3.7 3.9   CL 90* 89*   CO2 43* 40*   BUN 20 30*   CREATININE 0.9 0.8   GLUCOSE 209* 214*         Hepatic: No results for input(s): AST, ALT, ALB, BILITOT, ALKPHOS in the last 72 hours. Troponin: No results for input(s): TROPONINI in the last 72 hours. BNP: No results for input(s): BNP in the last 72 hours. INR: No results for input(s): INR in the last 72 hours. Reviewed imaging and reports noted      Assessment:  Principal Problem:    Acute on chronic diastolic heart failure (HCC)  Active Problems:    Peripheral edema    COPD exacerbation (HCC)    Essential hypertension    Hyperlipidemia    Morbid obesity with BMI of 50.0-59.9, adult (HCC)    Acute on chronic respiratory failure with hypercapnia (HCC)  Resolved Problems:    * No resolved hospital problems. *        Plan:  COPD AE  - Pulm following  - cont abx, duonebs, steroids, dulera, mucinex, prn Bipap      Acute on Chronic resp failure w/ hypoxia and hypercapnia  - treat as above, Pulm following      Acute on Chronic dCHF  - stable, Cardio following, CPM, CTM      HTN  - stable, CPM, CTM      UTI  - on rocephin, U cx grew E COli > 100K      Morbid Obesity      Diet: DIET NO SALT ADDED (3-4 GM);     Activity: up with assist  Prophylaxis: lovenox    Code status: Full Code     ----------        Ani Caruso MD  -------------------------------  Rounding hospitalist

## 2019-05-16 NOTE — PROGRESS NOTES
Occupational Therapy  Facility/Department: [address]  Daily Treatment Note  NAME: Sona Trejo  : 1935  MRN: [id number]    Date of Service: 2019    Discharge Recommendations:  Sona Trejo scored a 15/24 on the AM-PAC ADL Inpatient form. Current research shows that an AM-PAC score of 17 or less is typically not associated with a discharge to the patient's home setting. Based on the patients AM-PAC score and their current ADL deficits, it is recommended that the patient have 3-5 sessions per week of Occupational Therapy at d/c to increase the patients independence. Pt continues to required assist and is not at baseline. Due to decreased endurance and increased O2 requirement pt would benefit from above stated recommendation. Pt and daughter likely to refuse. If family and pt refuse: 24 hour supervision    HOME HEALTH CARE: LEVEL 3 SAFETY     - Initial home health evaluation to occur within 24-48 hours, in patient home   - Therapy evaluations in home within 24-48 hours of discharge; including DME and home safety   - Frontload therapy 5 days, then 3x a week   - Therapy to evaluate if patient has [address] needs for personal care   -  evaluation within 24-48 hours, includes evaluation of resources and insurance to determine AL, [place], LTC, and Medicaid options        OT Equipment Recommendations  Equipment Needed: No    Assessment   Performance deficits / Impairments: Decreased ADL status; Decreased functional mobility ; Decreased endurance  Assessment: pt not at baseline and would benefit from skilled OT services at this time. pt recently admitted for PNA and returned home with home health care. pt had fall at home and was without O2 for a time. pt unable to recall how long.    Treatment Diagnosis: pt presents with decreased functional mobility, ADL status, and endurance due to CHF, UTI, and PNA  Prognosis: Fair  History: pt lives at home alone but daughter is there for 53 hours a week. pt on O2 at baseline. family reporting they will take pt home at discharge. pt needs assist for ADL/IADL tasks  Assistance / Modification: SBA transfers, decreased endurance, cues for safety awareness  Patient Education: OT eval, POC, discharge, transfers, pursed lip breathing  Barriers to Learning: hard of hearing  REQUIRES OT FOLLOW UP: Yes  Activity Tolerance  Activity Tolerance: Treatment limited secondary to medical complications (free text); Patient limited by pain  Activity Tolerance: limited by decreased endurance  Safety Devices  Safety Devices in place: Yes  Type of devices: All fall risk precautions in place; Left in chair;Nurse notified;Call light within reach; Chair alarm in place; Patient at risk for falls         Patient Diagnosis(es): The encounter diagnosis was COPD exacerbation ([facility] [address] has a past medical history of COPD (chronic obstructive pulmonary disease) ([facility] [address] Hernia of unspecified site of abdominal cavity without mention of obstruction or gangrene, Incontinence, Knee pain, bilateral, and Spinal stenosis. has a past surgical history that includes Cholecystectomy and partial hysterectomy (cervix not removed). Restrictions  Restrictions/Precautions  Restrictions/Precautions: Fall Risk(high)  Required Braces or Orthoses?: No  Position Activity Restriction  Other position/activity restrictions:  82 y.o. female presents to the emergency department today for evaluation for shortness of breath. The patient has a history of COPD, and she is on 3 L of oxygen continuously. The patient states that today her to got disconnected from the oxygen and she states was without oxygen for several hours. Patient states that because she did not have her oxygen she noticed some wheezing, chest tightness and increased work of breathing. Patient states that she has had a cough which is productive of dry sputum, she denies any hemoptysis. She denies any chest pressure.   No fever or chills. She denies any abdominal pain. No nausea or vomiting or diarrhea. No urinary symptoms. She has no other complaints at this time. Subjective   General  Chart Reviewed: Yes  Patient assessed for rehabilitation services?: Yes  Additional Pertinent Hx: COPD, spinal stenosis, pt on O2 at home-3 L   Response to previous treatment: Patient with no complaints from previous session  Family / Caregiver Present: No  Diagnosis: CHF exacerbation. pt just discharged from hospital with PNA. reports falling at home and was without O2 for time. pulmonary edema suspected   Subjective  Subjective: pt up in recliner upon arrival and asleep. pt difficult to wake. pt agreeable to OT treatment and toileting this date  Pain Assessment  Pain Assessment: 0-10  Pain Level: 9  Pain Type: Chronic pain  Pain Location: Back;Generalized;Leg (RN notified, repositioned)  Pain Orientation: Right;Left  Vital Signs  Patient Currently in Pain: Yes   Orientation  Orientation  Overall Orientation Status: Within Functional Limits  Objective    ADL  Feeding: Setup(for coffee, RN approval)  Grooming: Setup;Maximum assistance(oral care- set up and hair washing-maxA)  LE Dressing: Maximum assistance  Toileting: Dependent/Total  Additional Comments: pt seated on BSC for BM. increased time to complete. O2 saturations decreasing to 82% as pt attempted to bear down for BM. Cues for pursed lip breathing. O2 returning to 90% and above within ~20 seconds.  pt on 4 L O2. pt declined other ADLs, reports she already had a bath this AM        Balance  Sitting Balance: Modified independent   Toilet Transfers  Toilet - Technique: Stand pivot  Equipment Used: Extra wide bedside commode  Toilet Transfer: Stand by assistance     Transfers  Stand Pivot Transfers: Stand by assistance  Sit to stand: Stand by assistance(x2 stand from chair, pt attempting to pull up from RW, cues to correct)  Stand to sit: Stand by assistance  Transfer Comments: EOB>BSC. pt's desatting to 80% after transferring to the [facility]. Pt performed transfer back to recliner from [facility] with O2 dropping to 75%. ~20 seconds to recover. pt performed sit to stand x2 for ~10-15 seconds. O2 saturation decreasing to 75%. Pt able to recover ~10-15 seconds      Cognition  Overall Cognitive Status: [facility]  Cognition Comment: cues for safety awareness, pt extremely hard of hearing     Perception  Overall Perceptual Status: WFL         Plan   Plan  Times per week: 3-5  Current Treatment Recommendations: Functional Mobility Training, Patient/Caregiver Education & Training, Endurance Training, Balance Training, Safety Education & Training, Self-Care / ADL    AM-PAC Score        AM-PAC Inpatient Daily Activity Raw Score: 15  AM-PAC Inpatient ADL T-Scale Score : 34.69  ADL Inpatient CMS 0-100% Score: 56.46  ADL Inpatient CMS G-Code Modifier : CK    Goals  Short term goals  Time Frame for Short term goals: discharge  Short term goal 1: bed mobility supervision--goal not addressed   Short term goal 2: functional ADL transfer with supervision--SBA 5/16  Short term goal 3: LB ADLs mod A--pt declined   Short term goal 4: UB ADLs with set up--pt declined   Short term goal 5: grooming with set up --maxA hair, set up for oral care 5/16  Long term goals  Time Frame for Long term goals : STG=LTG  Long term goal 1: functional mobility ~3 ft with RW and CGA--attempted stand x 2, saturations decreasing.  unable to take steps 5/16  Patient Goals   Patient goals : return home       Therapy Time   Individual Concurrent Group Co-treatment   Time In 8327         Time Out 1534         Minutes 38           Timed Code Treatment Minutes:  38 Minutes    Total Treatment Minutes:  Katelynn Ferguson 1696, OTR/L [address], OT

## 2019-05-16 NOTE — PROGRESS NOTES
[address]   Daily Progress Note      Admit Date:  5/9/2019    HPI:    Ms. Marj Rutherford is an 82year old female with history of COPD and is on 3 L of oxygen continuously at home. Obesity, spinal stenosis and dHF    She is admitted for SOB and was just discharged last week for COPD. She had been disconnected from her oxygen and was without it for several hours. cxr suggests pulmonary edema. CTPA negative for PE. She was diuresed. probnp 392->501->294  Ecoli in urine on antibiotics  Very [place]    Subjective:  Patient is being seen for acute on chronic dHF. There were no acute overnight cardiac events. probnp 148   She is sitting up in the chair on 4 L of oxygen (baseline at home is 3 L). Her daughter, Kvng Casiano is a bedside. She is feeding herself and continues to state that she is going home with her daughter. She denies any increased sob, edema or chest pain. Weight today is 265?? Objective:   /68   Pulse 73   Temp 97.6 °F (36.4 °C) (Temporal)   Resp 16   Ht 5' 1.5\" (1.562 m)   Wt 265 lb 3.4 oz (120.3 kg)   SpO2 93%   BMI 49.30 kg/m²       Intake/Output Summary (Last 24 hours) at 5/16/2019 1250  Last data filed at 5/16/2019 0716  Gross per 24 hour   Intake 1260 ml   Output 2750 ml   Net -1490 ml          Physical Exam:  General:  Awake and alert, very [place]  Skin:  Warm and dry. No unusual bruising or rash  Neck:  Supple. No JVD or carotid bruit appreciated  Chest:  Normal effort.   Clear to auscultation, no rales or rhonchi  Cardiovascular:  RRR, S1/S2, no murmur/gallop/rub  Abdomen:  Soft, nontender, +bowel sounds, morbidly obese  Extremities:  No edema  Neurological: No focal deficits  Psychological: Normal mood and affect      Medications:    methylPREDNISolone  40 mg Intravenous Daily    cefTRIAXone (ROCEPHIN) IV  1 g Intravenous Q24H    guaiFENesin  600 mg Oral BID    FLUoxetine  40 mg Oral Daily    levothyroxine  125 mcg Oral Daily    pregabalin  150 mg Oral BID    bedside    Discussed with patient who is agreeable with plan of care. Thank you for allowing me to participate in the care of your patient.     Iva Pleitez, CNS

## 2019-05-16 NOTE — PROGRESS NOTES
P Pulmonary and Critical Care    Follow Up Note    Subjective:   CHIEF COMPLAINT / HPI:   Chief Complaint   Patient presents with    Shortness of Breath     Pt found 82% on RA, unsure of when oxygen become unhooked, wears 3 LPM at all times and was disconnected at the hub. Pt given duo neb in squad, takes every 4 hours. She is sitting up in the chair. Answers questions appropriately. She is very hard of hearing. Past Medical History:    Reviewed; no changes    Social History:    Reviewed; no changes    REVIEW OF SYSTEMS:    CONSTITUTIONAL:  negative for fevers and chills  RESPIRATORY:  See HPI  CARDIOVASCULAR:  negative for chest pain, palpitations, edema  GASTROINTESTINAL:  negative for nausea, vomiting, diarrhea, constipation and abdominal pain    Objective:   PHYSICAL EXAM:        VITALS:  /66   Pulse 71   Temp 97.5 °F (36.4 °C) (Temporal)   Resp 18   Ht 5' 1.5\" (1.562 m)   Wt 265 lb 3.4 oz (120.3 kg)   SpO2 94%   BMI 49.30 kg/m²  on 3L NC    24HR INTAKE/OUTPUT:      Intake/Output Summary (Last 24 hours) at 5/16/2019 0802  Last data filed at 5/15/2019 2325  Gross per 24 hour   Intake 960 ml   Output 1800 ml   Net -840 ml       CONSTITUTIONAL:  awake, alert,  no apparent distress, and appears stated age  LUNGS:  No increased work of breathing and clear to auscultation, no crackles or wheezes  CARDIOVASCULAR: S1 and S2 and no JVD  ABDOMEN:  normal bowel sounds, non-distended and non-tender to palpation  EXT: No edema, no calf tenderness. Pulses are present bilaterally. NEUROLOGIC:  Mental Status Exam:  Level of Alertness:   awake  Orientation:   person, place, time.  Non focal  SKIN:  normal skin color, texture, turgor, no redness, warmth, or swelling at IV sites    DATA:    CBC:  Recent Labs     05/15/19  0433   WBC 15.3*   RBC 3.76*   HGB 10.5*   HCT 33.5*      MCV 89.2   MCH 27.9   MCHC 31.3   RDW 15.9*      BMP:  Recent Labs     05/14/19  0432 05/15/19  0432    136   K 3.7 3.9   CL 90* 89*   CO2 43* 40*   BUN 20 30*   CREATININE 0.9 0.8   CALCIUM 9.8 9.6   GLUCOSE 209* 214*      ABG:  Recent Labs     05/13/19  0921 05/14/19  1120   PHART 7.445 7. 353   CHG7QPN 79.8* 78.4*   PO2ART 54.1* 70.1*   YBY7BIC 54.7* 43.5*   G1YDOJJV 89.6* 93.6   BEART 26.4* 15.0*       Cultures:    Abx:    Radiology Review:  Pertinent images / reports were reviewed as a part of this visit. Assessment:     1. Chronic hypoxemic and hypercapnic respiratory failure  2. COPD exacerbation  3. Diastolic congestive heart failure    Has improved with BiPAP mask ventilation at night. She has a BiPAP mask at home but is unable to wear it.   It may not be functioning properly  Taper steroids further

## 2019-05-16 NOTE — PROGRESS NOTES
Pt agitated on and off overnight. Found pt without 02 on at 2030, pt agreeable to bipap. Pt wore bipap for 3 hours then 6L NC remainder of shift. Pt requested pain medication and antianxiety medication every 2-3 hours   during shift.

## 2019-05-16 NOTE — CONSULTS
[facility] FAILURE PROGRAM      Fabrizio Crawford 1935    History:  Past Medical History:   Diagnosis Date    COPD (chronic obstructive pulmonary disease) ([address]     Hernia of unspecified site of abdominal cavity without mention of obstruction or gangrene     Incontinence     Knee pain, bilateral     pt states no cartilage    Spinal stenosis        ECHO:     Conclusions      Summary   This was a very technically difficult and limited exam. Patient was in pain   and unable to lay still.   -Normal left ventricle size, wall thickness, and systolic function with an   estimated ejection fraction of 55-60%.  -No obvious regional wall motion abnormalities are seen. There is abnormal   septal motion.   -The mitral valve normal in structure and function.   -No evidence of mitral regurgitation or stenosis.   -The right ventricle is normal in size and function.      Signature      ------------------------------------------------------------------   Electronically signed by Raymond Miranda MD, [facility] (Interpreting   physician) on 05/10/2019 at 02:43 PM   ------------------------------------------------------------------    ACE/ARB: .Lisinopril 5 mg daily  BB: No beta blocker EF=55-60%  Aldosterone Antagonist: Spironolactone 25 mg bid     History of sleep apnea: yes Type: SHORTY  Equipment used at home: No  [place] Screen ordered:No. Has CPAP at home. States doesn't work well and is not compliant      [address] for COPD  Code Status: Full   Discharge plans:Patient to return home. Lives alone. Had visiting nurses through [facility]. Family Present: Daughter    Patient seen for a history of diastolic heart failure and COPD. She lives alone, although her daughter lives close and is with her during the day. She had [facility] when discharged with last admission and will resume care with them. A physician comes to the home to see the patient .  She only goes out for to her pain medicine doctor. Her daughter is going through [facility] to have someone stay with her in the evenings. She does not weigh daily, but has a scale and the importance of doing so was discussed. She follows a low sodium diet, but not compliant with her fluids. Importance of wearing her CPAP. medications and incorporating activity into her day was explained. Patient provided with both written and verbal education on CHF signs/ symptoms, causes, discharge medications, smoking cessation, daily weights, low sodium diet, activity, and follow-up. Pt to call if gains 3 pounds in one day or 5 pounds in one week. Mutually agreed upon goals were discussed such as calling the MD as soon as they recognize symptoms and weight gain, maintaining his proper diet, taking medications as prescribed, joining rehab when able. Patient provided with CHF Zone Management tool and CHF symptoms magnet. Discussed importance of lifestyle changes: daily weights, low sodium and fluid restriction diet as well as wearing her CPAP. PATIENT/CAREGIVER TEACHING:    Level of patient/caregiver understanding able to:   [x ] Verbalize understanding [ ] Demonstrate understanding [ x]Teach back   [ ] Needs reinforcement [ ] Other:       Time spent teachin isidra    1. WEIGHT: Admit Weight: 291 lb 9.6 oz (132.3 kg)      Today  Weight: 265 lb 3.4 oz (120.3 kg)   2. I/O     Intake/Output Summary (Last 24 hours) at 2019 1141  Last data filed at 2019 0716  Gross per 24 hour   Intake 1260 ml   Output 2750 ml   Net -1490 ml       Recommendations:   1. Patient will need a follow up appointment  2. Educate further on fluid restriction 48 oz- 64 oz during inpatient stay so he can understand how to measure intake at home. 3. Continue to educate on S/S.   4. Emphasize daily weights, diet, and knowing when and who to call  5. Provided patient with CHF Resource Line for questions and concerns.    6. CHF Pathway placed on the

## 2019-05-16 NOTE — PROGRESS NOTES
Patient has been sleeping all morning. She woke up at this time requesting her pain medication and stated her pain was \"10\". This nurse explained to the patient that since she has been sleeping all morning and due to her hypercapnia/hypoxia I am hesitant to give her pain medication. Patient demanded her pain medication. Patient's norco was administered at this time. Patient has been informed that if she continues to be sleepy the remainder of the day, I cannot give her pain medication. However, patient does not verbalize understanding. Will continue to monitor.

## 2019-05-17 LAB
ANION GAP SERPL CALCULATED.3IONS-SCNC: 7 MMOL/L (ref 3–16)
BUN BLDV-MCNC: 39 MG/DL (ref 7–20)
CALCIUM SERPL-MCNC: 9.1 MG/DL (ref 8.3–10.6)
CHLORIDE BLD-SCNC: 95 MMOL/L (ref 99–110)
CO2: 39 MMOL/L (ref 21–32)
CREAT SERPL-MCNC: 0.9 MG/DL (ref 0.6–1.2)
GFR AFRICAN AMERICAN: >60
GFR NON-AFRICAN AMERICAN: 60
GLUCOSE BLD-MCNC: 170 MG/DL (ref 70–99)
HCT VFR BLD CALC: 33.4 % (ref 36–48)
HEMOGLOBIN: 10.7 G/DL (ref 12–16)
MAGNESIUM: 2.6 MG/DL (ref 1.8–2.4)
MCH RBC QN AUTO: 28.5 PG (ref 26–34)
MCHC RBC AUTO-ENTMCNC: 32 G/DL (ref 31–36)
MCV RBC AUTO: 89.1 FL (ref 80–100)
PDW BLD-RTO: 16.3 % (ref 12.4–15.4)
PLATELET # BLD: 223 K/UL (ref 135–450)
PMV BLD AUTO: 8.4 FL (ref 5–10.5)
POTASSIUM SERPL-SCNC: 4.2 MMOL/L (ref 3.5–5.1)
RBC # BLD: 3.75 M/UL (ref 4–5.2)
SODIUM BLD-SCNC: 141 MMOL/L (ref 136–145)
WBC # BLD: 9.5 K/UL (ref 4–11)

## 2019-05-17 PROCEDURE — 6360000002 HC RX W HCPCS: Performed by: INTERNAL MEDICINE

## 2019-05-17 PROCEDURE — 99232 SBSQ HOSP IP/OBS MODERATE 35: CPT | Performed by: INTERNAL MEDICINE

## 2019-05-17 PROCEDURE — 6370000000 HC RX 637 (ALT 250 FOR IP): Performed by: INTERNAL MEDICINE

## 2019-05-17 PROCEDURE — 83735 ASSAY OF MAGNESIUM: CPT

## 2019-05-17 PROCEDURE — 6370000000 HC RX 637 (ALT 250 FOR IP): Performed by: PHYSICIAN ASSISTANT

## 2019-05-17 PROCEDURE — 6370000000 HC RX 637 (ALT 250 FOR IP): Performed by: FAMILY MEDICINE

## 2019-05-17 PROCEDURE — 94762 N-INVAS EAR/PLS OXIMTRY CONT: CPT

## 2019-05-17 PROCEDURE — 97530 THERAPEUTIC ACTIVITIES: CPT

## 2019-05-17 PROCEDURE — 2060000000 HC ICU INTERMEDIATE R&B

## 2019-05-17 PROCEDURE — 80048 BASIC METABOLIC PNL TOTAL CA: CPT

## 2019-05-17 PROCEDURE — 85027 COMPLETE CBC AUTOMATED: CPT

## 2019-05-17 PROCEDURE — 2700000000 HC OXYGEN THERAPY PER DAY

## 2019-05-17 PROCEDURE — 2580000003 HC RX 258: Performed by: INTERNAL MEDICINE

## 2019-05-17 PROCEDURE — 94760 N-INVAS EAR/PLS OXIMETRY 1: CPT

## 2019-05-17 PROCEDURE — 94640 AIRWAY INHALATION TREATMENT: CPT

## 2019-05-17 PROCEDURE — 36415 COLL VENOUS BLD VENIPUNCTURE: CPT

## 2019-05-17 PROCEDURE — 99232 SBSQ HOSP IP/OBS MODERATE 35: CPT | Performed by: CLINICAL NURSE SPECIALIST

## 2019-05-17 PROCEDURE — 6360000002 HC RX W HCPCS: Performed by: FAMILY MEDICINE

## 2019-05-17 PROCEDURE — 97535 SELF CARE MNGMENT TRAINING: CPT

## 2019-05-17 RX ORDER — PREDNISONE 20 MG/1
20 TABLET ORAL DAILY
Status: DISCONTINUED | OUTPATIENT
Start: 2019-05-17 | End: 2019-05-18 | Stop reason: HOSPADM

## 2019-05-17 RX ADMIN — PREDNISONE 20 MG: 20 TABLET ORAL at 14:29

## 2019-05-17 RX ADMIN — PREGABALIN 150 MG: 75 CAPSULE ORAL at 21:46

## 2019-05-17 RX ADMIN — METHYLPREDNISOLONE SODIUM SUCCINATE 40 MG: 40 INJECTION, POWDER, FOR SOLUTION INTRAMUSCULAR; INTRAVENOUS at 09:58

## 2019-05-17 RX ADMIN — HYDROCODONE BITARTRATE AND ACETAMINOPHEN 2 TABLET: 5; 325 TABLET ORAL at 09:55

## 2019-05-17 RX ADMIN — LISINOPRIL 5 MG: 5 TABLET ORAL at 10:30

## 2019-05-17 RX ADMIN — SENNOSIDES 8.6 MG: 8.6 TABLET, FILM COATED ORAL at 10:30

## 2019-05-17 RX ADMIN — GUAIFENESIN 600 MG: 600 TABLET, EXTENDED RELEASE ORAL at 10:11

## 2019-05-17 RX ADMIN — Medication 2 PUFF: at 07:52

## 2019-05-17 RX ADMIN — LEVOTHYROXINE SODIUM 125 MCG: 125 TABLET ORAL at 09:57

## 2019-05-17 RX ADMIN — GUAIFENESIN 600 MG: 600 TABLET, EXTENDED RELEASE ORAL at 21:47

## 2019-05-17 RX ADMIN — ROSUVASTATIN CALCIUM 40 MG: 20 TABLET, FILM COATED ORAL at 21:46

## 2019-05-17 RX ADMIN — SPIRONOLACTONE 25 MG: 25 TABLET ORAL at 21:46

## 2019-05-17 RX ADMIN — FLUOXETINE 40 MG: 20 CAPSULE ORAL at 10:11

## 2019-05-17 RX ADMIN — IPRATROPIUM BROMIDE AND ALBUTEROL SULFATE 1 AMPULE: .5; 3 SOLUTION RESPIRATORY (INHALATION) at 07:51

## 2019-05-17 RX ADMIN — IPRATROPIUM BROMIDE AND ALBUTEROL SULFATE 1 AMPULE: .5; 3 SOLUTION RESPIRATORY (INHALATION) at 11:45

## 2019-05-17 RX ADMIN — HYDROCODONE BITARTRATE AND ACETAMINOPHEN 2 TABLET: 5; 325 TABLET ORAL at 16:11

## 2019-05-17 RX ADMIN — Medication 2 PUFF: at 20:38

## 2019-05-17 RX ADMIN — IPRATROPIUM BROMIDE AND ALBUTEROL SULFATE 1 AMPULE: .5; 3 SOLUTION RESPIRATORY (INHALATION) at 20:38

## 2019-05-17 RX ADMIN — PREGABALIN 150 MG: 75 CAPSULE ORAL at 09:56

## 2019-05-17 RX ADMIN — SPIRONOLACTONE 25 MG: 25 TABLET ORAL at 09:56

## 2019-05-17 RX ADMIN — ENOXAPARIN SODIUM 40 MG: 40 INJECTION SUBCUTANEOUS at 09:57

## 2019-05-17 RX ADMIN — Medication 10 ML: at 09:58

## 2019-05-17 RX ADMIN — HYDROCODONE BITARTRATE AND ACETAMINOPHEN 2 TABLET: 5; 325 TABLET ORAL at 03:25

## 2019-05-17 RX ADMIN — Medication 1 G: at 21:47

## 2019-05-17 RX ADMIN — Medication 10 ML: at 21:47

## 2019-05-17 RX ADMIN — PANTOPRAZOLE SODIUM 40 MG: 40 TABLET, DELAYED RELEASE ORAL at 09:59

## 2019-05-17 RX ADMIN — IPRATROPIUM BROMIDE AND ALBUTEROL SULFATE 1 AMPULE: .5; 3 SOLUTION RESPIRATORY (INHALATION) at 15:34

## 2019-05-17 ASSESSMENT — PAIN SCALES - GENERAL
PAINLEVEL_OUTOF10: 10
PAINLEVEL_OUTOF10: 4
PAINLEVEL_OUTOF10: 1
PAINLEVEL_OUTOF10: 10
PAINLEVEL_OUTOF10: 10
PAINLEVEL_OUTOF10: 9
PAINLEVEL_OUTOF10: 8
PAINLEVEL_OUTOF10: 7
PAINLEVEL_OUTOF10: 8

## 2019-05-17 ASSESSMENT — PAIN DESCRIPTION - LOCATION
LOCATION: GENERALIZED

## 2019-05-17 ASSESSMENT — PAIN DESCRIPTION - PAIN TYPE
TYPE: CHRONIC PAIN

## 2019-05-17 ASSESSMENT — PAIN DESCRIPTION - PROGRESSION: CLINICAL_PROGRESSION: NOT CHANGED

## 2019-05-17 ASSESSMENT — PAIN DESCRIPTION - FREQUENCY: FREQUENCY: CONTINUOUS

## 2019-05-17 ASSESSMENT — PAIN DESCRIPTION - DESCRIPTORS: DESCRIPTORS: ACHING

## 2019-05-17 ASSESSMENT — PAIN DESCRIPTION - ONSET: ONSET: ON-GOING

## 2019-05-17 NOTE — PLAN OF CARE
Problem: Falls - Risk of:  Goal: Absence of physical injury  Description  Absence of physical injury  Outcome: Ongoing  Note:   Patient has remained free of falls at the time of this note. Preventative measures in place: bed in lowest position, non skid socks, call light within reach, bed alarm, patient's room near nurse's station  Patient's ambulatory status:Extensive x2 assist, stand and pivot. Calls out appropriately for needs. High fall risk. Problem: Musculor/Skeletal Functional Status  Goal: Absence of falls  Outcome: Ongoing     Problem: Musculor/Skeletal Functional Status  Goal: Highest potential functional level  Outcome: Ongoing  Note:   Assessed by therapy, see PT/OT notes. Problem: Pain:  Goal: Control of acute pain  Description  Control of acute pain  Outcome: Ongoing  Note:   PRN Norco provided for 10/10 chronic pain in back. Patient is satisfied with current interventions at this time. Problem: Pain:  Goal: Control of acute pain  Description  Control of acute pain  Outcome: Ongoing  Note:   PRN Norco provided for 10/10 chronic pain in back. Patient is satisfied with current interventions at this time.

## 2019-05-17 NOTE — PLAN OF CARE
Pt a high fall risk due to weakness of lower extremities. Hospital protocol for high fall risk patients be followed. Pt will call for assist into and out of bed. Pt at risk for skin breakdown. Pt has low air loss mattress, will turn from side to side with assist and has low air los cushion in chair when up in chair. Pt also reminded to shift weight every 1-2 hours when up in chair. Pt up in chair to improve oxygenation and exercise tolerance. Encouraged to cough and deep breath every 2 hours. PT/OT seeing pt daily to strengthen muscle and increase activity tolerance. Pt maintaining stable vital signs and intake and output. Reinforced education on fluid limitation for CHF. Using alternate methods to keep mouth moist instead of drinking lots of fluids. Pt continues with chronic pain. Pt being repositioned, mobilized into the chair to help with decrease in pain. Being medicated as needed as noted in [facility]

## 2019-05-17 NOTE — PROGRESS NOTES
[facility] Pul[facility]    Follow Up Note    Subjective:   CHIEF COMPLAINT / HPI:   Chief Complaint   Patient presents with    Shortness of Breath     Pt found 82% on RA, unsure of when oxygen become unhooked, wears 3 LPM at all times and was disconnected at the hub. Pt given duo neb in squad, takes every 4 hours. She states that she is feeling pretty good. She is asking when she can go home. .    Past Medical History:    Reviewed; no changes    Social History:    Reviewed; no changes    REVIEW OF SYSTEMS:    CONSTITUTIONAL:  negative for fevers and chills  RESPIRATORY:  See HPI  CARDIOVASCULAR:  negative for chest pain, palpitations, edema  GASTROINTESTINAL:  negative for nausea, vomiting, diarrhea, constipation and abdominal pain    Objective:   PHYSICAL EXAM:        VITALS:  BP (!) 114/53   Pulse 80   Temp 97.8 °F (36.6 °C) (Temporal)   Resp 18   Ht 5' 1.5\" (1.562 m)   Wt 268 lb 15.4 oz (122 kg)   SpO2 93%   BMI 50.00 kg/m²  on 3L NC    24HR INTAKE/OUTPUT:      Intake/Output Summary (Last 24 hours) at 5/17/2019 1228  Last data filed at 5/17/2019 0844  Gross per 24 hour   Intake 420 ml   Output 1775 ml   Net -1355 ml       CONSTITUTIONAL:  awake, alert,  no apparent distress, and appears stated age  LUNGS:  No increased work of breathing and clear to auscultation, no crackles or wheezes  CARDIOVASCULAR: S1 and S2 and no JVD  ABDOMEN:  normal bowel sounds, non-distended and non-tender to palpation  EXT: No edema, no calf tenderness. Pulses are present bilaterally. NEUROLOGIC:  Mental Status Exam:  Level of Alertness:   awake  Orientation:   person, place, time.  Non focal  SKIN:  normal skin color, texture, turgor, no redness, warmth, or swelling at IV sites    DATA:    CBC:  Recent Labs     05/15/19  0433 05/17/19  0430   WBC 15.3* 9.5   RBC 3.76* 3.75*   HGB 10.5* 10.7*   HCT 33.5* 33.4*    223   MCV 89.2 89.1   MCH 27.9 28.5   MCHC 31.3 32.0   RDW 15.9* 16.3*      BMP:  Recent Labs 05/15/19  0432 05/17/19  0430    141   K 3.9 4.2   CL 89* 95*   CO2 40* 39*   BUN 30* 39*   CREATININE 0.8 0.9   CALCIUM 9.6 9.1   GLUCOSE 214* 170*      ABG:  No results for input(s): PHART, URR4GYS, PO2ART, GQU0GFL, B8UKUNNE, BEART in the last 72 hours. Cultures:    Abx:    Radiology Review:  Pertinent images / reports were reviewed as a part of this visit. Assessment:     1. Chronic hypoxemic and hypercapnic respiratory failure  2. COPD exacerbation  3. Diastolic congestive heart failure    I changed her to p.o. steroids  Recommend she continue BiPAP mask ventilation at night  Okay with me if she is discharged when okay with her other MDs  I will sign off.   Please call if there are further questions

## 2019-05-17 NOTE — PROGRESS NOTES
Pt rested well most of night. VSS. Pt able to maintain 90-92% while sleeping overnight on 4L NC. Pt continues to call out often for pain medication and ice chips. Pt verbalizes understanding to limit fluids and importance of daily weights.

## 2019-05-17 NOTE — PROGRESS NOTES
[facility], [place]   Daily Progress Note      Admit Date:  5/9/2019    HPI:    Ms. Renae Meek is an 82year old female with history of COPD and is on 3 L of oxygen continuously at home. Obesity, spinal stenosis and dHF    She is admitted for SOB and was just discharged last week for COPD. She had been disconnected from her oxygen and was without it for several hours. cxr suggests pulmonary edema. CTPA negative for PE. She was diuresed. probnp 392->501->294->148  Ecoli in urine on antibiotics  Very [place]    Subjective:  Patient is being seen for acute on chronic dHF. There were no acute overnight cardiac events. Bun 39 creat 0.9 co2 39 potassium 4.2 weight 268? ? And -8 L out. She is working with PT (2 to just transfer her to the chair). She always ask for pain medications. She is on 4 L and denies any chest pain or sob. Main complaint is pain all over    Objective:   /65   Pulse 67   Temp 98 °F (36.7 °C) (Oral)   Resp 18   Ht 5' 1.5\" (1.562 m)   Wt 268 lb 15.4 oz (122 kg)   SpO2 95%   BMI 50.00 kg/m²       Intake/Output Summary (Last 24 hours) at 5/17/2019 0915  Last data filed at 5/17/2019 0844  Gross per 24 hour   Intake 180 ml   Output 1775 ml   Net -1595 ml          Physical Exam:  General:  Awake and alert, very [place]  Skin:  Warm and dry. No unusual bruising or rash  Neck:  Supple. No JVD or carotid bruit appreciated  Chest:  Normal effort.   Clear to auscultation, no rales or rhonchi  Cardiovascular:  RRR, S1/S2, no murmur/gallop/rub  Abdomen:  Soft, nontender, +bowel sounds, morbidly obese  Extremities:  No edema  Neurological: No focal deficits  Psychological: Normal mood and affect      Medications:    methylPREDNISolone  40 mg Intravenous Daily    cefTRIAXone (ROCEPHIN) IV  1 g Intravenous Q24H    guaiFENesin  600 mg Oral BID    FLUoxetine  40 mg Oral Daily    levothyroxine  125 mcg Oral Daily    pregabalin  150 mg Oral BID    rosuvastatin  40 mg Oral QPM    senna  1 tablet Oral Daily    spironolactone  25 mg Oral BID    sodium chloride flush  10 mL Intravenous 2 times per day    enoxaparin  40 mg Subcutaneous Daily    ipratropium-albuterol  1 ampule Inhalation Q4H WA    pantoprazole  40 mg Oral QAM AC    mometasone-formoterol  2 puff Inhalation BID    lisinopril  5 mg Oral Daily         Lab Data:  CBC:   Recent Labs     05/15/19  0433 05/17/19 0430   WBC 15.3* 9.5   HGB 10.5* 10.7*    223     BMP:    Recent Labs     05/15/19  0432 05/17/19 0430    141   K 3.9 4.2   CO2 40* 39*   BUN 30* 39*   CREATININE 0.8 0.9     INR:  No results for input(s): INR in the last 72 hours. BNP:    Recent Labs     05/16/19 0423   PROBNP 148         Diagnostics:  Echo 5/9/19  This was a very technically difficult and limited exam. Patient was in pain   and unable to lay still.   -Normal left ventricle size, wall thickness, and systolic function with an   estimated ejection fraction of 55-60%.  -No obvious regional wall motion abnormalities are seen. There is abnormal   septal motion.   -The mitral valve normal in structure and function.   -No evidence of mitral regurgitation or stenosis.   -The right ventricle is normal in size and function. Assessment:    1. COPD exacerbation, improving  2. Acute on chronic diastolic heart failure, compensated  3. Essential hypertension, controlled  4. Hyperlipidemia  5. Morbid obesity  6. UTI on antibiotics  7. Chronic pain     Plan:    1. Continue to hold Lasix. Will need to be started on some lasix prior to discharge. 2.  Continue Lisinopril 5 mg daily (10 mg at home)  3. Continue Aldactone 25 mg bid  4. CHF nurse following for diet education, fluid restriction and daily weights  5. Pulmonary following    Recommend facility at discharge which she is declining. Hopefully discharge soon. No family at bedside    Discussed with patient who is agreeable with plan of care.      Thank you for allowing me to participate in the care of your patient.     Misael Simmons, CNS

## 2019-05-17 NOTE — CARE COORDINATION
Please notify [facility],  [address] [place], [place] [address]ury [place] [phone], fax [phone] when patient is discharged home to resume her 53 hours of non-medical home health care through [facility], & fax discharge summary/ AVS.

## 2019-05-17 NOTE — PROGRESS NOTES
Occupational Therapy  Facility/Department: [address]  Daily Treatment Note  NAME: Fabrizio Crawford  : 1935  MRN: [id number]    Date of Service: 2019    Discharge Recommendations:  Fabrizio Crawford scored a 17/24 on the AM-PAC ADL Inpatient form. Current research shows that an AM-PAC score of 17 or less is typically not associated with a discharge to the patient's home setting. Based on the patients AM-PAC score and their current ADL deficits, it is recommended that the patient have 3-5 sessions per week of Occupational Therapy at d/c to increase the patients independence. Pt likely to refuse recommendation: HOME HEALTH CARE: LEVEL 3 SAFETY     - Initial home health evaluation to occur within 24-48 hours, in patient home   - Therapy evaluations in home within 24-48 hours of discharge; including DME and home safety   - Frontload therapy 5 days, then 3x a week   - Therapy to evaluate if patient has [address] needs for personal care   -  evaluation within 24-48 hours, includes evaluation of resources and insurance to determine AL, [place], LTC, and Medicaid options        OT Equipment Recommendations  Equipment Needed: No    Assessment   Performance deficits / Impairments: Decreased ADL status; Decreased functional mobility ; Decreased endurance  Assessment: pt not at baseline and would benefit from skilled OT services at this time. pt recently admitted for PNA and returned home with home health care. pt had fall at home and was without O2 for a time. pt continues to be limited by decreased endurance but is making progress with therapy. pt able to hold conversations which she was previously unable to do without redirection   Treatment Diagnosis: pt presents with decreased functional mobility, ADL status, and endurance due to CHF, UTI, and PNA  Prognosis: Good  History: pt lives at home alone but daughter is there for 53 hours a week. pt on O2 at baseline.  family reporting they will take pt home at discharge. pt needs assist for ADL/IADL tasks  Assistance / Modification: SBA to CGA transfers, decreased endurance, bed mobility assist   Patient Education: OT eval, POC, discharge, transfers, pursed lip breathing, building endurance  REQUIRES OT FOLLOW UP: Yes  Activity Tolerance  Activity Tolerance: Patient Tolerated treatment well  Activity Tolerance: limited by decreased endurance  Safety Devices  Safety Devices in place: Yes  Type of devices: All fall risk precautions in place; Left in chair;Nurse notified;Call light within reach; Chair alarm in place; Patient at risk for falls         Patient Diagnosis(es): The encounter diagnosis was COPD exacerbation ([facility] [address] has a past medical history of COPD (chronic obstructive pulmonary disease) ([facility] [address] Hernia of unspecified site of abdominal cavity without mention of obstruction or gangrene, Incontinence, Knee pain, bilateral, and Spinal stenosis. has a past surgical history that includes Cholecystectomy and partial hysterectomy (cervix not removed). Restrictions  Restrictions/Precautions  Restrictions/Precautions: Fall Risk(high)  Required Braces or Orthoses?: No  Position Activity Restriction  Other position/activity restrictions:  82 y.o. female presents to the emergency department today for evaluation for shortness of breath. The patient has a history of COPD, and she is on 3 L of oxygen continuously. The patient states that today her to got disconnected from the oxygen and she states was without oxygen for several hours. Patient states that because she did not have her oxygen she noticed some wheezing, chest tightness and increased work of breathing. Patient states that she has had a cough which is productive of dry sputum, she denies any hemoptysis. She denies any chest pressure. No fever or chills. She denies any abdominal pain. No nausea or vomiting or diarrhea. No urinary symptoms. She has no other complaints at this time. Subjective   General  Chart Reviewed: Yes  Patient assessed for rehabilitation services?: Yes  Additional Pertinent Hx: COPD, spinal stenosis, pt on O2 at home-3 L   Response to previous treatment: Patient with no complaints from previous session  Family / Caregiver Present: No  Diagnosis: CHF exacerbation. pt just discharged from hospital with PNA. reports falling at home and was without O2 for time. pulmonary edema suspected   Subjective  Subjective: pt supine in bed upon arrival and agreeable to OT session with encouragement. pt reporting 10/10 generalized pain. RN notified to administer pain meds, repositioned  Pain Assessment  Pain Assessment: 0-10  Pain Level: 10  Pain Type: Chronic pain  Pain Location: Generalized  Vital Signs  Patient Currently in Pain: Yes   Orientation  Orientation  Overall Orientation Status: Within Functional Limits  Objective    ADL  Grooming: Setup(face washig/oral care)  UE Bathing: Increased time to complete; Moderate assistance  UE Dressing: Verbal cueing;Setup(donning gown)  Additional Comments: pt transferred to recliner for bathing, SPO2 at/above 90% during session        Balance  Sitting Balance: Modified independent   Standing Balance: Contact guard assistance  Standing Balance  Time: ~20 seconds x 3  Activity: stand pivot transfer to/from EOB>recliner>EOB. pt transferred for bathing in the recliner  Comment: pt fatigues easily with transfers  Bed mobility  Supine to Sit: Contact guard assistance  Sit to Supine: Moderate assistance  Scooting: Supervision  Comment: cues for sequencing bed mobility, pt reporting need for assistance this date.  Able to complete with encouragement  Transfers  Stand Pivot Transfers: Stand by assistance(SBA to chair, CGA back to EOB secondary to fatigue)  Sit to stand: Stand by assistance  Stand to sit: Stand by assistance  Transfer Comments: saturations remained above 90% on 4 L O2                       Cognition  Overall Cognitive Status: [facility] Perception  Overall Perceptual Status: WFL                                   Plan   Plan  Times per week: 3-5  Current Treatment Recommendations: Functional Mobility Training, Patient/Caregiver Education & Training, Endurance Training, Balance Training, Safety Education & Training, Self-Care / ADL  G-Code     OutComes Score                                                  AM-PAC Score        AM-PAC Inpatient Daily Activity Raw Score: 17  AM-PAC Inpatient ADL T-Scale Score : 37.26  ADL Inpatient CMS 0-100% Score: 50.11  ADL Inpatient CMS G-Code Modifier : CK    Goals  Short term goals  Time Frame for Short term goals: discharge  Short term goal 1: bed mobility supervision--CGA supine to sit, sit to supine mod A  Short term goal 2: functional ADL transfer with supervision--SBA to [facility] 5/17  Short term goal 3: LB ADLs mod A--pt declined   Short term goal 4: UB ADLs with set up--modA 5/17  Short term goal 5: grooming with set up --GOAL MET 5/17  Long term goals  Time Frame for Long term goals : STG=LTG  Long term goal 1: functional mobility ~3 ft with RW and CGA--pt declined d/t fatigue from transfers and partial stand this date  Patient Goals   Patient goals : return home       Therapy Time   Individual Concurrent Group Co-treatment   Time In 1353         Time Out 1423         Minutes 30              Timed Code Treatment Minutes:  30 Minutes    Total Treatment Minutes:  [facility], OTR/L [address] [address], OT

## 2019-05-17 NOTE — PROGRESS NOTES
Nutrition Assessment    Type and Reason for Visit: Initial    Nutrition Recommendations:   1. Ensure High Protein BID  2. CHF education     Nutrition Assessment: Pt nutritionally compromised AEB poor po intake. Unable to visit with pt d/t therapy session. Intake recorded as 0 & 1-25% x 2 meals. Noted skin breakdown, with st 2 on buttocks. Will offer Ensure HP & monitor for further compromise. Malnutrition Assessment:  · Malnutrition Status: At risk for malnutrition  · Context: Acute illness or injury  · Findings of the 6 clinical characteristics of malnutrition (Minimum of 2 out of 6 clinical characteristics is required to make the diagnosis of moderate or severe Protein Calorie Malnutrition based on AND/ASPEN Guidelines):  1. Energy Intake-Less than or equal to 50% of estimated energy requirement, Greater than or equal to 5 days    2. Weight Loss-Unable to assess,    3. Fat Loss-No significant subcutaneous fat loss,    4. Muscle Loss-No significant muscle mass loss,    5. Fluid Accumulation-Mild fluid accumulation, Extremities  6.  Strength-Not measured    Nutrition Risk Level:  Moderate    Nutrient Needs:  · Estimated Daily Total Kcal: 976- 1342  · Estimated Daily Protein (g): 74- 98 g(1.5-2.0)  · Estimated Daily Total Fluid (ml/day): 1 ml/kcal or defer to MD    Nutrition Diagnosis:   · Problem: Increased nutrient needs  · Etiology: related to Increased demand for energy/nutrients     Signs and symptoms:  as evidenced by Presence of wounds    Objective Information:  · Nutrition-Focused Physical Findings: +1 pitting edema BLE  · Wound Type: Stage II  · Current Nutrition Therapies:  · Oral Diet Orders: No Added Salt (3-4gm)   · Oral Diet intake: 1-25%, 0%  · Oral Nutrition Supplement (ONS) Orders: None  · ONS intake:    · Anthropometric Measures:  · Ht: 5' 1.5\" (156.2 cm)   · Current Body Wt: 268 lb (121.6 kg)  · Ideal Body Wt: 108 lb (49 kg), % Ideal Body    · BMI Classification: BMI > or equal to 40.0 Obese Class III    Nutrition Interventions:   Continue current diet, Start ONS  Continued Inpatient Monitoring, Education not appropriate at this time    Nutrition Evaluation:   · Evaluation: Goals set   · Goals: po intake at least 50% of meals & supplements    · Monitoring: Meal Intake, Supplement Intake, Wound Healing, Weight, Patient/Family Education      Electronically signed by Monty Palomo RD, LD on 5/17/19 at 2:16 PM    Contact Number: [phone]

## 2019-05-17 NOTE — PROGRESS NOTES
Hospital Medicine Progress Note     Date:  5/17/2019    PCP: ADRIANO Nguyen CNP (Tel: [phone])    Date of Admission: 5/9/2019      Brief hospital course: Pt presented to the ER w/ acute SOB w/ orthopnea. She was DC from [facility] on 4/29/19 after being treated for COPD AE, acute on chronic resp failure w/ hypoxia. Pt being treated with Bipap for hypercapnia. Subjective  Lying in bed comfortably, on 4L O2 NC. Objective  Physical exam:  Vitals: BP (!) 114/53   Pulse 80   Temp 97.8 °F (36.6 °C) (Temporal)   Resp 18   Ht 5' 1.5\" (1.562 m)   Wt 268 lb 15.4 oz (122 kg)   SpO2 93%   BMI 50.00 kg/m²   Gen: Not in distress. Alert. Head: Normocephalic. Atraumatic. Eyes: EOMI. Good acuity. ENT: Oral mucosa moist  Neck: No JVD. No obvious thyromegaly. CVS: Nml S1S2, no MRG, RRR  Pulmomary: diminished bilaterally. No w, r, r  Gastrointestinal: Soft, NT/ND. Positive bowel sounds. Musculoskeletal: No edema. Warm  Neuro: No focal deficit. Moves extremity spontaneously. Psychiatry: Appropriate affect. Not agitated. Skin: Warm, dry with normal turgor. No rash      24HR INTAKE/OUTPUT:      Intake/Output Summary (Last 24 hours) at 5/17/2019 1503  Last data filed at 5/17/2019 0844  Gross per 24 hour   Intake 240 ml   Output 1775 ml   Net -1535 ml     I/O last 3 completed shifts: In: 240 [P.O.:240]  Out: 1775 [Urine:1775]  No intake/output data recorded.       Meds:    predniSONE  20 mg Oral Daily    cefTRIAXone (ROCEPHIN) IV  1 g Intravenous Q24H    guaiFENesin  600 mg Oral BID    FLUoxetine  40 mg Oral Daily    levothyroxine  125 mcg Oral Daily    pregabalin  150 mg Oral BID    rosuvastatin  40 mg Oral QPM    senna  1 tablet Oral Daily    spironolactone  25 mg Oral BID    sodium chloride flush  10 mL Intravenous 2 times per day    enoxaparin  40 mg Subcutaneous Daily    ipratropium-albuterol  1 ampule Inhalation Q4H WA    pantoprazole  40 mg Oral QAM AC    mometasone-formoterol  2 puff Inhalation BID    lisinopril  5 mg Oral Daily       Infusions:       PRN Meds: sodium chloride, bisacodyl, ALPRAZolam, sodium chloride flush, magnesium hydroxide, ondansetron, HYDROcodone 5 mg - acetaminophen    Labs/imaging:  CBC:   Recent Labs     05/15/19  0433 05/17/19 0430   WBC 15.3* 9.5   HGB 10.5* 10.7*    223         BMP:    Recent Labs     05/15/19  0432 05/17/19 0430    141   K 3.9 4.2   CL 89* 95*   CO2 40* 39*   BUN 30* 39*   CREATININE 0.8 0.9   GLUCOSE 214* 170*         Hepatic: No results for input(s): AST, ALT, ALB, BILITOT, ALKPHOS in the last 72 hours. Troponin: No results for input(s): TROPONINI in the last 72 hours. BNP: No results for input(s): BNP in the last 72 hours. INR: No results for input(s): INR in the last 72 hours. Reviewed imaging and reports noted      Assessment:  Principal Problem:    Acute on chronic diastolic heart failure (HCC)  Active Problems:    Peripheral edema    COPD exacerbation (HCC)    Essential hypertension    Hyperlipidemia    Morbid obesity with BMI of 50.0-59.9, adult (HCC)    Acute on chronic respiratory failure with hypercapnia (HCC)  Resolved Problems:    * No resolved hospital problems. *        Plan:  COPD AE  - Pulm following  - cont abx, duonebs, steroids, dulera, mucinex, prn Bipap      Acute on Chronic resp failure w/ hypoxia and hypercapnia  - treat as above, Pulm following      Acute on Chronic dCHF  - stable, Cardio following, CPM, CTM      HTN  - stable, CPM, CTM      UTI  - on rocephin, U cx grew E COli > 100K      Morbid Obesity      Dispo: DC likely tomm if remains stable and cleared by other services. Diet: DIET NO SALT ADDED (3-4 GM);   Dietary Nutrition Supplements: Low Calorie High Protein Supplement    Activity: up with assist  Prophylaxis: lovenox    Code status: Full Code     ----------        Tunde Ambrosio MD  -------------------------------  Rounding hospitalist

## 2019-05-17 NOTE — PROGRESS NOTES
benefit from skilled PT to improve overall function and safely return to OF. Treatment Diagnosis: impaired strength, endurance, and functional mobility   Prognosis: Good  Patient Education: reason for PT eval, discharge plan   REQUIRES PT FOLLOW UP: Yes  Activity Tolerance  Activity Tolerance: Patient limited by fatigue;Patient limited by endurance; Patient limited by pain(O2 sats were between 90-91% throughout. Adrianoasaelanna Cardas )       AM-PAC Score  AM-PAC Inpatient Mobility Raw Score : 14  AM-PAC Inpatient T-Scale Score : 38.1  Mobility Inpatient CMS 0-100% Score: 61.29  Mobility Inpatient CMS G-Code Modifier : CL          Goals  Short term goals  Time Frame for Short term goals: by discharge   Short term goal 1: pt. will perform bed mobility with mod A   Short term goal 2: pt. will perform transfer to [facility] with LRAD and SBA   Short term goal 3: pt. will perform STS transfer with LRAD and SBA   Short term goal 4: pt. will ambulate 5ft with LRAD and min A   Long term goals  Time Frame for Long term goals : STG=LTG  Patient Goals   Patient goals : to return home    Goals not met at this time. Plan    Plan  Times per week: 3-5  Times per day: Daily  Current Treatment Recommendations: Strengthening, Transfer Training, Endurance Training, Pain Management, Balance Training, Gait Training, Home Exercise Program, Functional Mobility Training, Positioning  Plan Comment: Pt does not wish to go to SNF even after education and recommendations. Safety Devices  Type of devices:  All fall risk precautions in place, Call light within reach, Chair alarm in place, Gait belt, Nurse notified, Left in chair, Patient at risk for falls  Restraints  Initially in place: No     Therapy Time   Individual Concurrent Group Co-treatment   Time In 0905         Time Out 0933         Minutes 28         Timed Code Treatment Minutes: [address] [facility], [facility]  Lavone Peabody  This treatment was observed and supervised by Lavone Peabody, [address] [facility]

## 2019-05-18 VITALS
HEART RATE: 82 BPM | SYSTOLIC BLOOD PRESSURE: 108 MMHG | OXYGEN SATURATION: 91 % | WEIGHT: 270 LBS | DIASTOLIC BLOOD PRESSURE: 85 MMHG | RESPIRATION RATE: 14 BRPM | HEIGHT: 62 IN | TEMPERATURE: 97.7 F | BODY MASS INDEX: 49.69 KG/M2

## 2019-05-18 PROCEDURE — 94640 AIRWAY INHALATION TREATMENT: CPT

## 2019-05-18 PROCEDURE — 94760 N-INVAS EAR/PLS OXIMETRY 1: CPT

## 2019-05-18 PROCEDURE — 6370000000 HC RX 637 (ALT 250 FOR IP): Performed by: INTERNAL MEDICINE

## 2019-05-18 PROCEDURE — 99232 SBSQ HOSP IP/OBS MODERATE 35: CPT | Performed by: CLINICAL NURSE SPECIALIST

## 2019-05-18 PROCEDURE — 6370000000 HC RX 637 (ALT 250 FOR IP): Performed by: CLINICAL NURSE SPECIALIST

## 2019-05-18 PROCEDURE — 2580000003 HC RX 258: Performed by: INTERNAL MEDICINE

## 2019-05-18 PROCEDURE — 6370000000 HC RX 637 (ALT 250 FOR IP): Performed by: FAMILY MEDICINE

## 2019-05-18 PROCEDURE — 6360000002 HC RX W HCPCS: Performed by: INTERNAL MEDICINE

## 2019-05-18 PROCEDURE — 2700000000 HC OXYGEN THERAPY PER DAY

## 2019-05-18 PROCEDURE — 6370000000 HC RX 637 (ALT 250 FOR IP): Performed by: PHYSICIAN ASSISTANT

## 2019-05-18 RX ORDER — IPRATROPIUM BROMIDE AND ALBUTEROL SULFATE 2.5; .5 MG/3ML; MG/3ML
3 SOLUTION RESPIRATORY (INHALATION) EVERY 6 HOURS
Qty: 360 ML | Refills: 1 | Status: SHIPPED | OUTPATIENT
Start: 2019-05-18 | End: 2019-06-11 | Stop reason: ALTCHOICE

## 2019-05-18 RX ORDER — FUROSEMIDE 20 MG/1
20 TABLET ORAL DAILY
Qty: 60 TABLET | Refills: 3 | Status: SHIPPED | OUTPATIENT
Start: 2019-05-19 | End: 2019-06-11 | Stop reason: DRUGHIGH

## 2019-05-18 RX ORDER — OXYCODONE AND ACETAMINOPHEN 10; 325 MG/1; MG/1
1 TABLET ORAL EVERY 6 HOURS PRN
Qty: 12 TABLET | Refills: 0 | Status: SHIPPED | OUTPATIENT
Start: 2019-05-18 | End: 2019-05-21

## 2019-05-18 RX ORDER — FUROSEMIDE 20 MG/1
20 TABLET ORAL DAILY
Status: DISCONTINUED | OUTPATIENT
Start: 2019-05-18 | End: 2019-05-18 | Stop reason: HOSPADM

## 2019-05-18 RX ORDER — LISINOPRIL 5 MG/1
5 TABLET ORAL DAILY
Qty: 30 TABLET | Refills: 0 | Status: ON HOLD | OUTPATIENT
Start: 2019-05-19 | End: 2019-07-03 | Stop reason: HOSPADM

## 2019-05-18 RX ORDER — PREDNISONE 10 MG/1
TABLET ORAL
Qty: 14 TABLET | Refills: 0 | Status: SHIPPED | OUTPATIENT
Start: 2019-05-18 | End: 2019-06-11 | Stop reason: ALTCHOICE

## 2019-05-18 RX ADMIN — PANTOPRAZOLE SODIUM 40 MG: 40 TABLET, DELAYED RELEASE ORAL at 09:34

## 2019-05-18 RX ADMIN — ENOXAPARIN SODIUM 40 MG: 40 INJECTION SUBCUTANEOUS at 09:30

## 2019-05-18 RX ADMIN — HYDROCODONE BITARTRATE AND ACETAMINOPHEN 2 TABLET: 5; 325 TABLET ORAL at 09:34

## 2019-05-18 RX ADMIN — IPRATROPIUM BROMIDE AND ALBUTEROL SULFATE 1 AMPULE: .5; 3 SOLUTION RESPIRATORY (INHALATION) at 11:42

## 2019-05-18 RX ADMIN — Medication 2 PUFF: at 07:36

## 2019-05-18 RX ADMIN — HYDROCODONE BITARTRATE AND ACETAMINOPHEN 2 TABLET: 5; 325 TABLET ORAL at 15:13

## 2019-05-18 RX ADMIN — IPRATROPIUM BROMIDE AND ALBUTEROL SULFATE 1 AMPULE: .5; 3 SOLUTION RESPIRATORY (INHALATION) at 07:36

## 2019-05-18 RX ADMIN — HYDROCODONE BITARTRATE AND ACETAMINOPHEN 2 TABLET: 5; 325 TABLET ORAL at 03:44

## 2019-05-18 RX ADMIN — SPIRONOLACTONE 25 MG: 25 TABLET ORAL at 09:31

## 2019-05-18 RX ADMIN — PREGABALIN 150 MG: 75 CAPSULE ORAL at 09:30

## 2019-05-18 RX ADMIN — GUAIFENESIN 600 MG: 600 TABLET, EXTENDED RELEASE ORAL at 09:31

## 2019-05-18 RX ADMIN — LISINOPRIL 5 MG: 5 TABLET ORAL at 09:31

## 2019-05-18 RX ADMIN — Medication 10 ML: at 09:32

## 2019-05-18 RX ADMIN — PREDNISONE 20 MG: 20 TABLET ORAL at 09:31

## 2019-05-18 RX ADMIN — FUROSEMIDE 20 MG: 20 TABLET ORAL at 09:31

## 2019-05-18 RX ADMIN — SENNOSIDES 8.6 MG: 8.6 TABLET, FILM COATED ORAL at 09:31

## 2019-05-18 RX ADMIN — FLUOXETINE 40 MG: 20 CAPSULE ORAL at 09:30

## 2019-05-18 RX ADMIN — LEVOTHYROXINE SODIUM 125 MCG: 125 TABLET ORAL at 09:31

## 2019-05-18 ASSESSMENT — PAIN DESCRIPTION - FREQUENCY: FREQUENCY: CONTINUOUS

## 2019-05-18 ASSESSMENT — PAIN DESCRIPTION - LOCATION
LOCATION: GENERALIZED

## 2019-05-18 ASSESSMENT — PAIN SCALES - GENERAL
PAINLEVEL_OUTOF10: 0
PAINLEVEL_OUTOF10: 8
PAINLEVEL_OUTOF10: 7
PAINLEVEL_OUTOF10: 8
PAINLEVEL_OUTOF10: 5

## 2019-05-18 ASSESSMENT — PAIN DESCRIPTION - DESCRIPTORS: DESCRIPTORS: ACHING

## 2019-05-18 ASSESSMENT — PAIN DESCRIPTION - PAIN TYPE
TYPE: ACUTE PAIN
TYPE: CHRONIC PAIN
TYPE: CHRONIC PAIN

## 2019-05-18 ASSESSMENT — PAIN DESCRIPTION - ONSET: ONSET: ON-GOING

## 2019-05-18 NOTE — DISCHARGE SUMMARY
cells. No significant mastoid opacification. SOFT TISSUES/SKULL:  There is hyperostosis frontalis     Motion limited. No acute traumatic intracranial abnormality. Mild periventricular white matter disease, likely due to small-vessel ischemic change Trace paranasal sinus disease     Ct Chest Wo Contrast    Result Date: 4/24/2019  EXAMINATION: CT OF THE CHEST WITHOUT CONTRAST 4/24/2019 9:46 am TECHNIQUE: CT of the chest was performed without the administration of intravenous contrast. Multiplanar reformatted images are provided for review. Dose modulation, iterative reconstruction, and/or weight based adjustment of the mA/kV was utilized to reduce the radiation dose to as low as reasonably achievable. COMPARISON: 06/11/2013 HISTORY: ORDERING SYSTEM PROVIDED HISTORY: r/o pneumonia FINDINGS: Mediastinum: Atherosclerotic change seen in aorta. Thyroid gland appears normal.  Scattered small mediastinal nodes are noted. Coronary artery calcification is seen. Small hiatal hernia is seen. There is nonspecific thickening at the GE junction Lungs/pleura: Trace pleural fluid is seen bilaterally, right greater than left. There is adjacent consolidation of lung bases Respiratory motion artifact limits evaluation of fine pulmonary parenchymal change Mild mosaic attenuation is seen in the apices suggesting small airways disease or air trapping There is a tiny punctate pulmonary nodule in the right upper lobe measuring 2-3 mm, similar to prior Tiny punctate pulmonary nodule in left upper lobe is also unchanged Scattered areas of bronchial wall thickening are also seen Upper Abdomen: There is mild thickening of the adrenal glands either due to adenoma or hyperplasia. Hypodense nodule projects medially off the left kidney measuring 2.6 cm. This is slightly increased in size compared to prior. Hypodense nodule seen liver, left hepatic lobe measuring 7- 8 mm.   This is too small to characterize, but most likely represents cyst or hemangioma in the absence of a known primary. This is unchanged Soft Tissues/Bones: Multilevel degenerative changes are seen in the spine. Degenerative changes are seen in the shoulder joints     Bibasilar airspace disease, right greater than left, either atelectasis or pneumonia. Scattered areas of bronchial wall thickening are seen Mild mosaic attenuation in the apices, suggesting small airways disease or air trapping Stable tiny punctate pulmonary nodules Increase in size of left renal nodule, presumably a cyst     Ct Cervical Spine Wo Contrast    Result Date: 4/24/2019  EXAMINATION: CT OF THE CERVICAL SPINE WITHOUT CONTRAST 4/24/2019 10:24 am TECHNIQUE: CT of the cervical spine was performed without the administration of intravenous contrast. Multiplanar reformatted images are provided for review. Dose modulation, iterative reconstruction, and/or weight based adjustment of the mA/kV was utilized to reduce the radiation dose to as low as reasonably achievable. COMPARISON: None. HISTORY: ORDERING SYSTEM PROVIDED HISTORY: fall Initial evaluation of acute traumatic cervical spine pain. FINDINGS: BONES/ALIGNMENT: There is no evidence of an acute cervical spine fracture. There is normal alignment of the cervical spine. DEGENERATIVE CHANGES: Multilevel severe degenerative changes are present in the cervical spine with severe disc height loss at C4-5, C5-6 and C6-7. Multilevel facet degenerative changes. SOFT TISSUES: There is no prevertebral soft tissue swelling. No acute abnormality of the cervical spine. Xr Chest Portable    Result Date: 5/10/2019  EXAMINATION: ONE XRAY VIEW OF THE CHEST 5/10/2019 12:07 am COMPARISON: 04/24/2019 HISTORY: ORDERING SYSTEM PROVIDED HISTORY: SOB TECHNOLOGIST PROVIDED HISTORY: Reason for exam:->SOB Ordering Physician Provided Reason for Exam: sob Acuity: Unknown Type of Exam: Unknown FINDINGS: Evaluation is limited by the patient's body habitus and the portable technique.   There is no focal airspace disease. Pulmonary edema may be present. Mild cardiomegaly is unchanged. There is no large pleural effusion or definite evidence for pneumothorax. Possible pulmonary edema. Xr Chest Portable    Result Date: 4/24/2019  EXAMINATION: SINGLE XRAY VIEW OF THE CHEST 4/24/2019 9:46 am COMPARISON: April 8, 2019 HISTORY: ORDERING SYSTEM PROVIDED HISTORY: SOB TECHNOLOGIST PROVIDED HISTORY: Reason for exam:->SOB Ordering Physician Provided Reason for Exam: Fall (Pt. comes in by [facility] after she fell earlier this morning. Daughter came to check on pt. this morning and found pt. on floor. Pt. was on floor for unknown amount of time. Pt. reports left shoulder pain. Unsure if she hit her head or lost consciousness.) Pt is a large sized woman and was in and out of being aware of what was going on. Unable to help with getting xrays. Acuity: Acute Type of Exam: Unknown FINDINGS: Cardiac silhouette is upper limits of normal in size. No pneumothorax. No pleural effusion. Pulmonary vascular congestion and hazy airspace disease centrally, not substantially changed. No acute bony abnormality. Findings not substantially changed likely related to congestive heart failure. Ct Chest Pulmonary Embolism W Contrast    Result Date: 5/10/2019  EXAMINATION: CTA OF THE CHEST 5/10/2019 1:27 am TECHNIQUE: CTA of the chest was performed after the administration of intravenous contrast.  Multiplanar reformatted images are provided for review. MIP images are provided for review. Dose modulation, iterative reconstruction, and/or weight based adjustment of the mA/kV was utilized to reduce the radiation dose to as low as reasonably achievable. COMPARISON: CT chest done 04/24/2019.  HISTORY: ORDERING SYSTEM PROVIDED HISTORY: r/o pe TECHNOLOGIST PROVIDED HISTORY: Ordering Physician Provided Reason for Exam: shortness of breath Acuity: Unknown Relevant Medical/Surgical History: (Pt found 82% on RA, unsure of when oxygen become unhooked, wears 3 LPM at all times and was disconnected at the hub. Pt given duo neb in squad, takes every 4 hours. ) FINDINGS: Pulmonary Arteries: Evaluation of the pulmonary arteries is degraded by suboptimal contrast opacification. Measured average attenuation values in the main pulmonary artery of 220-240 Hounsfield units. Suboptimal evaluation of the subsegmental and more peripheral pulmonary arteries due to motion artifact. Given this, no obvious evidence of acute pulmonary thromboembolic disease. No pulmonary hypertension. No right ventricular strain. Mediastinum: No cardiomegaly or pericardial effusion. Coronary artery calcifications. Atherosclerosis of the tortuous thoracic aorta. No thoracic aortic aneurysm. Calcified left hilar/perihilar lymph nodes. No intrathoracic lymphadenopathy. Small thyroid gland may be atrophic. Normal esophagus. Lungs/pleura: Respiratory motion. Low lung volume. Expiratory imaging. Bilateral dependent and basilar subsegmental atelectasis. No suspicious pulmonary mass or consolidation. No endoluminal masslike lesion. No pleural effusion or pneumothorax. Upper Abdomen: Atherosclerosis of the visualized abdominal aorta and its branches. Partially visualized cyst in the superior left kidney. Otherwise, the visualized upper abdomen demonstrates no obvious acute abnormality. Soft Tissues/Bones: Multilevel degenerative disc disease. Osseous fusion of T6-T7. Evaluation of the pulmonary arteries is degraded by suboptimal contrast opacification and motion artifact. Given this, no obvious evidence of acute pulmonary thromboembolic disease. No pulmonary hypertension or right ventricular strain. Low lung volume with respiratory motion and bilateral atelectasis. No suspicious pulmonary mass or consolidation. Underlying pulmonary edema or infection is difficult to entirely exclude. No pleural effusion or pneumothorax. Treatments: As above. Discharge Medications:     Medication List      ASK your doctor about these medications    ADVAIR DISKUS IN     albuterol (2.5 MG/3ML) 0.083% nebulizer solution  Commonly known as:  PROVENTIL  Take 3 mLs by nebulization every 4 hours (while awake)     Chlorphen-Phenyleph-APAP 2-5-325 MG Tabs  Commonly known as:  CORICIDIN D COLD/FLU/SINUS  Take 2 tablets by mouth 4 times daily as needed (cough)     diclofenac 50 MG EC tablet  Commonly known as:  VOLTAREN     docusate sodium 100 MG capsule  Commonly known as:  COLACE     esomeprazole 40 MG delayed release capsule  Commonly known as:  NEXIUM     FLUoxetine 20 MG capsule  Commonly known as:  PROZAC     furosemide 20 MG tablet  Commonly known as:  LASIX  Take 2 tablets by mouth 2 times daily     levothyroxine 125 MCG tablet  Commonly known as:  SYNTHROID     lisinopril 10 MG tablet  Commonly known as:  PRINIVIL;ZESTRIL     magnesium hydroxide 400 MG/5ML suspension  Commonly known as:  MILK OF MAGNESIA  Take 30 mLs by mouth daily as needed for Constipation     oxyCODONE-acetaminophen  MG per tablet  Commonly known as:  PERCOCET     pregabalin 50 MG capsule  Commonly known as:  LYRICA     rosuvastatin 40 MG tablet  Commonly known as:  CRESTOR     senna 8.6 MG tablet  Commonly known as:  SENOKOT     solifenacin 10 MG tablet  Commonly known as:  VESICARE     spironolactone 25 MG tablet  Commonly known as:  ALDACTONE     SUMAtriptan 25 MG tablet  Commonly known as:  IMITREX            Activity: activity as tolerated  Diet: DIET NO SALT ADDED (3-4 GM);   Dietary Nutrition Supplements: Low Calorie High Protein Supplement      Disposition: home  Discharged Condition: Stable  Follow Up:   Americo Montgomery, APRN - CNP  [address] [facility] [address]  [phone]          [facility]  [address]  [address] [address] [facility] [place] [facility] [address] [phone]              Code status:  Full Code         Total time spent on discharge, finalizing medications, referrals and arranging outpatient follow up was more than 1 hour      Thank you ADRIANO Franklin - PAYTON for the opportunity to be involved in this patients care.

## 2019-05-18 NOTE — PROGRESS NOTES
Assessment complete, vitals stable and Hs meds given. Pt denies any other needs at this time, callbell in reach and bed alarm on.   Ant Fisher RN

## 2019-05-18 NOTE — PLAN OF CARE
Problem: Pain:  Goal: Pain level will decrease  Description  Pain level will decrease  5/18/2019 0347 by Alejandra Rucker, MARCIA  Outcome: Ongoing  Note:   C/O generalized pain, PRN pain medication given, see MAR.   Assisted with repositioning for additional comfort

## 2019-05-18 NOTE — PLAN OF CARE
Problem: Falls - Risk of:  Goal: Will remain free from falls  Description  Will remain free from falls  5/17/2019 2142 by Tran Clement RN  Outcome: Ongoing     Problem: Falls - Risk of:  Goal: Absence of physical injury  Description  Absence of physical injury  5/17/2019 2142 by Tran Clement RN  Outcome: Ongoing  Note:   High FALL RISK. SAFE to door, fall blanket on bed, siderails up x2, bed in lowest position and wheels locked. Pt is SBA to pivot with walker. Callbell in reach, nonskid socks on, and bed alarm on. Problem: Risk for Impaired Skin Integrity  Goal: Tissue integrity - skin and mucous membranes  Description  Structural intactness and normal physiological function of skin and  mucous membranes.   5/17/2019 2142 by Tran Clement RN  Note:   Documented area to left buttock, no drainage - barrier cream applied - pt repositioned to side     Problem: OXYGENATION/RESPIRATORY FUNCTION  Goal: Patient will maintain patent airway  5/17/2019 2142 by Tran Clement RN  Outcome: Ongoing     Problem: OXYGENATION/RESPIRATORY FUNCTION  Goal: Patient will achieve/maintain normal respiratory rate/effort  Description  Respiratory rate and effort will be within normal limits for the patient  5/17/2019 2142 by Tran Clement RN  Outcome: Ongoing  Note:   O2 sat 93% on 4L per nasal cannula - lung sounds clear diminished     Problem: HEMODYNAMIC STATUS  Goal: Patient has stable vital signs and fluid balance  5/17/2019 2142 by Tran Clement RN  Outcome: Ongoing  Note:   Vitals stable     Problem: ACTIVITY INTOLERANCE/IMPAIRED MOBILITY  Goal: Mobility/activity is maintained at optimum level for patient  5/17/2019 2142 by Tran Clement RN  Note:   Continue with SOB with exertion - encourage cough/deep breathing     Problem:   Goal: Adequate urinary output  5/17/2019 2142 by Tran Clement RN  Outcome: Ongoing     Problem:   Goal: No urinary complication  Outcome: Ongoing     Problem: Musculor/Skeletal Functional Status  Goal: Highest potential functional level  5/17/2019 2142 by Aubree Glover RN  Outcome: Ongoing     Problem: Musculor/Skeletal Functional Status  Goal: Absence of falls  5/17/2019 2142 by Aubree Glover RN  Outcome: Ongoing     Problem: Nutrition  Goal: Optimal nutrition therapy  5/17/2019 2142 by Aubree Glover RN  Outcome: Ongoing  Note:   Pt states she has been eating well

## 2019-05-18 NOTE — PROGRESS NOTES
[facility]   Daily Progress Note      Admit Date:  5/9/2019    HPI:    Ms. GONSALEZ [facility] is an 82year old female with history of COPD and is on 3 L of oxygen continuously at home. Obesity, spinal stenosis and dHF    She is admitted for SOB and was just discharged last week for COPD. She had been disconnected from her oxygen and was without it for several hours. cxr suggests pulmonary edema. CTPA negative for PE. She was diuresed. probnp 392->501->294->148  Ecoli in urine on antibiotics  Very [place]    Subjective:  Patient is being seen for acute on chronic dHF. There were no acute overnight cardiac events. -11 L out but intake not complete no labs today. She continues on 4 L of oxygen and wore her bipap last night. She is lying in bed. PT recommends nursing home (2 patient assist with pivoting). Objective:   /70   Pulse 74   Temp 98.1 °F (36.7 °C) (Oral)   Resp 18   Ht 5' 1.5\" (1.562 m)   Wt 268 lb 15.4 oz (122 kg)   SpO2 96%   BMI 50.00 kg/m²       Intake/Output Summary (Last 24 hours) at 5/18/2019 0814  Last data filed at 5/18/2019 0539  Gross per 24 hour   Intake 240 ml   Output 3250 ml   Net -3010 ml          Physical Exam:  General:  Awake and alert, very [place]  Skin:  Warm and dry. No unusual bruising or rash  Neck:  Supple. No JVD or carotid bruit appreciated  Chest:  Normal effort.   Clear to auscultation, no rales or rhonchi  Cardiovascular:  RRR, S1/S2, no murmur/gallop/rub  Abdomen:  Soft, nontender, +bowel sounds, morbidly obese  Extremities:  No edema  Neurological: No focal deficits  Psychological: Normal mood and affect      Medications:    predniSONE  20 mg Oral Daily    cefTRIAXone (ROCEPHIN) IV  1 g Intravenous Q24H    guaiFENesin  600 mg Oral BID    FLUoxetine  40 mg Oral Daily    levothyroxine  125 mcg Oral Daily    pregabalin  150 mg Oral BID    rosuvastatin  40 mg Oral QPM    senna  1 tablet Oral Daily    spironolactone  25 mg Oral BID    sodium

## 2019-05-18 NOTE — PROGRESS NOTES
Data- discharge order received, pt verbalized agreement to discharge, needs for [facility] with [facility] for PT/OT/SN, GERALDINE reviewed and signed by MD, to be completed by RN. Action- AVS prepared, discharge instructions prepared and given to patient, medication information packet given r/t NEW or CHANGED prescriptions, pt verbalized understanding further self-review. D/C instruction summary: Diet- Cardiac, Activity- as tolerated, follow up with Primary Care Physician Tyler Sawant, [address] [place] [address] Road [phone] appointment within one week, immunizations reviewed and updated, medications prescriptions to be filled at [facility]. Contact information provided to above agencies used. Response- Case Management/ reported faxing completed GEARLDINE and AVS to needed HHC/DME services stated above. Pt belongings gathered, IV removed, pt dressed with assistance. Disposition is home with HHC/DME as stated above, transported with daughter, taken to lobby via w/c with RN, no complications.

## 2019-05-20 ENCOUNTER — TELEPHONE (OUTPATIENT)
Dept: OTHER | Age: 84
End: 2019-05-20

## 2019-05-20 NOTE — TELEPHONE ENCOUNTER
[address] [facility] [address] FAILURE PROGRAM  TELEPHONE ENCOUNTER FORM    Charanjit Counter 1935    Attempted to call patient for HF follow-up. No answer at this time.       Next MD/ Clinic appointment: 5/24 with [address] 5/20/2019 12:23 PM

## 2019-05-21 ENCOUNTER — TELEPHONE (OUTPATIENT)
Dept: OTHER | Age: 84
End: 2019-05-21

## 2019-05-21 NOTE — TELEPHONE ENCOUNTER
[facility]  TELEPHONE ENCOUNTER FORM    Renato Grimaldo 1935    ASSESSMENT:   1. Baseline weight: 270 lbs  2. Current weight: 271 lbs  3. Patient weighing daily: Yes  4. Symptoms: dyspnea, edema  5. Patient knows who to call with symptoms: Yes  6. Diet history:      Patient states sodium limitation is : 3,000 mg( daughter helps)      Patient states fluid limitation is 64 ounces( daughter helps)  Patient following diet as instructed: Yes  7. Medication history: taking medications as instructed Yes; medication side effects noted No  8. Patient is involved in exercise program: No,getting physical therapy  9. Patient knows date and time of follow up appointment: Yes  10. Patient has transportation to appointments: Yes, daughter    RECOMMENDATIONS:   1. Medication: none  2. Diet: 3,000 mg a day  3. MD/ Clinic appointment: 5/24/19 with Barrera Aragon NP  4. Other: \"I'm really trying this time\", patient states. Daughter is helping me.

## 2019-05-22 NOTE — PROGRESS NOTES
Physical Therapy  Physical Therapy Discharge Summary    Name: Carmine Chaves  : 1935    The pt was evaluated by PT on 5/15/16 and seen for 1 treatment sessions prior to DC to home on 19 per MD order. The pt's acute therapy goals were:  Short term goals  Time Frame for Short term goals: by discharge   Short term goal 1: pt. will perform bed mobility with mod A   Short term goal 2: pt. will perform transfer to [facility] with LRAD and SBA   Short term goal 3: pt. will perform STS transfer with LRAD and SBA   Short term goal 4: pt. will ambulate 5ft with LRAD and min A   Long term goals  Time Frame for Long term goals : STG=LTG    Patient met 0 goals during stay. Number of Refusals:0  Number of Holds: 0  During this hospitalization, the patient was educated on:  Patient Education: reason for PT eval, discharge plan     DC pt from PT caseload at this time. Thank you!     Thanks, Akira Wolf [place], DPT [id number]

## 2019-06-11 ENCOUNTER — APPOINTMENT (OUTPATIENT)
Dept: GENERAL RADIOLOGY | Age: 84
DRG: 189 | End: 2019-06-11
Payer: COMMERCIAL

## 2019-06-11 ENCOUNTER — HOSPITAL ENCOUNTER (INPATIENT)
Age: 84
LOS: 3 days | Discharge: HOME HEALTH | DRG: 189 | End: 2019-06-14
Attending: EMERGENCY MEDICINE | Admitting: INTERNAL MEDICINE
Payer: COMMERCIAL

## 2019-06-11 DIAGNOSIS — J96.02 ACUTE RESPIRATORY FAILURE WITH HYPERCAPNIA (HCC): Primary | ICD-10-CM

## 2019-06-11 DIAGNOSIS — R77.8 ELEVATED TROPONIN: ICD-10-CM

## 2019-06-11 DIAGNOSIS — N28.9 ACUTE RENAL INSUFFICIENCY: ICD-10-CM

## 2019-06-11 DIAGNOSIS — J44.1 COPD EXACERBATION (HCC): ICD-10-CM

## 2019-06-11 LAB
A/G RATIO: 1 (ref 1.1–2.2)
ALBUMIN SERPL-MCNC: 3.3 G/DL (ref 3.4–5)
ALP BLD-CCNC: 96 U/L (ref 40–129)
ALT SERPL-CCNC: 34 U/L (ref 10–40)
ANION GAP SERPL CALCULATED.3IONS-SCNC: 7 MMOL/L (ref 3–16)
AST SERPL-CCNC: 27 U/L (ref 15–37)
BASE EXCESS ARTERIAL: 8 MMOL/L (ref -3–3)
BASOPHILS ABSOLUTE: 0.1 K/UL (ref 0–0.2)
BASOPHILS RELATIVE PERCENT: 0.8 %
BILIRUB SERPL-MCNC: <0.2 MG/DL (ref 0–1)
BILIRUBIN URINE: NEGATIVE
BLOOD, URINE: NEGATIVE
BUN BLDV-MCNC: 35 MG/DL (ref 7–20)
CALCIUM SERPL-MCNC: 9.1 MG/DL (ref 8.3–10.6)
CARBOXYHEMOGLOBIN ARTERIAL: 1.3 % (ref 0–1.5)
CHLORIDE BLD-SCNC: 96 MMOL/L (ref 99–110)
CLARITY: CLEAR
CO2: 32 MMOL/L (ref 21–32)
COLOR: YELLOW
CREAT SERPL-MCNC: 1.5 MG/DL (ref 0.6–1.2)
EKG ATRIAL RATE: 89 BPM
EKG DIAGNOSIS: NORMAL
EKG P AXIS: 45 DEGREES
EKG P-R INTERVAL: 166 MS
EKG Q-T INTERVAL: 360 MS
EKG QRS DURATION: 104 MS
EKG QTC CALCULATION (BAZETT): 438 MS
EKG R AXIS: -16 DEGREES
EKG T AXIS: 33 DEGREES
EKG VENTRICULAR RATE: 89 BPM
EOSINOPHILS ABSOLUTE: 0.2 K/UL (ref 0–0.6)
EOSINOPHILS RELATIVE PERCENT: 3.1 %
GFR AFRICAN AMERICAN: 40
GFR NON-AFRICAN AMERICAN: 33
GLOBULIN: 3.2 G/DL
GLUCOSE BLD-MCNC: 140 MG/DL (ref 70–99)
GLUCOSE URINE: NEGATIVE MG/DL
HCO3 ARTERIAL: 37 MMOL/L (ref 21–29)
HCT VFR BLD CALC: 32.7 % (ref 36–48)
HEMOGLOBIN, ART, EXTENDED: 10.3 G/DL (ref 12–16)
HEMOGLOBIN: 10.4 G/DL (ref 12–16)
KETONES, URINE: NEGATIVE MG/DL
LEUKOCYTE ESTERASE, URINE: NEGATIVE
LYMPHOCYTES ABSOLUTE: 1.5 K/UL (ref 1–5.1)
LYMPHOCYTES RELATIVE PERCENT: 22.6 %
MCH RBC QN AUTO: 29.1 PG (ref 26–34)
MCHC RBC AUTO-ENTMCNC: 31.9 G/DL (ref 31–36)
MCV RBC AUTO: 91.2 FL (ref 80–100)
METHEMOGLOBIN ARTERIAL: 0.4 %
MICROSCOPIC EXAMINATION: NORMAL
MONOCYTES ABSOLUTE: 0.6 K/UL (ref 0–1.3)
MONOCYTES RELATIVE PERCENT: 9.4 %
NEUTROPHILS ABSOLUTE: 4.1 K/UL (ref 1.7–7.7)
NEUTROPHILS RELATIVE PERCENT: 64.1 %
NITRITE, URINE: NEGATIVE
O2 CONTENT ARTERIAL: 14 ML/DL
O2 SAT, ARTERIAL: 95.7 %
O2 THERAPY: ABNORMAL
PCO2 ARTERIAL: 79.7 MMHG (ref 35–45)
PDW BLD-RTO: 15.9 % (ref 12.4–15.4)
PH ARTERIAL: 7.28 (ref 7.35–7.45)
PH UA: 5.5 (ref 5–8)
PLATELET # BLD: 194 K/UL (ref 135–450)
PMV BLD AUTO: 9.1 FL (ref 5–10.5)
PO2 ARTERIAL: 79.8 MMHG (ref 75–108)
POTASSIUM SERPL-SCNC: 5.5 MMOL/L (ref 3.5–5.1)
PRO-BNP: 418 PG/ML (ref 0–449)
PROTEIN UA: NEGATIVE MG/DL
RBC # BLD: 3.59 M/UL (ref 4–5.2)
SODIUM BLD-SCNC: 135 MMOL/L (ref 136–145)
SPECIFIC GRAVITY UA: 1.01 (ref 1–1.03)
TCO2 ARTERIAL: 88.3 MMOL/L
TOTAL PROTEIN: 6.5 G/DL (ref 6.4–8.2)
TROPONIN: 0.04 NG/ML
TROPONIN: <0.01 NG/ML
URINE REFLEX TO CULTURE: NORMAL
URINE TYPE: NORMAL
UROBILINOGEN, URINE: 0.2 E.U./DL
WBC # BLD: 6.5 K/UL (ref 4–11)

## 2019-06-11 PROCEDURE — 96374 THER/PROPH/DIAG INJ IV PUSH: CPT

## 2019-06-11 PROCEDURE — 36600 WITHDRAWAL OF ARTERIAL BLOOD: CPT

## 2019-06-11 PROCEDURE — 6360000002 HC RX W HCPCS: Performed by: PHYSICIAN ASSISTANT

## 2019-06-11 PROCEDURE — 93010 ELECTROCARDIOGRAM REPORT: CPT | Performed by: INTERNAL MEDICINE

## 2019-06-11 PROCEDURE — 85025 COMPLETE CBC W/AUTO DIFF WBC: CPT

## 2019-06-11 PROCEDURE — 6370000000 HC RX 637 (ALT 250 FOR IP): Performed by: INTERNAL MEDICINE

## 2019-06-11 PROCEDURE — 82803 BLOOD GASES ANY COMBINATION: CPT

## 2019-06-11 PROCEDURE — 94761 N-INVAS EAR/PLS OXIMETRY MLT: CPT

## 2019-06-11 PROCEDURE — 80053 COMPREHEN METABOLIC PANEL: CPT

## 2019-06-11 PROCEDURE — 81003 URINALYSIS AUTO W/O SCOPE: CPT

## 2019-06-11 PROCEDURE — 96361 HYDRATE IV INFUSION ADD-ON: CPT

## 2019-06-11 PROCEDURE — 2580000003 HC RX 258: Performed by: PHYSICIAN ASSISTANT

## 2019-06-11 PROCEDURE — 83880 ASSAY OF NATRIURETIC PEPTIDE: CPT

## 2019-06-11 PROCEDURE — 36415 COLL VENOUS BLD VENIPUNCTURE: CPT

## 2019-06-11 PROCEDURE — 2700000000 HC OXYGEN THERAPY PER DAY

## 2019-06-11 PROCEDURE — 99285 EMERGENCY DEPT VISIT HI MDM: CPT

## 2019-06-11 PROCEDURE — 51702 INSERT TEMP BLADDER CATH: CPT

## 2019-06-11 PROCEDURE — 93005 ELECTROCARDIOGRAM TRACING: CPT | Performed by: EMERGENCY MEDICINE

## 2019-06-11 PROCEDURE — 2060000000 HC ICU INTERMEDIATE R&B

## 2019-06-11 PROCEDURE — 6370000000 HC RX 637 (ALT 250 FOR IP): Performed by: PHYSICIAN ASSISTANT

## 2019-06-11 PROCEDURE — 2580000003 HC RX 258: Performed by: INTERNAL MEDICINE

## 2019-06-11 PROCEDURE — 6360000002 HC RX W HCPCS: Performed by: INTERNAL MEDICINE

## 2019-06-11 PROCEDURE — 94660 CPAP INITIATION&MGMT: CPT

## 2019-06-11 PROCEDURE — 94640 AIRWAY INHALATION TREATMENT: CPT

## 2019-06-11 PROCEDURE — 71045 X-RAY EXAM CHEST 1 VIEW: CPT

## 2019-06-11 PROCEDURE — 84484 ASSAY OF TROPONIN QUANT: CPT

## 2019-06-11 RX ORDER — ASPIRIN 81 MG/1
324 TABLET, CHEWABLE ORAL ONCE
Status: COMPLETED | OUTPATIENT
Start: 2019-06-11 | End: 2019-06-11

## 2019-06-11 RX ORDER — ALBUTEROL SULFATE 2.5 MG/3ML
5 SOLUTION RESPIRATORY (INHALATION) ONCE
Status: COMPLETED | OUTPATIENT
Start: 2019-06-11 | End: 2019-06-11

## 2019-06-11 RX ORDER — SODIUM CHLORIDE 0.9 % (FLUSH) 0.9 %
10 SYRINGE (ML) INJECTION EVERY 12 HOURS SCHEDULED
Status: DISCONTINUED | OUTPATIENT
Start: 2019-06-11 | End: 2019-06-14 | Stop reason: HOSPADM

## 2019-06-11 RX ORDER — FLUOXETINE HYDROCHLORIDE 40 MG/1
1 CAPSULE ORAL
Status: ON HOLD | COMMUNITY
End: 2019-06-14 | Stop reason: HOSPADM

## 2019-06-11 RX ORDER — IPRATROPIUM BROMIDE AND ALBUTEROL SULFATE 2.5; .5 MG/3ML; MG/3ML
1 SOLUTION RESPIRATORY (INHALATION)
Status: DISCONTINUED | OUTPATIENT
Start: 2019-06-11 | End: 2019-06-14 | Stop reason: HOSPADM

## 2019-06-11 RX ORDER — ONDANSETRON 2 MG/ML
4 INJECTION INTRAMUSCULAR; INTRAVENOUS EVERY 6 HOURS PRN
Status: DISCONTINUED | OUTPATIENT
Start: 2019-06-11 | End: 2019-06-14 | Stop reason: HOSPADM

## 2019-06-11 RX ORDER — LEVOTHYROXINE SODIUM 0.12 MG/1
125 TABLET ORAL DAILY
Status: DISCONTINUED | OUTPATIENT
Start: 2019-06-11 | End: 2019-06-14 | Stop reason: HOSPADM

## 2019-06-11 RX ORDER — BUDESONIDE AND FORMOTEROL FUMARATE DIHYDRATE 160; 4.5 UG/1; UG/1
AEROSOL RESPIRATORY (INHALATION)
Refills: 3 | COMMUNITY
Start: 2019-05-30

## 2019-06-11 RX ORDER — LEVOTHYROXINE SODIUM 125 UG/1
125 CAPSULE ORAL
Status: ON HOLD | COMMUNITY
End: 2019-06-20

## 2019-06-11 RX ORDER — 0.9 % SODIUM CHLORIDE 0.9 %
500 INTRAVENOUS SOLUTION INTRAVENOUS ONCE
Status: COMPLETED | OUTPATIENT
Start: 2019-06-11 | End: 2019-06-11

## 2019-06-11 RX ORDER — NALOXEGOL OXALATE 25 MG/1
25 TABLET, FILM COATED ORAL DAILY PRN
Refills: 5 | Status: ON HOLD | COMMUNITY
Start: 2019-05-11 | End: 2021-10-30

## 2019-06-11 RX ORDER — FUROSEMIDE 40 MG/1
20 TABLET ORAL DAILY
Refills: 3 | Status: ON HOLD | COMMUNITY
Start: 2019-05-14 | End: 2021-12-04

## 2019-06-11 RX ORDER — LEVOTHYROXINE SODIUM 112 UG/1
TABLET ORAL
Refills: 3 | COMMUNITY
Start: 2019-05-15

## 2019-06-11 RX ORDER — METHYLPREDNISOLONE SODIUM SUCCINATE 125 MG/2ML
125 INJECTION, POWDER, LYOPHILIZED, FOR SOLUTION INTRAMUSCULAR; INTRAVENOUS ONCE
Status: COMPLETED | OUTPATIENT
Start: 2019-06-11 | End: 2019-06-11

## 2019-06-11 RX ORDER — IPRATROPIUM BROMIDE AND ALBUTEROL SULFATE 2.5; .5 MG/3ML; MG/3ML
1 SOLUTION RESPIRATORY (INHALATION) ONCE
Status: COMPLETED | OUTPATIENT
Start: 2019-06-11 | End: 2019-06-11

## 2019-06-11 RX ORDER — PREGABALIN 75 MG/1
75 CAPSULE ORAL 2 TIMES DAILY
Status: DISCONTINUED | OUTPATIENT
Start: 2019-06-11 | End: 2019-06-14 | Stop reason: HOSPADM

## 2019-06-11 RX ORDER — METHYLPREDNISOLONE SODIUM SUCCINATE 40 MG/ML
40 INJECTION, POWDER, LYOPHILIZED, FOR SOLUTION INTRAMUSCULAR; INTRAVENOUS EVERY 12 HOURS
Status: DISCONTINUED | OUTPATIENT
Start: 2019-06-11 | End: 2019-06-13

## 2019-06-11 RX ORDER — IPRATROPIUM BROMIDE AND ALBUTEROL SULFATE 2.5; .5 MG/3ML; MG/3ML
1 SOLUTION RESPIRATORY (INHALATION) EVERY 6 HOURS PRN
COMMUNITY

## 2019-06-11 RX ORDER — LORATADINE 10 MG/1
1 TABLET ORAL DAILY
Status: ON HOLD | COMMUNITY
End: 2020-10-12 | Stop reason: HOSPADM

## 2019-06-11 RX ORDER — OXYCODONE AND ACETAMINOPHEN 10; 325 MG/1; MG/1
1 TABLET ORAL EVERY 6 HOURS PRN
Status: ON HOLD | COMMUNITY
End: 2019-06-25 | Stop reason: SDUPTHER

## 2019-06-11 RX ORDER — ROSUVASTATIN CALCIUM 40 MG/1
1 TABLET, COATED ORAL
Status: ON HOLD | COMMUNITY
End: 2019-06-20

## 2019-06-11 RX ORDER — LUBIPROSTONE 24 UG/1
24 CAPSULE, GELATIN COATED ORAL 2 TIMES DAILY
Status: ON HOLD | COMMUNITY
End: 2019-06-20

## 2019-06-11 RX ORDER — FLUOXETINE HYDROCHLORIDE 20 MG/1
40 CAPSULE ORAL DAILY
Status: DISCONTINUED | OUTPATIENT
Start: 2019-06-11 | End: 2019-06-14 | Stop reason: HOSPADM

## 2019-06-11 RX ORDER — SODIUM CHLORIDE 0.9 % (FLUSH) 0.9 %
10 SYRINGE (ML) INJECTION PRN
Status: DISCONTINUED | OUTPATIENT
Start: 2019-06-11 | End: 2019-06-14 | Stop reason: HOSPADM

## 2019-06-11 RX ORDER — PREGABALIN 200 MG/1
200 CAPSULE ORAL DAILY
Status: ON HOLD | COMMUNITY
End: 2019-06-20

## 2019-06-11 RX ADMIN — ENOXAPARIN SODIUM 40 MG: 40 INJECTION SUBCUTANEOUS at 17:31

## 2019-06-11 RX ADMIN — IPRATROPIUM BROMIDE AND ALBUTEROL SULFATE 1 AMPULE: .5; 3 SOLUTION RESPIRATORY (INHALATION) at 15:37

## 2019-06-11 RX ADMIN — METHYLPREDNISOLONE SODIUM SUCCINATE 40 MG: 40 INJECTION, POWDER, FOR SOLUTION INTRAMUSCULAR; INTRAVENOUS at 17:31

## 2019-06-11 RX ADMIN — METHYLPREDNISOLONE SODIUM SUCCINATE 125 MG: 125 INJECTION, POWDER, FOR SOLUTION INTRAMUSCULAR; INTRAVENOUS at 11:32

## 2019-06-11 RX ADMIN — ASPIRIN 81 MG 324 MG: 81 TABLET ORAL at 13:01

## 2019-06-11 RX ADMIN — IPRATROPIUM BROMIDE AND ALBUTEROL SULFATE 1 AMPULE: .5; 3 SOLUTION RESPIRATORY (INHALATION) at 12:02

## 2019-06-11 RX ADMIN — METHYLPREDNISOLONE SODIUM SUCCINATE 40 MG: 40 INJECTION, POWDER, FOR SOLUTION INTRAMUSCULAR; INTRAVENOUS at 22:51

## 2019-06-11 RX ADMIN — SODIUM CHLORIDE 500 ML: 9 INJECTION, SOLUTION INTRAVENOUS at 11:59

## 2019-06-11 RX ADMIN — Medication 10 ML: at 22:50

## 2019-06-11 RX ADMIN — ALBUTEROL SULFATE 5 MG: 2.5 SOLUTION RESPIRATORY (INHALATION) at 12:02

## 2019-06-11 RX ADMIN — IPRATROPIUM BROMIDE AND ALBUTEROL SULFATE 1 AMPULE: .5; 3 SOLUTION RESPIRATORY (INHALATION) at 19:28

## 2019-06-11 RX ADMIN — Medication 10 ML: at 17:31

## 2019-06-11 RX ADMIN — PREGABALIN 75 MG: 75 CAPSULE ORAL at 22:50

## 2019-06-11 ASSESSMENT — ENCOUNTER SYMPTOMS
SHORTNESS OF BREATH: 1
ABDOMINAL PAIN: 0
COUGH: 0
VOMITING: 0
RHINORRHEA: 0
NAUSEA: 0
DIARRHEA: 0

## 2019-06-11 ASSESSMENT — PAIN DESCRIPTION - PROGRESSION: CLINICAL_PROGRESSION: NOT CHANGED

## 2019-06-11 ASSESSMENT — PAIN DESCRIPTION - DESCRIPTORS: DESCRIPTORS: CONSTANT

## 2019-06-11 ASSESSMENT — PAIN DESCRIPTION - ONSET: ONSET: ON-GOING

## 2019-06-11 ASSESSMENT — PAIN DESCRIPTION - PAIN TYPE: TYPE: CHRONIC PAIN

## 2019-06-11 ASSESSMENT — PAIN DESCRIPTION - FREQUENCY: FREQUENCY: CONTINUOUS

## 2019-06-11 ASSESSMENT — PAIN SCALES - GENERAL: PAINLEVEL_OUTOF10: 6

## 2019-06-11 ASSESSMENT — PAIN DESCRIPTION - LOCATION: LOCATION: BUTTOCKS;BACK

## 2019-06-11 NOTE — ED PROVIDER NOTES
Constitutional: Positive for fatigue. Negative for activity change, appetite change, chills and fever. HENT: Negative for congestion and rhinorrhea. Respiratory: Positive for shortness of breath. Negative for cough. Cardiovascular: Negative for chest pain. Gastrointestinal: Negative for abdominal pain, diarrhea, nausea and vomiting. Genitourinary: Negative for difficulty urinating, dysuria and hematuria. Neurological: Negative for headaches. Positives and Pertinent negatives as per HPI. Except as noted abovein the ROS, all other systems were reviewed and negative. PAST MEDICAL HISTORY     Past Medical History:   Diagnosis Date    COPD (chronic obstructive pulmonary disease) ([facility] [address]     Hernia of unspecified site of abdominal cavity without mention of obstruction or gangrene     Incontinence     Knee pain, bilateral     pt states no cartilage    Spinal stenosis          SURGICAL HISTORY     Past Surgical History:   Procedure Laterality Date    CHOLECYSTECTOMY      PARTIAL HYSTERECTOMY           CURRENTMEDICATIONS       Previous Medications    CHLORPHEN-PHENYLEPH-APAP (CORICIDIN D COLD/FLU/SINUS) 2-5-325 MG TABS    Take 2 tablets by mouth 4 times daily as needed (cough)    DOCUSATE SODIUM (COLACE) 100 MG CAPSULE    Take 100 mg by mouth daily    ESOMEPRAZOLE (NEXIUM) 40 MG CAPSULE    Take 40 mg by mouth every morning (before breakfast). FLUOXETINE (PROZAC) 20 MG CAPSULE    Take 40 mg by mouth daily. FLUTICASONE-SALMETEROL (ADVAIR DISKUS IN)    Inhale  into the lungs. FUROSEMIDE (LASIX) 20 MG TABLET    Take 1 tablet by mouth daily    IPRATROPIUM-ALBUTEROL (DUONEB) 0.5-2.5 (3) MG/3ML SOLN NEBULIZER SOLUTION    Inhale 3 mLs into the lungs every 6 hours for 10 days    LEVOTHYROXINE (SYNTHROID) 125 MCG TABLET    Take 125 mcg by mouth daily.       LISINOPRIL (PRINIVIL;ZESTRIL) 5 MG TABLET    Take 1 tablet by mouth daily    MAGNESIUM HYDROXIDE (MILK OF MAGNESIA) 400 MG/5ML SUSPENSION    Take 30 mLs by mouth daily as needed for Constipation    PREDNISONE (DELTASONE) 10 MG TABLET    Take 2 tabs PO QD for 4 days, then 1 tab PO QD for 4 days then 0.5 tab PO QD for 4 days    PREGABALIN (LYRICA) 50 MG CAPSULE    Take 150 mg by mouth 2 times daily. ROSUVASTATIN (CRESTOR) 40 MG TABLET    Take 40 mg by mouth every evening    SENNA (SENOKOT) 8.6 MG TABLET    Take 1 tablet by mouth daily    SOLIFENACIN (VESICARE) 10 MG TABLET    Take 10 mg by mouth daily    SPIRONOLACTONE (ALDACTONE) 25 MG TABLET    Take 25 mg by mouth 2 times daily. SUMATRIPTAN (IMITREX) 25 MG TABLET    Take 50 mg by mouth once as needed. ALLERGIES     Sulfa antibiotics    FAMILYHISTORY     History reviewed. No pertinent family history.        SOCIAL HISTORY       Social History     Socioeconomic History    Marital status:      Spouse name: None    Number of children: None    Years of education: None    Highest education level: None   Occupational History    None   Social Needs    Financial resource strain: None    Food insecurity:     Worry: None     Inability: None    Transportation needs:     Medical: None     Non-medical: None   Tobacco Use    Smoking status: Never Smoker    Smokeless tobacco: Never Used   Substance and Sexual Activity    Alcohol use: No    Drug use: No    Sexual activity: None   Lifestyle    Physical activity:     Days per week: None     Minutes per session: None    Stress: None   Relationships    Social connections:     Talks on phone: None     Gets together: None     Attends Shinto service: None     Active member of club or organization: None     Attends meetings of clubs or organizations: None     Relationship status: None    Intimate partner violence:     Fear of current or ex partner: None     Emotionally abused: None     Physically abused: None     Forced sexual activity: None   Other Topics Concern    None   Social History Narrative    None

## 2019-06-11 NOTE — H&P
hypercapneic/hypoxic respiratory failure  - due to COPD exac/ multi-factorial, related to OHS/COPD. Continue BiPAP with sleep and at nighttime.    -was on BIPAP in the ED  -wean as tolerated. Pulm consult     2. Acute exac of COPD  -ct nebs and steroids. Azithromycin  Might benefit from long term zithromax given re-occurrence. 3. Pulm HTN    Mild Edema is from pulm htn and non compliance with CPAP  - Use CPAP at home   -hold lasix as BP low     5. Pt has stage II ulcer on coccyx.  -present on admission  Wound care      6. Morbid obesity due to excess calories (Body mass index is 52.33 kg/m². ) -   -recommend dietary and lifestyle modifications     7.   Essential hypertension   Low currently -hold  Meds.     8.Acute kidney injury-doubt cardiorenal.  Hold klasix/lisinopril for now    DVT prophy  Pt/ot  Will benefit from Placement  ____________________________  MD LORENZA  Hospitalist Service

## 2019-06-11 NOTE — ED NOTES
Patient tolerated insertion of patel catheter without complaint. Patient hadn't voided yet today, return of 600 ml clear yellow urine noted upon catheter insertion. Dr. Nithya Balbuena in room to see patient, discussed plan to admit patient with patient and her daughter.      [address] BURROUGHS RN  06/11/19 6959

## 2019-06-11 NOTE — PROGRESS NOTES
06/11/19 1150   Oxygen Therapy/Pulse Ox   Blood Gas  Performed? Yes   $ABG $ABG   Mo's Test #1 Pos   Site #1 Right Radial   Site Prepped #1 Yes   Number of Attempts #1 2   Pressure Held #1 Yes   Complications #1 None   Post-procedure #1 Standard   Specimen Status #1 To lab   How Tolerated?  Tolerated well

## 2019-06-12 LAB
ANION GAP SERPL CALCULATED.3IONS-SCNC: 7 MMOL/L (ref 3–16)
BUN BLDV-MCNC: 30 MG/DL (ref 7–20)
CALCIUM SERPL-MCNC: 9.5 MG/DL (ref 8.3–10.6)
CHLORIDE BLD-SCNC: 99 MMOL/L (ref 99–110)
CO2: 32 MMOL/L (ref 21–32)
CREAT SERPL-MCNC: 1 MG/DL (ref 0.6–1.2)
GFR AFRICAN AMERICAN: >60
GFR NON-AFRICAN AMERICAN: 53
GLUCOSE BLD-MCNC: 179 MG/DL (ref 70–99)
GLUCOSE BLD-MCNC: 204 MG/DL (ref 70–99)
PERFORMED ON: ABNORMAL
POTASSIUM REFLEX MAGNESIUM: 5.2 MMOL/L (ref 3.5–5.1)
SODIUM BLD-SCNC: 138 MMOL/L (ref 136–145)
TROPONIN: 0.01 NG/ML

## 2019-06-12 PROCEDURE — 6360000002 HC RX W HCPCS: Performed by: INTERNAL MEDICINE

## 2019-06-12 PROCEDURE — 36415 COLL VENOUS BLD VENIPUNCTURE: CPT

## 2019-06-12 PROCEDURE — 6370000000 HC RX 637 (ALT 250 FOR IP): Performed by: INTERNAL MEDICINE

## 2019-06-12 PROCEDURE — 2060000000 HC ICU INTERMEDIATE R&B

## 2019-06-12 PROCEDURE — 99223 1ST HOSP IP/OBS HIGH 75: CPT | Performed by: INTERNAL MEDICINE

## 2019-06-12 PROCEDURE — 94640 AIRWAY INHALATION TREATMENT: CPT

## 2019-06-12 PROCEDURE — 94761 N-INVAS EAR/PLS OXIMETRY MLT: CPT

## 2019-06-12 PROCEDURE — 2700000000 HC OXYGEN THERAPY PER DAY

## 2019-06-12 PROCEDURE — 84484 ASSAY OF TROPONIN QUANT: CPT

## 2019-06-12 PROCEDURE — 94660 CPAP INITIATION&MGMT: CPT

## 2019-06-12 PROCEDURE — 2580000003 HC RX 258: Performed by: INTERNAL MEDICINE

## 2019-06-12 PROCEDURE — 80048 BASIC METABOLIC PNL TOTAL CA: CPT

## 2019-06-12 RX ORDER — OXYCODONE AND ACETAMINOPHEN 10; 325 MG/1; MG/1
1 TABLET ORAL EVERY 6 HOURS PRN
Status: DISCONTINUED | OUTPATIENT
Start: 2019-06-12 | End: 2019-06-14 | Stop reason: HOSPADM

## 2019-06-12 RX ADMIN — Medication 10 ML: at 10:36

## 2019-06-12 RX ADMIN — MAGNESIUM HYDROXIDE 30 ML: 400 SUSPENSION ORAL at 13:52

## 2019-06-12 RX ADMIN — IPRATROPIUM BROMIDE AND ALBUTEROL SULFATE 1 AMPULE: .5; 3 SOLUTION RESPIRATORY (INHALATION) at 12:12

## 2019-06-12 RX ADMIN — OXYCODONE AND ACETAMINOPHEN 1 TABLET: 10; 325 TABLET ORAL at 10:35

## 2019-06-12 RX ADMIN — OXYCODONE AND ACETAMINOPHEN 1 TABLET: 10; 325 TABLET ORAL at 18:47

## 2019-06-12 RX ADMIN — PREGABALIN 75 MG: 75 CAPSULE ORAL at 10:35

## 2019-06-12 RX ADMIN — Medication 10 ML: at 11:27

## 2019-06-12 RX ADMIN — IPRATROPIUM BROMIDE AND ALBUTEROL SULFATE 1 AMPULE: .5; 3 SOLUTION RESPIRATORY (INHALATION) at 08:08

## 2019-06-12 RX ADMIN — IPRATROPIUM BROMIDE AND ALBUTEROL SULFATE 1 AMPULE: .5; 3 SOLUTION RESPIRATORY (INHALATION) at 20:16

## 2019-06-12 RX ADMIN — IPRATROPIUM BROMIDE AND ALBUTEROL SULFATE 1 AMPULE: .5; 3 SOLUTION RESPIRATORY (INHALATION) at 16:15

## 2019-06-12 RX ADMIN — LEVOTHYROXINE SODIUM 125 MCG: 125 TABLET ORAL at 06:24

## 2019-06-12 RX ADMIN — ENOXAPARIN SODIUM 40 MG: 40 INJECTION SUBCUTANEOUS at 10:36

## 2019-06-12 RX ADMIN — PREGABALIN 75 MG: 75 CAPSULE ORAL at 21:02

## 2019-06-12 RX ADMIN — HYDROCORTISONE 2.5%: 25 CREAM TOPICAL at 21:03

## 2019-06-12 RX ADMIN — FLUOXETINE 40 MG: 20 CAPSULE ORAL at 10:35

## 2019-06-12 RX ADMIN — Medication 10 ML: at 21:02

## 2019-06-12 RX ADMIN — HYDROCORTISONE 2.5%: 25 CREAM TOPICAL at 16:52

## 2019-06-12 RX ADMIN — METHYLPREDNISOLONE SODIUM SUCCINATE 40 MG: 40 INJECTION, POWDER, FOR SOLUTION INTRAMUSCULAR; INTRAVENOUS at 11:27

## 2019-06-12 RX ADMIN — METHYLPREDNISOLONE SODIUM SUCCINATE 40 MG: 40 INJECTION, POWDER, FOR SOLUTION INTRAMUSCULAR; INTRAVENOUS at 23:26

## 2019-06-12 ASSESSMENT — PAIN SCALES - GENERAL
PAINLEVEL_OUTOF10: 10
PAINLEVEL_OUTOF10: 4
PAINLEVEL_OUTOF10: 8
PAINLEVEL_OUTOF10: 0
PAINLEVEL_OUTOF10: 10
PAINLEVEL_OUTOF10: 9

## 2019-06-12 NOTE — CARE COORDINATION
[facility] Continence Nurse  Consult Note       NAME:  Joelle [facility] RECORD NUMBER:  [id number]  AGE: 82 y.o. GENDER: female  : 1935  TODAY'S DATE:  2019    Subjective   Reason for WOCN Evaluation and Assessment: stage 2 ? Deep tissue injury; pt has stage 2      Clement Fiddler is a 82 y.o. female referred by:   [] Physician  [x] Nursing  [] Other:     Wound Identification:  Wound Type: pressure  Contributing Factors: chronic pressure and decreased mobility    Wound History: chronic, has been noted on prior hospitalizations  Current Wound Care Treatment:  Barrier ointment    Patient Goal of Care:  [x] Wound Healing  [] Odor Control  [] Palliative Care  [] Pain Control   [] Other:         PAST MEDICAL HISTORY        Diagnosis Date    Arthritis     osteo    COPD (chronic obstructive pulmonary disease) ([facility]     Hemorrhoids     Hernia of unspecified site of abdominal cavity without mention of obstruction or gangrene     Incontinence     Knee pain, bilateral     pt states no cartilage    Spinal stenosis     Spinal stenosis        PAST SURGICAL HISTORY    Past Surgical History:   Procedure Laterality Date    CHOLECYSTECTOMY      PARTIAL HYSTERECTOMY         FAMILY HISTORY    History reviewed. No pertinent family history. SOCIAL HISTORY    Social History     Tobacco Use    Smoking status: Never Smoker    Smokeless tobacco: Never Used   Substance Use Topics    Alcohol use: No    Drug use: No       ALLERGIES    Allergies   Allergen Reactions    Sulfa Antibiotics      Unknown reaction       MEDICATIONS    No current facility-administered medications on file prior to encounter. Current Outpatient Medications on File Prior to Encounter   Medication Sig Dispense Refill    oxyCODONE-acetaminophen (PERCOCET)  MG per tablet Take 1 tablet by mouth every 6 hours as needed for Pain.       ipratropium-albuterol (DUONEB) 0.5-2.5 (3) MG/3ML SOLN nebulizer solution LABA1C    Assessment   Jorge Luis Risk Score: Jorge Luis Scale Score: 14    Patient Active Problem List   Diagnosis Code    Pneumonia J18.9    Acute on chronic diastolic heart failure ([facility]) I50.33    Peripheral edema R60.9    COPD exacerbation ([facility]) J44.1    Essential hypertension I10    Hyperlipidemia E78.5    Morbid obesity with BMI of 50.0-59.9, adult ([facility] E66.01, Z68.43    Acute on chronic respiratory failure with hypercapnia ([facility]) J96.22       Measurements:        Wound 05/10/19 stage 2 left buttocks (Active)   Wound Image   6/12/2019  9:50 AM   Wound Pressure Stage  2 5/18/2019  3:43 AM   Dressing Status Other (Comment) 5/18/2019 12:30 PM   Dressing/Treatment Moisture barrier;Open to air 6/12/2019  9:50 AM   Wound Cleansed Wound cleanser 5/11/2019  3:14 PM   Wound Length (cm) 0.5 cm 6/12/2019  9:50 AM   Wound Width (cm) 0.5 cm 6/12/2019  9:50 AM   Wound Depth (cm) 0.1 cm 6/12/2019  9:50 AM   Wound Surface Area (cm^2) 0.25 cm^2 6/12/2019  9:50 AM   Wound Volume (cm^3) 0.02 cm^3 6/12/2019  9:50 AM   Wound Assessment Red 6/12/2019  9:50 AM   Drainage Amount None 6/12/2019  9:50 AM   Odor None 6/12/2019  9:50 AM   Margins Defined edges 6/12/2019  9:50 AM   Jessica-wound Assessment Pink;Purple 6/12/2019  9:50 AM   Red%Wound Bed 100 6/12/2019  9:50 AM   Number of days: 33      Pt seen. Has stage 2 pressure injury to left buttock and smaller scabbed area. Periwound is pink to light purple. Doubt DTI; this was present on last admit and wound actually looks much better. Doubt DTI; would have expected to evolve by now. Heels pink. Abd pannus and skin under breasts are moist, pink, not open. Response to treatment:  Well tolerated by patient.      Pain Assessment:  Severity:  0 / 10  Quality of pain: N/A  Wound Pain Timing/Severity: none  Premedicated: No    Plan   Plan of Care: Wound 05/10/19 stage 2 left buttocks-Dressing/Treatment: Moisture barrier, Open to air  -interdry to skin folds  -cont zinc barrier to

## 2019-06-12 NOTE — PROGRESS NOTES
[facility] PROGRESS NOTE    6/12/2019 5:46 PM        Name: Thuy Santos . Admitted: 6/11/2019  Primary Care Provider: ADRIANO Lucas CNP (Tel: [phone])    Brief Course:       CC: confusion    Subjective: Lori Luciano More awake   Following commands   No sob, no chest pain     Reviewed interval ancillary notes    Current Medications    sodium chloride (OCEAN, BABY AYR) 0.65 % nasal spray 1 spray Q2H PRN   oxyCODONE-acetaminophen (PERCOCET)  MG per tablet 1 tablet Q6H PRN   hydrocortisone (ANUSOL-HC) 2.5 % rectal cream BID   sodium chloride flush 0.9 % injection 10 mL 2 times per day   sodium chloride flush 0.9 % injection 10 mL PRN   magnesium hydroxide (MILK OF MAGNESIA) 400 MG/5ML suspension 30 mL Daily PRN   ondansetron (ZOFRAN) injection 4 mg Q6H PRN   enoxaparin (LOVENOX) injection 40 mg Daily   methylPREDNISolone sodium (SOLU-MEDROL) injection 40 mg Q12H   ipratropium-albuterol (DUONEB) nebulizer solution 1 ampule Q4H WA   FLUoxetine (PROZAC) capsule 40 mg Daily   levothyroxine (SYNTHROID) tablet 125 mcg Daily   pregabalin (LYRICA) capsule 75 mg BID       Objective:  BP (!) 98/48 Comment: Nurse made aware  Pulse 83   Temp 96.8 °F (36 °C) (Axillary)   Resp 18   Ht 5' 1\" (1.549 m)   Wt 282 lb 9.6 oz (128.2 kg)   SpO2 92%   BMI 53.40 kg/m²     Intake/Output Summary (Last 24 hours) at 6/12/2019 1746  Last data filed at 6/12/2019 1333  Gross per 24 hour   Intake 180 ml   Output 1400 ml   Net -1220 ml    Wt Readings from Last 3 Encounters:   06/12/19 282 lb 9.6 oz (128.2 kg)   05/18/19 270 lb (122.5 kg)   04/28/19 281 lb 8 oz (127.7 kg)   arousable  Awake and alert, following commands   General appearance:  Appears comfortable  Eyes: Sclera clear. Pupils equal.  ENT: Moist oral mucosa. Trachea midline, no adenopathy. Cardiovascular: Regular rhythm, normal S1, S2. No murmur.  No edema in lower extremities  Respiratory: Not using accessory muscles. Good inspiratory effort. Clear to auscultation bilaterally, no wheeze or crackles. GI: Abdomen soft, no tenderness, not distended, normal bowel sounds  Musculoskeletal: No cyanosis in digits, neck supple  Neurology: CN 2-12 grossly intact. No speech or motor deficits  Psych: Normal affect. Alert and oriented in time, place and person  Skin: Warm, dry, normal turgor  Extremity exam shows brisk capillary refill. Peripheral pulses are palpable in lower extremities     Labs and Tests:  CBC:   Recent Labs     06/11/19  1137   WBC 6.5   HGB 10.4*        BMP:  Recent Labs     06/11/19  1137 06/12/19  0424   * 138   K 5.5* 5.2*   CL 96* 99   CO2 32 32   BUN 35* 30*   CREATININE 1.5* 1.0   GLUCOSE 140* 204*     Hepatic: Recent Labs     06/11/19  1137   AST 27   ALT 34   BILITOT <0.2   ALKPHOS 96     XR CHEST PORTABLE   Final Result   Limited rotated portable chest x-ray demonstrating vascular congestion   without overt edema. Small bilateral areas of focal opacity are favored to   be atelectasis, less likely pneumonia. Problem List  Active Problems:    COPD exacerbation ([address]  Resolved Problems:    * No resolved hospital problems.  *       Assessment & Plan:   Acute on chronic mixed respiratory failure with resulting acute metabolic encephalopathy due to SHORTY/OHA   PFTs done in remote past showing mild COPD, use to see Dr Shantelle Tilley   Probably related to non compliance with CPAP  Will probably need sleep retitration study,     Morbid obesity     Hypothyroidism   Synthroid         IV Access: peripheral  Ngo: No  Diet: DIET GENERAL;  Dietary Nutrition Supplements: Low Calorie High Protein Supplement  Code:Full Code  DVT PPX Lovenox  Disposition Unclear      Nehemias Morales MD   6/12/2019 5:46 PM

## 2019-06-12 NOTE — PLAN OF CARE
Problem: Pain:  Goal: Control of acute pain  Description  Control of acute pain  Note:   Pt sleeping will monitor pt pain througout the night. Problem: Falls - Risk of:  Goal: Absence of physical injury  Description  Absence of physical injury  Note:   Patient free from harm. ID bands on, bed in lowest position, call light in reach. Patient instructed to call for help if needed. Patient educated on ambulation and safety. Problem: Risk for Impaired Skin Integrity  Goal: Tissue integrity - skin and mucous membranes  Description  Structural intactness and normal physiological function of skin and  mucous membranes. Note:   Pt skin assessed. Pt on a lift pad for preparation to transfer pt to specialty mattress. Pt refused at this time.

## 2019-06-12 NOTE — CONSULTS
SYSTEMS:    CONSTITUTIONAL:  negative for fevers, chills, diaphoresis, activity change, appetite change, fatigue, night sweats and unexpected weight change. EYES:  negative for blurred vision, eye discharge, visual disturbance and icterus  HEENT:  negative for hearing loss, tinnitus, ear drainage, sinus pressure, nasal congestion, epistaxis and snoring  RESPIRATORY:  See HPI  CARDIOVASCULAR:  negative for chest pain, palpitations, exertional chest pressure/discomfort, syncope.  +edema,   GASTROINTESTINAL:  negative for nausea, vomiting, diarrhea, constipation, blood in stool and abdominal pain  GENITOURINARY:  negative for frequency, dysuria, urinary incontinence, decreased urine volume, and hematuria  HEMATOLOGIC/LYMPHATIC:  negative for easy bruising, bleeding and lymphadenopathy  ALLERGIC/IMMUNOLOGIC:  negative for recurrent infections, angioedema, anaphylaxis and drug reactions  ENDOCRINE:  negative for weight changes and diabetic symptoms including polyuria, polydipsia and polyphagia  MUSCULOSKELETAL:  negative for  pain, joint swelling, decreased range of motion and muscle weakness  NEUROLOGICAL:  negative for headaches, slurred speech, unilateral weakness  PSYCHIATRIC/BEHAVIORAL: negative for hallucinations, behavioral problems, confusion and agitation. Objective:   PHYSICAL EXAM:      VITALS:  /75   Pulse 74   Temp 98.4 °F (36.9 °C) (Axillary)   Resp 20   Ht 5' 1\" (1.549 m)   Wt 282 lb 9.6 oz (128.2 kg)   SpO2 91%   BMI 53.40 kg/m²   24HR INTAKE/OUTPUT:      Intake/Output Summary (Last 24 hours) at 2019 0741  Last data filed at 2019 6965  Gross per 24 hour   Intake 510 ml   Output 2175 ml   Net -1665 ml     CURRENT PULSE OXIMETRY:  SpO2: 91 %  24HR PULSE OXIMETRY RANGE:  SpO2  Av.7 %  Min: 91 %  Max: 97 % on 5L    CONSTITUTIONAL:  Lethargic but awakens, cooperative, no distress, and appears stated age.   Obese  NECK:  Supple, symmetrical, trachea midline, no adenopathy, 06/11/2019    UAG1FRK 79.7 06/11/2019    PO2ART 79.8 06/11/2019    JDY1AUM 88.3 06/11/2019       Cultures:   Blood Culture:    Sputum Culture:        Radiology Review:  All pertinent images / reports were reviewed as a part of this visit. imaging reveals the following:  XR CHEST PORTABLE   Final Result   Limited rotated portable chest x-ray demonstrating vascular congestion   without overt edema. Small bilateral areas of focal opacity are favored to   be atelectasis, less likely pneumonia. Other diagnostic test:      PFTs:none on file      Echo:Summary   This was a very technically difficult and limited exam. Patient was in pain   and unable to lay still.   -Normal left ventricle size, wall thickness, and systolic function with an   estimated ejection fraction of 55-60%.  -No obvious regional wall motion abnormalities are seen. There is abnormal   septal motion.   -The mitral valve normal in structure and function.   -No evidence of mitral regurgitation or stenosis.   -The right ventricle is normal in size and function. Assessment/Plan   82 y.o. female with acute on chronic mixed respiratory failure, encephalopathy    Patient admitted several times over the past month.  pco2 on admission was about the same as her previous checks, but her serum bicarbonate was lower. Patient unable to give me history in terms of what medications she's been taking, but she does appear a bit overloaded. Last admission lasix was held because of possible contraction alkalosis. I'm dubious of the COPD diagnosis because her smoking history is so small and remote. There are no PFTS on record. Physical exam from the ED said she had wheezing, but there was no wheezing on my exam, just crackles. No evidence of infection  Continue steroids for a short burst and neb treatments for presumed COPD  Patient has SHORTY and per care everywhere she's supposed to be on CPAP of 11.   Based on her ABG she would qualify for a home vent or a bipap. I recommend restarting lasix  Patient indicated that she would never want to be put on a ventilator. I didn't enter her as DNI because she's not fully lucid. Will ask palliative care to assist with advanced directives as her risk of readmission and mortality in the next year is high. In addition she has significant chronic pain.        Alexander Silverio

## 2019-06-12 NOTE — DISCHARGE INSTR - COC
Nurse Assessment:  Last Vital Signs: /75   Pulse 79   Temp 97.3 °F (36.3 °C) (Oral)   Resp 20   Ht 5' 1\" (1.549 m)   Wt 282 lb 9.6 oz (128.2 kg)   SpO2 93%   BMI 53.40 kg/m²     Last documented pain score (0-10 scale): Pain Level: 10(chronic pain)  Last Weight:   Wt Readings from Last 1 Encounters:   06/12/19 282 lb 9.6 oz (128.2 kg)     Mental Status:  oriented, alert, coherent and logical    IV Access:  - None    Nursing Mobility/ADLs:  Walking   Dependent  Transfer  Dependent  Bathing  Dependent  Dressing  Dependent  Toileting  Dependent  Feeding  Assisted  Med Admin  Assisted  Med Delivery   whole    Wound Care Documentation and Therapy:  Stage 2 pressure ulcer on buttocks     Elimination:  Continence:   · Bowel: Yes  · Bladder: Yes  Urinary Catheter: Removal Date 6/13/19   Colostomy/Ileostomy/Ileal Conduit: No       Date of Last BM: 6/13/19    Intake/Output Summary (Last 24 hours) at 6/12/2019 1249  Last data filed at 6/12/2019 0609  Gross per 24 hour   Intake 510 ml   Output 2175 ml   Net -1665 ml     I/O last 3 completed shifts: In: 510 [I.V.:10; IV Piggyback:500]  Out: 2175 [Urine:2175]    Safety Concerns: At Risk for Falls    Impairments/Disabilities:      None    Nutrition Therapy:  Current Nutrition Therapy:   - Oral Diet:  General    Routes of Feeding: Oral  Liquids: Thin Liquids  Daily Fluid Restriction: no  Last Modified Barium Swallow with Video (Video Swallowing Test): not done    Treatments at the Time of Hospital Discharge:   Respiratory Treatments: yes  Oxygen Therapy:  is on oxygen at 3-4 L/min per nasal cannula.   Ventilator:    - BiPAP   IPAP: 14 cmH20, EPAP: 6 cmH2O {[facility] CPAP Settings:[id number]}    Rehab Therapies: Physical Therapy, Occupational Therapy, Speech/Language Therapy and nursing care  Weight Bearing Status/Restrictions: No weight bearing restirctions  Other Medical Equipment (for information only, NOT a DME order):  walker, bath bench and bedside

## 2019-06-12 NOTE — PLAN OF CARE
Nutrition Problem: Increased nutrient needs  Intervention: Food and/or Nutrient Delivery: Continue current diet, Start ONS  Nutritional Goals: PO 50% or more at meals and supp

## 2019-06-13 LAB
ANION GAP SERPL CALCULATED.3IONS-SCNC: 11 MMOL/L (ref 3–16)
BUN BLDV-MCNC: 34 MG/DL (ref 7–20)
CALCIUM SERPL-MCNC: 9.5 MG/DL (ref 8.3–10.6)
CHLORIDE BLD-SCNC: 98 MMOL/L (ref 99–110)
CO2: 33 MMOL/L (ref 21–32)
CREAT SERPL-MCNC: 0.9 MG/DL (ref 0.6–1.2)
GFR AFRICAN AMERICAN: >60
GFR NON-AFRICAN AMERICAN: 60
GLUCOSE BLD-MCNC: 134 MG/DL (ref 70–99)
GLUCOSE BLD-MCNC: 180 MG/DL (ref 70–99)
GLUCOSE BLD-MCNC: 193 MG/DL (ref 70–99)
GLUCOSE BLD-MCNC: 215 MG/DL (ref 70–99)
GLUCOSE BLD-MCNC: 288 MG/DL (ref 70–99)
PERFORMED ON: ABNORMAL
POTASSIUM REFLEX MAGNESIUM: 4.7 MMOL/L (ref 3.5–5.1)
SODIUM BLD-SCNC: 142 MMOL/L (ref 136–145)

## 2019-06-13 PROCEDURE — 36415 COLL VENOUS BLD VENIPUNCTURE: CPT

## 2019-06-13 PROCEDURE — 99232 SBSQ HOSP IP/OBS MODERATE 35: CPT | Performed by: INTERNAL MEDICINE

## 2019-06-13 PROCEDURE — 97530 THERAPEUTIC ACTIVITIES: CPT

## 2019-06-13 PROCEDURE — 6370000000 HC RX 637 (ALT 250 FOR IP): Performed by: INTERNAL MEDICINE

## 2019-06-13 PROCEDURE — 6360000002 HC RX W HCPCS: Performed by: INTERNAL MEDICINE

## 2019-06-13 PROCEDURE — 2700000000 HC OXYGEN THERAPY PER DAY

## 2019-06-13 PROCEDURE — 97166 OT EVAL MOD COMPLEX 45 MIN: CPT

## 2019-06-13 PROCEDURE — 2060000000 HC ICU INTERMEDIATE R&B

## 2019-06-13 PROCEDURE — 94640 AIRWAY INHALATION TREATMENT: CPT

## 2019-06-13 PROCEDURE — 2580000003 HC RX 258: Performed by: INTERNAL MEDICINE

## 2019-06-13 PROCEDURE — 97162 PT EVAL MOD COMPLEX 30 MIN: CPT

## 2019-06-13 PROCEDURE — 80048 BASIC METABOLIC PNL TOTAL CA: CPT

## 2019-06-13 PROCEDURE — 97535 SELF CARE MNGMENT TRAINING: CPT

## 2019-06-13 RX ORDER — DEXTROSE MONOHYDRATE 50 MG/ML
100 INJECTION, SOLUTION INTRAVENOUS PRN
Status: DISCONTINUED | OUTPATIENT
Start: 2019-06-13 | End: 2019-06-14 | Stop reason: HOSPADM

## 2019-06-13 RX ORDER — DEXTROSE MONOHYDRATE 25 G/50ML
12.5 INJECTION, SOLUTION INTRAVENOUS PRN
Status: DISCONTINUED | OUTPATIENT
Start: 2019-06-13 | End: 2019-06-14 | Stop reason: HOSPADM

## 2019-06-13 RX ORDER — PREDNISONE 20 MG/1
20 TABLET ORAL 2 TIMES DAILY
Status: DISCONTINUED | OUTPATIENT
Start: 2019-06-13 | End: 2019-06-14 | Stop reason: HOSPADM

## 2019-06-13 RX ORDER — NICOTINE POLACRILEX 4 MG
15 LOZENGE BUCCAL PRN
Status: DISCONTINUED | OUTPATIENT
Start: 2019-06-13 | End: 2019-06-14 | Stop reason: HOSPADM

## 2019-06-13 RX ADMIN — OXYCODONE AND ACETAMINOPHEN 1 TABLET: 10; 325 TABLET ORAL at 15:44

## 2019-06-13 RX ADMIN — IPRATROPIUM BROMIDE AND ALBUTEROL SULFATE 1 AMPULE: .5; 3 SOLUTION RESPIRATORY (INHALATION) at 09:34

## 2019-06-13 RX ADMIN — LEVOTHYROXINE SODIUM 125 MCG: 125 TABLET ORAL at 05:22

## 2019-06-13 RX ADMIN — INSULIN LISPRO 4 UNITS: 100 INJECTION, SOLUTION INTRAVENOUS; SUBCUTANEOUS at 09:26

## 2019-06-13 RX ADMIN — IPRATROPIUM BROMIDE AND ALBUTEROL SULFATE 1 AMPULE: .5; 3 SOLUTION RESPIRATORY (INHALATION) at 12:02

## 2019-06-13 RX ADMIN — PREDNISONE 20 MG: 20 TABLET ORAL at 20:25

## 2019-06-13 RX ADMIN — OXYCODONE AND ACETAMINOPHEN 1 TABLET: 10; 325 TABLET ORAL at 21:46

## 2019-06-13 RX ADMIN — METHYLPREDNISOLONE SODIUM SUCCINATE 40 MG: 40 INJECTION, POWDER, FOR SOLUTION INTRAMUSCULAR; INTRAVENOUS at 12:47

## 2019-06-13 RX ADMIN — PREGABALIN 75 MG: 75 CAPSULE ORAL at 10:04

## 2019-06-13 RX ADMIN — Medication 10 ML: at 10:04

## 2019-06-13 RX ADMIN — INSULIN LISPRO 3 UNITS: 100 INJECTION, SOLUTION INTRAVENOUS; SUBCUTANEOUS at 20:27

## 2019-06-13 RX ADMIN — HYDROCORTISONE 2.5%: 25 CREAM TOPICAL at 12:48

## 2019-06-13 RX ADMIN — PREGABALIN 75 MG: 75 CAPSULE ORAL at 20:25

## 2019-06-13 RX ADMIN — FLUOXETINE 40 MG: 20 CAPSULE ORAL at 10:04

## 2019-06-13 RX ADMIN — IPRATROPIUM BROMIDE AND ALBUTEROL SULFATE 1 AMPULE: .5; 3 SOLUTION RESPIRATORY (INHALATION) at 20:19

## 2019-06-13 RX ADMIN — INSULIN LISPRO 2 UNITS: 100 INJECTION, SOLUTION INTRAVENOUS; SUBCUTANEOUS at 16:49

## 2019-06-13 RX ADMIN — OXYCODONE AND ACETAMINOPHEN 1 TABLET: 10; 325 TABLET ORAL at 05:22

## 2019-06-13 RX ADMIN — IPRATROPIUM BROMIDE AND ALBUTEROL SULFATE 1 AMPULE: .5; 3 SOLUTION RESPIRATORY (INHALATION) at 16:16

## 2019-06-13 RX ADMIN — ENOXAPARIN SODIUM 40 MG: 40 INJECTION SUBCUTANEOUS at 10:04

## 2019-06-13 ASSESSMENT — PAIN SCALES - GENERAL
PAINLEVEL_OUTOF10: 7
PAINLEVEL_OUTOF10: 8
PAINLEVEL_OUTOF10: 7
PAINLEVEL_OUTOF10: 0
PAINLEVEL_OUTOF10: 9
PAINLEVEL_OUTOF10: 7
PAINLEVEL_OUTOF10: 0
PAINLEVEL_OUTOF10: 7
PAINLEVEL_OUTOF10: 0

## 2019-06-13 ASSESSMENT — PAIN DESCRIPTION - PAIN TYPE
TYPE: ACUTE PAIN
TYPE: ACUTE PAIN

## 2019-06-13 ASSESSMENT — PAIN DESCRIPTION - LOCATION
LOCATION: LEG
LOCATION: LEG

## 2019-06-13 ASSESSMENT — PAIN DESCRIPTION - ORIENTATION
ORIENTATION: LEFT
ORIENTATION: LEFT

## 2019-06-13 NOTE — PROGRESS NOTES
Hearing  REQUIRES PT FOLLOW UP: Yes  Activity Tolerance  Activity Tolerance: Patient Tolerated treatment well;Patient limited by endurance       Patient Diagnosis(es): The primary encounter diagnosis was Acute respiratory failure with hypercapnia ([address] Diagnoses of COPD exacerbation ([address] Acute renal insufficiency, and Elevated troponin were also pertinent to this visit. has a past medical history of Arthritis, COPD (chronic obstructive pulmonary disease) ([address] Hemorrhoids, Hernia of unspecified site of abdominal cavity without mention of obstruction or gangrene, Incontinence, Knee pain, bilateral, Spinal stenosis, and Spinal stenosis. has a past surgical history that includes Cholecystectomy and partial hysterectomy (cervix not removed). Restrictions  Restrictions/Precautions  Restrictions/Precautions: Fall Risk, Up as Tolerated(medium fall risk)  Position Activity Restriction  Other position/activity restrictions: Mandy Alston is a 82 y.o. female who presents to the emergency department today for evaluation for shortness of breath. H/o COPD and CHF. On 4L O2 at baseline 87%, was placed on 6L O2 in ED. Pt reports increased bilateral LE swelling. This is her 3rd admission in less than 2 months. Pt also has stage II pressure ulcer on L buttocks  Vision/Hearing        Subjective  General  Chart Reviewed: Yes  Response To Previous Treatment: Not applicable  Family / Caregiver Present: No  Diagnosis: acute on chronic mixed respiratory failure, encephalopathy  Follows Commands: Within Functional Limits  General Comment  Comments: Patient reports 7-8 pain in left leg. Nurse notified. Patient agreeable to PT/OT evaluation.   Subjective  Subjective: Patient supine in bed upon arrival.   Pain Screening  Patient Currently in Pain: Yes(reported pain in beginning of evaluatio; denied pain at end of eval)  Pain Assessment  Pain Level: 7  Vital Signs  Patient Currently in Pain: Yes Orientation  Orientation  Overall Orientation Status: Within Functional Limits  Social/Functional History  Social/Functional History  Lives With: Alone(pt lives alone, daughter provides 53 hours)  Type of Home: Apartment  Home Layout: Two level(stair lift to apartment)  Home Access: (stair lift)  Bathroom Shower/Tub: (takes sponge bath on BSC or in chair)  Bathroom Toilet: Bedside commode(either uses BSC or standard height toilet)  Bathroom Accessibility: Walker accessible  Home Equipment: Rolling walker, Hospital bed, Alert Button, Oxygen(transport chair)  Receives Help From: Family  ADL Assistance: Needs assistance  Bath: Moderate assistance  Dressing: Moderate assistance  Grooming: Modified independent (completes seated)  Toileting: Independent  Homemaking Assistance: (daughter completes cooking/cleaning/laundry)  Homemaking Responsibilities: No  Ambulation Assistance: Independent(sometimes ambulates mod I with RW up to 10', when daughter is there, daughter provides SBA)  Transfer Assistance: Independent  Active : No  Patient's  Info: daughter  Leisure & Hobbies: reading, watching TV  Additional Comments: pt reports one recent fall; daughter is pt's caregiver - daughter assists [phone] and from 530-8pm, pt is alone between those times and at night. Pt reports that she toilets at night.     Objective     Observation/Palpation  Posture: Fair    AROM RLE (degrees)  RLE AROM: WFL  RLE General AROM: Hip: lacking 25% of full ROM, Grossly WFL: Ankle, Knee  AROM LLE (degrees)  LLE AROM : WFL  LLE General AROM: Hip: lacking 25% of full ROM, Grossly WFL: Ankle, Knee  Strength RLE  Strength RLE: WFL  Comment: Ankle DF: 3+/5, Knee Ext: 4/5, Hip Flex: 2+/5  Strength LLE  Strength LLE: WFL  Comment: Ankle DF: 3+/5, Knee Ext: 4/5, Hip Flex: 2+/5     Sensation  Overall Sensation Status: WFL  Bed mobility  Rolling to Right: Minimal assistance  Supine to Sit: Minimal assistance  Scooting: Minimal

## 2019-06-13 NOTE — PROGRESS NOTES
Occupational Therapy   Occupational Therapy Initial Assessment/ Discharge  Date: 2019   Patient Name: Meaghan Chan  MRN: [id number]     : 1935    Date of Service: 2019    Discharge Recommendations: Meaghan Chan scored a 14/24 on the AM-PAC ADL Inpatient form. Current research shows that an AM-PAC score of 17 or less is typically not associated with a discharge to the patient's home setting. Based on the patients AM-PAC score and their current ADL deficits, it is recommended that the patient have 3-5 sessions per week of Occupational Therapy at d/c to increase the patients independence. OT Equipment Recommendations  Other: if pt d/c's home, pt would benefit from manual w/c    Assessment   Performance deficits / Impairments: Decreased functional mobility ; Decreased ADL status; Decreased strength;Decreased safe awareness;Decreased endurance;Decreased balance;Decreased cognition  Assessment: Pt is not at baseline level of function and will benefit from skilled OT in order to address the above limitations. Treatment Diagnosis: Decreased functional mobility, ADL status, balance, strength, activity tolerance, cognition, safety awareness related to COPD exacerbation  Prognosis: Good  Decision Making: Medium Complexity  History: OA, COPD; is alone during parts of the day and at night, can typically toilet independently, 3rd recent hospital admission  Exam: six-clicks, balance, pain  Assistance / Modification: assist of 2  Patient Education: Role of OT, OT eval, POC, d/c  Barriers to Learning: hearing  REQUIRES OT FOLLOW UP: Yes  Activity Tolerance  Activity Tolerance: Patient Tolerated treatment well;Patient limited by fatigue  Safety Devices  Safety Devices in place: Yes  Type of devices: All fall risk precautions in place; Patient at risk for falls; Left in chair;Call light within reach; Chair alarm in place;Nurse notified(recommended to [facility] to use filomena stedy for transfers)           Patient Training, Endurance Training, Safety Education & Training, Patient/Caregiver Education & Training, Self-Care / ADL      AM-PAC Score        AM-PAC Inpatient Daily Activity Raw Score: 14 (06/13/19 1034)  AM-PAC Inpatient ADL T-Scale Score : 33.39 (06/13/19 1034)  ADL Inpatient CMS 0-100% Score: 59.67 (06/13/19 1034)  ADL Inpatient CMS G-Code Modifier : CK (06/13/19 1034)    Goals  Short term goals  Time Frame for Short term goals: Discharge  Short term goal 1: Bed mobility with supervision. Short term goal 2: Functional transfers with min A. Short term goal 3: Functional mobility with min A. Short term goal 4: UB ADLs in seated position with set-up/supervision. Short term goal 5: Toileting with mod A. Long term goals  Time Frame for Long term goals : STG=LTG  Patient Goals   Patient goals : To d/c home with [facility] therapy       Therapy Time   Individual Concurrent Group Co-treatment   Time In 0828         Time Out 0921         Minutes 53               Timed Code Treatment Minutes:  38 Minutes    Total Treatment Minutes:  [address] ([place], [place] [id number]  DC to home on 6/14 per MD order.

## 2019-06-13 NOTE — PROGRESS NOTES
[facility] PROGRESS NOTE    6/13/2019 6:04 PM        Name: Bruno Rosa . Admitted: 6/11/2019  Primary Care Provider: ADRIANO Barriga CNP (Tel: [phone])    Brief Course:       CC: confusion    Subjective:  .   Patient sitting in the chair  Feels better   Alert,awake and oriented     Reviewed interval ancillary notes    Current Medications    insulin lispro (HUMALOG) injection pen 0-12 Units TID WC   insulin lispro (HUMALOG) injection pen 0-6 Units Nightly   glucose (GLUTOSE) 40 % oral gel 15 g PRN   dextrose 50 % IV solution PRN   glucagon (rDNA) injection 1 mg PRN   dextrose 5 % solution PRN   predniSONE (DELTASONE) tablet 20 mg BID   sodium chloride (OCEAN, BABY AYR) 0.65 % nasal spray 1 spray Q2H PRN   oxyCODONE-acetaminophen (PERCOCET)  MG per tablet 1 tablet Q6H PRN   hydrocortisone (ANUSOL-HC) 2.5 % rectal cream BID   sodium chloride flush 0.9 % injection 10 mL 2 times per day   sodium chloride flush 0.9 % injection 10 mL PRN   magnesium hydroxide (MILK OF MAGNESIA) 400 MG/5ML suspension 30 mL Daily PRN   ondansetron (ZOFRAN) injection 4 mg Q6H PRN   enoxaparin (LOVENOX) injection 40 mg Daily   ipratropium-albuterol (DUONEB) nebulizer solution 1 ampule Q4H WA   FLUoxetine (PROZAC) capsule 40 mg Daily   levothyroxine (SYNTHROID) tablet 125 mcg Daily   pregabalin (LYRICA) capsule 75 mg BID       Objective:  BP (!) 145/77   Pulse 81   Temp 98.1 °F (36.7 °C) (Oral)   Resp 20   Ht 5' 1\" (1.549 m)   Wt 282 lb 9.6 oz (128.2 kg)   SpO2 90%   BMI 53.40 kg/m²     Intake/Output Summary (Last 24 hours) at 6/13/2019 1804  Last data filed at 6/13/2019 1405  Gross per 24 hour   Intake --   Output 1675 ml   Net -1675 ml      Wt Readings from Last 3 Encounters:   06/13/19 282 lb 9.6 oz (128.2 kg)   05/18/19 270 lb (122.5 kg)   04/28/19 281 lb 8 oz (127.7 kg)     Awake and alert, following commands

## 2019-06-14 VITALS
SYSTOLIC BLOOD PRESSURE: 122 MMHG | WEIGHT: 283 LBS | HEIGHT: 61 IN | DIASTOLIC BLOOD PRESSURE: 80 MMHG | BODY MASS INDEX: 53.43 KG/M2 | OXYGEN SATURATION: 95 % | RESPIRATION RATE: 18 BRPM | HEART RATE: 63 BPM | TEMPERATURE: 98.2 F

## 2019-06-14 LAB
GLUCOSE BLD-MCNC: 149 MG/DL (ref 70–99)
GLUCOSE BLD-MCNC: 170 MG/DL (ref 70–99)
PERFORMED ON: ABNORMAL
PERFORMED ON: ABNORMAL

## 2019-06-14 PROCEDURE — 6360000002 HC RX W HCPCS: Performed by: INTERNAL MEDICINE

## 2019-06-14 PROCEDURE — 99232 SBSQ HOSP IP/OBS MODERATE 35: CPT | Performed by: INTERNAL MEDICINE

## 2019-06-14 PROCEDURE — 6370000000 HC RX 637 (ALT 250 FOR IP): Performed by: INTERNAL MEDICINE

## 2019-06-14 PROCEDURE — 2580000003 HC RX 258: Performed by: INTERNAL MEDICINE

## 2019-06-14 RX ORDER — PREDNISONE 20 MG/1
20 TABLET ORAL DAILY
Qty: 3 TABLET | Refills: 0 | Status: SHIPPED | OUTPATIENT
Start: 2019-06-14 | End: 2019-06-17

## 2019-06-14 RX ADMIN — LEVOTHYROXINE SODIUM 125 MCG: 125 TABLET ORAL at 05:06

## 2019-06-14 RX ADMIN — FLUOXETINE 40 MG: 20 CAPSULE ORAL at 07:57

## 2019-06-14 RX ADMIN — OXYCODONE AND ACETAMINOPHEN 1 TABLET: 10; 325 TABLET ORAL at 11:22

## 2019-06-14 RX ADMIN — Medication 10 ML: at 07:58

## 2019-06-14 RX ADMIN — INSULIN LISPRO 2 UNITS: 100 INJECTION, SOLUTION INTRAVENOUS; SUBCUTANEOUS at 07:58

## 2019-06-14 RX ADMIN — ENOXAPARIN SODIUM 40 MG: 40 INJECTION SUBCUTANEOUS at 07:57

## 2019-06-14 RX ADMIN — HYDROCORTISONE 2.5%: 25 CREAM TOPICAL at 08:08

## 2019-06-14 RX ADMIN — PREDNISONE 20 MG: 20 TABLET ORAL at 07:57

## 2019-06-14 RX ADMIN — PREGABALIN 75 MG: 75 CAPSULE ORAL at 07:57

## 2019-06-14 RX ADMIN — OXYCODONE AND ACETAMINOPHEN 1 TABLET: 10; 325 TABLET ORAL at 04:12

## 2019-06-14 ASSESSMENT — PAIN DESCRIPTION - LOCATION: LOCATION: BACK;LEG

## 2019-06-14 ASSESSMENT — PAIN SCALES - GENERAL
PAINLEVEL_OUTOF10: 3
PAINLEVEL_OUTOF10: 7
PAINLEVEL_OUTOF10: 9
PAINLEVEL_OUTOF10: 10

## 2019-06-14 ASSESSMENT — PAIN DESCRIPTION - PAIN TYPE
TYPE: CHRONIC PAIN
TYPE: CHRONIC PAIN

## 2019-06-14 ASSESSMENT — PAIN DESCRIPTION - ORIENTATION: ORIENTATION: RIGHT;LEFT;LOWER

## 2019-06-14 ASSESSMENT — PAIN DESCRIPTION - PROGRESSION: CLINICAL_PROGRESSION: NOT CHANGED

## 2019-06-14 ASSESSMENT — PAIN DESCRIPTION - DESCRIPTORS: DESCRIPTORS: ACHING

## 2019-06-14 ASSESSMENT — PAIN DESCRIPTION - FREQUENCY: FREQUENCY: CONTINUOUS

## 2019-06-14 ASSESSMENT — PAIN DESCRIPTION - ONSET: ONSET: ON-GOING

## 2019-06-14 NOTE — DISCHARGE SUMMARY
with you. STOP taking these medications    Chlorphen-Phenyleph-APAP 2-5-325 MG Tabs  Commonly known as:  CORICIDIN D COLD/FLU/SINUS     docusate sodium 100 MG capsule  Commonly known as:  COLACE     solifenacin 10 MG tablet  Commonly known as:  VESICARE     spironolactone 25 MG tablet  Commonly known as:  ALDACTONE           Where to Get Your Medications      You can get these medications from any pharmacy    Bring a paper prescription for each of these medications  · predniSONE 20 MG tablet         Discharge recommendations given to patient. [facility]   Disposition. home  Activity. activity as tolerated  Diet: No diet orders on file      Spent 35 minutes in discharge process.     Signed:  Anastacia Lo MD     6/14/2019 3:56 PM

## 2019-06-17 ENCOUNTER — TELEPHONE (OUTPATIENT)
Dept: PULMONOLOGY | Age: 84
End: 2019-06-17

## 2019-06-17 NOTE — TELEPHONE ENCOUNTER
----- Message from Kennedy Carcamo MD sent at 6/14/2019 10:35 AM EDT -----  Needs outpt follow up with me in 2 weeks.

## 2019-06-20 ENCOUNTER — HOSPITAL ENCOUNTER (INPATIENT)
Age: 84
LOS: 6 days | Discharge: NURSING, REHAB OR LONG-TERM CARE | DRG: 871 | End: 2019-06-26
Attending: EMERGENCY MEDICINE | Admitting: INTERNAL MEDICINE
Payer: COMMERCIAL

## 2019-06-20 ENCOUNTER — APPOINTMENT (OUTPATIENT)
Dept: GENERAL RADIOLOGY | Age: 84
DRG: 871 | End: 2019-06-20
Payer: COMMERCIAL

## 2019-06-20 ENCOUNTER — APPOINTMENT (OUTPATIENT)
Dept: CT IMAGING | Age: 84
DRG: 871 | End: 2019-06-20
Payer: COMMERCIAL

## 2019-06-20 DIAGNOSIS — R41.82 ALTERED MENTAL STATUS, UNSPECIFIED ALTERED MENTAL STATUS TYPE: ICD-10-CM

## 2019-06-20 DIAGNOSIS — R09.89 ABNORMAL PULMONARY FINDING: ICD-10-CM

## 2019-06-20 DIAGNOSIS — J96.00 ACUTE RESPIRATORY FAILURE, UNSPECIFIED WHETHER WITH HYPOXIA OR HYPERCAPNIA (HCC): Primary | ICD-10-CM

## 2019-06-20 DIAGNOSIS — I95.9 HYPOTENSION, UNSPECIFIED HYPOTENSION TYPE: ICD-10-CM

## 2019-06-20 DIAGNOSIS — Z79.891 CHRONIC PRESCRIPTION OPIATE USE: ICD-10-CM

## 2019-06-20 PROBLEM — J96.20 ACUTE ON CHRONIC RESPIRATORY FAILURE (HCC): Status: ACTIVE | Noted: 2019-06-20

## 2019-06-20 LAB
A/G RATIO: 1 (ref 1.1–2.2)
ALBUMIN SERPL-MCNC: 3.3 G/DL (ref 3.4–5)
ALP BLD-CCNC: 99 U/L (ref 40–129)
ALT SERPL-CCNC: 37 U/L (ref 10–40)
AMMONIA: 14 UMOL/L (ref 11–51)
AMPHETAMINE SCREEN, URINE: ABNORMAL
ANION GAP SERPL CALCULATED.3IONS-SCNC: 9 MMOL/L (ref 3–16)
ANISOCYTOSIS: ABNORMAL
APTT: 31.2 SEC (ref 26–36)
AST SERPL-CCNC: 40 U/L (ref 15–37)
BACTERIA: ABNORMAL /HPF
BANDED NEUTROPHILS RELATIVE PERCENT: 2 % (ref 0–7)
BARBITURATE SCREEN URINE: ABNORMAL
BASE EXCESS ARTERIAL: 7.9 MMOL/L (ref -3–3)
BASOPHILS ABSOLUTE: 0 K/UL (ref 0–0.2)
BASOPHILS RELATIVE PERCENT: 0 %
BENZODIAZEPINE SCREEN, URINE: POSITIVE
BILIRUB SERPL-MCNC: 0.3 MG/DL (ref 0–1)
BILIRUBIN URINE: NEGATIVE
BLOOD, URINE: ABNORMAL
BUN BLDV-MCNC: 38 MG/DL (ref 7–20)
CALCIUM SERPL-MCNC: 9.2 MG/DL (ref 8.3–10.6)
CANNABINOID SCREEN URINE: ABNORMAL
CARBOXYHEMOGLOBIN ARTERIAL: 1.2 % (ref 0–1.5)
CHLORIDE BLD-SCNC: 95 MMOL/L (ref 99–110)
CLARITY: ABNORMAL
CO2: 31 MMOL/L (ref 21–32)
COCAINE METABOLITE SCREEN URINE: ABNORMAL
COLOR: YELLOW
CREAT SERPL-MCNC: 1.4 MG/DL (ref 0.6–1.2)
EKG ATRIAL RATE: 90 BPM
EKG DIAGNOSIS: NORMAL
EKG P AXIS: 45 DEGREES
EKG P-R INTERVAL: 170 MS
EKG Q-T INTERVAL: 360 MS
EKG QRS DURATION: 110 MS
EKG QTC CALCULATION (BAZETT): 440 MS
EKG R AXIS: -32 DEGREES
EKG T AXIS: 37 DEGREES
EKG VENTRICULAR RATE: 90 BPM
EOSINOPHILS ABSOLUTE: 0.2 K/UL (ref 0–0.6)
EOSINOPHILS RELATIVE PERCENT: 2 %
EPITHELIAL CELLS, UA: 1 /HPF (ref 0–5)
ETHANOL: NORMAL MG/DL (ref 0–0.08)
GFR AFRICAN AMERICAN: 44
GFR NON-AFRICAN AMERICAN: 36
GLOBULIN: 3.2 G/DL
GLUCOSE BLD-MCNC: 131 MG/DL (ref 70–99)
GLUCOSE URINE: NEGATIVE MG/DL
HCO3 ARTERIAL: 34.2 MMOL/L (ref 21–29)
HCT VFR BLD CALC: 35.1 % (ref 36–48)
HEMOGLOBIN, ART, EXTENDED: 10.6 G/DL (ref 12–16)
HEMOGLOBIN: 10.8 G/DL (ref 12–16)
HYALINE CASTS: 1 /LPF (ref 0–8)
INR BLD: 0.95 (ref 0.86–1.14)
KETONES, URINE: NEGATIVE MG/DL
LACTIC ACID, SEPSIS: 1.1 MMOL/L (ref 0.4–1.9)
LACTIC ACID: 0.8 MMOL/L (ref 0.4–2)
LEUKOCYTE ESTERASE, URINE: ABNORMAL
LIPASE: 33 U/L (ref 13–60)
LYMPHOCYTES ABSOLUTE: 0.8 K/UL (ref 1–5.1)
LYMPHOCYTES RELATIVE PERCENT: 7 %
Lab: ABNORMAL
MCH RBC QN AUTO: 28.7 PG (ref 26–34)
MCHC RBC AUTO-ENTMCNC: 30.7 G/DL (ref 31–36)
MCV RBC AUTO: 93.5 FL (ref 80–100)
METHADONE SCREEN, URINE: ABNORMAL
METHEMOGLOBIN ARTERIAL: 0.4 %
MICROSCOPIC EXAMINATION: YES
MONOCYTES ABSOLUTE: 0.5 K/UL (ref 0–1.3)
MONOCYTES RELATIVE PERCENT: 5 %
MYELOCYTE PERCENT: 1 %
NEUTROPHILS ABSOLUTE: 9.4 K/UL (ref 1.7–7.7)
NEUTROPHILS RELATIVE PERCENT: 83 %
NITRITE, URINE: NEGATIVE
O2 CONTENT ARTERIAL: 15 ML/DL
O2 SAT, ARTERIAL: 100 %
O2 THERAPY: ABNORMAL
OPIATE SCREEN URINE: ABNORMAL
OXYCODONE URINE: POSITIVE
PCO2 ARTERIAL: 56.4 MMHG (ref 35–45)
PDW BLD-RTO: 15.7 % (ref 12.4–15.4)
PH ARTERIAL: 7.39 (ref 7.35–7.45)
PH UA: 6
PH UA: 6 (ref 5–8)
PHENCYCLIDINE SCREEN URINE: ABNORMAL
PLATELET # BLD: 190 K/UL (ref 135–450)
PLATELET SLIDE REVIEW: ADEQUATE
PMV BLD AUTO: 9.1 FL (ref 5–10.5)
PO2 ARTERIAL: 277 MMHG (ref 75–108)
POLYCHROMASIA: ABNORMAL
POTASSIUM REFLEX MAGNESIUM: 5.3 MMOL/L (ref 3.5–5.1)
PRO-BNP: 191 PG/ML (ref 0–449)
PROPOXYPHENE SCREEN: ABNORMAL
PROTEIN UA: ABNORMAL MG/DL
PROTHROMBIN TIME: 10.8 SEC (ref 9.8–13)
RBC # BLD: 3.75 M/UL (ref 4–5.2)
RBC UA: 8 /HPF (ref 0–4)
SLIDE REVIEW: ABNORMAL
SODIUM BLD-SCNC: 135 MMOL/L (ref 136–145)
SPECIFIC GRAVITY UA: 1.03 (ref 1–1.03)
TCO2 ARTERIAL: 80.6 MMOL/L
TOTAL PROTEIN: 6.5 G/DL (ref 6.4–8.2)
TROPONIN: <0.01 NG/ML
URINE TYPE: ABNORMAL
UROBILINOGEN, URINE: 0.2 E.U./DL
WBC # BLD: 10.9 K/UL (ref 4–11)
WBC UA: 458 /HPF (ref 0–5)

## 2019-06-20 PROCEDURE — 99291 CRITICAL CARE FIRST HOUR: CPT | Performed by: INTERNAL MEDICINE

## 2019-06-20 PROCEDURE — 2580000003 HC RX 258: Performed by: INTERNAL MEDICINE

## 2019-06-20 PROCEDURE — 71260 CT THORAX DX C+: CPT

## 2019-06-20 PROCEDURE — 6370000000 HC RX 637 (ALT 250 FOR IP): Performed by: INTERNAL MEDICINE

## 2019-06-20 PROCEDURE — 80053 COMPREHEN METABOLIC PANEL: CPT

## 2019-06-20 PROCEDURE — 82140 ASSAY OF AMMONIA: CPT

## 2019-06-20 PROCEDURE — 2500000003 HC RX 250 WO HCPCS: Performed by: EMERGENCY MEDICINE

## 2019-06-20 PROCEDURE — 96365 THER/PROPH/DIAG IV INF INIT: CPT

## 2019-06-20 PROCEDURE — 71045 X-RAY EXAM CHEST 1 VIEW: CPT

## 2019-06-20 PROCEDURE — 85730 THROMBOPLASTIN TIME PARTIAL: CPT

## 2019-06-20 PROCEDURE — 93010 ELECTROCARDIOGRAM REPORT: CPT | Performed by: INTERNAL MEDICINE

## 2019-06-20 PROCEDURE — 6360000002 HC RX W HCPCS: Performed by: EMERGENCY MEDICINE

## 2019-06-20 PROCEDURE — 94750 HC PULMONARY COMPLIANCE STUDY: CPT

## 2019-06-20 PROCEDURE — 70450 CT HEAD/BRAIN W/O DYE: CPT

## 2019-06-20 PROCEDURE — 87077 CULTURE AEROBIC IDENTIFY: CPT

## 2019-06-20 PROCEDURE — 4500000026 HC ED CRITICAL CARE PROCEDURE

## 2019-06-20 PROCEDURE — 74177 CT ABD & PELVIS W/CONTRAST: CPT

## 2019-06-20 PROCEDURE — 36415 COLL VENOUS BLD VENIPUNCTURE: CPT

## 2019-06-20 PROCEDURE — 81001 URINALYSIS AUTO W/SCOPE: CPT

## 2019-06-20 PROCEDURE — 51702 INSERT TEMP BLADDER CATH: CPT

## 2019-06-20 PROCEDURE — 36600 WITHDRAWAL OF ARTERIAL BLOOD: CPT

## 2019-06-20 PROCEDURE — 82803 BLOOD GASES ANY COMBINATION: CPT

## 2019-06-20 PROCEDURE — 85025 COMPLETE CBC W/AUTO DIFF WBC: CPT

## 2019-06-20 PROCEDURE — 93005 ELECTROCARDIOGRAM TRACING: CPT | Performed by: EMERGENCY MEDICINE

## 2019-06-20 PROCEDURE — 6360000002 HC RX W HCPCS

## 2019-06-20 PROCEDURE — 99291 CRITICAL CARE FIRST HOUR: CPT

## 2019-06-20 PROCEDURE — 83880 ASSAY OF NATRIURETIC PEPTIDE: CPT

## 2019-06-20 PROCEDURE — 96375 TX/PRO/DX INJ NEW DRUG ADDON: CPT

## 2019-06-20 PROCEDURE — 2580000003 HC RX 258: Performed by: EMERGENCY MEDICINE

## 2019-06-20 PROCEDURE — 6360000002 HC RX W HCPCS: Performed by: INTERNAL MEDICINE

## 2019-06-20 PROCEDURE — 2000000000 HC ICU R&B

## 2019-06-20 PROCEDURE — 80307 DRUG TEST PRSMV CHEM ANLYZR: CPT

## 2019-06-20 PROCEDURE — 84484 ASSAY OF TROPONIN QUANT: CPT

## 2019-06-20 PROCEDURE — 5A1935Z RESPIRATORY VENTILATION, LESS THAN 24 CONSECUTIVE HOURS: ICD-10-PCS | Performed by: INTERNAL MEDICINE

## 2019-06-20 PROCEDURE — C9113 INJ PANTOPRAZOLE SODIUM, VIA: HCPCS | Performed by: INTERNAL MEDICINE

## 2019-06-20 PROCEDURE — 96366 THER/PROPH/DIAG IV INF ADDON: CPT

## 2019-06-20 PROCEDURE — 96367 TX/PROPH/DG ADDL SEQ IV INF: CPT

## 2019-06-20 PROCEDURE — 6360000004 HC RX CONTRAST MEDICATION: Performed by: EMERGENCY MEDICINE

## 2019-06-20 PROCEDURE — 87040 BLOOD CULTURE FOR BACTERIA: CPT

## 2019-06-20 PROCEDURE — 85610 PROTHROMBIN TIME: CPT

## 2019-06-20 PROCEDURE — G0480 DRUG TEST DEF 1-7 CLASSES: HCPCS

## 2019-06-20 PROCEDURE — 94664 DEMO&/EVAL PT USE INHALER: CPT

## 2019-06-20 PROCEDURE — 83605 ASSAY OF LACTIC ACID: CPT

## 2019-06-20 PROCEDURE — 6370000000 HC RX 637 (ALT 250 FOR IP): Performed by: EMERGENCY MEDICINE

## 2019-06-20 PROCEDURE — 94640 AIRWAY INHALATION TREATMENT: CPT

## 2019-06-20 PROCEDURE — 99292 CRITICAL CARE ADDL 30 MIN: CPT

## 2019-06-20 PROCEDURE — 94002 VENT MGMT INPAT INIT DAY: CPT

## 2019-06-20 PROCEDURE — 83690 ASSAY OF LIPASE: CPT

## 2019-06-20 RX ORDER — SODIUM CHLORIDE 0.9 % (FLUSH) 0.9 %
10 SYRINGE (ML) INJECTION EVERY 12 HOURS SCHEDULED
Status: DISCONTINUED | OUTPATIENT
Start: 2019-06-20 | End: 2019-06-26 | Stop reason: HOSPADM

## 2019-06-20 RX ORDER — FENTANYL CITRATE 50 UG/ML
INJECTION, SOLUTION INTRAMUSCULAR; INTRAVENOUS
Status: COMPLETED
Start: 2019-06-20 | End: 2019-06-20

## 2019-06-20 RX ORDER — PREGABALIN 75 MG/1
150 CAPSULE ORAL 2 TIMES DAILY
Status: DISCONTINUED | OUTPATIENT
Start: 2019-06-20 | End: 2019-06-26 | Stop reason: HOSPADM

## 2019-06-20 RX ORDER — PROPOFOL 10 MG/ML
10 INJECTION, EMULSION INTRAVENOUS
Status: DISCONTINUED | OUTPATIENT
Start: 2019-06-20 | End: 2019-06-21

## 2019-06-20 RX ORDER — CHLORHEXIDINE GLUCONATE 0.12 MG/ML
15 RINSE ORAL 2 TIMES DAILY
Status: DISCONTINUED | OUTPATIENT
Start: 2019-06-20 | End: 2019-06-21

## 2019-06-20 RX ORDER — PROPOFOL 10 MG/ML
10 INJECTION, EMULSION INTRAVENOUS
Status: DISCONTINUED | OUTPATIENT
Start: 2019-06-20 | End: 2019-06-20

## 2019-06-20 RX ORDER — IPRATROPIUM BROMIDE AND ALBUTEROL SULFATE 2.5; .5 MG/3ML; MG/3ML
1 SOLUTION RESPIRATORY (INHALATION) ONCE
Status: COMPLETED | OUTPATIENT
Start: 2019-06-20 | End: 2019-06-20

## 2019-06-20 RX ORDER — ALBUTEROL SULFATE 90 UG/1
6 AEROSOL, METERED RESPIRATORY (INHALATION) EVERY 4 HOURS
Status: DISCONTINUED | OUTPATIENT
Start: 2019-06-20 | End: 2019-06-21

## 2019-06-20 RX ORDER — MIDAZOLAM HYDROCHLORIDE 1 MG/ML
INJECTION INTRAMUSCULAR; INTRAVENOUS
Status: COMPLETED
Start: 2019-06-20 | End: 2019-06-20

## 2019-06-20 RX ORDER — SODIUM CHLORIDE 0.9 % (FLUSH) 0.9 %
10 SYRINGE (ML) INJECTION PRN
Status: DISCONTINUED | OUTPATIENT
Start: 2019-06-20 | End: 2019-06-24 | Stop reason: SDUPTHER

## 2019-06-20 RX ORDER — LEVOTHYROXINE SODIUM 112 UG/1
112 TABLET ORAL DAILY
Status: DISCONTINUED | OUTPATIENT
Start: 2019-06-21 | End: 2019-06-26 | Stop reason: HOSPADM

## 2019-06-20 RX ORDER — 0.9 % SODIUM CHLORIDE 0.9 %
1000 INTRAVENOUS SOLUTION INTRAVENOUS ONCE
Status: DISCONTINUED | OUTPATIENT
Start: 2019-06-20 | End: 2019-06-20

## 2019-06-20 RX ORDER — METHYLPREDNISOLONE SODIUM SUCCINATE 125 MG/2ML
80 INJECTION, POWDER, LYOPHILIZED, FOR SOLUTION INTRAMUSCULAR; INTRAVENOUS DAILY
Status: DISCONTINUED | OUTPATIENT
Start: 2019-06-20 | End: 2019-06-20

## 2019-06-20 RX ORDER — ACETAMINOPHEN 650 MG/1
650 SUPPOSITORY RECTAL EVERY 4 HOURS PRN
Status: DISCONTINUED | OUTPATIENT
Start: 2019-06-20 | End: 2019-06-26 | Stop reason: HOSPADM

## 2019-06-20 RX ORDER — MIDAZOLAM HYDROCHLORIDE 5 MG/ML
INJECTION INTRAMUSCULAR; INTRAVENOUS
Status: DISCONTINUED
Start: 2019-06-20 | End: 2019-06-20 | Stop reason: WASHOUT

## 2019-06-20 RX ORDER — ALBUTEROL SULFATE 2.5 MG/3ML
2.5 SOLUTION RESPIRATORY (INHALATION) ONCE
Status: COMPLETED | OUTPATIENT
Start: 2019-06-20 | End: 2019-06-20

## 2019-06-20 RX ORDER — PANTOPRAZOLE SODIUM 40 MG/10ML
40 INJECTION, POWDER, LYOPHILIZED, FOR SOLUTION INTRAVENOUS DAILY
Status: DISCONTINUED | OUTPATIENT
Start: 2019-06-20 | End: 2019-06-26

## 2019-06-20 RX ORDER — SODIUM CHLORIDE 0.9 % (FLUSH) 0.9 %
10 SYRINGE (ML) INJECTION PRN
Status: DISCONTINUED | OUTPATIENT
Start: 2019-06-20 | End: 2019-06-26 | Stop reason: HOSPADM

## 2019-06-20 RX ORDER — SODIUM CHLORIDE 9 MG/ML
INJECTION, SOLUTION INTRAVENOUS CONTINUOUS
Status: DISCONTINUED | OUTPATIENT
Start: 2019-06-20 | End: 2019-06-26 | Stop reason: HOSPADM

## 2019-06-20 RX ORDER — IPRATROPIUM BROMIDE AND ALBUTEROL SULFATE 2.5; .5 MG/3ML; MG/3ML
1 SOLUTION RESPIRATORY (INHALATION) EVERY 6 HOURS PRN
Status: DISCONTINUED | OUTPATIENT
Start: 2019-06-20 | End: 2019-06-21

## 2019-06-20 RX ORDER — FLUOXETINE HYDROCHLORIDE 20 MG/1
40 CAPSULE ORAL DAILY
Status: DISCONTINUED | OUTPATIENT
Start: 2019-06-21 | End: 2019-06-26 | Stop reason: HOSPADM

## 2019-06-20 RX ORDER — ROSUVASTATIN CALCIUM 40 MG/1
40 TABLET, COATED ORAL NIGHTLY
Status: DISCONTINUED | OUTPATIENT
Start: 2019-06-20 | End: 2019-06-20 | Stop reason: ALTCHOICE

## 2019-06-20 RX ORDER — 0.9 % SODIUM CHLORIDE 0.9 %
500 INTRAVENOUS SOLUTION INTRAVENOUS ONCE
Status: COMPLETED | OUTPATIENT
Start: 2019-06-20 | End: 2019-06-20

## 2019-06-20 RX ORDER — SODIUM CHLORIDE 0.9 % (FLUSH) 0.9 %
10 SYRINGE (ML) INJECTION EVERY 12 HOURS SCHEDULED
Status: DISCONTINUED | OUTPATIENT
Start: 2019-06-20 | End: 2019-06-24 | Stop reason: SDUPTHER

## 2019-06-20 RX ORDER — ONDANSETRON 2 MG/ML
4 INJECTION INTRAMUSCULAR; INTRAVENOUS EVERY 6 HOURS PRN
Status: DISCONTINUED | OUTPATIENT
Start: 2019-06-20 | End: 2019-06-26 | Stop reason: HOSPADM

## 2019-06-20 RX ORDER — METHYLPREDNISOLONE SODIUM SUCCINATE 40 MG/ML
40 INJECTION, POWDER, LYOPHILIZED, FOR SOLUTION INTRAMUSCULAR; INTRAVENOUS EVERY 12 HOURS
Status: DISCONTINUED | OUTPATIENT
Start: 2019-06-20 | End: 2019-06-22

## 2019-06-20 RX ORDER — HYDROMORPHONE HYDROCHLORIDE 1 MG/ML
1 INJECTION, SOLUTION INTRAMUSCULAR; INTRAVENOUS; SUBCUTANEOUS ONCE
Status: DISCONTINUED | OUTPATIENT
Start: 2019-06-20 | End: 2019-06-20

## 2019-06-20 RX ORDER — VANCOMYCIN HYDROCHLORIDE 1 G/200ML
1000 INJECTION, SOLUTION INTRAVENOUS ONCE
Status: COMPLETED | OUTPATIENT
Start: 2019-06-20 | End: 2019-06-20

## 2019-06-20 RX ORDER — POTASSIUM CHLORIDE 7.45 MG/ML
10 INJECTION INTRAVENOUS PRN
Status: DISCONTINUED | OUTPATIENT
Start: 2019-06-20 | End: 2019-06-26 | Stop reason: HOSPADM

## 2019-06-20 RX ADMIN — IPRATROPIUM BROMIDE AND ALBUTEROL SULFATE 1 AMPULE: .5; 3 SOLUTION RESPIRATORY (INHALATION) at 13:47

## 2019-06-20 RX ADMIN — VANCOMYCIN HYDROCHLORIDE 1000 MG: 1 INJECTION, SOLUTION INTRAVENOUS at 17:43

## 2019-06-20 RX ADMIN — PROPOFOL 20 MCG/KG/MIN: 10 INJECTION, EMULSION INTRAVENOUS at 12:38

## 2019-06-20 RX ADMIN — FENTANYL CITRATE 50 MCG: 50 INJECTION INTRAMUSCULAR; INTRAVENOUS at 13:15

## 2019-06-20 RX ADMIN — CHLORHEXIDINE GLUCONATE 15 ML: 1.2 RINSE ORAL at 20:59

## 2019-06-20 RX ADMIN — IOPAMIDOL 75 ML: 755 INJECTION, SOLUTION INTRAVENOUS at 14:43

## 2019-06-20 RX ADMIN — PROPOFOL 40 MCG/KG/MIN: 10 INJECTION, EMULSION INTRAVENOUS at 17:16

## 2019-06-20 RX ADMIN — MIDAZOLAM 2 MG: 1 INJECTION INTRAMUSCULAR; INTRAVENOUS at 13:15

## 2019-06-20 RX ADMIN — FENTANYL CITRATE 0.5 MCG/KG/HR: 50 INJECTION INTRAVENOUS at 17:43

## 2019-06-20 RX ADMIN — Medication 10 ML: at 21:02

## 2019-06-20 RX ADMIN — ALBUTEROL SULFATE 2.5 MG: 2.5 SOLUTION RESPIRATORY (INHALATION) at 13:47

## 2019-06-20 RX ADMIN — Medication 10 ML: at 17:43

## 2019-06-20 RX ADMIN — CEFEPIME HYDROCHLORIDE 2 G: 2 INJECTION, POWDER, FOR SOLUTION INTRAVENOUS at 16:08

## 2019-06-20 RX ADMIN — Medication 6 PUFF: at 19:15

## 2019-06-20 RX ADMIN — SODIUM CHLORIDE: 9 INJECTION, SOLUTION INTRAVENOUS at 13:00

## 2019-06-20 RX ADMIN — PROPOFOL 40 MCG/KG/MIN: 10 INJECTION, EMULSION INTRAVENOUS at 18:08

## 2019-06-20 RX ADMIN — METHYLPREDNISOLONE SODIUM SUCCINATE 80 MG: 125 INJECTION, POWDER, FOR SOLUTION INTRAMUSCULAR; INTRAVENOUS at 13:30

## 2019-06-20 RX ADMIN — SODIUM CHLORIDE 500 ML: 9 INJECTION, SOLUTION INTRAVENOUS at 12:30

## 2019-06-20 RX ADMIN — PANTOPRAZOLE SODIUM 40 MG: 40 INJECTION, POWDER, FOR SOLUTION INTRAVENOUS at 17:42

## 2019-06-20 RX ADMIN — PROPOFOL 40 MCG/KG/MIN: 10 INJECTION, EMULSION INTRAVENOUS at 20:59

## 2019-06-20 RX ADMIN — ENOXAPARIN SODIUM 40 MG: 40 INJECTION SUBCUTANEOUS at 21:00

## 2019-06-20 RX ADMIN — Medication 10 ML: at 21:00

## 2019-06-20 RX ADMIN — Medication 0.05 MCG/KG/MIN: at 13:15

## 2019-06-20 RX ADMIN — Medication 2 PUFF: at 19:15

## 2019-06-20 RX ADMIN — METHYLPREDNISOLONE SODIUM SUCCINATE 40 MG: 40 INJECTION, POWDER, FOR SOLUTION INTRAMUSCULAR; INTRAVENOUS at 23:00

## 2019-06-20 ASSESSMENT — PAIN SCALES - GENERAL
PAINLEVEL_OUTOF10: 0
PAINLEVEL_OUTOF10: 10

## 2019-06-20 ASSESSMENT — PULMONARY FUNCTION TESTS
PIF_VALUE: 22
PIF_VALUE: 24
PIF_VALUE: 22
PIF_VALUE: 19

## 2019-06-20 NOTE — ED NOTES
30mg of Etomidate administered via [facility]. Per Dr. Hay Cristina. 120mg of Succs administered via [facility] per Dr. Hay Cristina. RT remains at bedside.       Karin Eldridge RN  06/20/19 8612

## 2019-06-20 NOTE — ED NOTES
Pt brought to ED room and placed on monitor. Dr. Gabriela Easton, pharmacy, and RT at bedside for intubation.       Glenna Pedraza RN  06/20/19 6900

## 2019-06-20 NOTE — PROGRESS NOTES
4 Eyes Skin Assessment     The patient is being assess for  Admission    I agree that 2 RN's have performed a thorough Head to Toe Skin Assessment on the patient. ALL assessment sites listed below have been assessed. Areas assessed by both nurses:   [x]   Head, Face, and Ears   [x]   Shoulders, Back, and Chest  [x]   Arms, Elbows, and Hands   [x]   Coccyx, Sacrum, and IschIum  [x]   Legs, Feet, and Heels        Does the Patient have Skin Breakdown?   Yes LDA WOUND CARE was Initiated documentation include the Jessica-wound, Wound Assessment, Measurements, Dressing Treatment, Drainage, and Color\",         Jorge Luis Prevention initiated:  Yes   Wound Care Orders initiated:  Yes      [id number] nurse consulted for Pressure Injury (Stage 3,4, Unstageable, DTI, NWPT, and Complex wounds), New and Established Ostomies:  NA      Nurse 1 eSignature: Electronically signed by Pierre Castleman, RN on 6/20/19 at 6:26 PM    **SHARE this note so that the co-signing nurse is able to place an eSignature**    Nurse 2 eSignature: Electronically signed by Vaishnavi Hoskins RN on 6/20/19 at 6:50 PM

## 2019-06-20 NOTE — ED NOTES
Dr. Caridad Gupta continuing to attempt intubation RT remains at bedside. Pt o2 stats 93%.       Suellen Ordonez RN  06/20/19 [id number]

## 2019-06-20 NOTE — CONSULTS
site of abdominal cavity without mention of obstruction or gangrene     Incontinence     Knee pain, bilateral     pt states no cartilage    Spinal stenosis     Spinal stenosis      PAST SURGICAL HISTORY:  Past Surgical History:   Procedure Laterality Date    CHOLECYSTECTOMY      PARTIAL HYSTERECTOMY         FAMILY HISTORY:  family history is not on file. Unable to obtain due to intubation and sedation. SOCIAL HISTORY:   reports that she has never smoked. She has never used smokeless tobacco.    MEDICATIONS:  Scheduled Meds:   vancomycin  1,000 mg Intravenous Once    sodium chloride flush  10 mL Intravenous 2 times per day    FLUoxetine  40 mg Oral Daily    levothyroxine  1 tablet Oral Daily    pregabalin  150 mg Oral BID    rosuvastatin  40 mg Oral Nightly    mometasone-formoterol  2 puff Inhalation BID    sodium chloride flush  10 mL Intravenous 2 times per day    enoxaparin  40 mg Subcutaneous Daily    chlorhexidine  15 mL Mouth/Throat BID    pantoprazole  40 mg Intravenous Daily    albuterol sulfate HFA  6 puff Inhalation Q4H    And    ipratropium  6 puff Inhalation Q4H    methylPREDNISolone  40 mg Intravenous Q12H    [START ON 6/21/2019] cefepime  2 g Intravenous Q12H     Continuous Infusions:   sodium chloride 50 mL/hr at 06/20/19 1300    norepinephrine 0.08 mcg/kg/min (06/20/19 1544)    propofol      fentaNYL (SUBLIMAZE) infusion       PRN Meds:  sodium chloride flush, ipratropium-albuterol, sodium chloride flush, magnesium hydroxide, ondansetron, potassium chloride, acetaminophen    ALLERGIES:  Patient is allergic to sulfa antibiotics. REVIEW OF SYSTEMS:  Unable to obtain due to intubation and sedation. PHYSICAL EXAM:  VITAL SIGNS: Blood pressure (!) 137/51, pulse 73, temperature 98.6 °F (37 °C), temperature source Temporal, resp. rate 18, height 5' 1\" (1.549 m), weight 290 lb (131.5 kg), SpO2 99 %. Gen:   Intubated and on mechanical ventilation.    Eyes:   Equal pupils. Clear conjunctiva. ENT:   Lips and tongue are normal. Endotracheal tube is in place. Neck:   Trachea is midline. No JVD. Resp:   No accessory muscle use. Mild wheezing. CV:   PMI is normal. No murmur or gallop. GI:   Soft and not distended. No tenderness. Obese. :  Ngo catheter in place. Skin:   Warm and dry. No rash. No cyanosis. Lymph:  No cervical or supraclavicular lymph nodes. M/S:  No joint swelling or tenderness in RUE, LUE, RLE, or LLE. No edema. Neuro:  Sedated. No tremor. LABS:  CBC:   Recent Labs     06/20/19  1259   WBC 10.9   HGB 10.8*   HCT 35.1*   MCV 93.5        CHEMISTRY:   Recent Labs     06/20/19  1259   *   K 5.3*   CL 95*   CO2 31   BUN 38*   CREATININE 1.4*   GLUCOSE 131*     LIVER PROFILE:   Recent Labs     06/20/19  1259   AST 40*   ALT 37   LIPASE 33.0   BILITOT 0.3   ALKPHOS 99       PT/INR:   Recent Labs     06/20/19  1259   PROTIME 10.8   INR 0.95     APTT:   Recent Labs     06/20/19  1259   APTT 31.2       ABG:   Recent Labs     06/20/19  1335   PHART 7.391   HXE7XEA 56.4*   PO2ART 277.0*       UA:  Recent Labs     06/20/19  1602   COLORU YELLOW   PHUR 6.0  6.0   WBCUA 458*   RBCUA 8*   BACTERIA 4+*   CLARITYU TURBID*   SPECGRAV 1.027   LEUKOCYTESUR LARGE*   UROBILINOGEN 0.2   BILIRUBINUR Negative   BLOODU TRACE*   GLUCOSEU Negative         CHEST XRAY 6/20/19:  Interval placement of a right internal jugular central venous catheter.  The   patient is moderately rotated, but the tip appears to be in the mid SVC.  No   pneumothorax detected.  Endotracheal tube is unchanged.  The heart is mildly   enlarged.  Stable mild airspace opacities in the left lower lung zone. Pleural fluid is not definitively observed.  Only partial visualization of   the right lower lung zone.  There does appear to be increasing reticular and   ground-glass opacification on the right.  No skeletal findings.        CHEST, ABDOMEN AND PELVIS CT SCAN aspiration, or pneumonia. CRITICAL CARE TIME:  I was at the bedside or in the ICU for 45 minutes providing Critical Care to Kasie Tomlinson.

## 2019-06-20 NOTE — FLOWSHEET NOTE
06/20/19 1734   Encounter Summary   Services provided to: Family   Referral/Consult From: Nurse   Support System Children;Family members   Continue Visiting   (visit w/pt's daughter and grandson 6/20 CL)   Complexity of Encounter Moderate   Length of Encounter 15 minutes   Spiritual/Orthodox   Type Spiritual support   Assessment Calm; Approachable;Coping   Intervention Active listening;Explored feelings, thoughts, concerns; Discussed illness/injury and it's impact   Outcome Engaged in conversation;Coping   Electronically signed by Rhona Gallagher on 6/20/2019 at 5:37 PM

## 2019-06-20 NOTE — ED NOTES
Unable to perform baseline NIH stroke assessment due to pt mental status and combative behavior upon arrival. Dr. Caridad Gupta to further assess pt at this time.       Delores Aldana RN  06/20/19 5860

## 2019-06-20 NOTE — ED PROVIDER NOTES
[facility]  eMERGENCY dEPARTMENTeNCOUnter      Pt Name: Morelia Marina  MRN: [id number]  [place] 1935  Date ofevaluation: 6/20/2019  Provider: Robert Joseph III, DO    CHIEF COMPLAINT       Chief Complaint   Patient presents with    Altered Mental Status     pt brought in by squad from home. pt family states pt is altered. pt combative upon arrival and taken directly to CT and assessed by Dr. Linda Perera. HISTORY OF PRESENT ILLNESS   (Location/Symptom, Timing/Onset,Context/Setting, Quality, Duration, Modifying Factors, Severity)  Note limiting factors. Morelia Marina is a 82 y.o. female who presents to the emergency department respiratory distress. Patient came in hypoxic 85%. She was also apparently confused, and combative, tremulous to the upper extremities. Patient was made a stroke alert by EMS personnel was brought immediately into the [address] She maintains some hypoxia on nonrebreather but had improved number at 89%. She CTAs were not able to be performed and I do not think needed, because she was a difficult IV stick at that time and airway need to be maintained. BiPAP was started but she did not do well with this. Patient was intubated. Decompensation all occurred today. Initially there were no family members or friends at bedside. Other HPI is difficult or impossible to obtain. HPI    NursingNotes were reviewed. REVIEW OF SYSTEMS    (2-9 systems for level 4, 10 or more for level 5)     Review of Systems    Except as noted above the remainder of the review of systems was reviewed and negative.        PAST MEDICAL HISTORY     Past Medical History:   Diagnosis Date    Arthritis     osteo    COPD (chronic obstructive pulmonary disease) ([facility] [address]     Hemorrhoids     Hernia of unspecified site of abdominal cavity without mention of obstruction or gangrene     Incontinence     Knee pain, bilateral     pt states no cartilage    Spinal stenosis     Spinal stenosis          SURGICALHISTORY       Past Surgical History:   Procedure Laterality Date    CHOLECYSTECTOMY      PARTIAL HYSTERECTOMY           CURRENT MEDICATIONS       Previous Medications    ESOMEPRAZOLE (NEXIUM) 40 MG CAPSULE    Take 40 mg by mouth every morning (before breakfast). FLUOXETINE (PROZAC) 20 MG CAPSULE    Take 40 mg by mouth daily. FUROSEMIDE (LASIX) 40 MG TABLET    TAKE 1 TABLET BY MOUTH EVERY DAY    IPRATROPIUM-ALBUTEROL (DUONEB) 0.5-2.5 (3) MG/3ML SOLN NEBULIZER SOLUTION    Inhale 1 vial into the lungs every 6 hours as needed for Shortness of Breath    LEVOTHYROXINE (SYNTHROID) 112 MCG TABLET    TAKE 1 TABLET BY MOUTH EVERY DAY    LEVOTHYROXINE SODIUM (TIROSINT) 125 MCG CAPS    Take 125 mg by mouth    LISINOPRIL (PRINIVIL;ZESTRIL) 5 MG TABLET    Take 1 tablet by mouth daily    LORATADINE (CLARITIN) 10 MG TABLET    Take 1 tablet by mouth daily    LUBIPROSTONE (AMITIZA) 24 MCG CAPSULE    Take 24 mcg by mouth 2 times daily    MAGNESIUM HYDROXIDE (MILK OF MAGNESIA) 400 MG/5ML SUSPENSION    Take 30 mLs by mouth daily as needed for Constipation    MOVANTIK 25 MG TABS TABLET    Take 25 mg by mouth daily as needed    OXYCODONE-ACETAMINOPHEN (PERCOCET)  MG PER TABLET    Take 1 tablet by mouth every 6 hours as needed for Pain. PREGABALIN (LYRICA) 200 MG CAPSULE    Take 200 mg by mouth daily. PREGABALIN (LYRICA) 50 MG CAPSULE    Take 150 mg by mouth 2 times daily. ROSUVASTATIN (CRESTOR) 40 MG TABLET    Take 1 tablet by mouth    SENNA (SENOKOT) 8.6 MG TABLET    Take 1 tablet by mouth daily    SUMATRIPTAN (IMITREX) 25 MG TABLET    Take 50 mg by mouth once as needed. SYMBICORT 160-4.5 MCG/ACT AERO    TAKE 2 PUFFS TWICE A DAY       ALLERGIES     Sulfa antibiotics    FAMILY HISTORY     History reviewed. No pertinent family history.        SOCIAL HISTORY       Social History     Socioeconomic History    Marital status:      Spouse name: None    Number of children: None    Years of education: None    Highest education level: None   Occupational History    None   Social Needs    Financial resource strain: None    Food insecurity:     Worry: None     Inability: None    Transportation needs:     Medical: None     Non-medical: None   Tobacco Use    Smoking status: Never Smoker    Smokeless tobacco: Never Used   Substance and Sexual Activity    Alcohol use: No    Drug use: No    Sexual activity: None   Lifestyle    Physical activity:     Days per week: None     Minutes per session: None    Stress: None   Relationships    Social connections:     Talks on phone: None     Gets together: None     Attends Rastafari service: None     Active member of club or organization: None     Attends meetings of clubs or organizations: None     Relationship status: None    Intimate partner violence:     Fear of current or ex partner: None     Emotionally abused: None     Physically abused: None     Forced sexual activity: None   Other Topics Concern    None   Social History Narrative    None       SCREENINGS      @FLOW([id number])@      PHYSICAL EXAM    (up to 7 for level 4, 8 or more for level 5)     ED Triage Vitals   BP Temp Temp src Pulse Resp SpO2 Height Weight   -- -- -- -- -- -- -- --       Physical Exam      Constitutional:  Well developed, well nourished, obese,toxic appearance   Eyes:  PERRL, conjunctiva normal   HENT:  Atraumatic, external ears normal, nosenormal, oropharynx DRY, no pharyngeal exudates. Respiratory:  +respiratory distress, symmetrical back breath sounds with poor auscultation exam due to effort or body habitus  Cardiovascular:  Normal rate, normal rhythm, no murmurs,   GI:  Soft, nondistended, nontender, morbidly obese, no obvious organomegaly, no mass, no rebound, no guarding   Musculoskeletal:  No tenderness, no deformities.   Integument:  no rashes on exposed surfaces  Neurologic:  Alert & oriented x 3,  normalmotor function, normal sensory function, no focal deficits noted   Psychiatric:  Speech and behavior appropriate. No agitation. DIAGNOSTICRESULTS     EKG: All EKG's are interpreted by the Emergency Department Physician who either signs or Co-signs this chartin the absence of a cardiologist.  And  RADIOLOGY:           CT ABDOMEN PELVIS W IV CONTRAST Additional Contrast? None (Final result)   Result time 06/20/19 15:08:51   Final result by Mansoor Moreira MD (06/20/19 15:08:51)                Impression:    1. Mild bibasilar segmental atelectasis versus pneumonia. 2. No acute abdominopelvic abnormality. 3. Apparent intravaginal Ngo catheter.  Clinical correlation requested. Narrative:    EXAMINATION:  CT OF THE ABDOMEN AND PELVIS WITH CONTRAST 6/20/2019 2:08 pm    TECHNIQUE:  CT of the abdomen and pelvis was performed with the administration of  intravenous contrast. Multiplanar reformatted images are provided for review. Dose modulation, iterative reconstruction, and/or weight based adjustment of  the mA/kV was utilized to reduce the radiation dose to as low as reasonably  achievable. COMPARISON:  04/30/2016    HISTORY:  ORDERING SYSTEM PROVIDED HISTORY: Kelly  TECHNOLOGIST PROVIDED HISTORY:  Additional Contrast?->None  Ordering Physician Provided Reason for Exam: Kelly  Acuity: Unknown  Type of Exam: Unknown    FINDINGS:  Lower Chest: There is mild bibasilar segmental airspace disease.  The pleural  spaces are clear. Organs: Postsurgical changes of cholecystectomy are present.  The liver,  pancreas, spleen, kidneys and adrenals are unremarkable aside from a benign  2.9 cm simple left renal cyst.    GI/Bowel: There is no bowel dilatation, wall thickening or obstruction. Pelvis: The bladder is moderately distended. Bernadette Leventhal is a Ngo catheter in  place, the balloon of which appears to be centered within the upper vagina. Postsurgical changes of hysterectomy are present.     Peritoneum/Retroperitoneum: There is no free air, free fluid or  intraperitoneal inflammatory change.  There is no adenopathy. Bones/Soft Tissues: There is no evidence of fracture or aggressive osseous  lesion.                    CT CHEST PULMONARY EMBOLISM W CONTRAST (In process)         XR CHEST PORTABLE (Final result)   Result time 06/20/19 14:03:36   Final result by Murlean Cabot, MD (06/20/19 14:03:36)                Impression:    No complication following right IJ central venous catheter placement.  Stable  airspace changes at the left lung base with questionable new airspace changes  on the right that are partially excluded from field of view. Narrative:    EXAMINATION:  ONE XRAY VIEW OF THE CHEST    6/20/2019 1:34 pm    COMPARISON:  06/20/2019 radiograph at 12:51 p.m. HISTORY:  ORDERING SYSTEM PROVIDED HISTORY: CVC  TECHNOLOGIST PROVIDED HISTORY:  Reason for exam:->CVC  Ordering Physician Provided Reason for Exam: CVC line placement  Acuity: Acute  Type of Exam: Subsequent/Follow-up    FINDINGS:  Interval placement of a right internal jugular central venous catheter.  The  patient is moderately rotated, but the tip appears to be in the mid SVC.  No  pneumothorax detected.  Endotracheal tube is unchanged.  The heart is mildly  enlarged.  Stable mild airspace opacities in the left lower lung zone. Pleural fluid is not definitively observed.  Only partial visualization of  the right lower lung zone.  There does appear to be increasing reticular and  ground-glass opacification on the right.  No skeletal findings.                    XR CHEST PORTABLE (Final result)   Result time 06/20/19 13:02:35   Final result by Sudeep Noel MD (06/20/19 13:02:35)                Impression:    Endotracheal tube tip is 3.5 cm above the merced. Increasing asymmetric left-sided airspace disease with more focal  consolidation in the left lung base.  Findings may be related to asymmetric  edema and/or pneumonia.             Narrative: EXAMINATION:  ONE XRAY VIEW OF THE CHEST    6/20/2019 12:47 pm    COMPARISON:  Chest radiograph earlier same date and priors. HISTORY:  ORDERING SYSTEM PROVIDED HISTORY: intubation  TECHNOLOGIST PROVIDED HISTORY:  Reason for exam:->intubation  Acuity: Unknown  Type of Exam: Unknown    FINDINGS:  The lung volumes are low.  Study is limited due to low lung volumes and  underpenetrationd.  There has been placement endotracheal tube with the tip  approximately 3.5 cm above the merced.  There is asymmetric left-sided  airspace disease with more focal consolidation in the left base.  There is a  probable small left-sided pleural effusion.  No pneumothorax.  Cardiac and  mediastinal contours are unchanged when accounting for positioning.  No acute  osseous abnormality.                    XR CHEST PORTABLE (Final result)   Result time 06/20/19 12:29:21   Final result by Ana Benson MD (06/20/19 12:29:21)                Impression:    1. Trace pleural effusions and mild bibasilar atelectasis. 2. Low lung volumes. Narrative:    EXAMINATION:  ONE XRAY VIEW OF THE CHEST    6/20/2019 12:24 pm    COMPARISON:  06/11/2019    HISTORY:  ORDERING SYSTEM PROVIDED HISTORY: SOB/CP  TECHNOLOGIST PROVIDED HISTORY:  Reason for exam:->SOB/CP  Ordering Physician Provided Reason for Exam: SOB/CP  Acuity: Unknown  Type of Exam: Unknown    FINDINGS:  Low lung volumes.  Trace pleural effusions and mild bibasilar atelectasis. No other lung consolidation.  Mild stable cardiomegaly.                    CT Head WO Contrast (Final result)   Result time 06/20/19 12:46:50   Final result by Ana Benson MD (06/20/19 12:46:50)                Impression:    Mild motion artifact.  No acute intracranial abnormality.             Narrative:    EXAMINATION:  CT OF THE HEAD WITHOUT CONTRAST  6/20/2019 12:17 pm    TECHNIQUE:  CT of the head was performed without the administration of intravenous  contrast. Dose modulation, iterative reconstruction, and/or weight based  adjustment of the mA/kV was utilized to reduce the radiation dose to as low  as reasonably achievable. COMPARISON:  None. HISTORY:  ORDERING SYSTEM PROVIDED HISTORY: stroke  TECHNOLOGIST PROVIDED HISTORY:  Has a \"code stroke\" or \"stroke alert\" been called? ->Yes  Ordering Physician Provided Reason for Exam: stroke  Acuity: Unknown  Type of Exam: Unknown    FINDINGS:  Mild motion artifact. BRAIN/VENTRICLES: There is no acute intracranial hemorrhage, mass effect or  midline shift.  No abnormal extra-axial fluid collection.  The gray-white  differentiation is maintained without evidence of an acute infarct.  There is  no evidence of hydrocephalus. ORBITS: The visualized portion of the orbits demonstrate no acute abnormality. SINUSES: The visualized paranasal sinuses and mastoid air cells demonstrate  no acute abnormality.     SOFT TISSUES/SKULL:  No acute abnormality of the visualized skull or soft  tissues.                ECG Results         EKG 12 Lead (Final result)    Component (Lab Inquiry)   Collection Time Result Time Ventricular Rate Atrial Rate P-R Interval QRS Duration Q-T Interval   06/20/19 12:45:47 06/20/19 14:14:19 90 90 170 110 360       Collection Time Result Time QTc Calculation (Bazett) P Axis R Axis T Axis Diagnosis   06/20/19 12:45:47 06/20/19 14:14:19 440 45 -32 37 Normal sinus rhythmLeft axis deviationSeptal infarct , age undetermined possiblyBorderline ECGConfirmed by [facility] John SINGH MD ([id number]) on 6/20/2019 2:14:19 PM                       LABS:  Labs Reviewed   CBC WITH AUTO DIFFERENTIAL - Abnormal; Notable for the following components:       Result Value    RBC 3.75 (*)     Hemoglobin 10.8 (*)     Hematocrit 35.1 (*)     MCHC 30.7 (*)     RDW 15.7 (*)     Neutrophils # 9.4 (*)     Lymphocytes # 0.8 (*)     Myelocytes Relative 1 (*)     Anisocytosis 1+ (*)     Polychromasia Occasional (*)     All other components within normal limits Narrative:     Performed at:  [facility] [address] [facility] [place] [employer], [address]   Phone [phone]   COMPREHENSIVE METABOLIC PANEL W/ REFLEX TO MG FOR LOW K - Abnormal; Notable for the following components:    Sodium 135 (*)     Potassium reflex Magnesium 5.3 (*)     Chloride 95 (*)     Glucose 131 (*)     BUN 38 (*)     CREATININE 1.4 (*)     GFR Non- 36 (*)     GFR  44 (*)     Alb 3.3 (*)     Albumin/Globulin Ratio 1.0 (*)     AST 40 (*)     All other components within normal limits    Narrative:     Performed at:  [facility] [address] [employer], [employer]   Phone [phone]   BLOOD GAS, ARTERIAL - Abnormal; Notable for the following components:    pCO2, Arterial 56.4 (*)     pO2, Arterial 277.0 (*)     HCO3, Arterial 34.2 (*)     Base Excess, Arterial 7.9 (*)     Hemoglobin, Art, Extended 10.6 (*)     All other components within normal limits    Narrative:     Performed at:  [facility] [address] [employer], [address]   Phone [phone]   CULTURE BLOOD #1   CULTURE BLOOD #2   LIPASE    Narrative:     Performed at:  [facility] [address] [facility] [place] [employer], [address]   Phone [phone]   TROPONIN    Narrative:     Performed at:  [facility] [address], [employer]   Phone [phone] PEPTIDE    Narrative:     Performed at:  [facility] [address], [employer]   Phone [phone]   PROTIME-INR    Narrative:     Performed at:  [facility] [address], [employer]   Phone [phone]   APTT    Narrative:     Performed at:  [facility] [address], [address]   Phone [phone]   ETHANOL    Narrative: Performed at:  [facility]  [address] [facility] [address] [facility] [address]   Phone [phone]   LACTATE, SEPSIS    Narrative:     Performed at:  [facility]  [address] [facility] [address]   Phone [phone]   [place]       All other labs were within normal range or not returned as ofthis dictation. EMERGENCYDEPARTMENT COURSE and DIFFERENTIAL DIAGNOSIS/MDM:   Vitals:    Vitals:    06/20/19 1433 06/20/19 1440 06/20/19 1500 06/20/19 1529   BP: (!) 142/45 (!) 131/58 (!) 136/57    Pulse: 85 83 75    Resp: 18 18 20 20   SpO2: 100% 100% 100%    Weight:       Height:           MDM:   1201 -Case discussed with stroke neurology. Patient was made a stroke alert by EMS out in the field. I evaluated the patient in the CT day. She is moving all 4 extremities, on nonrebreather, without any obvious acute deficits. Medic was telling me that she activated the stroke alert because the patient was confused, generally weak and having upper extremity tremors because of agitation. Patient was also hypoxic earlier today. She arrives today on a nonrebreather. IV was not able to be obtained while in the CT day. Patient is not a TPA candidate obviously. BiPAP failed on this patient and patient was intubated. She was hypotensive before the intubation before any sedatives. Although after intubation she seemed to be more normotensive but then became hypotensive once again requiring pressors and central line. Also consulted with the patient's daughter at bedside eventually who arrived. Patient given broad-spectrum antibiotics and some IV fluids today. No obvious sepsis and I think probably her hypotension is more related to cardiac in nature.   Case discussed with hospitalist/ICU who will admit      CONSULTS:  IP CONSULT TO HOSPITALIST      CRITICAL CARE TIME   Total Critical Care time was 115 minutes, A guide wire was then inserted into the vein through the needle. A triple lumen catheter was then inserted into the vessel over the guide wire using the Seldinger technique. All ports showed good, free flowing blood return and were flushed with saline solution. The catheter was then securely fastened to the skin with sutures and covered with a sterile dressing. A post procedure X-ray was not DONE AND LOOKED OK    The patient tolerated the procedure well. Complications: None    Electronically Signed by:yeison                   Procedures    FINAL IMPRESSION      1. Acute respiratory failure, unspecified whether with hypoxia or hypercapnia ([address]    2. Altered mental status, unspecified altered mental status type    3. Hypotension, unspecified hypotension type    4. Abnormal pulmonary finding          DISPOSITION/PLAN   DISPOSITION Decision To Admit 06/20/2019 03:21:09 PM      PATIENT REFERRED TO:  No follow-up provider specified.     DISCHARGE MEDICATIONS:  New Prescriptions    No medications on file          (Please note that portions of this note were completed with a voice recognition program.  Efforts weremade to edit the dictations but occasionally words are mis-transcribed.)    Aubree Agee III, DO (electronically signed)  Attending Emergency Physician          Barb Dumont III, DO  06/20/19 3134

## 2019-06-20 NOTE — ED NOTES
Attempted to obtain urine specimen when patel catheter was placed. Urine return noted but not enough for specimen at this time. Pt incontinent of urine as patel catheter was being placed. Pt cleaned and repositioned for comfort. Will monitor closely.       Neal Cabezas RN  06/20/19 5250

## 2019-06-20 NOTE — ED NOTES
Bedside report given to ICU RN at this time. Pt continued on propofol, Levophed, and antibiotics to floor. With RT and ICU RN at bedside.       Teresa Robison RN  06/20/19 6188

## 2019-06-20 NOTE — PROGRESS NOTES
Palliative Care:     Patient has no advanced directives. She is known to palliative care from recent hospital admission. Patient decided to make herself DNR CCA. She expressed she did not want intubation but BiPAP was acceptable. Her daughter Tejinder Bailey cares for her at home as her CNA. Spoke with daughter Tejinder Bailey. Reviewed clinical status. She states patient never expressed end of life wishes with her. For now would like to continue with intubation and ventilator support. She states patient was declining rapidly at home. She has been non compliant with CPAP at home but was in the process of sleep medicine MD adjusting her settings and she recently obtained a new mask. Will follow and ask  to support.

## 2019-06-20 NOTE — ED NOTES
Successful intubation with positive color change. 24cm secured at the lip.       Westley Wiggins RN  06/20/19 3134

## 2019-06-21 ENCOUNTER — APPOINTMENT (OUTPATIENT)
Dept: GENERAL RADIOLOGY | Age: 84
DRG: 871 | End: 2019-06-21
Payer: COMMERCIAL

## 2019-06-21 LAB
A/G RATIO: 1.1 (ref 1.1–2.2)
ALBUMIN SERPL-MCNC: 3.5 G/DL (ref 3.4–5)
ALP BLD-CCNC: 106 U/L (ref 40–129)
ALT SERPL-CCNC: 42 U/L (ref 10–40)
ANION GAP SERPL CALCULATED.3IONS-SCNC: 8 MMOL/L (ref 3–16)
ANISOCYTOSIS: ABNORMAL
AST SERPL-CCNC: 33 U/L (ref 15–37)
BANDED NEUTROPHILS RELATIVE PERCENT: 3 % (ref 0–7)
BASE EXCESS ARTERIAL: 6 MMOL/L (ref -3–3)
BASE EXCESS ARTERIAL: 6.9 MMOL/L (ref -3–3)
BASOPHILS ABSOLUTE: 0 K/UL (ref 0–0.2)
BASOPHILS RELATIVE PERCENT: 0 %
BILIRUB SERPL-MCNC: 0.3 MG/DL (ref 0–1)
BUN BLDV-MCNC: 23 MG/DL (ref 7–20)
CALCIUM SERPL-MCNC: 9.3 MG/DL (ref 8.3–10.6)
CARBOXYHEMOGLOBIN ARTERIAL: 0.8 % (ref 0–1.5)
CARBOXYHEMOGLOBIN ARTERIAL: 1.1 % (ref 0–1.5)
CHLORIDE BLD-SCNC: 100 MMOL/L (ref 99–110)
CO2: 32 MMOL/L (ref 21–32)
CREAT SERPL-MCNC: 0.9 MG/DL (ref 0.6–1.2)
EOSINOPHILS ABSOLUTE: 0 K/UL (ref 0–0.6)
EOSINOPHILS RELATIVE PERCENT: 0 %
GFR AFRICAN AMERICAN: >60
GFR NON-AFRICAN AMERICAN: 60
GLOBULIN: 3.1 G/DL
GLUCOSE BLD-MCNC: 235 MG/DL (ref 70–99)
HCO3 ARTERIAL: 33.3 MMOL/L (ref 21–29)
HCO3 ARTERIAL: 34.1 MMOL/L (ref 21–29)
HCT VFR BLD CALC: 33 % (ref 36–48)
HEMOGLOBIN, ART, EXTENDED: 10.7 G/DL (ref 12–16)
HEMOGLOBIN, ART, EXTENDED: 10.9 G/DL (ref 12–16)
HEMOGLOBIN: 10.8 G/DL (ref 12–16)
LYMPHOCYTES ABSOLUTE: 0.4 K/UL (ref 1–5.1)
LYMPHOCYTES RELATIVE PERCENT: 3 %
MAGNESIUM: 2.5 MG/DL (ref 1.8–2.4)
MCH RBC QN AUTO: 29.2 PG (ref 26–34)
MCHC RBC AUTO-ENTMCNC: 32.6 G/DL (ref 31–36)
MCV RBC AUTO: 89.3 FL (ref 80–100)
METHEMOGLOBIN ARTERIAL: 0.3 %
METHEMOGLOBIN ARTERIAL: 0.4 %
MONOCYTES ABSOLUTE: 0.3 K/UL (ref 0–1.3)
MONOCYTES RELATIVE PERCENT: 2 %
MYELOCYTE PERCENT: 3 %
NEUTROPHILS ABSOLUTE: 13.8 K/UL (ref 1.7–7.7)
NEUTROPHILS RELATIVE PERCENT: 89 %
O2 CONTENT ARTERIAL: 15 ML/DL
O2 CONTENT ARTERIAL: 15 ML/DL
O2 SAT, ARTERIAL: 96.6 %
O2 SAT, ARTERIAL: 99.7 %
O2 THERAPY: ABNORMAL
O2 THERAPY: ABNORMAL
PCO2 ARTERIAL: 61.7 MMHG (ref 35–45)
PCO2 ARTERIAL: 61.9 MMHG (ref 35–45)
PDW BLD-RTO: 15.1 % (ref 12.4–15.4)
PH ARTERIAL: 7.34 (ref 7.35–7.45)
PH ARTERIAL: 7.35 (ref 7.35–7.45)
PHOSPHORUS: 2.9 MG/DL (ref 2.5–4.9)
PLATELET # BLD: 234 K/UL (ref 135–450)
PLATELET SLIDE REVIEW: ADEQUATE
PMV BLD AUTO: 9.1 FL (ref 5–10.5)
PO2 ARTERIAL: 147 MMHG (ref 75–108)
PO2 ARTERIAL: 82 MMHG (ref 75–108)
POTASSIUM SERPL-SCNC: 5.2 MMOL/L (ref 3.5–5.1)
PROCALCITONIN: 0.35 NG/ML (ref 0–0.15)
RBC # BLD: 3.7 M/UL (ref 4–5.2)
SLIDE REVIEW: ABNORMAL
SODIUM BLD-SCNC: 140 MMOL/L (ref 136–145)
TCO2 ARTERIAL: 79 MMOL/L
TCO2 ARTERIAL: 80.6 MMOL/L
TOTAL PROTEIN: 6.6 G/DL (ref 6.4–8.2)
TOXIC GRANULATION: PRESENT
WBC # BLD: 14.5 K/UL (ref 4–11)

## 2019-06-21 PROCEDURE — 94003 VENT MGMT INPAT SUBQ DAY: CPT

## 2019-06-21 PROCEDURE — 99223 1ST HOSP IP/OBS HIGH 75: CPT | Performed by: INTERNAL MEDICINE

## 2019-06-21 PROCEDURE — 94770 HC ETCO2 MONITOR DAILY: CPT

## 2019-06-21 PROCEDURE — 87641 MR-STAPH DNA AMP PROBE: CPT

## 2019-06-21 PROCEDURE — 84100 ASSAY OF PHOSPHORUS: CPT

## 2019-06-21 PROCEDURE — 82803 BLOOD GASES ANY COMBINATION: CPT

## 2019-06-21 PROCEDURE — 94750 HC PULMONARY COMPLIANCE STUDY: CPT

## 2019-06-21 PROCEDURE — 83735 ASSAY OF MAGNESIUM: CPT

## 2019-06-21 PROCEDURE — 6370000000 HC RX 637 (ALT 250 FOR IP): Performed by: INTERNAL MEDICINE

## 2019-06-21 PROCEDURE — 6360000002 HC RX W HCPCS: Performed by: INTERNAL MEDICINE

## 2019-06-21 PROCEDURE — 2580000003 HC RX 258: Performed by: INTERNAL MEDICINE

## 2019-06-21 PROCEDURE — 84145 PROCALCITONIN (PCT): CPT

## 2019-06-21 PROCEDURE — C9113 INJ PANTOPRAZOLE SODIUM, VIA: HCPCS | Performed by: INTERNAL MEDICINE

## 2019-06-21 PROCEDURE — 99233 SBSQ HOSP IP/OBS HIGH 50: CPT | Performed by: INTERNAL MEDICINE

## 2019-06-21 PROCEDURE — 2700000000 HC OXYGEN THERAPY PER DAY

## 2019-06-21 PROCEDURE — 94640 AIRWAY INHALATION TREATMENT: CPT

## 2019-06-21 PROCEDURE — 85025 COMPLETE CBC W/AUTO DIFF WBC: CPT

## 2019-06-21 PROCEDURE — 87449 NOS EACH ORGANISM AG IA: CPT

## 2019-06-21 PROCEDURE — 2580000003 HC RX 258: Performed by: EMERGENCY MEDICINE

## 2019-06-21 PROCEDURE — 2500000003 HC RX 250 WO HCPCS: Performed by: INTERNAL MEDICINE

## 2019-06-21 PROCEDURE — 94761 N-INVAS EAR/PLS OXIMETRY MLT: CPT

## 2019-06-21 PROCEDURE — 71045 X-RAY EXAM CHEST 1 VIEW: CPT

## 2019-06-21 PROCEDURE — 2000000000 HC ICU R&B

## 2019-06-21 PROCEDURE — 80053 COMPREHEN METABOLIC PANEL: CPT

## 2019-06-21 PROCEDURE — 36415 COLL VENOUS BLD VENIPUNCTURE: CPT

## 2019-06-21 RX ORDER — IPRATROPIUM BROMIDE AND ALBUTEROL SULFATE 2.5; .5 MG/3ML; MG/3ML
1 SOLUTION RESPIRATORY (INHALATION)
Status: DISCONTINUED | OUTPATIENT
Start: 2019-06-21 | End: 2019-06-26 | Stop reason: HOSPADM

## 2019-06-21 RX ORDER — ALBUTEROL SULFATE 2.5 MG/3ML
2.5 SOLUTION RESPIRATORY (INHALATION)
Status: DISCONTINUED | OUTPATIENT
Start: 2019-06-21 | End: 2019-06-26 | Stop reason: HOSPADM

## 2019-06-21 RX ORDER — CASTOR OIL AND BALSAM, PERU 788; 87 MG/G; MG/G
OINTMENT TOPICAL 2 TIMES DAILY
Status: DISCONTINUED | OUTPATIENT
Start: 2019-06-21 | End: 2019-06-26 | Stop reason: HOSPADM

## 2019-06-21 RX ORDER — LINEZOLID 600 MG/1
600 TABLET, FILM COATED ORAL EVERY 12 HOURS SCHEDULED
Status: DISCONTINUED | OUTPATIENT
Start: 2019-06-21 | End: 2019-06-22

## 2019-06-21 RX ADMIN — PROPOFOL 40 MCG/KG/MIN: 10 INJECTION, EMULSION INTRAVENOUS at 01:26

## 2019-06-21 RX ADMIN — PANTOPRAZOLE SODIUM 40 MG: 40 INJECTION, POWDER, FOR SOLUTION INTRAVENOUS at 09:17

## 2019-06-21 RX ADMIN — Medication 1 SPRAY: at 13:10

## 2019-06-21 RX ADMIN — Medication 6 PUFF: at 08:17

## 2019-06-21 RX ADMIN — FENTANYL CITRATE 0.5 MCG/KG/HR: 50 INJECTION INTRAVENOUS at 06:23

## 2019-06-21 RX ADMIN — PROPOFOL 50 MCG/KG/MIN: 10 INJECTION, EMULSION INTRAVENOUS at 06:22

## 2019-06-21 RX ADMIN — METHYLPREDNISOLONE SODIUM SUCCINATE 40 MG: 40 INJECTION, POWDER, FOR SOLUTION INTRAMUSCULAR; INTRAVENOUS at 21:56

## 2019-06-21 RX ADMIN — Medication 6 PUFF: at 03:57

## 2019-06-21 RX ADMIN — Medication 6 PUFF: at 12:16

## 2019-06-21 RX ADMIN — CASTOR OIL AND BALSAM, PERU: 788; 87 OINTMENT TOPICAL at 13:10

## 2019-06-21 RX ADMIN — CEFEPIME HYDROCHLORIDE 2 G: 2 INJECTION, POWDER, FOR SOLUTION INTRAVENOUS at 04:32

## 2019-06-21 RX ADMIN — Medication 6 PUFF: at 12:15

## 2019-06-21 RX ADMIN — PROPOFOL 50 MCG/KG/MIN: 10 INJECTION, EMULSION INTRAVENOUS at 09:15

## 2019-06-21 RX ADMIN — IPRATROPIUM BROMIDE AND ALBUTEROL SULFATE 1 AMPULE: .5; 3 SOLUTION RESPIRATORY (INHALATION) at 16:26

## 2019-06-21 RX ADMIN — CASTOR OIL AND BALSAM, PERU: 788; 87 OINTMENT TOPICAL at 20:54

## 2019-06-21 RX ADMIN — Medication 10 ML: at 09:18

## 2019-06-21 RX ADMIN — Medication 6 PUFF: at 00:01

## 2019-06-21 RX ADMIN — SODIUM CHLORIDE: 9 INJECTION, SOLUTION INTRAVENOUS at 03:01

## 2019-06-21 RX ADMIN — Medication 10 ML: at 09:17

## 2019-06-21 RX ADMIN — METHYLPREDNISOLONE SODIUM SUCCINATE 40 MG: 40 INJECTION, POWDER, FOR SOLUTION INTRAMUSCULAR; INTRAVENOUS at 09:45

## 2019-06-21 RX ADMIN — ENOXAPARIN SODIUM 40 MG: 40 INJECTION SUBCUTANEOUS at 20:54

## 2019-06-21 RX ADMIN — PROPOFOL 40 MCG/KG/MIN: 10 INJECTION, EMULSION INTRAVENOUS at 03:01

## 2019-06-21 RX ADMIN — TAZOBACTAM SODIUM AND PIPERACILLIN SODIUM 3.38 G: 375; 3 INJECTION, SOLUTION INTRAVENOUS at 20:54

## 2019-06-21 RX ADMIN — IPRATROPIUM BROMIDE AND ALBUTEROL SULFATE 1 AMPULE: .5; 3 SOLUTION RESPIRATORY (INHALATION) at 19:38

## 2019-06-21 RX ADMIN — PREGABALIN 150 MG: 75 CAPSULE ORAL at 09:17

## 2019-06-21 RX ADMIN — Medication 2 PUFF: at 08:18

## 2019-06-21 RX ADMIN — CHLORHEXIDINE GLUCONATE 15 ML: 1.2 RINSE ORAL at 09:18

## 2019-06-21 RX ADMIN — TAZOBACTAM SODIUM AND PIPERACILLIN SODIUM 3.38 G: 375; 3 INJECTION, SOLUTION INTRAVENOUS at 13:10

## 2019-06-21 RX ADMIN — FLUOXETINE 40 MG: 20 CAPSULE ORAL at 09:17

## 2019-06-21 ASSESSMENT — ENCOUNTER SYMPTOMS
CHEST TIGHTNESS: 0
COUGH: 1
FACIAL SWELLING: 0
BLOOD IN STOOL: 0
DIARRHEA: 0
SHORTNESS OF BREATH: 1
APNEA: 0
RHINORRHEA: 0
ABDOMINAL PAIN: 0
NAUSEA: 0
CHOKING: 0
STRIDOR: 0
EYE DISCHARGE: 0
COLOR CHANGE: 0
EYE REDNESS: 0
PHOTOPHOBIA: 0
TROUBLE SWALLOWING: 0

## 2019-06-21 ASSESSMENT — PAIN SCALES - GENERAL
PAINLEVEL_OUTOF10: 0
PAINLEVEL_OUTOF10: 0
PAINLEVEL_OUTOF10: 5
PAINLEVEL_OUTOF10: 0
PAINLEVEL_OUTOF10: 6

## 2019-06-21 ASSESSMENT — PAIN SCALES - WONG BAKER: WONGBAKER_NUMERICALRESPONSE: 0

## 2019-06-21 ASSESSMENT — PULMONARY FUNCTION TESTS
PIF_VALUE: 21
PIF_VALUE: 20
PIF_VALUE: 20

## 2019-06-21 ASSESSMENT — PAIN DESCRIPTION - DESCRIPTORS: DESCRIPTORS: ACHING

## 2019-06-21 ASSESSMENT — PAIN DESCRIPTION - LOCATION: LOCATION: FOOT;HIP;LEG

## 2019-06-21 ASSESSMENT — PAIN DESCRIPTION - PAIN TYPE: TYPE: CHRONIC PAIN

## 2019-06-21 ASSESSMENT — PAIN DESCRIPTION - ORIENTATION: ORIENTATION: RIGHT;LEFT

## 2019-06-21 NOTE — PROGRESS NOTES
VSS see flow sheet. Reassessment complete. No acute changes. Bed bath given. Ngo and oral care given. Gown and linens changed. Will monitor.

## 2019-06-21 NOTE — CONSULTS
Infectious Diseases   Consult Note        Admission Date: 6/20/2019  Hospital Day: Hospital Day: 2  Attending: Nita Smith MD  Date of service: 6/21/19    Reason for admission: Acute on chronic respiratory failure ([facility] [J96.20]  Acute on chronic [facility] failure ([facility] [J96.20]    Chief complaint/ Reason for consult: Concern for porphyria versus cellulitis    Microbiology:        I have reviewed allavailable micro lab data and cultures    · Blood culture (2/2) - collected on 6/20/2019: In process        Antibiotics and immunizations:       Current antibiotics: All antibiotics and their doses were reviewed by me    Recent Abx Admin                   cefepime (MAXIPIME) 2 g IVPB minibag (g) 2 g New Bag 06/21/19 0432    vancomycin (VANCOCIN) 1000 mg in dextrose 5% 200 mL IVPB (mg) 1,000 mg New Bag 06/20/19 1743    cefepime (MAXIPIME) 2 g IVPB minibag (g) 2 g New Bag 06/20/19 1608                  Immunization History: All immunization history was reviewed by me today. Immunization History   Administered Date(s) Administered    Influenza Virus Vaccine 10/24/2018    Pneumococcal Conjugate 13-valent (Elías Karan) 04/24/2013       Known drug allergies: All allergies were reviewed and updated    Allergies   Allergen Reactions    Sulfa Antibiotics      Unknown reaction       Assessment:     The patient is a 82 y.o. old female who  has a past medical history of Arthritis, COPD (chronic obstructive pulmonary disease) ([facility] Hemorrhoids, Hernia of unspecified site of abdominal cavity without mention of obstruction or gangrene, Incontinence, Knee pain, bilateral, Spinal stenosis, and Spinal stenosis.  with following problems:    · Acute on chronic respiratory failure with hypoxia and hypercarbia  · Bilateral lower lobe pneumonia -aspiration suspected  · Altered mental state  · Complicated UTI  · Bandemia  · History of recurrent UTI  · Acute kidney injury  · COPD  · History of spinal you for involving me in the care of your patient. I will continue to follow. If you have any additional questions, please do not hesitate to contact me. Subjective:       HPI: Kasie Tomlinson is a 82 y.o. female patient, who was seen at the request of Dr. Attila Asif MD.    History was obtained from chart review and the patient     The patient was admitted on 6/20/2019. I have been consulted to see the patient for above mentioned reason(s). The patient has multiple medical comorbidities, and presented to the ER for acute respiratory distress. The patient was confused, combative and tremulous to upper extremities on presentation to the ER    In the ER, she was found to be hypoxic with oxygen saturation of 85%. She was intubated for acute respiratory failure. WBC count was 15,000. Chest imaging showed small bilateral pleural effusions and small bibasilar consolidations. The patient has been extubated 1 hour before the exam.      I have been asked to see the patient for this complicated infection. Past Medical History: All past medical history reviewed today. Past Medical History:   Diagnosis Date    Arthritis     osteo    COPD [facility]chronic obstructive pulmonary disease) ([facility]     Hemorrhoids     Hernia of unspecified site of abdominal cavity without mention of obstruction or gangrene     Incontinence     Knee pain, bilateral     pt states no cartilage    Spinal stenosis     Spinal stenosis          Past Surgical History: All pastsurgical history was reviewed today. Past Surgical History:   Procedure Laterality Date    CHOLECYSTECTOMY      PARTIAL HYSTERECTOMY         Social History:  All social andepidemiologic history was reviewed and updated by me today as needed. · Tobacco use:   reports that she has never smoked. She has never used smokeless tobacco.  · Alcohol use:   reports that she does not drink alcohol.   · Currently lives in: [facility]  · Intravenous 2 times per day    FLUoxetine  40 mg Oral Daily    levothyroxine  112 mcg Oral Daily    pregabalin  150 mg Oral BID    mometasone-formoterol  2 puff Inhalation BID    sodium chloride flush  10 mL Intravenous 2 times per day    enoxaparin  40 mg Subcutaneous Nightly    pantoprazole  40 mg Intravenous Daily    methylPREDNISolone  40 mg Intravenous Q12H          REVIEW OF SYSTEMS:       Review of Systems   Constitutional: Positive for fatigue. Negative for chills, diaphoresis and unexpected weight change. HENT: Negative for congestion, ear discharge, ear pain, facial swelling, hearing loss, rhinorrhea and trouble swallowing. Eyes: Negative for photophobia, discharge, redness and visual disturbance. Respiratory: Positive for cough and shortness of breath. Negative for apnea, choking, chest tightness and stridor. Cardiovascular: Negative for chest pain and palpitations. Gastrointestinal: Negative for abdominal pain, blood in stool, diarrhea and nausea. Endocrine: Negative for polydipsia, polyphagia and polyuria. Genitourinary: Negative for difficulty urinating, dysuria, frequency, hematuria, menstrual problem and vaginal discharge. Musculoskeletal: Negative for arthralgias, joint swelling, myalgias and neck stiffness. Skin: Negative for color change and rash. Allergic/Immunologic: Negative for immunocompromised state. Neurological: Negative for dizziness, seizures, speech difficulty, light-headedness and headaches. Hematological: Negative for adenopathy. Psychiatric/Behavioral: Negative for agitation, hallucinations and suicidal ideas. Objective:       PHYSICAL EXAM:      Vitals:   Vitals:    06/21/19 0900 06/21/19 0915 06/21/19 1215 06/21/19 1216   BP: (!) 127/49      Pulse: 72 69     Resp: 13 15 19 13   Temp:       TempSrc:       SpO2: 97% 97% 98% 97%   Weight:       Height:           Physical Exam   Constitutional: She is oriented to person, place, and time.  She PROT 6.6 06/21/2019    PROT 6.9 03/21/2011    BILITOT 0.3 06/21/2019    LABALBU 3.5 06/21/2019       CPK:   Lab Results   Component Value Date    CKTOTAL 139 04/24/2019     ESR: No results found for: SEDRATE  CRP: No results found for: CRP      Imaging: All pertinent images and reports for the current visit were reviewed by meduring this visit. XR CHEST PORTABLE   Final Result   Markedly rotated exam.  Persistent left greater than right airspace disease   which can reflect asymmetric edema or pneumonia. CT ABDOMEN PELVIS W IV CONTRAST Additional Contrast? None   Final Result   1. Mild bibasilar segmental atelectasis versus pneumonia. 2. No acute abdominopelvic abnormality. 3. Apparent intravaginal Ngo catheter. Clinical correlation requested. CT CHEST PULMONARY EMBOLISM W CONTRAST   Final Result   1. No CT evidence of a pulmonary embolism. 2. Small bilateral pleural effusions, with dependent consolidative opacity   within the bilateral lower lobes, with differentials including atelectasis,   aspiration, or pneumonia. XR CHEST PORTABLE   Final Result   No complication following right IJ central venous catheter placement. Stable   airspace changes at the left lung base with questionable new airspace changes   on the right that are partially excluded from field of view. XR CHEST PORTABLE   Final Result   Endotracheal tube tip is 3.5 cm above the merced. Increasing asymmetric left-sided airspace disease with more focal   consolidation in the left lung base. Findings may be related to asymmetric   edema and/or pneumonia. XR CHEST PORTABLE   Final Result   1. Trace pleural effusions and mild bibasilar atelectasis. 2. Low lung volumes. CT Head WO Contrast   Final Result   Mild motion artifact. No acute intracranial abnormality.              Outside records:    Labs, Microbiology, Radiology and pertinent results from Care everywhere, if available,

## 2019-06-21 NOTE — PROGRESS NOTES
PHARMACY TO DOSE VANCOMYCIN FOR SEPSIS  PATIENT RECEIVED 1 GRAM DOSE IN ED ON 6/20/19 AROUND 1800  CHANGE DOSE TO 1750 MG EVERY 24 HOURS  CLINICAL PHARMACY STAFF WILL SCHEDULE A TROUGH LEVEL.

## 2019-06-21 NOTE — PROGRESS NOTES
Nutrition Assessment (Enteral Nutrition)    Type and Reason for Visit: Initial, Consult    Nutrition Recommendations:   1. If enteral nutrition to begin: recommend to initiate Vital High Protein (low calorie, high protein formula) at 10 mL/hr and maintain rate at 10 mL/hr via OG. Rate limited by levo and lipid calories from propofol. 2. Recommend 30 mL H20 flush q 4 hours for tube maintenance. 3. Monitor and correct lytes (K, Phos, Mg). 4. Ensure head of bed is 30 - 45 degrees during continuous feeding. Turn off the feeding if head of bed is lowered less than 30 degrees. 5. Monitor for tolerance (bowel habits, N/V, cramping). 6. Irrigate tube with 30 mL water before, between and after each medication. 7. If tube becomes clogged, first try flushing with water. If water does not unclog the tube, may use clog zapper at the direction of the physician/LIP. If tube remains clogged, contact Physician/LIP for additional orders. 8. Closed System Guidelines:  Change tubing and feeding container every 24 hours. Nutrition Assessment: Pt's current nutrition status is stable, but at risk for compromise related to intubated/sedated/NPO status. Receiving NS, propofol, and low dose levo. Potential to start enteral nutrition based on vent status later on this date. Will provide recs for trophic feeding.       Malnutrition Assessment:  · Malnutrition Status: No malnutrition    Nutrition Risk Level: High    Nutrition Needs:  · Estimated Daily Total Kcal: 1056 - 1200  · Estimated Daily Protein (g): 96 - 120  · Estimated Daily Fluid (ml/day): 1440    Nutrition Diagnosis:   · Problem: Inadequate oral intake  · Etiology: related to Impaired respiratory function-inability to consume food, Insufficient energy/nutrient consumption     Signs and symptoms:  as evidenced by NPO status due to medical condition, Intubation    Objective Information:  · Nutrition-Focused Physical Findings: edema: +1 BUE & BLE   · Wound Type: Deep Tissue Injury  · Current Nutrition Therapies:  · Oral Diet Orders: NPO   · Tube Feeding (TF) Orders:   · Feeding Route: Orogastric  · Goal TF (once off propofol and levo): Vital HP @ 50 mL/hr provides 1200mL total volume, 1200 kcal, 105 g protein & 1003 mL free water  · Additional Calories: propofol @ 39.5 mL/hr provides 1043 mL calories from lipids  · Anthropometric Measures:  · Ht: 5' 1\" (154.9 cm)   · Current Body Wt: 282 lb (127.9 kg)  · Usual Body Wt: 283 lb (128.4 kg)(6/11/19)  · Ideal Body Wt: 105 lb (47.6 kg), % Ideal Body 269%  · BMI Classification: BMI > or equal to 40.0 Obese Class III    Nutrition Interventions:   Continue NPO, Start Tube Feeding(based on intubation status)  Continued Inpatient Monitoring, Education Not Indicated    Nutrition Evaluation:   · Evaluation: Goals set   · Goals: adv of nutrition within next 24 - 48 hr   · Monitoring: Nutrition Progression, Weight, Pertinent Labs, Monitor Hemodynamic Status      Electronically signed by Garth Short RD, LD on 6/21/19 at 10:14 AM  Weekend Contact Number: [phone]

## 2019-06-21 NOTE — PROGRESS NOTES
Sedation vacation performed. Patient opens eyes but without sustained eye contact. Follows commands squeezed hands, moves BUE, moves toes. OG placed 71 cm at the lip. ABG, MRSA nasal swab and am labs collected and sent to lab. R IJ TLC dressing changed d/t old bloody drainage.

## 2019-06-21 NOTE — CARE COORDINATION
Discharge planning-    Patient is on the vent. Will continue to follow. Patient is known to social work from previous admissions. Patient lives at home alone, receives skilled home care services through [facility]/ non-skilled services through [facility] ([facility]), 53.5 hours/ week. Facesheet and H&P sent to [facility] via fax, notifying of the patient's hospitalization. Called [facility], spoke with [facility] in [address] [place] [address]. Notified [facility] of the patient's readmission. Palliative Care following. Addendum: Spoke with the patient's [facility] / Sandra Sidhu ([phone]) who would like to remain updated. Plan- Unclear at this time, patient remains on vent.     Will continue to follow for support and discharge planning.    -Lion Morales, MSW, LSW

## 2019-06-21 NOTE — CARE COORDINATION
[facility] Continence Nurse  Consult Note       NAME:  Joelle [facility] RECORD NUMBER:  [id number]  AGE: 82 y.o. GENDER: female  : 1935  TODAY'S DATE:  2019    Subjective   Reason for WOCN Evaluation and Assessment: DTI left buttock    Fabrizio Crawford is a 82 y.o. female referred by:   [] Physician  [x] Nursing  [] Other:     Wound Identification:  Wound Type: pressure  Contributing Factors: chronic pressure and decreased mobility    Wound History: chronic, has been present on prior admits, but now more open  Current Wound Care Treatment:  moisture barrier    Patient Goal of Care:  [x] Wound Healing  [] Odor Control  [] Palliative Care  [] Pain Control   [] Other:         PAST MEDICAL HISTORY        Diagnosis Date    Arthritis     osteo    [facility]     Hemorrhoids     Hernia of unspecified site of abdominal cavity without mention of obstruction or gangrene     Incontinence     Knee pain, bilateral     pt states no cartilage    Spinal stenosis     Spinal stenosis        PAST SURGICAL HISTORY    Past Surgical History:   Procedure Laterality Date    CHOLECYSTECTOMY      PARTIAL HYSTERECTOMY         FAMILY HISTORY    History reviewed. No pertinent family history. SOCIAL HISTORY    Social History     Tobacco Use    Smoking status: Never Smoker    Smokeless tobacco: Never Used   Substance Use Topics    Alcohol use: No    Drug use: No       ALLERGIES    Allergies   Allergen Reactions    Sulfa Antibiotics      Unknown reaction       MEDICATIONS    No current facility-administered medications on file prior to encounter. Current Outpatient Medications on File Prior to Encounter   Medication Sig Dispense Refill    oxyCODONE-acetaminophen (PERCOCET)  MG per tablet Take 1 tablet by mouth every 6 hours as needed for Pain.       ipratropium-albuterol (DUONEB) 0.5-2.5 (3) MG/3ML SOLN nebulizer solution Inhale 1 vial into the lungs every 6 hours as needed for Shortness of Breath      SYMBICORT 160-4.5 MCG/ACT AERO TAKE 2 PUFFS TWICE A DAY  3    levothyroxine (SYNTHROID) 112 MCG tablet TAKE 1 TABLET BY MOUTH EVERY DAY  3    furosemide (LASIX) 40 MG tablet TAKE 1 TABLET BY MOUTH EVERY DAY  3    loratadine (CLARITIN) 10 MG tablet Take 1 tablet by mouth daily      MOVANTIK 25 MG TABS tablet Take 25 mg by mouth daily as needed  5    lisinopril (PRINIVIL;ZESTRIL) 5 MG tablet Take 1 tablet by mouth daily 30 tablet 0    senna (SENOKOT) 8.6 MG tablet Take 1 tablet by mouth daily      magnesium hydroxide (MILK OF MAGNESIA) 400 MG/5ML suspension Take 30 mLs by mouth daily as needed for Constipation 1 Bottle 0    fluoxetine (PROZAC) 20 MG capsule Take 40 mg by mouth daily.  sumatriptan (IMITREX) 25 MG tablet Take 50 mg by mouth once as needed.  pregabalin (LYRICA) 50 MG capsule Take 150 mg by mouth 2 times daily.  esomeprazole (NEXIUM) 40 MG capsule Take 40 mg by mouth every morning (before breakfast).            Objective    BP (!) 127/49   Pulse 69   Temp 97.8 °F (36.6 °C) (Temporal)   Resp 15   Ht 5' 1\" (1.549 m)   Wt 282 lb 6.6 oz (128.1 kg)   SpO2 97%   BMI 53.36 kg/m²     LABS:  WBC:    Lab Results   Component Value Date    WBC 14.5 06/21/2019     H/H:    Lab Results   Component Value Date    HGB 10.8 06/21/2019    HCT 33.0 06/21/2019     PTT:    Lab Results   Component Value Date    APTT 31.2 06/20/2019   [APTT}  PT/INR:    Lab Results   Component Value Date    PROTIME 10.8 06/20/2019    INR 0.95 06/20/2019     HgBA1c:  No results found for: LABA1C    Assessment   Jorge Luis Risk Score: Jorge Luis Scale Score: 12    Patient Active Problem List   Diagnosis Code    Pneumonia J18.9    Acute on chronic diastolic heart failure (HCC) I50.33    Peripheral edema R60.9    COPD exacerbation (HCC) J44.1    Essential hypertension I10    Hyperlipidemia E78.5    Morbid obesity with BMI of 50.0-59.9, adult ([facility] E66.01, Other    Patient/Caregiver Teaching:  Level of patient/caregiver understanding able to:   [] Indicates understanding       [] Needs reinforcement  [] Unsuccessful      [] Verbal Understanding  [] Demonstrated understanding       [] No evidence of learning  [] Refused teaching         [x] N/A       Electronically signed by SANTI Ziegler on 6/21/2019 at 11:23 AM

## 2019-06-21 NOTE — PROGRESS NOTES
Pulmonary & Critical Care Medicine ICU Progress Note      ASSESSMENT AND PLAN:     Acute Hypoxic and Hypercapnic Respiratory Failure due to sepsis and pneumonia  · Extubate now  · Oxygen   · DNR Comfort Care Arrest and Do Not Intubate  Severe Sepsis due to pneumonia  · IV fluids and antibiotics   Septic Shock   · Levophed  Bilateral Lower Lobe Pneumonia - healthcare associated pneumonia  · Cefepime and Vancomycin  · Sputum Culture pending  · Nasal MRSA DNA Probe pending  Small Bilateral Parapneumonic Pleural Effusions  · Antibiotics for pneumonia  Acute Exacerbation of COPD  · Solumedrol 40 mg IV bid  · Albuterol and Ipratropium Nebulizer  COPD  · Dulera as in hospital substitute for Symbicort   Prophylaxis  · Protonix for GI Prophylaxis  · Lovenox for DVT Prophylaxis   Code Status  · DNR Comfort Care Arrest  · Do Not Intubate        UPDATE:   Intubated and on mechanical ventilation. Passed Spontaneous Breathing Trial and ABG is OK. She is on Levophed. Discussed Code Status with daughter at bedside - she will be DNR CCA and Do Not Intubate. HOSPITAL VISIT:  She was just seen now. CHIEF COMPLAINT:  Unable to obtain due to intubation and sedation. HISTORY:  Unable to obtain due to intubation and sedation. REVIEW OF SYMPTOMS:  Unable to obtain due to intubation and sedation.       MECHANICAL VENTILATION:  Intubated 6/20/19  Vent Mode: CPAP Rate Set: 14 bmp/Vt Ordered: 450 mL/ /FiO2 : 50 %    ABG:   Recent Labs     06/21/19  0507 06/21/19  0941   PHART 7.339* 7.350   [id number] 61.9* 61.7*   PO2ART 82.0 147.0*         IV:   sodium chloride 50 mL/hr at 06/21/19 0301    norepinephrine 0.05 mcg/kg/min (06/21/19 0304)       Intake/Output Summary (Last 24 hours) at 6/21/2019 1233  Last data filed at 6/21/2019 0600  Gross per 24 hour   Intake 2389 ml   Output 3650 ml   Net -1261 ml       MEDICATIONS:  Scheduled Meds:   VENELEX   Topical BID    ipratropium-albuterol  1 ampule Inhalation Q4H WA    sodium chloride flush  10 mL Intravenous 2 times per day    FLUoxetine  40 mg Oral Daily    levothyroxine  112 mcg Oral Daily    pregabalin  150 mg Oral BID    mometasone-formoterol  2 puff Inhalation BID    sodium chloride flush  10 mL Intravenous 2 times per day    enoxaparin  40 mg Subcutaneous Nightly    pantoprazole  40 mg Intravenous Daily    methylPREDNISolone  40 mg Intravenous Q12H    cefepime  2 g Intravenous Q12H    vancomycin  1,750 mg Intravenous Q24H     PRN Meds:  albuterol, sodium chloride flush, sodium chloride flush, magnesium hydroxide, ondansetron, potassium chloride, acetaminophen      PHYSICAL EXAM:  Vital Signs: BP (!) 127/49   Pulse 69   Temp 97.8 °F (36.6 °C) (Temporal)   Resp 13   Ht 5' 1\" (1.549 m)   Wt 282 lb 6.6 oz (128.1 kg)   SpO2 97%   BMI 53.36 kg/m²      Gen: Intubated and on mechanical ventilation. ENT:   Endotracheal tube is in place. Resp:   No accessory muscle use. Clear lungs. CV:   PMI is normal. No murmur or gallop. GI:   Soft and not distended. No tenderness. Neuro:  Sedated. No tremor. LAB RESULTS:  CBC:   Recent Labs     06/20/19  1259 06/21/19  0500   WBC 10.9 14.5*   HGB 10.8* 10.8*   HCT 35.1* 33.0*   MCV 93.5 89.3    234     CHEMISTRY:   Recent Labs     06/20/19  1259 06/21/19  0500   * 140   K 5.3* 5.2*   CL 95* 100   CO2 31 32   BUN 38* 23*   CREATININE 1.4* 0.9   PHOS  --  2.9   GLUCOSE 131* 235*     LIVER PROFILE:   Recent Labs     06/20/19  1259 06/21/19  0500   AST 40* 33   ALT 37 42*   LIPASE 33.0  --    BILITOT 0.3 0.3   ALKPHOS 99 106     ABG:   Recent Labs     06/21/19  0507 06/21/19  0941   PHART 7.339* 7.350   THU2ZBL 61.9* 61.7*   PO2ART 82.0 147.0*         CULTURES:  Pending      CHEST XRAY 6/21/19:  Exam is limited by patient rotation.  Endotracheal tube extends to the level   of the clavicles.  Distal course of nasogastric tube is obscured.    Heterogeneous left greater than right pulmonary opacity is again seen. Cardiac and mediastinal silhouette evaluation is limited secondary to patient   rotation.  Portion of a right central venous catheter is identified. CHEST, ABDOMEN AND PELVIS CT SCAN 6/20/19:  FINDINGS:   Pulmonary Arteries: Pulmonary arteries are adequately opacified for   evaluation.  No evidence of intraluminal filling defect to suggest pulmonary   embolism.  Main pulmonary artery is normal in caliber.       Mediastinum: The thyroid is unremarkable.  There is no pathologic   lymphadenopathy. Analy Peaches is atherosclerotic disease of the thoracic aorta,   without evidence of aneurysm or dissection.  No pericardial abnormality is   identified.       Lungs/pleura: There is an endotracheal tube in place with tip terminating   within the midthoracic trachea.  The central airways are otherwise patent. There are small bilateral pleural effusions, new from prior exam. Analy Peaches is   dependent consolidative opacity within the basilar aspects of both lower   lobes, representing either atelectasis, aspiration, or pneumonia.  This has   progressed from the prior exam of 5/10/2019. Analy Peaches is curvilinear opacity   within the posterior left upper lobe, likely additional atelectasis.  No   additional focus of airspace consolidation is identified. Analy Peaches is no   evidence of a pneumothorax. Analy Peaches is a stable small 3 mm noncalcified   granuloma within the right upper lobe, unchanged from 6/11/2013, and   consistent with a benign nodule requiring no further follow-up given its   stability.  No new suspicious pulmonary nodule or mass is identified.       Upper Abdomen: The visualized portions of the adrenals are unremarkable. There is a stable 2.8 cm cyst emanating off of the left kidney upper pole. The remainder of the upper abdomen is unremarkable.       Soft Tissues/Bones:  There is multilevel degenerative change throughout the   thoracic spine.  No osteolytic or osteoblastic lesion is seen.           Impression

## 2019-06-21 NOTE — PROGRESS NOTES
Palliative Care: Met with patient and her daughter Elvie Pinto. Patient affirms her wishes for DNR CCA. States she has no recollection of being intubated \"and glad I don't. \" She now states short term intubation would be acceptable if absolutely necessary. States she is \"not quite as afraid of it now that I don't remember anything. \" Daughter Elvie Pinto states she will honor patient's decision.

## 2019-06-21 NOTE — PROGRESS NOTES
VSS see flow sheet. Sedated on Propofol and Fentanyl. Patient /58 (78) remains on Levophed. Vent settings AC 14, , FIO2 50%, PEEP 5. End tidal CO2 54. ETT 7.5 and 23 at the lip. Patient did not stir to voice, but did to painful stimuli. Does not follow commands. Abdomen bruised. Bowel sounds hypoactive. Lungs decreased in bases. Ngo patent draining yellow urine. R IJ TLC site has old bloody drainage. Oral care performed. Turned & repositioned for comfort/skin care. Will monitor. Bed alarm engaged.

## 2019-06-21 NOTE — PROGRESS NOTES
Hospitalist Progress Note      PCP: Cara Armendariz    Date of Admission: 6/20/2019    Chief Complaint: Shortness of breath and acute respiratory failure    Hospital Course:   History Of Present Illness:  Evan Aguayo a 82 y. o. female who presents to the emergency department respiratory distress.  Patient came in hypoxic 85%.  She was also apparently confused, and combative, tremulous to the upper extremities.  Patient was made a stroke alert by EMS personnel was brought immediately into the [facility] [address]  She maintains some hypoxia on nonrebreather but had improved number at 89%.  She CTAs were not able to be performed and I do not think needed, because she was a difficult IV stick at that time and airway need to be maintained.  BiPAP was started but she did not do well with this.  Patient was intubated.  Decompensation all occurred today.  Initially there were no family members or friends at bedside. [facility] is difficult or impossible to obtain.     Of note during her recent stay earlier this month the patient had made herself a DNR CCA, and she did not want compressions or intubation.     The patient was hypotensive upon presentation and she was given some IV fluids that she was responded briefly but following intubation she has remained hypotensive and she has had a central line placed and has been started on some Levophed.        She was on a spontaneous trial her sedation was still up.   She did finally pass this spontaneous trial was extubated this afternoon I have seen the patient prior to this extubation      Subjective: Abated and sedated      Medications:  Reviewed    Infusion Medications    sodium chloride 50 mL/hr at 06/21/19 0301    norepinephrine Stopped (06/21/19 1412)     Scheduled Medications    VENELEX   Topical BID    ipratropium-albuterol  1 ampule Inhalation Q4H WA    linezolid  600 mg Oral 2 times per day    piperacillin-tazobactam  3.375 g Intravenous Q8H    sodium chloride flush  10 mL Intravenous 2 times per day    FLUoxetine  40 mg Oral Daily    levothyroxine  112 mcg Oral Daily    pregabalin  150 mg Oral BID    mometasone-formoterol  2 puff Inhalation BID    sodium chloride flush  10 mL Intravenous 2 times per day    enoxaparin  40 mg Subcutaneous Nightly    pantoprazole  40 mg Intravenous Daily    methylPREDNISolone  40 mg Intravenous Q12H     PRN Meds: albuterol, phenol, sodium chloride flush, sodium chloride flush, magnesium hydroxide, ondansetron, potassium chloride, acetaminophen      Intake/Output Summary (Last 24 hours) at 6/21/2019 1834  Last data filed at 6/21/2019 1315  Gross per 24 hour   Intake 3151.6 ml   Output 2800 ml   Net 351.6 ml       Physical Exam Performed:    BP (!) 102/43   Pulse 71   Temp 97.9 °F (36.6 °C) (Temporal)   Resp 18   Ht 5' 1\" (1.549 m)   Wt 282 lb 6.6 oz (128.1 kg)   SpO2 96%   BMI 53.36 kg/m²     General appearance: No apparent distress, appears stated age and cooperative. HEENT: Pupils equal, round, and reactive to light. Conjunctivae/corneas clear. Neck: Supple, with full range of motion. No jugular venous distention. Trachea midline. Respiratory:  Normal respiratory effort. Clear to auscultation, bilaterally without Rales/Wheezes/Rhonchi. Cardiovascular: Regular rate and rhythm with normal S1/S2 without murmurs, rubs or gallops. Abdomen: Soft, non-tender, non-distended with normal bowel sounds. Musculoskeletal: No clubbing, cyanosis or edema bilaterally. Full range of motion without deformity. Skin: Skin color, texture, turgor normal.  No rashes or lesions. Neurologic:  Neurovascularly intact without any focal sensory/motor deficits.  Cranial nerves: II-XII intact, grossly non-focal.  Psychiatric: Alert and oriented, thought content appropriate, normal insight  Capillary Refill: Brisk,< 3 seconds   Peripheral Pulses: +2 palpable, equal bilaterally       Labs:   Recent Labs     06/20/19  1259 06/21/19  0500   WBC 10.9 there is a component of heart failure here  Echo done last month shows a normal ejection fraction     LIZ  -This appears to be multiple previous creatinine levels are normal liver she had one abnormal at 1.5 with normals in between the current one.   Consider nephrology consult if patient does not show improvement with IV hydration    Bilateral lower lobe pneumonia  She is on appropriate antibiotic coverage        DVT Prophylaxis: Lovenox  Diet: Diet NPO Effective Now  Code Status: Limited    PT/OT Eval Status: Not at this time    Dispo -continue ICU    Sharif Bernabe MD

## 2019-06-22 ENCOUNTER — APPOINTMENT (OUTPATIENT)
Dept: GENERAL RADIOLOGY | Age: 84
DRG: 871 | End: 2019-06-22
Payer: COMMERCIAL

## 2019-06-22 LAB
ANION GAP SERPL CALCULATED.3IONS-SCNC: 8 MMOL/L (ref 3–16)
BUN BLDV-MCNC: 20 MG/DL (ref 7–20)
CALCIUM SERPL-MCNC: 9.2 MG/DL (ref 8.3–10.6)
CHLORIDE BLD-SCNC: 101 MMOL/L (ref 99–110)
CO2: 31 MMOL/L (ref 21–32)
CREAT SERPL-MCNC: 0.8 MG/DL (ref 0.6–1.2)
GFR AFRICAN AMERICAN: >60
GFR NON-AFRICAN AMERICAN: >60
GLUCOSE BLD-MCNC: 197 MG/DL (ref 70–99)
HCT VFR BLD CALC: 31.9 % (ref 36–48)
HEMOGLOBIN: 10.4 G/DL (ref 12–16)
L. PNEUMOPHILA SEROGP 1 UR AG: NORMAL
MCH RBC QN AUTO: 29.1 PG (ref 26–34)
MCHC RBC AUTO-ENTMCNC: 32.5 G/DL (ref 31–36)
MCV RBC AUTO: 89.5 FL (ref 80–100)
MRSA SCREEN RT-PCR: NORMAL
PDW BLD-RTO: 15.1 % (ref 12.4–15.4)
PLATELET # BLD: 199 K/UL (ref 135–450)
PMV BLD AUTO: 9.4 FL (ref 5–10.5)
POTASSIUM SERPL-SCNC: 4.5 MMOL/L (ref 3.5–5.1)
RBC # BLD: 3.56 M/UL (ref 4–5.2)
SODIUM BLD-SCNC: 140 MMOL/L (ref 136–145)
STREP PNEUMONIAE ANTIGEN, URINE: NORMAL
WBC # BLD: 15.2 K/UL (ref 4–11)

## 2019-06-22 PROCEDURE — 6370000000 HC RX 637 (ALT 250 FOR IP): Performed by: INTERNAL MEDICINE

## 2019-06-22 PROCEDURE — 2060000000 HC ICU INTERMEDIATE R&B

## 2019-06-22 PROCEDURE — 80048 BASIC METABOLIC PNL TOTAL CA: CPT

## 2019-06-22 PROCEDURE — 2500000003 HC RX 250 WO HCPCS: Performed by: INTERNAL MEDICINE

## 2019-06-22 PROCEDURE — 2580000003 HC RX 258: Performed by: INTERNAL MEDICINE

## 2019-06-22 PROCEDURE — 6360000002 HC RX W HCPCS: Performed by: INTERNAL MEDICINE

## 2019-06-22 PROCEDURE — 94761 N-INVAS EAR/PLS OXIMETRY MLT: CPT

## 2019-06-22 PROCEDURE — 99233 SBSQ HOSP IP/OBS HIGH 50: CPT | Performed by: INTERNAL MEDICINE

## 2019-06-22 PROCEDURE — 2700000000 HC OXYGEN THERAPY PER DAY

## 2019-06-22 PROCEDURE — 71045 X-RAY EXAM CHEST 1 VIEW: CPT

## 2019-06-22 PROCEDURE — 85027 COMPLETE CBC AUTOMATED: CPT

## 2019-06-22 PROCEDURE — C9113 INJ PANTOPRAZOLE SODIUM, VIA: HCPCS | Performed by: INTERNAL MEDICINE

## 2019-06-22 PROCEDURE — 94640 AIRWAY INHALATION TREATMENT: CPT

## 2019-06-22 RX ORDER — METHYLPREDNISOLONE SODIUM SUCCINATE 40 MG/ML
40 INJECTION, POWDER, LYOPHILIZED, FOR SOLUTION INTRAMUSCULAR; INTRAVENOUS DAILY
Status: DISCONTINUED | OUTPATIENT
Start: 2019-06-23 | End: 2019-06-25

## 2019-06-22 RX ADMIN — IPRATROPIUM BROMIDE AND ALBUTEROL SULFATE 1 AMPULE: .5; 3 SOLUTION RESPIRATORY (INHALATION) at 15:53

## 2019-06-22 RX ADMIN — Medication 10 ML: at 11:05

## 2019-06-22 RX ADMIN — PANTOPRAZOLE SODIUM 40 MG: 40 INJECTION, POWDER, FOR SOLUTION INTRAVENOUS at 11:05

## 2019-06-22 RX ADMIN — TAZOBACTAM SODIUM AND PIPERACILLIN SODIUM 3.38 G: 375; 3 INJECTION, SOLUTION INTRAVENOUS at 20:31

## 2019-06-22 RX ADMIN — IPRATROPIUM BROMIDE AND ALBUTEROL SULFATE 1 AMPULE: .5; 3 SOLUTION RESPIRATORY (INHALATION) at 07:46

## 2019-06-22 RX ADMIN — Medication 2 PUFF: at 19:22

## 2019-06-22 RX ADMIN — IPRATROPIUM BROMIDE AND ALBUTEROL SULFATE 1 AMPULE: .5; 3 SOLUTION RESPIRATORY (INHALATION) at 19:21

## 2019-06-22 RX ADMIN — ENOXAPARIN SODIUM 40 MG: 40 INJECTION SUBCUTANEOUS at 20:31

## 2019-06-22 RX ADMIN — CASTOR OIL AND BALSAM, PERU: 788; 87 OINTMENT TOPICAL at 11:05

## 2019-06-22 RX ADMIN — TAZOBACTAM SODIUM AND PIPERACILLIN SODIUM 3.38 G: 375; 3 INJECTION, SOLUTION INTRAVENOUS at 13:15

## 2019-06-22 RX ADMIN — CASTOR OIL AND BALSAM, PERU: 788; 87 OINTMENT TOPICAL at 20:31

## 2019-06-22 RX ADMIN — TAZOBACTAM SODIUM AND PIPERACILLIN SODIUM 3.38 G: 375; 3 INJECTION, SOLUTION INTRAVENOUS at 05:55

## 2019-06-22 RX ADMIN — PREGABALIN 150 MG: 75 CAPSULE ORAL at 20:31

## 2019-06-22 RX ADMIN — IPRATROPIUM BROMIDE AND ALBUTEROL SULFATE 1 AMPULE: .5; 3 SOLUTION RESPIRATORY (INHALATION) at 11:25

## 2019-06-22 ASSESSMENT — PAIN SCALES - PAIN ASSESSMENT IN ADVANCED DEMENTIA (PAINAD)
TOTALSCORE: 0
BODYLANGUAGE: 0
CONSOLABILITY: 0
BREATHING: 0
NEGVOCALIZATION: 0
FACIALEXPRESSION: 0

## 2019-06-22 ASSESSMENT — PAIN SCALES - GENERAL: PAINLEVEL_OUTOF10: 0

## 2019-06-22 NOTE — PROGRESS NOTES
Reassessment. Patient awakes to verbal and reoriented to situation. Patient transferred to specialty bed, bathed and repositioned. No changes from previous assessment, call light in reach.

## 2019-06-22 NOTE — PROGRESS NOTES
Q8H    sodium chloride flush  10 mL Intravenous 2 times per day    FLUoxetine  40 mg Oral Daily    levothyroxine  112 mcg Oral Daily    pregabalin  150 mg Oral BID    mometasone-formoterol  2 puff Inhalation BID    sodium chloride flush  10 mL Intravenous 2 times per day    enoxaparin  40 mg Subcutaneous Nightly    pantoprazole  40 mg Intravenous Daily     PRN Meds: albuterol, phenol, sodium chloride flush, sodium chloride flush, magnesium hydroxide, ondansetron, potassium chloride, acetaminophen      Intake/Output Summary (Last 24 hours) at 6/22/2019 1142  Last data filed at 6/22/2019 0400  Gross per 24 hour   Intake 1362.6 ml   Output 1825 ml   Net -462.4 ml       Physical Exam Performed:    BP (!) 143/49   Pulse 63   Temp 96.6 °F (35.9 °C) (Temporal)   Resp 22   Ht 5' 1\" (1.549 m)   Wt 279 lb 4.8 oz (126.7 kg)   SpO2 96%   BMI 52.77 kg/m²     General appearance: No apparent distress, appears stated age , somnolent, Pickwickian in habbitus  HEENT: Pupils equal, round, and reactive to light. Conjunctivae/corneas clear. Neck: Supple, with full range of motion. No jugular venous distention. Trachea midline. Respiratory:  Normal respiratory effort. diminished but Clear to auscultation, bilaterally without Rales/Wheezes/Rhonchi. Cardiovascular: Regular rate and rhythm with normal S1/S2 without murmurs, rubs or gallops. Abdomen: Soft, non-tender, non-distended with normal bowel sounds. Musculoskeletal: No clubbing, cyanosis or edema bilaterally. Full range of motion without deformity. Skin: Skin color, texture, turgor normal.  No rashes or lesions. Neurologic:  Neurovascularly intact without any focal sensory/motor deficits.  Cranial nerves: II-XII intact, grossly non-focal.  Psychiatric: Alert and oriented, thought content appropriate, normal insight  Capillary Refill: Brisk,< 3 seconds   Peripheral Pulses: +2 palpable, equal bilaterally       Labs:   Recent Labs     06/20/19  1259 06/21/19  0500 06/22/19  0455   WBC 10.9 14.5* 15.2*   HGB 10.8* 10.8* 10.4*   HCT 35.1* 33.0* 31.9*    234 199     Recent Labs     06/20/19  1259 06/21/19  0500 06/22/19  0455   * 140 140   K 5.3* 5.2* 4.5   CL 95* 100 101   CO2 31 32 31   BUN 38* 23* 20   CREATININE 1.4* 0.9 0.8   CALCIUM 9.2 9.3 9.2   PHOS  --  2.9  --      Recent Labs     06/20/19  1259 06/21/19  0500   AST 40* 33   ALT 37 42*   BILITOT 0.3 0.3   ALKPHOS 99 106     Recent Labs     06/20/19  1259   INR 0.95     Recent Labs     06/20/19  1259   TROPONINI <0.01       Urinalysis:      Lab Results   Component Value Date    NITRU Negative 06/20/2019    WBCUA 458 06/20/2019    BACTERIA 4+ 06/20/2019    RBCUA 8 06/20/2019    BLOODU TRACE 06/20/2019    SPECGRAV 1.027 06/20/2019    GLUCOSEU Negative 06/20/2019    GLUCOSEU NEGATIVE 03/21/2011       Radiology:  XR CHEST PORTABLE   Final Result   Improved left and stable right basilar airspace disease with stable right   pleural effusion         XR CHEST PORTABLE   Final Result   Markedly rotated exam.  Persistent left greater than right airspace disease   which can reflect asymmetric edema or pneumonia. CT ABDOMEN PELVIS W IV CONTRAST Additional Contrast? None   Final Result   1. Mild bibasilar segmental atelectasis versus pneumonia. 2. No acute abdominopelvic abnormality. 3. Apparent intravaginal Ngo catheter. Clinical correlation requested. CT CHEST PULMONARY EMBOLISM W CONTRAST   Final Result   1. No CT evidence of a pulmonary embolism. 2. Small bilateral pleural effusions, with dependent consolidative opacity   within the bilateral lower lobes, with differentials including atelectasis,   aspiration, or pneumonia. XR CHEST PORTABLE   Final Result   No complication following right IJ central venous catheter placement. Stable   airspace changes at the left lung base with questionable new airspace changes   on the right that are partially excluded from field of view. XR CHEST PORTABLE   Final Result   Endotracheal tube tip is 3.5 cm above the merced. Increasing asymmetric left-sided airspace disease with more focal   consolidation in the left lung base. Findings may be related to asymmetric   edema and/or pneumonia. XR CHEST PORTABLE   Final Result   1. Trace pleural effusions and mild bibasilar atelectasis. 2. Low lung volumes. CT Head WO Contrast   Final Result   Mild motion artifact. No acute intracranial abnormality. Assessment/Plan:    Active Hospital Problems    Diagnosis    Bilateral lower lobe pneumonia due to Escherichia coli ([facility] [address] [J15.5]    Endotracheally intubated [Z97.8]    On mechanically assisted ventilation (HCC) [Z99.11]    Acute kidney injury ([facility] [address] [N17.9]    History of recurrent UTI (urinary tract infection) [Z87.440]    Bandemia [D72.825]    Chronic obstructive pulmonary disease with acute lower respiratory infection (HCC) [J44.0]    Spinal stenosis [D07.89]    Complicated UTI (urinary tract infection) [N39.0]    Acute on chronic respiratory failure ([facility]) [J96.20]    Acute respiratory failure with hypoxia and hypercapnia ([facility]) [J96.01, J96.02]    Severe sepsis ([facility]) [A41.9, R65.20]    Septic shock (HCC) [A41.9, R65.21]     Acute on chronic respiratory failure with hypoxia and hypercapnia  -extuabted 6/21/2019  -Pulmonary critical care following  -Known to have COPD and prone to exacerbations   - appears to have SHORTY and should wear CPAP or BiPAP at night or when sleeping  - she likely needs a sleep study  -COPD and Pneumonia    COPD exacerbation acute   -IV steroids  -Possible pneumonia will cover with IV antibiotics  -She passed her spontaneous trial and was extubated (6/22/2019)  - dulera     Severe sepsis  -Again appears to be secondary to pneumonia I do not find another source of infection at this point  -She has been covered with cefepime and vancomycin  -Continue these antibiotics.      -Septic shock  -She was initially hypotensive in the emergency department but did perk up with some fluids however following intubation the patient's pressure again began to drop  -Currently on IV Levophed  - off levophed and maintaining blood pressure  - resolved.        Small bilateral pleural effusions  -Unclear if there is a component of heart failure here  Echo done last month shows a normal ejection fraction     LIZ  -This appears to be multiple previous creatinine levels are normal liver she had one abnormal at 1.5 with normals in between the current one.   Consider nephrology consult if patient does not show improvement with IV hydration  -resolved    Bilateral lower lobe pneumonia  She is on appropriate antibiotic coverage        DVT Prophylaxis: Lovenox  Diet: Diet NPO Effective Now  Code Status: Limited    PT/OT Eval Status: Not at this time    Dispo -could likely transfer out of ICU in beds are needed, suspect she could easily go hypercarbic    Nita Smith MD

## 2019-06-22 NOTE — PROGRESS NOTES
[facility] Pulmonary/CCM Progress note      Admit Date: 6/20/2019    Chief Complaint: Shortness of breath and altered mental status    Subjective: Interval History: Extubated, currently on 3 L O2 which is close to her baseline. Appears somnolent and states that she just does not feel well not able to give me more information at this time. No worsening shortness of breath. No new overnight events noted as per RN.     Scheduled Meds:   VENELEX   Topical BID    ipratropium-albuterol  1 ampule Inhalation Q4H WA    linezolid  600 mg Oral 2 times per day    piperacillin-tazobactam  3.375 g Intravenous Q8H    sodium chloride flush  10 mL Intravenous 2 times per day    FLUoxetine  40 mg Oral Daily    levothyroxine  112 mcg Oral Daily    pregabalin  150 mg Oral BID    mometasone-formoterol  2 puff Inhalation BID    sodium chloride flush  10 mL Intravenous 2 times per day    enoxaparin  40 mg Subcutaneous Nightly    pantoprazole  40 mg Intravenous Daily    methylPREDNISolone  40 mg Intravenous Q12H     Continuous Infusions:   sodium chloride 50 mL/hr at 06/21/19 0301    norepinephrine Stopped (06/21/19 1412)     PRN Meds:albuterol, phenol, sodium chloride flush, sodium chloride flush, magnesium hydroxide, ondansetron, potassium chloride, acetaminophen    Review of Systems  Constitutional: negative for fatigue, fevers, malaise and weight loss  Ears, nose, mouth, throat: negative for ear drainage, epistaxis, hoarseness, nasal congestion, sore throat and voice change  Respiratory: negative except for shortness of breath  Cardiovascular: negative for chest pain, chest pressure/discomfort, irregular heart beat, lower extremity edema and palpitations  Gastrointestinal: negative for abdominal pain, constipation, diarrhea, jaundice, melena, odynophagia, reflux symptoms and vomiting  Hematologic/lymphatic: negative for bleeding, easy bruising, lymphadenopathy and petechiae  Musculoskeletal:negative for arthralgias, bone pain, muscle weakness, neck pain and stiff joints  Neurological: negative for dizziness, gait problems, headaches, seizures, speech problems, tremors and weakness  Behavioral/Psych: negative for anxiety, behavior problems, depression, fatigue and sleep disturbance  Endocrine: negative for diabetic symptoms including none, neuropathy, polyphagia, polyuria, polydipsia, vomiting and diarrhea and temperature intolerance  Allergic/Immunologic: negative for anaphylaxis, angioedema, hay fever and urticaria    Objective:     Patient Vitals for the past 8 hrs:   BP Temp Temp src Pulse Resp SpO2 Weight   06/22/19 0800 (!) 146/62 96.6 °F (35.9 °C) Temporal 65 21 96 % --   06/22/19 0747 -- -- -- -- 20 97 % --   06/22/19 0700 (!) 141/63 -- -- 61 20 96 % --   06/22/19 0600 -- -- -- -- -- -- 279 lb 4.8 oz (126.7 kg)   06/22/19 0400 (!) 156/60 97.5 °F (36.4 °C) Temporal 66 20 96 % --   06/22/19 0352 -- -- -- -- 18 95 % --     I/O last 3 completed shifts: In: 1362.6 [I.V.:1362.6]  Out: 1825 [Urine:1825]  No intake/output data recorded.     General Appearance: alert and oriented to person, place and time, well developed and well- nourished, in no acute distress  Skin: warm and dry, no rash or erythema  Head: normocephalic and atraumatic  Eyes: pupils equal, round, and reactive to light, extraocular eye movements intact, conjunctivae normal  ENT: external ear and ear canal normal bilaterally, nose without deformity, nasal mucosa and turbinates normal  Neck: supple and non-tender without mass, no cervical lymphadenopathy  Pulmonary/Chest: clear to auscultation bilaterally- no wheezes, rales or rhonchi, normal air movement, no respiratory distress  Cardiovascular: normal rate, regular rhythm,  no murmurs, rubs, distal pulses intact, no carotid bruits  Abdomen: soft, non-tender, non-distended, normal bowel sounds, no masses or organomegaly  Lymph Nodes: Cervical, supraclavicular normal  Extremities: no cyanosis, clubbing or edema  Musculoskeletal: normal range of motion, no joint swelling, deformity or tenderness  Neurologic: alert, no focal neurologic deficits    Data Review:  CBC:   Lab Results   Component Value Date    WBC 15.2 06/22/2019    RBC 3.56 06/22/2019     BMP:   Lab Results   Component Value Date    GLUCOSE 197 06/22/2019    CO2 31 06/22/2019    BUN 20 06/22/2019    CREATININE 0.8 06/22/2019    CALCIUM 9.2 06/22/2019     ABG:   Lab Results   Component Value Date    QLY3QOC 34.1 06/21/2019    BEART 6.9 06/21/2019    M8WNPTZX 99.7 06/21/2019    PHART 7.350 06/21/2019    JEV8KJA 61.7 06/21/2019    PO2ART 147.0 06/21/2019    UYX4QER 80.6 06/21/2019       Radiology: All pertinent images / reports were reviewed as a part of this visit. Problem List:     Acute hypoxic/hypercapnic respiratory failure requiring intubation  Presumed healthcare associated pneumonia  SHORTY/OHS, noncompliant with CPAP  Presumed COPD with probable acute exacerbation    Assessment/Plan:     Acute hypoxic/hypercapnic respiratory failure, extubated yesterday-currently on 3 to 4 L O2. No significant issues overnight. Patient appears somnolent, noncompliant with noninvasive ventilation. Decision was made yesterday about limited measures, but apparently patient stated that short-term intubation should still be okay. Need to clarify this. Presumed COPD/bibasal pneumonia. Reviewed CT images from admission, possible bibasilar atelectasis noted. No respiratory cultures are available. Blood cultures are negative. On empiric Zosyn, DC Zyvox. Decrease dose of Solu-Medrol to 40 mg IV daily. Somnolent, may not be able to take p.o. Personally I feel that patient has poor quality of life, with recurrent admissions primarily due to noncompliance with noninvasive ventilation/cpap at home. Questionable history of COPD noted. Would need to continue to address CODE STATUS-would prefer hospice/comfort care.     Evangelina Fleming MD

## 2019-06-22 NOTE — PROGRESS NOTES
Pt suctioned and extubated by RT- tolerated well. 4L NC. Family at bedside and updated.  Chidi Santana BSN, RN

## 2019-06-22 NOTE — PROGRESS NOTES
Shift assessment. Patient awakes to verbal, alert to person, place, situation. IV therapy via right triple lumen IJ, patent patel in place. Patient currently NPO waiting SLP eval in am. O2 via NC at 4 Lpm. NS at 50 infusing. Pt turned and repositioned. Call light in reach.

## 2019-06-22 NOTE — PROGRESS NOTES
Patient is more alert, responding appropriately. Denies discomfort at this time. Speech therapy eval placed.

## 2019-06-23 PROBLEM — J96.00 ACUTE RESPIRATORY FAILURE (HCC): Status: ACTIVE | Noted: 2019-06-20

## 2019-06-23 LAB
ANION GAP SERPL CALCULATED.3IONS-SCNC: 11 MMOL/L (ref 3–16)
BUN BLDV-MCNC: 22 MG/DL (ref 7–20)
CALCIUM SERPL-MCNC: 9.2 MG/DL (ref 8.3–10.6)
CHLORIDE BLD-SCNC: 104 MMOL/L (ref 99–110)
CO2: 30 MMOL/L (ref 21–32)
CREAT SERPL-MCNC: 0.8 MG/DL (ref 0.6–1.2)
GFR AFRICAN AMERICAN: >60
GFR NON-AFRICAN AMERICAN: >60
GLUCOSE BLD-MCNC: 125 MG/DL (ref 70–99)
HCT VFR BLD CALC: 33.8 % (ref 36–48)
HEMOGLOBIN: 10.7 G/DL (ref 12–16)
MCH RBC QN AUTO: 28.2 PG (ref 26–34)
MCHC RBC AUTO-ENTMCNC: 31.6 G/DL (ref 31–36)
MCV RBC AUTO: 89 FL (ref 80–100)
PDW BLD-RTO: 15 % (ref 12.4–15.4)
PLATELET # BLD: 214 K/UL (ref 135–450)
PMV BLD AUTO: 8.4 FL (ref 5–10.5)
POTASSIUM SERPL-SCNC: 3.8 MMOL/L (ref 3.5–5.1)
RBC # BLD: 3.8 M/UL (ref 4–5.2)
SODIUM BLD-SCNC: 145 MMOL/L (ref 136–145)
WBC # BLD: 15.5 K/UL (ref 4–11)

## 2019-06-23 PROCEDURE — 6360000002 HC RX W HCPCS: Performed by: INTERNAL MEDICINE

## 2019-06-23 PROCEDURE — 80048 BASIC METABOLIC PNL TOTAL CA: CPT

## 2019-06-23 PROCEDURE — 2580000003 HC RX 258: Performed by: EMERGENCY MEDICINE

## 2019-06-23 PROCEDURE — 85027 COMPLETE CBC AUTOMATED: CPT

## 2019-06-23 PROCEDURE — 2500000003 HC RX 250 WO HCPCS: Performed by: INTERNAL MEDICINE

## 2019-06-23 PROCEDURE — 2580000003 HC RX 258: Performed by: INTERNAL MEDICINE

## 2019-06-23 PROCEDURE — C9113 INJ PANTOPRAZOLE SODIUM, VIA: HCPCS | Performed by: INTERNAL MEDICINE

## 2019-06-23 PROCEDURE — 94762 N-INVAS EAR/PLS OXIMTRY CONT: CPT

## 2019-06-23 PROCEDURE — 99233 SBSQ HOSP IP/OBS HIGH 50: CPT | Performed by: INTERNAL MEDICINE

## 2019-06-23 PROCEDURE — 2700000000 HC OXYGEN THERAPY PER DAY

## 2019-06-23 PROCEDURE — 6370000000 HC RX 637 (ALT 250 FOR IP): Performed by: INTERNAL MEDICINE

## 2019-06-23 PROCEDURE — 2060000000 HC ICU INTERMEDIATE R&B

## 2019-06-23 PROCEDURE — 99232 SBSQ HOSP IP/OBS MODERATE 35: CPT | Performed by: INTERNAL MEDICINE

## 2019-06-23 PROCEDURE — 92526 ORAL FUNCTION THERAPY: CPT

## 2019-06-23 PROCEDURE — 92610 EVALUATE SWALLOWING FUNCTION: CPT

## 2019-06-23 PROCEDURE — 94640 AIRWAY INHALATION TREATMENT: CPT

## 2019-06-23 PROCEDURE — 94660 CPAP INITIATION&MGMT: CPT

## 2019-06-23 RX ORDER — AMOXICILLIN AND CLAVULANATE POTASSIUM 875; 125 MG/1; MG/1
1 TABLET, FILM COATED ORAL EVERY 12 HOURS SCHEDULED
Status: DISCONTINUED | OUTPATIENT
Start: 2019-06-23 | End: 2019-06-24

## 2019-06-23 RX ADMIN — IPRATROPIUM BROMIDE AND ALBUTEROL SULFATE 1 AMPULE: .5; 3 SOLUTION RESPIRATORY (INHALATION) at 11:51

## 2019-06-23 RX ADMIN — Medication 2 PUFF: at 19:44

## 2019-06-23 RX ADMIN — PREGABALIN 150 MG: 75 CAPSULE ORAL at 20:56

## 2019-06-23 RX ADMIN — IPRATROPIUM BROMIDE AND ALBUTEROL SULFATE 1 AMPULE: .5; 3 SOLUTION RESPIRATORY (INHALATION) at 19:43

## 2019-06-23 RX ADMIN — PANTOPRAZOLE SODIUM 40 MG: 40 INJECTION, POWDER, FOR SOLUTION INTRAVENOUS at 10:01

## 2019-06-23 RX ADMIN — IPRATROPIUM BROMIDE AND ALBUTEROL SULFATE 1 AMPULE: .5; 3 SOLUTION RESPIRATORY (INHALATION) at 16:30

## 2019-06-23 RX ADMIN — Medication 10 ML: at 20:56

## 2019-06-23 RX ADMIN — CASTOR OIL AND BALSAM, PERU: 788; 87 OINTMENT TOPICAL at 10:01

## 2019-06-23 RX ADMIN — AMOXICILLIN AND CLAVULANATE POTASSIUM 1 TABLET: 875; 125 TABLET, FILM COATED ORAL at 20:56

## 2019-06-23 RX ADMIN — AMOXICILLIN AND CLAVULANATE POTASSIUM 1 TABLET: 875; 125 TABLET, FILM COATED ORAL at 13:53

## 2019-06-23 RX ADMIN — METHYLPREDNISOLONE SODIUM SUCCINATE 40 MG: 40 INJECTION, POWDER, FOR SOLUTION INTRAMUSCULAR; INTRAVENOUS at 10:01

## 2019-06-23 RX ADMIN — PREGABALIN 150 MG: 75 CAPSULE ORAL at 10:00

## 2019-06-23 RX ADMIN — ENOXAPARIN SODIUM 40 MG: 40 INJECTION SUBCUTANEOUS at 20:56

## 2019-06-23 RX ADMIN — TAZOBACTAM SODIUM AND PIPERACILLIN SODIUM 3.38 G: 375; 3 INJECTION, SOLUTION INTRAVENOUS at 04:52

## 2019-06-23 RX ADMIN — Medication 10 ML: at 10:02

## 2019-06-23 RX ADMIN — Medication 2 PUFF: at 11:51

## 2019-06-23 RX ADMIN — FLUOXETINE 40 MG: 20 CAPSULE ORAL at 10:00

## 2019-06-23 RX ADMIN — CASTOR OIL AND BALSAM, PERU: 788; 87 OINTMENT TOPICAL at 22:00

## 2019-06-23 NOTE — PROGRESS NOTES
WA    piperacillin-tazobactam  3.375 g Intravenous Q8H    sodium chloride flush  10 mL Intravenous 2 times per day    FLUoxetine  40 mg Oral Daily    levothyroxine  112 mcg Oral Daily    pregabalin  150 mg Oral BID    mometasone-formoterol  2 puff Inhalation BID    sodium chloride flush  10 mL Intravenous 2 times per day    enoxaparin  40 mg Subcutaneous Nightly    pantoprazole  40 mg Intravenous Daily     PRN Meds: albuterol, phenol, sodium chloride flush, sodium chloride flush, magnesium hydroxide, ondansetron, potassium chloride, acetaminophen      Intake/Output Summary (Last 24 hours) at 6/23/2019 0847  Last data filed at 6/23/2019 0400  Gross per 24 hour   Intake 780 ml   Output 1950 ml   Net -1170 ml       Physical Exam Performed:    BP (!) 144/51   Pulse 78   Temp 97.9 °F (36.6 °C) (Temporal)   Resp 27   Ht 5' 1\" (1.549 m)   Wt 279 lb 1.6 oz (126.6 kg)   SpO2 91%   BMI 52.74 kg/m²     General appearance: No apparent distress, appears stated age , somnolent, Pickwickian in habitus, morbidly obese  HEENT: Pupils equal, round, and reactive to light. Conjunctivae/corneas clear. Neck: Supple, with full range of motion. No jugular venous distention. Trachea midline. Respiratory:  Normal respiratory effort. Slightly diminished but Clear to auscultation, bilaterally without Rales/Wheezes/Rhonchi. Cardiovascular: Regular rate and rhythm with normal S1/S2 without murmurs, rubs or gallops. Abdomen: Soft, non-tender, non-distended with normal bowel sounds. Musculoskeletal: No clubbing, cyanosis or edema bilaterally. Full range of motion without deformity. Skin: Skin color, texture, turgor normal.  No rashes or lesions. Neurologic:  Neurovascularly intact without any focal sensory/motor deficits.  Cranial nerves: II-XII intact, grossly non-focal.  Psychiatric: Alert and oriented, thought content appropriate, normal insight  Capillary Refill: Brisk,< 3 seconds   Peripheral Pulses: +2 palpable, equal on the right that are partially excluded from field of view. XR CHEST PORTABLE   Final Result   Endotracheal tube tip is 3.5 cm above the merced. Increasing asymmetric left-sided airspace disease with more focal   consolidation in the left lung base. Findings may be related to asymmetric   edema and/or pneumonia. XR CHEST PORTABLE   Final Result   1. Trace pleural effusions and mild bibasilar atelectasis. 2. Low lung volumes. CT Head WO Contrast   Final Result   Mild motion artifact. No acute intracranial abnormality.                  Assessment/Plan:    Active Hospital Problems    Diagnosis    Bilateral lower lobe pneumonia due to Escherichia coli ([facility] [address] [J15.5]    Endotracheally intubated [Z97.8]    On mechanically assisted ventilation (HCC) [Z99.11]    Acute kidney injury ([address] [N17.9]    History of recurrent UTI (urinary tract infection) [Z87.440]    Bandemia [D72.825]    Chronic obstructive pulmonary disease with acute lower respiratory infection (HCC) [J44.0]    Spinal stenosis [S36.38]    Complicated UTI (urinary tract infection) [N39.0]    Acute on chronic respiratory failure (HCC) [J96.20]    Acute respiratory failure with hypoxia and hypercapnia (HCC) [J96.01, J96.02]    Severe sepsis (HCC) [A41.9, R65.20]    Septic shock (HCC) [A41.9, R65.21]     Acute on chronic respiratory failure with hypoxia and hypercapnia  -extuabted 6/21/2019  -Pulmonary critical care following  -Known to have COPD and prone to exacerbations   - appears to have SHORTY and should wear CPAP or BiPAP at night or when sleeping  - she likely needs a sleep study  -COPD and Pneumonia  - also concern for aspiration    COPD exacerbation acute   -IV steroids  -Possible pneumonia will cover with IV antibiotics  -She passed her spontaneous trial and was extubated (6/22/2019)  - Mary Franks  She has no wheeze today.     Severe sepsis  -Again appears to be secondary to pneumonia I do not find another source of infection at this point  -She has been covered with cefepime and vancomycin  -Continue these antibiotics.    -Septic shock  -She was initially hypotensive in the emergency department but did perk up with some fluids however following intubation the patient's pressure again began to drop  -Currently on IV Levophed  - off levophed and maintaining blood pressure  - resolved.        Small bilateral pleural effusions  -Unclear if there is a component of heart failure here  Echo done last month shows a normal ejection fraction     LIZ  -This appears to be multiple previous creatinine levels are normal liver she had one abnormal at 1.5 with normals in between the current one. Consider nephrology consult if patient does not show improvement with IV hydration  -resolved    Bilateral lower lobe pneumonia  She is on appropriate antibiotic coverage  Concern for aspiration, seen by maureen        DVT Prophylaxis: Lovenox  Diet: puree with thn liquids  Code Status: Limited  She is no okay with intubation as she does not remember it.   PT/OT Eval Status: Not at this time    Dispo -okay to transfer out of ICU if okay with Mel Washington MD

## 2019-06-23 NOTE — PROGRESS NOTES
ID Follow-up NOTE    CC:   Respiratory failure, possible pneumonia  Antibiotics: Augmentin     Admit Date: 2019  Hospital Day: 4    Subjective:     Patient has no complaint -  Denies SOB, cough, CP      Objective:     Patient Vitals for the past 8 hrs:   BP Temp Temp src Pulse Resp SpO2 Weight   19 0800 127/88 -- -- 75 25 94 % --   19 0400 (!) 144/51 97.9 °F (36.6 °C) Temporal 78 27 91 % 279 lb 1.6 oz (126.6 kg)     I/O last 3 completed shifts: In: 780 [I.V.:730; IV Piggyback:50]  Out: 1950 [Urine:1950]  No intake/output data recorded. EXAM:  GENERAL: No apparent distress. On 2L  HEENT: Membranes moist, no oral lesion  NECK:  Supple  LUNGS: Clear b/l, no rales, no dullness  CARDIAC: RRR, no murmur appreciated  ABD:  + BS, soft / NT  EXT:  No rash, no edema, no lesions  NEURO: No focal neurologic findings  PSYCH: Orientation, sensorium, mood normal  LINES:  R IJ line       Data Review:  Lab Results   Component Value Date    WBC 15.5 (H) 2019    HGB 10.7 (L) 2019    HCT 33.8 (L) 2019    MCV 89.0 2019     2019     Lab Results   Component Value Date    CREATININE 0.8 2019    BUN 22 (H) 2019     2019    K 3.8 2019     2019    CO2 30 2019       Hepatic Function Panel:   Lab Results   Component Value Date    ALKPHOS 106 2019    ALT 42 2019    AST 33 2019    PROT 6.6 2019    PROT 6.9 2011    BILITOT 0.3 2019    LABALBU 3.5 2019       MICRO:   S pneumoniae / Legionella urinary ag neg   MRSA DNA probe negative   BC x 1 Diphtheroids     IMAGIN/22 CXR   Improved left and stable right basilar airspace disease with stable right  pleural effusion     Chest CT / CTA   CT CHEST PULMONARY EMBOLISM W CONTRAST   Final Result   1. No CT evidence of a pulmonary embolism.    2. Small bilateral pleural effusions, with dependent consolidative opacity   within the bilateral lower lobes, with differentials including atelectasis,   aspiration, or pneumonia.        Scheduled Meds:   amoxicillin-clavulanate  1 tablet Oral 2 times per day    methylPREDNISolone  40 mg Intravenous Daily    VENELEX   Topical BID    ipratropium-albuterol  1 ampule Inhalation Q4H WA    sodium chloride flush  10 mL Intravenous 2 times per day    FLUoxetine  40 mg Oral Daily    levothyroxine  112 mcg Oral Daily    pregabalin  150 mg Oral BID    mometasone-formoterol  2 puff Inhalation BID    sodium chloride flush  10 mL Intravenous 2 times per day    enoxaparin  40 mg Subcutaneous Nightly    pantoprazole  40 mg Intravenous Daily       Continuous Infusions:   sodium chloride 50 mL/hr at 06/21/19 0301    norepinephrine Stopped (06/21/19 1412)       PRN Meds:  albuterol, phenol, sodium chloride flush, sodium chloride flush, magnesium hydroxide, ondansetron, potassium chloride, acetaminophen      Assessment:     COPD, SHORTY / OHS    Respiratory failure, extubated 6/22  Probable pneumonia  Leukocytosis       Plan:     Changed to augmentin by Dr Ann Camejo    Discussed with pt, RN  Louis Reynaga

## 2019-06-23 NOTE — PROGRESS NOTES
CLINICAL BEDSIDE SWALLOWING EVALUATION  Speech Therapy Department    Patient Name:  Fabrizio Crawford  :  1935  Pain level:Pt reported pain in bottom; Nurse in room and aware  Medical Diagnosis:   Acute on chronic respiratory failure ([facility] [address] [J96.20]  Acute on chronic respiratory failure ([facility] [address] [J96.20]     HPI:  \"Mari Day is a 82 y. o. female who presents to the emergency department respiratory distress.  Patient came in hypoxic 85%.  She was also apparently confused, and combative, tremulous to the upper extremities.  Patient was made a stroke alert by EMS personnel was brought immediately into the [address]  She maintains some hypoxia on nonrebreather but had improved number at 89%.  She CTAs were not able to be performed and I do not think needed, because she was a difficult IV stick at that time and airway need to be maintained.  BiPAP was started but she did not do well with this.  Patient was intubated.  Decompensation all occurred today.  Initially there were no family members or friends at bedside. Marjorie Law HPI is difficult or impossible to obtain.     Of note during her recent stay earlier this month the patient had made herself a DNR CCA, and she did not want compressions or intubation.     The patient was hypotensive upon presentation and she was given some IV fluids that she was responded briefly but following intubation she has remained hypotensive and she has had a central line placed and has been started on some Levophed. \"    Chest xray (19): Impression   Improved left and stable right basilar airspace disease with stable right   pleural effusion     SLP eval and treat orders received and acknowledged. Pt extubated 19. Pt alert and oriented. Pt denied hx of dysphagia. Pt reports eating lying down at home.     Treatment Diagnosis: Mild-Moderate Oropharyngeal Dysphagia    Impressions:  Nurse assisted in positioning pt near upright in bed upon arrival. Pt on 3L of O2 via nasal cannula. Pt given various texture trials to evaluate overall swallowing function. Pt exhibits overall mild-moderate oropharyngeal dysphagia characterized by decreased bolus control, decreased and disorganized mastication, reduced A-P propulsion, suspected premature loss of bolus to pharynx, delayed swallow initiation, and reduced hyolaryngeal elevation upon manual palpation. Thin liquids via cup revealed suspected premature loss of bolus to pharynx, delayed swallow initiation, reduced hyolaryngeal elevation, and use of second dry swallow to clear. No overt clinical s/s of aspiration/penetration assessed with thin liquids. Soft fruit revealed prolonged and disorganized mastication with immediate coughing episode. Puree textures tolerated with improved bolus control, oral clearance, and no overt clinical s/s of aspiration/penetration. Recommend initiate Dysphagia I (puree) texture diet with thin liquids at this time with ongoing assessment of swallow function. Education provided regarding importance of upright positioning with PO intake. Pt verbalized understanding. Dietary Recommendations:  Initiate Dysphagia I (puree) texture diet with thin liquids; No straws; No mixed consistencies (i.e. Chicken noodle soup, cereal with milk, pill with water, etc.)     Strategies: 90 degree positioning with all p.o. intake; small bites/sips; alternate textures through meal; reduce rate of intake    Treatment/Goals: Speech therapy for dysphagia tx 3-5 times per week. ST.) Pt will tolerate recommended diet without s/s of aspiration   2.) If clinical symptoms of penetration/aspiration continue to be noted,Pt will tolerate MBS to r/o aspiration and determine appropriate diet/liquid level.    3.) Pt will tolerate diet advance to least restricted diet, as clinically indicated, with no signs of aspiration   4.) Pt will improve oral motor function via bolus control exercises 5/5    Oral motor Exam:  Dentition:

## 2019-06-23 NOTE — PROGRESS NOTES
problems, tremors and weakness  Behavioral/Psych: negative for anxiety, behavior problems, depression, fatigue and sleep disturbance  Endocrine: negative for diabetic symptoms including none, neuropathy, polyphagia, polyuria, polydipsia, vomiting and diarrhea and temperature intolerance  Allergic/Immunologic: negative for anaphylaxis, angioedema, hay fever and urticaria    Objective:     Patient Vitals for the past 8 hrs:   BP Temp Temp src Pulse Resp SpO2 Weight   06/23/19 0800 127/88 -- -- 75 25 94 % --   06/23/19 0400 (!) 144/51 97.9 °F (36.6 °C) Temporal 78 27 91 % 279 lb 1.6 oz (126.6 kg)     I/O last 3 completed shifts: In: 780 [I.V.:730; IV Piggyback:50]  Out: 1950 [Urine:1950]  No intake/output data recorded.     General Appearance: alert and oriented to person, place and time, well developed and well- nourished, in no acute distress  Skin: warm and dry, no rash or erythema  Head: normocephalic and atraumatic  Eyes: pupils equal, round, and reactive to light, extraocular eye movements intact, conjunctivae normal  ENT: external ear and ear canal normal bilaterally, nose without deformity, nasal mucosa and turbinates normal  Neck: supple and non-tender without mass, no cervical lymphadenopathy  Pulmonary/Chest: clear to auscultation bilaterally- no wheezes, rales or rhonchi, normal air movement, no respiratory distress  Cardiovascular: normal rate, regular rhythm,  no murmurs, rubs, distal pulses intact, no carotid bruits  Abdomen: soft, non-tender, non-distended, normal bowel sounds, no masses or organomegaly  Lymph Nodes: Cervical, supraclavicular normal  Extremities: no cyanosis, clubbing or edema  Musculoskeletal: normal range of motion, no joint swelling, deformity or tenderness  Neurologic: alert, no focal neurologic deficits    Data Review:  CBC:   Lab Results   Component Value Date    WBC 15.5 06/23/2019    RBC 3.80 06/23/2019     BMP:   Lab Results   Component Value Date    GLUCOSE 125 06/23/2019 CO2 30 06/23/2019    BUN 22 06/23/2019    CREATININE 0.8 06/23/2019    CALCIUM 9.2 06/23/2019     ABG:   Lab Results   Component Value Date    LHO8DIK 34.1 06/21/2019    BEART 6.9 06/21/2019    N7NRORQX 99.7 06/21/2019    PHART 7.350 06/21/2019    PDR1OFI 61.7 06/21/2019    PO2ART 147.0 06/21/2019    UVQ4LRV 80.6 06/21/2019       Radiology: All pertinent images / reports were reviewed as a part of this visit. Problem List:     Acute hypoxic/hypercapnic respiratory failure requiring intubation  Presumed healthcare associated pneumonia  SHORTY/OHS, noncompliant with BPAP  Presumed COPD with probable acute exacerbation    Assessment/Plan:     Acute hypoxic/hypercapnic respiratory failure, extubated yesterday-currently on 3 to 4 L O2. No significant issues overnight. Patient appears somnolent, noncompliant with noninvasive ventilation. Presumed COPD/bibasal pneumonia. Reviewed CT images from admission, possible bibasilar atelectasis noted. No respiratory cultures are available. Blood cultures are negative. Switch Zosyn to oral Augmentin and continue IV Solu-Medrol. Start patient on BiPAP as needed and at nighttime. Requested patient's daughter to get her home BiPAP machine which apparently is \"not working/broken\" so that we can take a look. Patient would require to go home with a functional BiPAP machine, given her repeated hospitalizations for hypercapnic respiratory failure.     Narciso Bee MD

## 2019-06-23 NOTE — PROGRESS NOTES
Reassessment. Patient bathed and linens changed. Pt denies needs, repositioned, call light in reach.

## 2019-06-23 NOTE — PROGRESS NOTES
Shift assessment. Patient awake in bed, alert and oriented. O2 via NC at 3 Lpm, patent patel in place. IV therapy via CVC in right IJ. Patient repositioned, call light in reach.

## 2019-06-24 LAB
ANION GAP SERPL CALCULATED.3IONS-SCNC: 10 MMOL/L (ref 3–16)
BUN BLDV-MCNC: 17 MG/DL (ref 7–20)
CALCIUM SERPL-MCNC: 8.8 MG/DL (ref 8.3–10.6)
CHLORIDE BLD-SCNC: 106 MMOL/L (ref 99–110)
CO2: 31 MMOL/L (ref 21–32)
CREAT SERPL-MCNC: 0.7 MG/DL (ref 0.6–1.2)
GFR AFRICAN AMERICAN: >60
GFR NON-AFRICAN AMERICAN: >60
GLUCOSE BLD-MCNC: 130 MG/DL (ref 70–99)
HCT VFR BLD CALC: 33.3 % (ref 36–48)
HEMOGLOBIN: 10.6 G/DL (ref 12–16)
MCH RBC QN AUTO: 28.5 PG (ref 26–34)
MCHC RBC AUTO-ENTMCNC: 31.7 G/DL (ref 31–36)
MCV RBC AUTO: 90 FL (ref 80–100)
PDW BLD-RTO: 15.6 % (ref 12.4–15.4)
PLATELET # BLD: 197 K/UL (ref 135–450)
PMV BLD AUTO: 8.7 FL (ref 5–10.5)
POTASSIUM SERPL-SCNC: 3.6 MMOL/L (ref 3.5–5.1)
RBC # BLD: 3.7 M/UL (ref 4–5.2)
SODIUM BLD-SCNC: 147 MMOL/L (ref 136–145)
WBC # BLD: 14.9 K/UL (ref 4–11)

## 2019-06-24 PROCEDURE — 87086 URINE CULTURE/COLONY COUNT: CPT

## 2019-06-24 PROCEDURE — 6370000000 HC RX 637 (ALT 250 FOR IP): Performed by: INTERNAL MEDICINE

## 2019-06-24 PROCEDURE — 6360000002 HC RX W HCPCS: Performed by: INTERNAL MEDICINE

## 2019-06-24 PROCEDURE — C9113 INJ PANTOPRAZOLE SODIUM, VIA: HCPCS | Performed by: INTERNAL MEDICINE

## 2019-06-24 PROCEDURE — 94761 N-INVAS EAR/PLS OXIMETRY MLT: CPT

## 2019-06-24 PROCEDURE — 2700000000 HC OXYGEN THERAPY PER DAY

## 2019-06-24 PROCEDURE — 99233 SBSQ HOSP IP/OBS HIGH 50: CPT | Performed by: INTERNAL MEDICINE

## 2019-06-24 PROCEDURE — 85027 COMPLETE CBC AUTOMATED: CPT

## 2019-06-24 PROCEDURE — 80048 BASIC METABOLIC PNL TOTAL CA: CPT

## 2019-06-24 PROCEDURE — 6370000000 HC RX 637 (ALT 250 FOR IP): Performed by: HOSPITALIST

## 2019-06-24 PROCEDURE — 92526 ORAL FUNCTION THERAPY: CPT

## 2019-06-24 PROCEDURE — 2580000003 HC RX 258: Performed by: INTERNAL MEDICINE

## 2019-06-24 PROCEDURE — 94640 AIRWAY INHALATION TREATMENT: CPT

## 2019-06-24 PROCEDURE — 99232 SBSQ HOSP IP/OBS MODERATE 35: CPT | Performed by: INTERNAL MEDICINE

## 2019-06-24 PROCEDURE — 2060000000 HC ICU INTERMEDIATE R&B

## 2019-06-24 RX ORDER — HYDROCODONE BITARTRATE AND ACETAMINOPHEN 5; 325 MG/1; MG/1
2 TABLET ORAL EVERY 6 HOURS PRN
Status: DISCONTINUED | OUTPATIENT
Start: 2019-06-24 | End: 2019-06-26 | Stop reason: HOSPADM

## 2019-06-24 RX ORDER — LACTOBACILLUS RHAMNOSUS GG 10B CELL
1 CAPSULE ORAL 2 TIMES DAILY WITH MEALS
Status: DISCONTINUED | OUTPATIENT
Start: 2019-06-24 | End: 2019-06-26 | Stop reason: HOSPADM

## 2019-06-24 RX ORDER — AMOXICILLIN AND CLAVULANATE POTASSIUM 875; 125 MG/1; MG/1
1 TABLET, FILM COATED ORAL EVERY 12 HOURS SCHEDULED
Status: DISCONTINUED | OUTPATIENT
Start: 2019-06-24 | End: 2019-06-26 | Stop reason: HOSPADM

## 2019-06-24 RX ADMIN — HYDROCODONE BITARTRATE AND ACETAMINOPHEN 2 TABLET: 5; 325 TABLET ORAL at 11:46

## 2019-06-24 RX ADMIN — IPRATROPIUM BROMIDE AND ALBUTEROL SULFATE 1 AMPULE: .5; 3 SOLUTION RESPIRATORY (INHALATION) at 19:57

## 2019-06-24 RX ADMIN — METHYLPREDNISOLONE SODIUM SUCCINATE 40 MG: 40 INJECTION, POWDER, FOR SOLUTION INTRAMUSCULAR; INTRAVENOUS at 09:28

## 2019-06-24 RX ADMIN — ALBUTEROL SULFATE 2.5 MG: 2.5 SOLUTION RESPIRATORY (INHALATION) at 23:09

## 2019-06-24 RX ADMIN — FLUOXETINE 40 MG: 20 CAPSULE ORAL at 09:28

## 2019-06-24 RX ADMIN — Medication 2 PUFF: at 08:24

## 2019-06-24 RX ADMIN — CASTOR OIL AND BALSAM, PERU: 788; 87 OINTMENT TOPICAL at 22:24

## 2019-06-24 RX ADMIN — ENOXAPARIN SODIUM 40 MG: 40 INJECTION SUBCUTANEOUS at 20:08

## 2019-06-24 RX ADMIN — PREGABALIN 150 MG: 75 CAPSULE ORAL at 20:09

## 2019-06-24 RX ADMIN — Medication 10 ML: at 09:27

## 2019-06-24 RX ADMIN — Medication 2 PUFF: at 19:57

## 2019-06-24 RX ADMIN — LEVOTHYROXINE SODIUM 112 MCG: 112 TABLET ORAL at 06:00

## 2019-06-24 RX ADMIN — CASTOR OIL AND BALSAM, PERU: 788; 87 OINTMENT TOPICAL at 09:29

## 2019-06-24 RX ADMIN — IPRATROPIUM BROMIDE AND ALBUTEROL SULFATE 1 AMPULE: .5; 3 SOLUTION RESPIRATORY (INHALATION) at 12:07

## 2019-06-24 RX ADMIN — AMOXICILLIN AND CLAVULANATE POTASSIUM 1 TABLET: 875; 125 TABLET, FILM COATED ORAL at 20:09

## 2019-06-24 RX ADMIN — IPRATROPIUM BROMIDE AND ALBUTEROL SULFATE 1 AMPULE: .5; 3 SOLUTION RESPIRATORY (INHALATION) at 15:47

## 2019-06-24 RX ADMIN — PANTOPRAZOLE SODIUM 40 MG: 40 INJECTION, POWDER, FOR SOLUTION INTRAVENOUS at 09:28

## 2019-06-24 RX ADMIN — IPRATROPIUM BROMIDE AND ALBUTEROL SULFATE 1 AMPULE: .5; 3 SOLUTION RESPIRATORY (INHALATION) at 08:24

## 2019-06-24 RX ADMIN — PREGABALIN 150 MG: 75 CAPSULE ORAL at 09:28

## 2019-06-24 RX ADMIN — Medication 1 CAPSULE: at 16:54

## 2019-06-24 RX ADMIN — HYDROCORTISONE 2.5%: 25 CREAM TOPICAL at 22:24

## 2019-06-24 RX ADMIN — HYDROCODONE BITARTRATE AND ACETAMINOPHEN 2 TABLET: 5; 325 TABLET ORAL at 20:09

## 2019-06-24 ASSESSMENT — ENCOUNTER SYMPTOMS
NAUSEA: 0
FACIAL SWELLING: 0
PHOTOPHOBIA: 0
EYE REDNESS: 0
STRIDOR: 0
ABDOMINAL PAIN: 0
EYE DISCHARGE: 0
COUGH: 0
COLOR CHANGE: 0
RHINORRHEA: 0
SHORTNESS OF BREATH: 0
CHEST TIGHTNESS: 0
APNEA: 0
CHOKING: 0
BLOOD IN STOOL: 0
TROUBLE SWALLOWING: 0
DIARRHEA: 0

## 2019-06-24 ASSESSMENT — PAIN SCALES - GENERAL
PAINLEVEL_OUTOF10: 0
PAINLEVEL_OUTOF10: 8
PAINLEVEL_OUTOF10: 5
PAINLEVEL_OUTOF10: 9
PAINLEVEL_OUTOF10: 0
PAINLEVEL_OUTOF10: 2

## 2019-06-24 ASSESSMENT — PAIN DESCRIPTION - PAIN TYPE: TYPE: CHRONIC PAIN

## 2019-06-24 ASSESSMENT — PAIN DESCRIPTION - LOCATION: LOCATION: BACK;FOOT

## 2019-06-24 ASSESSMENT — PAIN DESCRIPTION - PROGRESSION: CLINICAL_PROGRESSION: GRADUALLY WORSENING

## 2019-06-24 NOTE — PROGRESS NOTES
Infectious Diseases   Progress Note      Admission Date: 6/20/2019  Hospital Day: Hospital Day: 5  Attending: Tanna Boland MD  Date of service: 6/24/2019    Chief complaint/ Reason for consult: The patient was seen today for the following:    · Acute on chronic respiratory failure with hypoxia and hypercarbia  · Bilateral lower lobe pneumonia -aspiration suspected  · Altered mental state  · Complicated UTI  · Bandemia    Microbiology:        I have reviewed all available micro lab data and cultures    · Blood culture (1/2) - collected on 6/20/2019: Corynebacterium      Antibiotics and immunizations:       Current antibiotics: All antibiotics and their doses were reviewed by me    Recent Abx Admin                   amoxicillin-clavulanate (AUGMENTIN) 875-125 MG per tablet 1 tablet (tablet) 1 tablet Given 06/23/19 2056     1 tablet Given  1353                  Immunization History: All immunization history was reviewed by me today. Immunization History   Administered Date(s) Administered    Influenza Virus Vaccine 10/24/2018    Pneumococcal Conjugate 13-valent (Thom Grosser) 04/24/2013       Known drug allergies: All allergies were reviewed and updated    Allergies   Allergen Reactions    Sulfa Antibiotics      Unknown reaction         Assessment:     The patient is a 82 y.o. old female who  has a past medical history of Arthritis, COPD (chronic obstructive pulmonary disease) ([address] Hemorrhoids, Hernia of unspecified site of abdominal cavity without mention of obstruction or gangrene, Incontinence, Knee pain, bilateral, Spinal stenosis, and Spinal stenosis.  with following problems:    · Acute on chronic respiratory failure with hypoxia and hypercarbia-improving  · Bilateral lower lobe pneumonia -aspiration suspected-colored with Augmentin now  · Altered mental state-resolved  · Complicated UTI-urine culture never sent  · Bandemia  · History of recurrent UTI  · Acute kidney injury  · COPD  · History of spinal stenosis        Discussion:      The patient has been on IV linezolid and IV Zosyn. One set of blood culture from admission came back positive for corynebacterium, which is likely a contaminant from the skin if not corynebacterium diphtheria    She was changed to oral Augmentin by pulmonology for the weekend    Leukocytosis is likely due to steroids    Looks like urine culture was never sent. Plan:     Diagnostic Workup:    · Continue to follow  fever curve, WBC count and blood cultures  · Follow up on *liver and renal function  · Send urine for culture    Antimicrobials:    · Agree with oral Augmentin  · Continue oral Augmentin 875 mg every 12 hours  · Continue probiotic twice daily  · Cough and deep breathing exercises  · Aspiration precautions  · Will recommend switching to oral steroid taper  · Will plan to continue Augmentin for 5 to 7 days more  · DVT prophylaxis  · Discussed the above plan with patient and RN       Drug Monitoring:    · Continue monitoring for antibiotic toxicity as follows: CMP  · Continue to watch for following: new or worsening fever, new hypotension, hives, lip swelling and redness or purulence at vascular access sites. I/v access Management:    · Continue to monitor i.v access sites for erythema, induration,discharge or tenderness. · As always, continue efforts to minimize tubes/ lines/ drains as clinically appropriate to reduce chances of line associated infections. Patient education and counseling:     · The patient was educated in detail about the side-effects of various antibiotics and things to watch for like new rashes, lip swelling, severe reaction, worsening diarrhea, break through fever etc.  · Discussed patient's condition and what to expect. All of the patient's questions were addressed in a satisfactory manner and patient verbalized understanding all instructions. Thank you for involving me in the care of your patient. I will continue to follow.  If you have any additional questions, please do not hesitate to contact me. Subjective: Interval history: Patient was seen and examined at bedside. Interval history was obtained. The patient is awake today. Slightly encephalopathic. She is afebrile. She is tolerating Augmentin okay. No diarrhea so far. REVIEW OF SYSTEMS:    Review of Systems   Constitutional: Positive for fatigue. Negative for chills, diaphoresis and unexpected weight change. HENT: Negative for congestion, ear discharge, ear pain, facial swelling, hearing loss, rhinorrhea and trouble swallowing. Eyes: Negative for photophobia, discharge, redness and visual disturbance. Respiratory: Negative for apnea, cough, choking, chest tightness, shortness of breath and stridor. Cardiovascular: Negative for chest pain and palpitations. Gastrointestinal: Negative for abdominal pain, blood in stool, diarrhea and nausea. Endocrine: Negative for polydipsia, polyphagia and polyuria. Genitourinary: Negative for difficulty urinating, dysuria, frequency, hematuria, menstrual problem and vaginal discharge. Musculoskeletal: Negative for arthralgias, joint swelling, myalgias and neck stiffness. Skin: Negative for color change and rash. Allergic/Immunologic: Negative for immunocompromised state. Neurological: Negative for dizziness, seizures, speech difficulty, light-headedness and headaches. Hematological: Negative for adenopathy. Psychiatric/Behavioral: Negative for agitation, hallucinations and suicidal ideas. Past Medical History: All past medical history reviewed today.     Past Medical History:   Diagnosis Date    Arthritis     osteo    COPD (chronic obstructive pulmonary disease) ([facility] [address]     Hemorrhoids     Hernia of unspecified site of abdominal cavity without mention of obstruction or gangrene     Incontinence     Knee pain, bilateral     pt states no cartilage    Spinal stenosis     Spinal stenosis Past Surgical History: All past surgical history was reviewed today. Past Surgical History:   Procedure Laterality Date    CHOLECYSTECTOMY      PARTIAL HYSTERECTOMY           Immunization History: All immunization history was reviewed by me today. Immunization History   Administered Date(s) Administered    Influenza Virus Vaccine 10/24/2018    Pneumococcal Conjugate 13-valent (Vedia Do) 04/24/2013       Family History: All family history was reviewed today. History reviewed. No pertinent family history. Objective:       PHYSICAL EXAM:      Vitals:   Vitals:    06/24/19 1100 06/24/19 1146 06/24/19 1200 06/24/19 1208   BP:  (!) 152/56     Pulse: 76 76 81    Resp: 21 21 25 24   Temp:  98 °F (36.7 °C)     TempSrc:  Temporal     SpO2: 91% 91% 90% 95%   Weight:       Height:           Physical Exam   Constitutional: She is oriented to person, place, and time. She appears well-developed. No distress. HENT:   Head: Normocephalic. Right Ear: External ear normal.   Left Ear: External ear normal.   Nose: Nose normal.   Mouth/Throat: Oropharynx is clear and moist.   Eyes: Pupils are equal, round, and reactive to light. Conjunctivae are normal. Right eye exhibits no discharge. Left eye exhibits no discharge. No scleral icterus. Neck: Normal range of motion. Neck supple. Cardiovascular: Normal rate and regular rhythm. Exam reveals no friction rub. No murmur heard. Pulmonary/Chest: Effort normal. No stridor. She has no wheezes. She has rales (Bilateral lower lobe crackles ). She exhibits no tenderness. Abdominal: Soft. She exhibits no mass. There is no tenderness. There is no rebound and no guarding. Musculoskeletal: She exhibits no edema or tenderness. Lymphadenopathy:     She has no cervical adenopathy. Neurological: She is alert and oriented to person, place, and time. She exhibits normal muscle tone. Skin: Skin is warm and dry. No rash noted. She is not diaphoretic. No erythema. Psychiatric: She has a normal mood and affect. Judgment normal.   Nursing note and vitals reviewed. Lines: All vascular access sites are healthy with no local erythema, discharge or tenderness. Intake and output:    I/O last 3 completed shifts: In: 440 [P.O.:240; I.V.:200]  Out: 1900 [Urine:1900]    Lab Data:   All available labs and old records have been reviewed by me. CBC:  Recent Labs     06/22/19  0455 06/23/19  0400 06/24/19  0600   WBC 15.2* 15.5* 14.9*   RBC 3.56* 3.80* 3.70*   HGB 10.4* 10.7* 10.6*   HCT 31.9* 33.8* 33.3*    214 197   MCV 89.5 89.0 90.0   MCH 29.1 28.2 28.5   MCHC 32.5 31.6 31.7   RDW 15.1 15.0 15.6*        BMP:  Recent Labs     06/22/19  0455 06/23/19  0400 06/24/19  0600    145 147*   K 4.5 3.8 3.6    104 106   CO2 31 30 31   BUN 20 22* 17   CREATININE 0.8 0.8 0.7   CALCIUM 9.2 9.2 8.8   GLUCOSE 197* 125* 130*        Hepatic Function Panel:   Lab Results   Component Value Date    ALKPHOS 106 06/21/2019    ALT 42 06/21/2019    AST 33 06/21/2019    PROT 6.6 06/21/2019    PROT 6.9 03/21/2011    BILITOT 0.3 06/21/2019    LABALBU 3.5 06/21/2019       CPK:   Lab Results   Component Value Date    CKTOTAL 139 04/24/2019     ESR: No results found for: SEDRATE  CRP: No results found for: CRP        Imaging: All pertinent images and reports for the current visit were reviewed by me during this visit. XR CHEST PORTABLE   Final Result   Improved left and stable right basilar airspace disease with stable right   pleural effusion         XR CHEST PORTABLE   Final Result   Markedly rotated exam.  Persistent left greater than right airspace disease   which can reflect asymmetric edema or pneumonia. CT ABDOMEN PELVIS W IV CONTRAST Additional Contrast? None   Final Result   1. Mild bibasilar segmental atelectasis versus pneumonia. 2. No acute abdominopelvic abnormality. 3. Apparent intravaginal Ngo catheter. Clinical correlation requested.          CT chronic diastolic heart failure (HCC) I50.33    Peripheral edema R60.9    COPD exacerbation (HCC) J44.1    Essential hypertension I10    Hyperlipidemia E78.5    Morbid obesity with BMI of 50.0-59.9, adult (HCC) E66.01, Z68.43    Acute on chronic respiratory failure with hypercapnia (HCC) J96.22    Acute respiratory failure (HCC) J96.00    Acute respiratory failure with hypoxia and hypercapnia (HCC) J96.01, J96.02    Severe sepsis (HCC) A41.9, R65.20    Septic shock (HCC) A41.9, R65.21    Bilateral lower lobe pneumonia due to Escherichia coli (HCC) J15.5    Endotracheally intubated Z97.8    On mechanically assisted ventilation (HCC) Z99.11    Acute kidney injury (HCC) N17.9    History of recurrent UTI (urinary tract infection) Z87.440    Bandemia D72.825    Chronic obstructive pulmonary disease with acute lower respiratory infection (HCC) J44.0    Spinal stenosis N04.53    Complicated UTI (urinary tract infection) N39.0       Please note that this chart was generated using Dragon dictation software. Although every effort was made to ensure the accuracy of this automated transcription, some errors in transcription may have occurred inadvertently. If you may need any clarification, please do not hesitate to contact me through EPIC or at the phone number provided below with my electronic signature.     Daniel Polanco MD, MPH  6/24/2019, 12:40 PM  [facility] Infectious Disease   Office: [phone]  Fax: [phone]  Tuesday AM clinic:   [address] [place] [address]  Thursday AM [id number] [facility]

## 2019-06-24 NOTE — PROGRESS NOTES
Topical BID    ipratropium-albuterol  1 ampule Inhalation Q4H WA    sodium chloride flush  10 mL Intravenous 2 times per day    FLUoxetine  40 mg Oral Daily    levothyroxine  112 mcg Oral Daily    pregabalin  150 mg Oral BID    mometasone-formoterol  2 puff Inhalation BID    sodium chloride flush  10 mL Intravenous 2 times per day    enoxaparin  40 mg Subcutaneous Nightly    pantoprazole  40 mg Intravenous Daily     PRN Meds: albuterol, phenol, sodium chloride flush, sodium chloride flush, magnesium hydroxide, ondansetron, potassium chloride, acetaminophen      Intake/Output Summary (Last 24 hours) at 6/24/2019 0829  Last data filed at 6/24/2019 0367  Gross per 24 hour   Intake 440 ml   Output 1900 ml   Net -1460 ml       Physical Exam Performed:    BP (!) 130/56   Pulse 73   Temp 97.4 °F (36.3 °C) (Temporal)   Resp 19   Ht 5' 1\" (1.549 m)   Wt 280 lb (127 kg)   SpO2 96%   BMI 52.91 kg/m²     General appearance: No apparent distress, appears stated age , somnolent, [place]wickian in habitus, morbidly obese  HEENT: Pupils equal, round, and reactive to light. Conjunctivae/corneas clear. Neck: Supple, with full range of motion. No jugular venous distention. Trachea midline. Respiratory:  Normal respiratory effort. Slightly diminished but Clear to auscultation, bilaterally without Rales/Wheezes/Rhonchi. Cardiovascular: Regular rate and rhythm with normal S1/S2 without murmurs, rubs or gallops. Abdomen: Soft, non-tender, non-distended with normal bowel sounds. Musculoskeletal: No clubbing, cyanosis or edema bilaterally. Full range of motion without deformity. Skin: Skin color, texture, turgor normal.  No rashes or lesions. Neurologic:  Neurovascularly intact without any focal sensory/motor deficits.  Cranial nerves: II-XII intact, grossly non-focal.  Psychiatric: Alert and oriented, thought content appropriate, normal insight  Capillary Refill: Brisk,< 3 seconds   Peripheral Pulses: +2 palpable, equal bilaterally       Labs:   Recent Labs     06/22/19  0455 06/23/19  0400 06/24/19  0600   WBC 15.2* 15.5* 14.9*   HGB 10.4* 10.7* 10.6*   HCT 31.9* 33.8* 33.3*    214 197     Recent Labs     06/22/19  0455 06/23/19  0400 06/24/19  0600    145 147*   K 4.5 3.8 3.6    104 106   CO2 31 30 31   BUN 20 22* 17   CREATININE 0.8 0.8 0.7   CALCIUM 9.2 9.2 8.8     No results for input(s): AST, ALT, BILIDIR, BILITOT, ALKPHOS in the last 72 hours. No results for input(s): INR in the last 72 hours. No results for input(s): Jacque Jubilee in the last 72 hours. Urinalysis:      Lab Results   Component Value Date    NITRU Negative 06/20/2019    WBCUA 458 06/20/2019    BACTERIA 4+ 06/20/2019    RBCUA 8 06/20/2019    BLOODU TRACE 06/20/2019    SPECGRAV 1.027 06/20/2019    GLUCOSEU Negative 06/20/2019    GLUCOSEU NEGATIVE 03/21/2011       Radiology:  XR CHEST PORTABLE   Final Result   Improved left and stable right basilar airspace disease with stable right   pleural effusion         XR CHEST PORTABLE   Final Result   Markedly rotated exam.  Persistent left greater than right airspace disease   which can reflect asymmetric edema or pneumonia. CT ABDOMEN PELVIS W IV CONTRAST Additional Contrast? None   Final Result   1. Mild bibasilar segmental atelectasis versus pneumonia. 2. No acute abdominopelvic abnormality. 3. Apparent intravaginal Ngo catheter. Clinical correlation requested. CT CHEST PULMONARY EMBOLISM W CONTRAST   Final Result   1. No CT evidence of a pulmonary embolism. 2. Small bilateral pleural effusions, with dependent consolidative opacity   within the bilateral lower lobes, with differentials including atelectasis,   aspiration, or pneumonia. XR CHEST PORTABLE   Final Result   No complication following right IJ central venous catheter placement.   Stable   airspace changes at the left lung base with questionable new airspace changes   on the right that are partially excluded from field of view. XR CHEST PORTABLE   Final Result   Endotracheal tube tip is 3.5 cm above the merced. Increasing asymmetric left-sided airspace disease with more focal   consolidation in the left lung base. Findings may be related to asymmetric   edema and/or pneumonia. XR CHEST PORTABLE   Final Result   1. Trace pleural effusions and mild bibasilar atelectasis. 2. Low lung volumes. CT Head WO Contrast   Final Result   Mild motion artifact. No acute intracranial abnormality.                  Assessment/Plan:    Active Hospital Problems    Diagnosis    Bilateral lower lobe pneumonia due to Escherichia coli ([address] [J15.5]    Endotracheally intubated [Z97.8]    On mechanically assisted ventilation (HCC) [Z99.11]    Acute kidney injury ([address] [N17.9]    History of recurrent UTI (urinary tract infection) [Z87.440]    Bandemia [D72.825]    Chronic obstructive pulmonary disease with acute lower respiratory infection (HCC) [J44.0]    Spinal stenosis [M13.30]    Complicated UTI (urinary tract infection) [N39.0]    Acute respiratory failure (HCC) [J96.00]    Acute respiratory failure with hypoxia and hypercapnia (HCC) [J96.01, J96.02]    Severe sepsis (HCC) [A41.9, R65.20]    Septic shock (HCC) [A41.9, R65.21]     Acute on chronic respiratory failure with hypoxia and hypercapnia  -extuabted 6/21/2019  -Known to have COPD and prone to exacerbations   - appears to have SHORTY and should wear CPAP or BiPAP at night or when sleeping  - abx adjusted to oral abx  - will require functional Bipap machine to go home with       COPD exacerbation acute   -wean steroids    extubated (6/22/2019)  - dulera          -Septic shock  -She was initially hypotensive in the emergency department but did perk up with some fluids however following intubation the patient's pressure again began to drop  - off levophed and maintaining blood pressure  - resolved.        Small bilateral pleural effusions  -Unclear if there is a component of heart failure here  Echo done last month shows a normal ejection fraction     LIZ  --resolved    Bilateral lower lobe pneumonia  She is on appropriate antibiotic coverage  Concern for aspiration, seen by maureen        DVT Prophylaxis: Lovenox  Diet: puree with thn liquids  Code Status: Limited  She is no okay with intubation as she does not remember it.   PT/OT Eval Status: Not at this time    Dispo -SNF once we setup sleep study Will need bipap machine      Lucila Wall MD

## 2019-06-24 NOTE — PROGRESS NOTES
Afternoon assessment done and recorded. Up in chair and comfortable at this time. Remains on O2 at 2 liters with sat 94%. States pain pills given earlier have helped some but continues to have very painful hemorrhoids. Medication ordered by Dr Diomedes Vasques.

## 2019-06-24 NOTE — PROGRESS NOTES
[facility]    Follow Up Note    Subjective:   CHIEF COMPLAINT / HPI:   Chief Complaint   Patient presents with    Altered Mental Status     pt brought in by squad from home. pt family states pt is altered. pt combative upon arrival and taken directly to CT and assessed by Dr. Luis Chaudhry. Patient was again admitted to the hospital with acute on chronic hypoxic and hypercapnic respiratory failure. She has multiple prior hospitalizations. She has been septic as a result of pneumonia. She also has obstructive sleep apnea/obesity hypoventilation syndrome. She does have BiPAP at home but the unit is broken and she does not use it. In the hospital, she has also been prescribed BiPAP 18/8. However she only wore this for about 2 hours last night. She states that she is feeling better. She would like to advance her diet. However, there has been some concern over the possibility of aspiration leading to her recurrent pneumonia.     Past Medical History:    Reviewed; no changes    Social History:    Reviewed; no changes    REVIEW OF SYSTEMS:    CONSTITUTIONAL:  negative for fevers and chills  RESPIRATORY:  See HPI  CARDIOVASCULAR:  negative for chest pain, palpitations, edema  GASTROINTESTINAL:  negative for nausea, vomiting, diarrhea, constipation and abdominal pain    Objective:   PHYSICAL EXAM:        VITALS:  BP (!) 130/56   Pulse 76   Temp 98 °F (36.7 °C) (Temporal)   Resp 21   Ht 5' 1\" (1.549 m)   Wt 280 lb (127 kg)   SpO2 91%   BMI 52.91 kg/m²  on 4L NC    24HR INTAKE/OUTPUT:      Intake/Output Summary (Last 24 hours) at 6/24/2019 1144  Last data filed at 6/24/2019 0900  Gross per 24 hour   Intake 620 ml   Output 1900 ml   Net -1280 ml       CONSTITUTIONAL:  awake, alert,  no apparent distress, and appears stated age  LUNGS:  No increased work of breathing and clear to auscultation, no crackles or wheezes  CARDIOVASCULAR: S1 and S2 and no JVD  ABDOMEN:  normal bowel sounds, non-distended and non-tender to palpation  EXT: No edema, no calf tenderness. Pulses are present bilaterally. NEUROLOGIC:  Mental Status Exam:  Level of Alertness:   awake  Orientation:   person, place, time. Non focal  SKIN:  normal skin color, texture, turgor, no redness, warmth, or swelling at IV sites    DATA:    CBC:  Recent Labs     06/22/19  0455 06/23/19  0400 06/24/19  0600   WBC 15.2* 15.5* 14.9*   RBC 3.56* 3.80* 3.70*   HGB 10.4* 10.7* 10.6*   HCT 31.9* 33.8* 33.3*    214 197   MCV 89.5 89.0 90.0   MCH 29.1 28.2 28.5   MCHC 32.5 31.6 31.7   RDW 15.1 15.0 15.6*      BMP:  Recent Labs     06/22/19  0455 06/23/19  0400 06/24/19  0600    145 147*   K 4.5 3.8 3.6    104 106   CO2 31 30 31   BUN 20 22* 17   CREATININE 0.8 0.8 0.7   CALCIUM 9.2 9.2 8.8   GLUCOSE 197* 125* 130*      ABG:  No results for input(s): PHART, MKO2VYV, PO2ART, LVI2QLB, G8LCQMTF, BEART in the last 72 hours. Cultures:    Abx:    Radiology Review:  Pertinent images / reports were reviewed as a part of this visit. Assessment:       1. Acute on chronic hypoxemic and hypercapnic respiratory failure  2. Healthcare associated pneumonia  3. Aspiration into airway  4. Obstructive sleep apnea  5. Obesity hypoventilation syndrome  6. COPD    I reviewed imaging which reveals evidence of bilateral lower lobe airspace disease, atelectasis versus pneumonia. She continues on antibiotics. Cultures are negative  She can complete a 7-day course of oral Augmentin. Taper steroids  Ongoing nocturnal BiPAP will be important to her stability as an outpatient  She is likely to need a repeat sleep study in order to qualify for a new unit  I think her best bet would be to plan to go to a rehab facility with BiPAP. From there we can get a sleep study and get her set up with a home unit.   Also concerned about the possibility of aspiration  Speech is working with her

## 2019-06-24 NOTE — PROGRESS NOTES
Bedside report received from off going shift to ensure safe transfer of care. Patient appears asleep at this time and not awakened. Will return for complete assessment.

## 2019-06-24 NOTE — PROGRESS NOTES
Initial assessment complete. See flowsheet. VSS at this time. Patient educated on importance of turning and pressure relief. Patient repositioned w/ wedge pillow. Patient placed back on bipap for sleeping overnight. RT notified. Patient at rest in bed without complaints or needs at this time. Patient encouraged to call for assistance as needed. Will continue to monitor.

## 2019-06-24 NOTE — PLAN OF CARE
I spoke with her daughter at the bedside. Her daughter was in the room a few days ago when the patient told Dr Adriana Carnes that she did not want mechanical ventilation. The daughter wants to honor her mother's wishes.         DIAGNOSIS:  · Severe COPD  · Chronic Hypoxic and Hypercapnic Respiratory Failure    PLAN:  · DNR Comfort Care Arrest  · Do Not Intubate
Nutrition Problem: Inadequate oral intake  Intervention: Food and/or Nutrient Delivery: Continue NPO, Start Tube Feeding(based on intubation status)  Nutritional Goals: adv of nutrition within next 24 - 48 hr
Nutrition Problem: Inadequate oral intake  Intervention: Food and/or Nutrient Delivery: Continue current diet, Start ONS  Nutritional Goals: Pt will consume at least 50% of meals and supplements
balance  Outcome: Ongoing

## 2019-06-24 NOTE — PROGRESS NOTES
Speech Language Pathology  Dysphagia Treatment Note    Name:  Marlin Walker  :   1935  Medical Diagnosis:  Acute on chronic respiratory failure ([facility] [address] [J96.20]  Acute on chronic respiratory failure ([facility] [address] [facility] [J96.20]  Treatment Diagnosis: Oropharyngeal Dysphagia  Pain level:  Pt denies pain at this time    Current Diet Level: Dysphagia I (puree) texture diet with thin liquids; No straws; No mixed consistencies (i.e. Chicken noodle soup, cereal with milk, pill with water, etc.)       Tolerance of Current Diet Level: Per nurse report, Pt has been requesting a diet advance and \"doesn't understand\" why she is on an altered diet at this time    Assessment of Texture Tolerance:  -Impressions: Pt is currently alert and verbally responsive and is currently positioned upright in chair. Prior dysphagia evaluation as well as rationale for current diet level explained to the Pt who verbalized understanding. With po trials, Pt demonstrates clinical symptoms of mild delayed swallow initiation and mild reduced laryngeal excursion with all textures. Pt demonstrates tolerance of thin liquids, puree, and soft solids with adequate laryngeal excursion and no overt signs of aspiration. Prolonged mastication with need for thin liquid \"rinse\" continues to be noted with solid textures. Diet and Treatment Recommendations:  1) Advance to Dysphagia II (mechanical soft) diet with thin liquids/no straws/meds with puree    (Dysphagia Goals addressed, if appropriate)  1.) Pt will tolerate recommended diet without s/s of aspiration (Ongoing 19)  2.) If clinical symptoms of penetration/aspiration continue to be noted,Pt will tolerate MBS to r/o aspiration and determine appropriate diet/liquid level. (Ongoing 19)   3.) Pt will tolerate diet advance to least restricted diet, as clinically indicated, with no signs of aspiration (Ongoing 19)  4.) Pt will improve oral motor function via bolus control exercises 5/5(Ongoing 6/24/19)    Plan:  Continued daily Dysphagia treatment with goals per plan of care. Patient/Family Education:Education given to the Pt and nurse, who verbalized understanding    Discharge Recommendations:  Pt will benefit from continued skilled Speech Therapy for Dysphagia services, prior to returning home.     Timed Code Treatment: 0 min    Total Treatment Time: 20 min    Dalila Azul [id number]

## 2019-06-24 NOTE — CARE COORDINATION
met with patient to discuss reason for admission, current living situation, and potential needs at the time of discharge     Demographics/Insurance verified: Yes     Current type of dwelling: Two-level home     Living arrangements: Patient reports that she lives at home alone.     Level of function/Support: Patient reports that her daughter cares for her at home. Patient reports that she remains independent at home through [employer] [facility] [employer] [facility] services, receives 53.5 hours/ week of non-skilled home care.     PCP: Got a new PCP- can not recall name     Last Visit to PCP: Last month     DME: Home oxygen (through [facility]); Hospital bed; walker     Active with any community resources/agencies/skilled home care: Patient reports that she receives skilled home care services through [address], and non-skilled home care through [address]. Medication compliance issues: None identified.     Financial issues that could impact healthcare: None identified.     Transportation at the time of discharge: Will need transport.      Tentative SW spoke with patient at bedside. Patient has had recent hospital admission. Patient Lonza Pry is not working and the doctor believes she will need another sleep study. Patient has declined SNF in the past however due to the Bipap concern she is wiling to go. PT/OT were ordered. Patient was provided a SNF list, patient will need precert. Juju Beyer first choice is [facility]. A referral was faxed. A waiting return call, PT/OT notes will be needed prior to starting precert. Social work will follow. D/C Plan- To SNF, Referral sent to [facility], Awaiting PT/OT notes.      Electronically signed by GIOVANNI Gonzalez, IVANIA on 6/24/19 at 11:57 AM

## 2019-06-24 NOTE — PROGRESS NOTES
Medicated for complaints of back pain and pain in feet. States its chronic but causes her lots of pain. Medicated per order. Noon assessment done and recorded. See flow sheet for details.

## 2019-06-24 NOTE — PROGRESS NOTES
I&O, Weight, Pertinent Labs, Chewing/Swallowing      Electronically signed by Ariana Petty RD, LD on 6/24/19 at 9:53 AM    Contact Number: [phone]

## 2019-06-24 NOTE — PROGRESS NOTES
Dr Rustam Schmitt and group here for interdisciplinary rounds. Reviewed chart, examined and discussed patient. Talked with patient regarding need for wearing Bipap when sleeping. States that the one she has at home doesn't work properly and Dr thinks there is need for another sleep study. She lives alone and will need some temporary rehab prior to going home. Dr Will attempt to set up outpatient study and get bipap prior to going home.  notified.

## 2019-06-25 LAB
ANION GAP SERPL CALCULATED.3IONS-SCNC: 9 MMOL/L (ref 3–16)
BLOOD CULTURE, ROUTINE: NORMAL
BUN BLDV-MCNC: 28 MG/DL (ref 7–20)
CALCIUM SERPL-MCNC: 9 MG/DL (ref 8.3–10.6)
CHLORIDE BLD-SCNC: 105 MMOL/L (ref 99–110)
CO2: 31 MMOL/L (ref 21–32)
CREAT SERPL-MCNC: 0.8 MG/DL (ref 0.6–1.2)
CULTURE, BLOOD 2: ABNORMAL
CULTURE, BLOOD 2: ABNORMAL
GFR AFRICAN AMERICAN: >60
GFR NON-AFRICAN AMERICAN: >60
GLUCOSE BLD-MCNC: 126 MG/DL (ref 70–99)
HCT VFR BLD CALC: 32.1 % (ref 36–48)
HEMOGLOBIN: 10.2 G/DL (ref 12–16)
MCH RBC QN AUTO: 28.4 PG (ref 26–34)
MCHC RBC AUTO-ENTMCNC: 31.8 G/DL (ref 31–36)
MCV RBC AUTO: 89.4 FL (ref 80–100)
ORGANISM: ABNORMAL
PDW BLD-RTO: 15.6 % (ref 12.4–15.4)
PLATELET # BLD: 198 K/UL (ref 135–450)
PMV BLD AUTO: 8.9 FL (ref 5–10.5)
POTASSIUM SERPL-SCNC: 4 MMOL/L (ref 3.5–5.1)
RBC # BLD: 3.59 M/UL (ref 4–5.2)
SODIUM BLD-SCNC: 145 MMOL/L (ref 136–145)
WBC # BLD: 17.5 K/UL (ref 4–11)

## 2019-06-25 PROCEDURE — 97535 SELF CARE MNGMENT TRAINING: CPT

## 2019-06-25 PROCEDURE — C9113 INJ PANTOPRAZOLE SODIUM, VIA: HCPCS | Performed by: INTERNAL MEDICINE

## 2019-06-25 PROCEDURE — 6370000000 HC RX 637 (ALT 250 FOR IP): Performed by: INTERNAL MEDICINE

## 2019-06-25 PROCEDURE — 97161 PT EVAL LOW COMPLEX 20 MIN: CPT

## 2019-06-25 PROCEDURE — 2700000000 HC OXYGEN THERAPY PER DAY

## 2019-06-25 PROCEDURE — 94640 AIRWAY INHALATION TREATMENT: CPT

## 2019-06-25 PROCEDURE — 2060000000 HC ICU INTERMEDIATE R&B

## 2019-06-25 PROCEDURE — 6370000000 HC RX 637 (ALT 250 FOR IP): Performed by: HOSPITALIST

## 2019-06-25 PROCEDURE — 97166 OT EVAL MOD COMPLEX 45 MIN: CPT

## 2019-06-25 PROCEDURE — 2580000003 HC RX 258: Performed by: INTERNAL MEDICINE

## 2019-06-25 PROCEDURE — 97530 THERAPEUTIC ACTIVITIES: CPT

## 2019-06-25 PROCEDURE — 94761 N-INVAS EAR/PLS OXIMETRY MLT: CPT

## 2019-06-25 PROCEDURE — 6360000002 HC RX W HCPCS: Performed by: INTERNAL MEDICINE

## 2019-06-25 PROCEDURE — 99233 SBSQ HOSP IP/OBS HIGH 50: CPT | Performed by: INTERNAL MEDICINE

## 2019-06-25 PROCEDURE — 80048 BASIC METABOLIC PNL TOTAL CA: CPT

## 2019-06-25 PROCEDURE — 92526 ORAL FUNCTION THERAPY: CPT

## 2019-06-25 PROCEDURE — 85027 COMPLETE CBC AUTOMATED: CPT

## 2019-06-25 PROCEDURE — 99232 SBSQ HOSP IP/OBS MODERATE 35: CPT | Performed by: INTERNAL MEDICINE

## 2019-06-25 RX ORDER — OXYCODONE AND ACETAMINOPHEN 10; 325 MG/1; MG/1
1 TABLET ORAL EVERY 8 HOURS PRN
Qty: 6 TABLET | Refills: 0 | Status: SHIPPED | OUTPATIENT
Start: 2019-06-25 | End: 2019-06-28

## 2019-06-25 RX ORDER — AMOXICILLIN AND CLAVULANATE POTASSIUM 875; 125 MG/1; MG/1
1 TABLET, FILM COATED ORAL EVERY 12 HOURS SCHEDULED
Qty: 10 TABLET | Refills: 0 | Status: SHIPPED | OUTPATIENT
Start: 2019-06-25 | End: 2019-06-30

## 2019-06-25 RX ORDER — LACTOBACILLUS RHAMNOSUS GG 10B CELL
1 CAPSULE ORAL 2 TIMES DAILY WITH MEALS
Qty: 10 CAPSULE | Refills: 0 | Status: SHIPPED | OUTPATIENT
Start: 2019-06-25 | End: 2019-06-30

## 2019-06-25 RX ADMIN — ENOXAPARIN SODIUM 40 MG: 40 INJECTION SUBCUTANEOUS at 20:16

## 2019-06-25 RX ADMIN — Medication 2 PUFF: at 08:05

## 2019-06-25 RX ADMIN — HYDROCODONE BITARTRATE AND ACETAMINOPHEN 2 TABLET: 5; 325 TABLET ORAL at 17:00

## 2019-06-25 RX ADMIN — PREGABALIN 150 MG: 75 CAPSULE ORAL at 20:16

## 2019-06-25 RX ADMIN — IPRATROPIUM BROMIDE AND ALBUTEROL SULFATE 1 AMPULE: .5; 3 SOLUTION RESPIRATORY (INHALATION) at 20:35

## 2019-06-25 RX ADMIN — Medication 10 ML: at 20:17

## 2019-06-25 RX ADMIN — IPRATROPIUM BROMIDE AND ALBUTEROL SULFATE 1 AMPULE: .5; 3 SOLUTION RESPIRATORY (INHALATION) at 16:13

## 2019-06-25 RX ADMIN — HYDROCORTISONE 2.5%: 25 CREAM TOPICAL at 08:31

## 2019-06-25 RX ADMIN — Medication 1 CAPSULE: at 17:00

## 2019-06-25 RX ADMIN — HYDROCODONE BITARTRATE AND ACETAMINOPHEN 2 TABLET: 5; 325 TABLET ORAL at 08:29

## 2019-06-25 RX ADMIN — PREGABALIN 150 MG: 75 CAPSULE ORAL at 08:29

## 2019-06-25 RX ADMIN — LEVOTHYROXINE SODIUM 112 MCG: 112 TABLET ORAL at 06:03

## 2019-06-25 RX ADMIN — Medication 10 ML: at 08:30

## 2019-06-25 RX ADMIN — IPRATROPIUM BROMIDE AND ALBUTEROL SULFATE 1 AMPULE: .5; 3 SOLUTION RESPIRATORY (INHALATION) at 12:21

## 2019-06-25 RX ADMIN — HYDROCORTISONE 2.5%: 25 CREAM TOPICAL at 20:16

## 2019-06-25 RX ADMIN — CASTOR OIL AND BALSAM, PERU: 788; 87 OINTMENT TOPICAL at 08:31

## 2019-06-25 RX ADMIN — AMOXICILLIN AND CLAVULANATE POTASSIUM 1 TABLET: 875; 125 TABLET, FILM COATED ORAL at 08:29

## 2019-06-25 RX ADMIN — Medication 2 PUFF: at 20:35

## 2019-06-25 RX ADMIN — Medication 1 CAPSULE: at 08:30

## 2019-06-25 RX ADMIN — FLUOXETINE 40 MG: 20 CAPSULE ORAL at 08:29

## 2019-06-25 RX ADMIN — AMOXICILLIN AND CLAVULANATE POTASSIUM 1 TABLET: 875; 125 TABLET, FILM COATED ORAL at 20:16

## 2019-06-25 RX ADMIN — IPRATROPIUM BROMIDE AND ALBUTEROL SULFATE 1 AMPULE: .5; 3 SOLUTION RESPIRATORY (INHALATION) at 08:05

## 2019-06-25 RX ADMIN — HYDROCODONE BITARTRATE AND ACETAMINOPHEN 2 TABLET: 5; 325 TABLET ORAL at 02:08

## 2019-06-25 RX ADMIN — CASTOR OIL AND BALSAM, PERU: 788; 87 OINTMENT TOPICAL at 20:16

## 2019-06-25 RX ADMIN — PANTOPRAZOLE SODIUM 40 MG: 40 INJECTION, POWDER, FOR SOLUTION INTRAVENOUS at 08:29

## 2019-06-25 ASSESSMENT — PAIN SCALES - GENERAL
PAINLEVEL_OUTOF10: 7
PAINLEVEL_OUTOF10: 6
PAINLEVEL_OUTOF10: 0
PAINLEVEL_OUTOF10: 6
PAINLEVEL_OUTOF10: 3
PAINLEVEL_OUTOF10: 3
PAINLEVEL_OUTOF10: 6
PAINLEVEL_OUTOF10: 0
PAINLEVEL_OUTOF10: 3
PAINLEVEL_OUTOF10: 0
PAINLEVEL_OUTOF10: 0
PAINLEVEL_OUTOF10: 9
PAINLEVEL_OUTOF10: 6
PAINLEVEL_OUTOF10: 9

## 2019-06-25 ASSESSMENT — ENCOUNTER SYMPTOMS
BLOOD IN STOOL: 0
CHEST TIGHTNESS: 0
NAUSEA: 0
SHORTNESS OF BREATH: 0
TROUBLE SWALLOWING: 0
RHINORRHEA: 0
DIARRHEA: 0
EYE REDNESS: 0
COUGH: 0
PHOTOPHOBIA: 0
FACIAL SWELLING: 0
COLOR CHANGE: 0
ABDOMINAL PAIN: 0
EYE DISCHARGE: 0
APNEA: 0
STRIDOR: 0
CHOKING: 0

## 2019-06-25 ASSESSMENT — PAIN DESCRIPTION - PAIN TYPE
TYPE: CHRONIC PAIN
TYPE: CHRONIC PAIN

## 2019-06-25 ASSESSMENT — PAIN DESCRIPTION - LOCATION
LOCATION: KNEE;FOOT
LOCATION: FOOT;KNEE

## 2019-06-25 ASSESSMENT — PAIN DESCRIPTION - ORIENTATION
ORIENTATION: RIGHT;LEFT
ORIENTATION: LEFT;RIGHT

## 2019-06-25 NOTE — PROGRESS NOTES
Labs:   Recent Labs     06/23/19  0400 06/24/19  0600 06/25/19  0450   WBC 15.5* 14.9* 17.5*   HGB 10.7* 10.6* 10.2*   HCT 33.8* 33.3* 32.1*    197 198     Recent Labs     06/23/19  0400 06/24/19  0600 06/25/19  0450    147* 145   K 3.8 3.6 4.0    106 105   CO2 30 31 31   BUN 22* 17 28*   CREATININE 0.8 0.7 0.8   CALCIUM 9.2 8.8 9.0     No results for input(s): AST, ALT, BILIDIR, BILITOT, ALKPHOS in the last 72 hours. No results for input(s): INR in the last 72 hours. No results for input(s): Lary Lacrosse in the last 72 hours. Urinalysis:      Lab Results   Component Value Date    NITRU Negative 06/20/2019    WBCUA 458 06/20/2019    BACTERIA 4+ 06/20/2019    RBCUA 8 06/20/2019    BLOODU TRACE 06/20/2019    SPECGRAV 1.027 06/20/2019    GLUCOSEU Negative 06/20/2019    GLUCOSEU NEGATIVE 03/21/2011       Radiology:  XR CHEST PORTABLE   Final Result   Improved left and stable right basilar airspace disease with stable right   pleural effusion         XR CHEST PORTABLE   Final Result   Markedly rotated exam.  Persistent left greater than right airspace disease   which can reflect asymmetric edema or pneumonia. CT ABDOMEN PELVIS W IV CONTRAST Additional Contrast? None   Final Result   1. Mild bibasilar segmental atelectasis versus pneumonia. 2. No acute abdominopelvic abnormality. 3. Apparent intravaginal Ngo catheter. Clinical correlation requested. CT CHEST PULMONARY EMBOLISM W CONTRAST   Final Result   1. No CT evidence of a pulmonary embolism. 2. Small bilateral pleural effusions, with dependent consolidative opacity   within the bilateral lower lobes, with differentials including atelectasis,   aspiration, or pneumonia. XR CHEST PORTABLE   Final Result   No complication following right IJ central venous catheter placement.   Stable   airspace changes at the left lung base with questionable new airspace changes   on the right that are partially excluded from field of view. XR CHEST PORTABLE   Final Result   Endotracheal tube tip is 3.5 cm above the merced. Increasing asymmetric left-sided airspace disease with more focal   consolidation in the left lung base. Findings may be related to asymmetric   edema and/or pneumonia. XR CHEST PORTABLE   Final Result   1. Trace pleural effusions and mild bibasilar atelectasis. 2. Low lung volumes. CT Head WO Contrast   Final Result   Mild motion artifact. No acute intracranial abnormality.                  Assessment/Plan:    Active Hospital Problems    Diagnosis    Acute on chronic respiratory failure with hypoxia and hypercapnia (HCC) [J96.21, J96.22]    Bilateral lower lobe pneumonia due to Escherichia coli (HCC) [J15.5]    Endotracheally intubated [Z97.8]    On mechanically assisted ventilation (HCC) [Z99.11]    Acute kidney injury ([address] [N17.9]    History of recurrent UTI (urinary tract infection) [Z87.440]    Bandemia [D72.825]    Chronic obstructive pulmonary disease with acute lower respiratory infection (HCC) [J44.0]    Spinal stenosis [D81.24]    Complicated UTI (urinary tract infection) [N39.0]    Acute respiratory failure (HCC) [J96.00]    Acute respiratory failure with hypoxia and hypercapnia (HCC) [J96.01, J96.02]    Severe sepsis (HCC) [A41.9, R65.20]    Septic shock (HCC) [A41.9, R65.21]     Acute on chronic respiratory failure with hypoxia and hypercapnia  -extuabted 6/21/2019  -Known to have COPD and prone to exacerbations   - appears to have SHORTY and should wear CPAP or BiPAP at night or when sleeping  - abx adjusted to oral abx  - will require functional Bipap machine at [facility] and sleep study as well for new bipaap      COPD exacerbation acute   -wean steroids    extubated (6/22/2019)  - dulera          -Septic shock  -She was initially hypotensive in the emergency department but did perk up with some fluids however following intubation the patient's pressure again began to drop  - off levophed and maintaining blood pressure  - resolved.        Small bilateral pleural effusions  -Unclear if there is a component of heart failure here  Echo done last month shows a normal ejection fraction     LIZ  --resolved    Bilateral lower lobe pneumonia  She is on appropriate antibiotic coverage  Concern for aspiration, seen by maureen        DVT Prophylaxis: Lovenox  Diet: puree with thn liquids  Code Status: Limited  She is no okay with intubation as she does not remember it.   PT/OT Eval Status: Not at this time    Dispo -SNF pending precert     Arely Ayers MD

## 2019-06-25 NOTE — PROGRESS NOTES
Physical Therapy    Facility/Department: [facility] ICU  Initial Assessment    NAME: Jose G Veloz  : 1935  MRN: [id number]    Date of Service: 2019    Discharge Recommendations:  Jose G Veloz scored a 15/24 on the AM-PAC short mobility form. Current research shows that an AM-PAC score of 17 or less is typically not associated with a discharge to the patient's home setting. Based on the patients AM-PAC score and their current functional mobility deficits, it is recommended that the patient have 3-5 sessions per week of Physical Therapy at d/c to increase the patients independence. PT Equipment Recommendations  Equipment Needed: No    Assessment   Body structures, Functions, Activity limitations: Decreased functional mobility ; Decreased strength;Decreased endurance;Decreased balance  Assessment: Pt presenting with the above deficits and is functioning below her baseline status. Pt would benefit from skilled PT services to improve strength and mobility for return to PLOF. Prognosis: Good  Decision Making: Low Complexity  Clinical Presentation: stable  Patient Education: DC recommendations, hand placement for transfers   REQUIRES PT FOLLOW UP: Yes  Activity Tolerance  Activity Tolerance: Patient limited by endurance       Patient Diagnosis(es): The primary encounter diagnosis was Acute respiratory failure, unspecified whether with hypoxia or hypercapnia ([address] Diagnoses of Altered mental status, unspecified altered mental status type, Hypotension, unspecified hypotension type, Abnormal pulmonary finding, and Chronic prescription opiate use were also pertinent to this visit. has a past medical history of Arthritis, COPD (chronic obstructive pulmonary disease) ([address] Hemorrhoids, Hernia of unspecified site of abdominal cavity without mention of obstruction or gangrene, Incontinence, Knee pain, bilateral, Spinal stenosis, and Spinal stenosis.    has a past surgical history that includes Cholecystectomy and partial hysterectomy (cervix not removed). Restrictions  Restrictions/Precautions  Restrictions/Precautions: Modified Diet, Fall Risk(Dysphagia II no straws)  Position Activity Restriction  Other position/activity restrictions: Noni Fine is a 82 y.o. female who presents to the emergency department respiratory distress. Patient came in hypoxic 85%. She was also apparently confused, and combative, tremulous to the upper extremities. Pt was intubated on 6/20. hypotensive and she has had a central line placed and has been started on some Levophed. Pt extubated on 6/21. She has been septic as a result of pneumonia. She also has obstructive sleep apnea/obesity hypoventilation syndrome. She does have BiPAP at home but the unit is broken and she does not use it.   Vision/Hearing  Vision: Impaired  Vision Exceptions: Wears glasses for reading  Hearing: Exceptions to [facility]  Hearing Exceptions: Hard of hearing/hearing concerns     Subjective  General  Chart Reviewed: Yes  Family / Caregiver Present: Yes(dtg)  Diagnosis: Respiratory failure  General Comment  Comments: supine in bed at arrival   Subjective  Subjective: agreed to evaluation, would like to sit on [facility], nsg notified of pain level   Pain Screening  Patient Currently in Pain: Yes  Pain Assessment  Pain Assessment: 0-10  Pain Level: 9  Pain Type: Chronic pain  Pain Location: Foot;Knee  Pain Orientation: Left;Right  Vital Signs  Patient Currently in Pain: Yes       Orientation  Orientation  Overall Orientation Status: Within Functional Limits  Social/Functional History  Social/Functional History  Lives With: Alone(daughter comes by 53 hrs/week)  Type of Home: Apartment  Home Layout: Two level, 1/2 bath on main level(has stair lift to second level, primarily stays on first level)  Home Access: Level entry  Bathroom Shower/Tub: (takes sponge bath)  Bathroom Toilet: Bedside commode  Bathroom Accessibility: Walker accessible  Home Equipment: Rolling walker, Hospital bed, Oxygen, Alert Button, Wheelchair-electric(sleeps in lift chair; transport w/c; electric w/c does not fit in apartment, does not use it)  Receives Help From: Family  ADL Assistance: Needs assistance  Bath: Dependent/Total  Dressing: Moderate assistance  Grooming: Modified independent   Feeding: Independent  Toileting: Independent  Homemaking Responsibilities: No(daughter completes cooking/cleaning/laundry)  Ambulation Assistance: Independent(ambulates short distances mod I with RW)  Transfer Assistance: Independent  Active : No  Patient's  Info: daughter  Leisure & Hobbies: reading, watching TV  Additional Comments: pt reports one recent fall; daughter is pt's caregiver - daughter assists [phone] and from 530-8pm, pt is alone between those times and at night. Pt reports that she toilets at night. Pt has had several recent hospital admissions  Cognition        Objective     Observation/Palpation  Posture: Fair  Observation: morbid obesity     AROM RLE (degrees)  RLE AROM: WFL  AROM LLE (degrees)  LLE AROM : WFL  Strength RLE  Comment: grossly 3+/5  Strength LLE  Comment: grossly 3+/5     Sensation  Overall Sensation Status: (reports neuropathy in her feet)  Bed mobility  Rolling to Right: Stand by assistance(use of bed railing)  Supine to Sit: Minimal assistance(of trunk, use of bed railing)  Scooting: Contact guard assistance(to EOB)  Transfers  Sit to Stand: Contact guard assistance(CGA of 2 progressing to CGA of 1)  Stand to sit: Contact guard assistance  Bed to Chair: Contact guard assistance(CGA of 2 with RW bed to BSC, CGA of 1 with RW BSC to recliner)  Comment: Transfer from EOB x 1, BSC x 2. Required increased time to complete STS transfers. VC for appropriate hand placement.    Ambulation  Ambulation?: Yes  Ambulation 1  Surface: level tile  Device: Rolling Walker  Assistance: Contact guard assistance  Quality of Gait: flexed posture, slow haim with small step length   Distance: 5 steps bed to [facility] and BSC to recliner  Stairs/Curb  Stairs?: No     Balance  Posture: Fair  Sitting - Static: Good  Sitting - Dynamic: Fair  Standing - Static: +;Fair(CGA with RW and B UE support on RW)  Standing - Dynamic: [address]  Times per week: 3-5x/wk   Current Treatment Recommendations: Strengthening, Transfer Training, Endurance Training, Balance Training, Gait Training, Functional Mobility Training  Safety Devices  Type of devices:  All fall risk precautions in place, Call light within reach, Left in chair, Chair alarm in place, Nurse notified    G-Code       OutComes Score                                                  AM-PAC Score  AM-PAC Inpatient Mobility Raw Score : 15 (06/25/19 1014)  AM-PAC Inpatient T-Scale Score : 39.45 (06/25/19 1014)  Mobility Inpatient CMS 0-100% Score: 57.7 (06/25/19 1014)  Mobility Inpatient CMS G-Code Modifier : CK (06/25/19 1014)          Goals  Short term goals  Time Frame for Short term goals: to be met by discharge  Short term goal 1: Pt able to perform bed mobility with SBA  Short term goal 2: Pt able to perform transfers with SBA  Short term goal 3: Pt able to ambulate 10 ft with RW and CGA  Patient Goals   Patient goals : \"to get stronger\"        Therapy Time   Individual Concurrent Group Co-treatment   Time In 0850         Time Out 0928         Minutes 38         Timed Code Treatment Minutes: Jeffrey 57, [id number]

## 2019-06-25 NOTE — PROGRESS NOTES
Occupational Therapy   Occupational Therapy Initial Assessment  Date: 2019   Patient Name: Karli Kearney  MRN: [id number]     : 1935    Date of Service: 2019    Discharge Recommendations: Karli Kearney scored a 14/24 on the AM-PAC ADL Inpatient form. Current research shows that an AM-PAC score of 17 or less is typically not associated with a discharge to the patient's home setting. Based on the patients AM-PAC score and their current ADL deficits, it is recommended that the patient have 3-5 sessions per week of Occupational Therapy at d/c to increase the patients independence. OT Equipment Recommendations  Equipment Needed: No    Assessment   Performance deficits / Impairments: Decreased functional mobility ; Decreased ADL status; Decreased endurance  Assessment: Pt is not at baseline level of function and will benefit from skilled OT in order to address the above limitations. Treatment Diagnosis: Decreased functional mobility, ADL status, functional activity tolerance related to acute on chronic respiratory failure  Prognosis: Good  Decision Making: Medium Complexity  History: recent hospital admissions, h/o COPD, spinal stenosis, arthritis, h/o recent fall  Exam: six-clicks, pain, mobility  Assistance / Modification: CGA of 2 progressing to CGA of 1, dep for LB ADLs  Patient Education: Role of OT, OT eval, transfers, POC, d/c  REQUIRES OT FOLLOW UP: Yes  Activity Tolerance  Activity Tolerance: Patient Tolerated treatment well;Patient limited by fatigue  Safety Devices  Safety Devices in place: Yes  Type of devices: All fall risk precautions in place; Left in chair;Call light within reach; Chair alarm in place;Nurse notified; Patient at risk for falls           Patient Diagnosis(es): The primary encounter diagnosis was Acute respiratory failure, unspecified whether with hypoxia or hypercapnia ([facility] [address]  Diagnoses of Altered mental status, unspecified altered mental status type, Hypotension, unspecified hypotension type, Abnormal pulmonary finding, and Chronic prescription opiate use were also pertinent to this visit. has a past medical history of Arthritis, COPD (chronic obstructive pulmonary disease) ([address] Hemorrhoids, Hernia of unspecified site of abdominal cavity without mention of obstruction or gangrene, Incontinence, Knee pain, bilateral, Spinal stenosis, and Spinal stenosis. has a past surgical history that includes Cholecystectomy and partial hysterectomy (cervix not removed). Treatment Diagnosis: Decreased functional mobility, ADL status, functional activity tolerance related to acute on chronic respiratory failure      Restrictions  Restrictions/Precautions  Restrictions/Precautions: Modified Diet, Fall Risk(Dysphagia II no straws)  Position Activity Restriction  Other position/activity restrictions: Toro De La O is a 82 y.o. female who presents to the emergency department respiratory distress. Patient came in hypoxic 85%. She was also apparently confused, and combative, tremulous to the upper extremities. Pt was intubated on 6/20. hypotensive and she has had a central line placed and has been started on some Levophed. Pt extubated on 6/21. She has been septic as a result of pneumonia. She also has obstructive sleep apnea/obesity hypoventilation syndrome. She does have BiPAP at home but the unit is broken and she does not use it. Subjective   General  Chart Reviewed: Yes  Additional Pertinent Hx: arthritis, COPD, spinal stenosis  Family / Caregiver Present: Yes(daughter present for beginning of eval only)  Diagnosis: acute on chronic respiratory failure  Subjective  Subjective: Pt lying supine in bed upon arrival, agreeable to evaluation.   General Comment  Comments: Pt recently received pain medication  Patient Currently in Pain: Yes  Pain Assessment  Pain Assessment: 0-10  Pain Level: 9  Pain Type: Chronic pain  Pain Location: Foot;Knee  Pain Orientation: Transfers  Toilet - Technique: Stand pivot  Equipment Used: Standard bedside commode  Toilet Transfer: Dependent/Total  Toilet Transfers Comments: CGA of 2 over to BSC, CGA of 1 off BSC x2  ADL  LE Dressing: Dependent/Total(to don B socks)  Toileting: Dependent/Total(patel catheter in place; dep for venessa care after being continent of BM)  Additional Comments: Pt reported need to have BM. Pt transferred over to [facility] with RW, CGA of 2. Pt required increased time to void. Pt stood from [facility] to  with CGA as this writer assisted with venessa care. Pt required seated rest break back onto [facility] prior to SPT to recliner. Tone RUE  RUE Tone: Normotonic  Tone LUE  LUE Tone: Normotonic  Coordination  Movements Are Fluid And Coordinated: Yes     Bed mobility  Rolling to Right: Stand by assistance(with use of railing)  Supine to Sit: Minimal assistance(min A at trunk)  Scooting: Contact guard assistance  Transfers  Sit to stand: Dependent/Total(CGA of 2 from EOB progressing to CGA of 1 from [facility])  Stand to sit: Dependent/Total(CGA of 2 to [facility] progressing to CGA of 1)  Transfer Comments: requires frequent cues for hand placement  Vision - Basic Assessment  Prior Vision: Wears glasses only for reading  Visual History: No significant visual history  Patient Visual Report: No visual complaint reported.   Cognition  Overall Cognitive Status: WFL        Sensation  Overall Sensation Status: (reports neuropathy in feet)        LUE AROM (degrees)  LUE AROM : WFL  RUE AROM (degrees)  RUE AROM : WFL  LUE Strength  Gross LUE Strength: WFL  LUE Strength Comment: grossly 4/5  RUE Strength  Gross RUE Strength: WFL  RUE Strength Comment: grossly 4/5                   Plan   Plan  Times per week: 3-5x  Times per day: Daily  Current Treatment Recommendations: Functional Mobility Training, Endurance Training, Patient/Caregiver Education & Training, Self-Care / ADL, Safety Education & Training             AM-PAC Score        AM-PAC Inpatient Daily Activity Raw Score: 14 (06/25/19 1025)  AM-PAC Inpatient ADL T-Scale Score : 33.39 (06/25/19 1025)  ADL Inpatient CMS 0-100% Score: 59.67 (06/25/19 1025)  ADL Inpatient CMS G-Code Modifier : CK (06/25/19 1025)    Goals  Short term goals  Time Frame for Short term goals: Discharge  Short term goal 1: Supervision for UB ADLs seated. Short term goal 2: Mod A for toileting and LB dressing. Short term goal 3: SBA for functional transfers. Short term goal 4: SBA for functional mobility. Long term goals  Time Frame for Long term goals : STG=LTG  Patient Goals   Patient goals :  To go to rehab and then home       Therapy Time   Individual Concurrent Group Co-treatment   Time In 0850         Time Out 0928         Minutes 38               Timed Code Treatment Minutes:  23 Minutes    Total Treatment Minutes:  [address], [address] [facility] CenterMARTINAFl[facility] [id number]

## 2019-06-25 NOTE — CARE COORDINATION
Discharge planning-    Discharge order noted. [facility], spoke with Chastity in admissions. They will submit for pre cert once PT/OT notes are available (PT/OT working with the patient right now). Plan- Pre cert for [facility] will be started today, after PT/OT evals completed.     Will continue to follow for support and discharge planning.    -Brady Christie, MSW, LSW

## 2019-06-25 NOTE — PROGRESS NOTES
[facility]    Follow Up Note    Subjective:   CHIEF COMPLAINT / HPI:   Chief Complaint   Patient presents with    Altered Mental Status     pt brought in by squad from home. pt family states pt is altered. pt combative upon arrival and taken directly to CT and assessed by Dr. Luis Chaudhry. Patient is feeling better. She did wear BiPAP all night last night. She is agreeable to [facility] with outpatient sleep study. PT OT evaluation is in progress. Past Medical History:    Reviewed; no changes    Social History:    Reviewed; no changes    REVIEW OF SYSTEMS:    CONSTITUTIONAL:  negative for fevers and chills  RESPIRATORY:  See HPI  CARDIOVASCULAR:  negative for chest pain, palpitations, edema  GASTROINTESTINAL:  negative for nausea, vomiting, diarrhea, constipation and abdominal pain    Objective:   PHYSICAL EXAM:        VITALS:  /60   Pulse 80   Temp 97.9 °F (36.6 °C) (Temporal)   Resp 26   Ht 5' 1\" (1.549 m)   Wt 281 lb 4.8 oz (127.6 kg)   SpO2 98%   BMI 53.15 kg/m²  on 4L NC    24HR INTAKE/OUTPUT:      Intake/Output Summary (Last 24 hours) at 6/25/2019 0839  Last data filed at 6/25/2019 0400  Gross per 24 hour   Intake 660 ml   Output 900 ml   Net -240 ml       CONSTITUTIONAL:  awake, alert,  no apparent distress, and appears stated age  LUNGS:  No increased work of breathing and clear to auscultation, no crackles or wheezes  CARDIOVASCULAR: S1 and S2 and no JVD  ABDOMEN:  normal bowel sounds, non-distended and non-tender to palpation  EXT: No edema, no calf tenderness. Pulses are present bilaterally. NEUROLOGIC:  Mental Status Exam:  Level of Alertness:   awake  Orientation:   person, place, time.  Non focal  SKIN:  normal skin color, texture, turgor, no redness, warmth, or swelling at IV sites    DATA:    CBC:  Recent Labs     06/23/19  0400 06/24/19  0600 06/25/19  0450   WBC 15.5* 14.9* 17.5*   RBC 3.80* 3.70* 3.59*   HGB 10.7* 10.6* 10.2*   HCT 33.8* 33.3* 32.1*    197

## 2019-06-25 NOTE — PROGRESS NOTES
Introduced self to patient. Initial shift assessment completed, see complex assessment in doc flowsheet. PT A&O x4. PT will work with PT/OT this AM. VSS. Questions answered. Call light within reach. Bed in low position with wheels locked. Encouraged to call for nurse as needed throughout the shift.

## 2019-06-25 NOTE — PROGRESS NOTES
Speech Language Pathology  Dysphagia Treatment Note    Name:  Noni Fine  :   1935  Medical Diagnosis:  Acute on chronic respiratory failure ([facility] [address] [J96.20]  Acute on chronic respiratory failure ([facility] [address] [J96.20]  Treatment Diagnosis: Oropharyngeal Dysphagia  Pain level:  Pt denies pain at this time    Current Diet Level: Dysphagia II (mechanical soft) diet with thin liquids/no straws/meds with puree    Tolerance of Current Diet Level: No noted difficulty with current diet    Assessment of Texture Tolerance:  -Impressions: Pt seen sitting upright in recliner chair, on 3L O2 via nasal cannula. Pt indicated she has been liking the soft diet and being able to eat \"real food\" again. Pt agreeable to trials of soft peaches, regular texture morenita cracker, and thin liquids via cup. Pt with mildly prolonged mastication of soft solids, but adequate breakdown is noted, despite not having any teeth/dentures. With trial of morenita cracker, pt demonstrated increased mastication time and difficulty with bolus breakdown. She benefited from small sip of water to soften bolus. Thin liquids revealed suspected premature spillage to pharynx. Overall, pt demonstrates prolonged mastication, delayed bolus formation, mildly delayed initiation of swallow and suspected reduced laryngeal elevation. No overt s/s aspiration. At this time, recommend continue current diet. Diet and Treatment Recommendations:  1) Continue dysphagia II (mechanical soft) diet with thin liquids/no straws/meds with puree    ST.) Pt will tolerate recommended diet without s/s of aspiration (Ongoing 19)  2.) If clinical symptoms of penetration/aspiration continue to be noted,Pt will tolerate MBS to r/o aspiration and determine appropriate diet/liquid level. (Ongoing 19)   3.) Pt will tolerate diet advance to least restricted diet, as clinically indicated, with no signs of aspiration (Ongoing 19)  4.) Pt will improve oral motor function via bolus control exercises 5/5 (Ongoing 6/25/19)    Plan:  Continued daily Dysphagia treatment with goals per plan of care. Patient/Family Education:Education given to the Pt and nurse, who verbalized understanding    Discharge Recommendations:  Pt will benefit from continued skilled Speech Therapy for Dysphagia services, prior to returning home.     Timed Code Treatment: 0 min    Total Treatment Time: 14 min    MARY Vera  Speech-Language Pathologist

## 2019-06-25 NOTE — DISCHARGE INSTR - COC
Continuity of Care Form    Patient Name: Carmine Chaves   :  1935  MRN:  [id number]    Admit date:  2019  Discharge date:  2019      Code Status Order: Limited   Advance Directives:   Advance Care Flowsheet Documentation     Date/Time Healthcare Directive Type of Healthcare Directive Copy in [address] Agent's Name Healthcare Agent's Phone Number    19 [phone]  No, patient does not have an advance directive for healthcare treatment -- -- -- -- --          Admitting Physician:  Linda Leija MD  PCP: Diaan Dominguez    Discharging Nurse: Albino Macario RN  [address] Unit/Room#: [id number]-0956/3789-37  [address] [facility] Unit Phone Number: [phone]    Emergency Contact:   Extended Emergency Contact Information  Primary Emergency Contact: Elliefareedjosé miguel [facility] Phone: [phone]  Relation: Child  Secondary Emergency Contact: Courtney De La O, [address] Phone: [phone]  Relation: Grandchild    Past Surgical History:  Past Surgical History:   Procedure Laterality Date    CHOLECYSTECTOMY      PARTIAL HYSTERECTOMY         Immunization History:   Immunization History   Administered Date(s) Administered    Influenza Virus Vaccine 10/24/2018    Pneumococcal Conjugate 13-valent Noemi Briones) 2013       Active Problems:  Patient Active Problem List   Diagnosis Code    Pneumonia J18.9    Acute on chronic diastolic heart failure (HCC) I50.33    Peripheral edema R60.9    COPD exacerbation ([address] J44.1    Essential hypertension I10    Hyperlipidemia E78.5    Morbid obesity with BMI of 50.0-59.9, adult ([address] E66.01, Z68.43    Acute on chronic respiratory failure with hypercapnia ([address] J96.22    Acute respiratory failure ([address] J96.00    Acute respiratory failure with hypoxia and hypercapnia (HCC) J96.01, J96.02    Severe sepsis ([address] A41.9, R65.20    Septic shock ([address] A41.9, R65.21    Bilateral lower lobe pneumonia due to Escherichia coli ([address] J15.5    Endotracheally intubated Z97.8    On mechanically assisted ventilation ([facility]) Z99.11    Acute kidney injury ([address] N17.9    History of recurrent UTI (urinary tract infection) Z87.440    Bandemia D72.825    Chronic obstructive pulmonary disease with acute lower respiratory infection ([facility]) J44.0    Spinal stenosis Y90.78    Complicated UTI (urinary tract infection) N39.0    Acute on chronic respiratory failure with hypoxia and hypercapnia ([facility]) J96.21, J96.22       Isolation/Infection:   Isolation          No Isolation            Nurse Assessment:  Last Vital Signs: /60   Pulse 80   Temp 97.9 °F (36.6 °C) (Temporal)   Resp 26   Ht 5' 1\" (1.549 m)   Wt 281 lb 4.8 oz (127.6 kg)   SpO2 98%   BMI 53.15 kg/m²     Last documented pain score (0-10 scale): Pain Level: 0  Last Weight:   Wt Readings from Last 1 Encounters:   06/25/19 281 lb 4.8 oz (127.6 kg)     Mental Status:  oriented, alert, coherent, logical, thought processes intact and able to concentrate and follow conversation    IV Access:  - None    Nursing Mobility/ADLs:  Walking   Assisted  Transfer  Assisted  Bathing  Assisted  Dressing  Assisted  Toileting  Assisted  Feeding  Independent  Med Admin  Assisted  Med Delivery   crushed and prefers mixed with pudding    Wound Care Documentation and Therapy:  Wound 04/24/19 Coccyx Mid open  (Active)   Number of days: 61       Wound 06/20/19 Buttocks Left Deep tissue injury (Active)   Wound Image   6/21/2019 10:00 AM   Wound Deep tissue/Injury 6/21/2019  4:00 PM   Dressing/Treatment Pharmaceutical agent (see MAR) 6/25/2019  4:00 AM   Wound Length (cm) 6 cm 6/21/2019 10:00 AM   Wound Width (cm) 8 cm 6/21/2019 10:00 AM   Wound Surface Area (cm^2) 48 cm^2 6/21/2019 10:00 AM   Wound Assessment Red;Purple 6/25/2019  4:00 AM   Drainage Amount None 6/25/2019  4:00 AM   Odor None 6/25/2019  4:00 AM   Margins Defined edges 6/25/2019  4:00 AM   Jessica-wound Assessment Pink 6/25/2019  4:00 AM   Red%Wound Bed 75 6/21/2019 10:00 AM   Purple%Wound Bed 25 6/21/2019 10:00 AM   Number of days: 4        Elimination:  Continence:   · Bowel: Yes  · Bladder: Yes  Urinary Catheter: None   Colostomy/Ileostomy/Ileal Conduit: No       Date of Last BM: 06/25/19      Intake/Output Summary (Last 24 hours) at 6/25/2019 0805  Last data filed at 6/25/2019 0400  Gross per 24 hour   Intake 660 ml   Output 900 ml   Net -240 ml     I/O last 3 completed shifts: In: 65 [P.O.:660]  Out: 900 [Urine:900]    Safety Concerns: At Risk for Falls    Impairments/Disabilities:      Speech, Vision and Hearing    Nutrition Therapy:  Current Nutrition Therapy:   - Oral Diet:  Dysphagia 2 mechanically altered    Routes of Feeding: Oral  Liquids: Thin Liquids  Daily Fluid Restriction: no  Last Modified Barium Swallow with Video (Video Swallowing Test): not done    Treatments at the Time of Hospital Discharge:   Respiratory Treatments: Dulera bid                                              Albuteral neb q2h prn                                              Duoneb q 4h                                              BiPap at night                                      Oxygen Therapy:  is on oxygen at 3 L/min per nasal cannula.   Ventilator:    - BiPAP   IPAP: 15 cmH20, EPAP: 5 cmH2O only when sleeping    Rehab Therapies: Physical Therapy, Occupational Therapy and Speech/Language Therapy  Weight Bearing Status/Restrictions: No weight bearing restirctions  Other Medical Equipment (for information only, NOT a DME order):  wheelchair, walker and bedside commode  Other Treatments: Pressure area to buttocks using Venelex ointment    Patient's personal belongings (please select all that are sent with patient):  None    RN SIGNATURE:  Electronically signed by Adriano Rosales RN on 6/26/19 at 11:25 AM    CASE MANAGEMENT/SOCIAL WORK SECTION    Inpatient Status Date: 6/20/19    Readmission Risk Assessment Score:  Readmission Risk              Risk of Unplanned Readmission: 27           Discharging to Facility/ Agency   · Name: [facility]  · Address: [address] [address]  · Phone: [phone]  · Fax: [phone]        / signature: Electronically signed by IVANIA Omalley on 6/25/19 at 9:25 AM    PHYSICIAN SECTION    Prognosis: Good    Condition at Discharge: Stable    Rehab Potential (if transferring to Rehab): Good    Recommended Labs or Other Treatments After Discharge: none    Physician Certification: I certify the above information and transfer of Ed Johnson  is necessary for the continuing treatment of the diagnosis listed and that she requires Kun Jake for greater 30 days.      Update Admission H&P: No change in H&P    PHYSICIAN SIGNATURE:  Electronically signed by Kendra Payne MD on 6/25/19 at 8:05 AM

## 2019-06-25 NOTE — PROGRESS NOTES
Infectious Diseases   Progress Note      Admission Date: 6/20/2019  Hospital Day: Hospital Day: 6  Attending: Siomara Lenz MD  Date of service: 6/25/2019    Chief complaint/ Reason for consult: The patient was seen today for the following:    · Acute on chronic respiratory failure with hypoxia and hypercarbia  · Bilateral lower lobe pneumonia -aspiration suspected  · Altered mental state  · Complicated UTI  · Bandemia    Microbiology:        I have reviewed all available micro lab data and cultures    · Blood culture (1/2) - collected on 6/20/2019: Corynebacterium      Antibiotics and immunizations:       Current antibiotics: All antibiotics and their doses were reviewed by me    Recent Abx Admin                   amoxicillin-clavulanate (AUGMENTIN) 875-125 MG per tablet 1 tablet (tablet) 1 tablet Given 06/25/19 0829     1 tablet Given 06/24/19 2009                  Immunization History: All immunization history was reviewed by me today. Immunization History   Administered Date(s) Administered    Influenza Virus Vaccine 10/24/2018    Pneumococcal Conjugate 13-valent (Brina Kayleigh) 04/24/2013       Known drug allergies: All allergies were reviewed and updated    Allergies   Allergen Reactions    Sulfa Antibiotics      Unknown reaction         Assessment:     The patient is a 82 y.o. old female who  has a past medical history of Arthritis, COPD (chronic obstructive pulmonary disease) ([address] Hemorrhoids, Hernia of unspecified site of abdominal cavity without mention of obstruction or gangrene, Incontinence, Knee pain, bilateral, Spinal stenosis, and Spinal stenosis.  with following problems:    · Acute on chronic respiratory failure with hypoxia and -improving  · Bilateral lower lobe pneumonia -aspiration suspected-she is on Augmentin and tolerating okay  · Altered mental state-resolved  · Complicated -follow her urine culture  · Bandemia  · History of recurrent UTI  · Acute kidney injury-this is resolved  · COPD  · History of spinal stenosis        Discussion:      The patient is on oral Augmentin. She seems to be tolerating antibiotics okay. White cell count is slowly going up and 17,500 today. However, she has been on steroids and I think the leukocytosis is from steroids and not infection      Serum creatinine is 0.8 today    Plan:     Diagnostic Workup:    · Continue to follow  fever curve, WBC count and blood cultures  · Follow up on liver and renal function    Antimicrobials:    · Will continue oral Augmentin 875 mg every 12 hours  · Continue probiotic twice daily  · We will plan to continue oral Augmentin until Monday  · Continue to watch for diarrhea  · Cough and deep breathing exercises  · Aspiration precautions  · DVT prophylaxis  · Discussed all above with patient and RN  · Removal right IJ central line    Drug Monitoring:    · Continue monitoring for antibiotic toxicity as follows: CMP  · Continue to watch for following: new or worsening fever, new hypotension, hives, lip swelling and redness or purulence at vascular access sites. I/v access Management:    · Continue to monitor i.v access sites for erythema, induration, discharge or tenderness. · As always, continue efforts to minimize tubes/lines/drains as clinically appropriate to reduce chances of line associated infections. Patient education and counseling:        · The patient was educated in detail about the side-effects of various antibiotics and things to watch for like new rashes, lip swelling, severe reaction, worsening diarrhea, break through fever etc.  · Discussed patient's condition and what to expect. All of the patient's questions were addressed in a satisfactory manner and patient verbalized understanding all instructions. Weight loss counseling:    Extensive weight loss counseling was done.  It is important to set a realistic weight loss goal. First goal should be to avoid gaining more weight and staying at Negative for photophobia, discharge, redness and visual disturbance. Respiratory: Negative for apnea, cough, choking, chest tightness, shortness of breath and stridor. Cardiovascular: Negative for chest pain and palpitations. Gastrointestinal: Negative for abdominal pain, blood in stool, diarrhea and nausea. Endocrine: Negative for polydipsia, polyphagia and polyuria. Genitourinary: Negative for difficulty urinating, dysuria, frequency, hematuria, menstrual problem and vaginal discharge. Musculoskeletal: Negative for arthralgias, joint swelling, myalgias and neck stiffness. Skin: Negative for color change and rash. Allergic/Immunologic: Negative for immunocompromised state. Neurological: Negative for dizziness, seizures, speech difficulty, light-headedness and headaches. Hematological: Negative for adenopathy. Psychiatric/Behavioral: Negative for agitation, hallucinations and suicidal ideas. Past Medical History: All past medical history reviewed today. Past Medical History:   Diagnosis Date    Arthritis     osteo    COPD (chronic obstructive pulmonary disease) ([address]     Hemorrhoids     Hernia of unspecified site of abdominal cavity without mention of obstruction or gangrene     Incontinence     Knee pain, bilateral     pt states no cartilage    Spinal stenosis     Spinal stenosis        Past Surgical History: All past surgical history was reviewed today. Past Surgical History:   Procedure Laterality Date    CHOLECYSTECTOMY      PARTIAL HYSTERECTOMY           Immunization History: All immunization history was reviewed by me today. Immunization History   Administered Date(s) Administered    Influenza Virus Vaccine 10/24/2018    Pneumococcal Conjugate 13-valent (Preston Rivera) 04/24/2013       Family History: All family history was reviewed today. History reviewed. No pertinent family history.     Objective:       PHYSICAL EXAM:      Vitals:   Vitals:    06/25/19 9119 06/25/19 0500 06/25/19 0800 06/25/19 1200   BP:   133/64    Pulse:       Resp: 26  21 21   Temp:   97.9 °F (36.6 °C)    TempSrc:   Temporal    SpO2:   97% 97%   Weight:  281 lb 4.8 oz (127.6 kg)     Height:           Physical Exam   Constitutional: She is oriented to person, place, and time. She appears well-developed. No distress. HENT:   Head: Normocephalic. Right Ear: External ear normal.   Left Ear: External ear normal.   Nose: Nose normal.   Mouth/Throat: Oropharynx is clear and moist.   Eyes: Pupils are equal, round, and reactive to light. Conjunctivae are normal. Right eye exhibits no discharge. Left eye exhibits no discharge. No scleral icterus. Neck: Normal range of motion. Neck supple. Cardiovascular: Normal rate and regular rhythm. Exam reveals no friction rub. No murmur heard. Pulmonary/Chest: Effort normal. No stridor. She has no wheezes. She has rales ( occasionaal bibasilar crackles). She exhibits no tenderness. Abdominal: Soft. She exhibits no mass. There is no tenderness. There is no rebound and no guarding. Musculoskeletal: She exhibits no edema or tenderness. Lymphadenopathy:     She has no cervical adenopathy. Neurological: She is alert and oriented to person, place, and time. She exhibits normal muscle tone. Skin: Skin is warm and dry. No rash noted. She is not diaphoretic. No erythema. Psychiatric: She has a normal mood and affect. Judgment normal.   Nursing note and vitals reviewed. Lines: All vascular access sites are healthy with no local erythema, discharge or tenderness. Intake and output:    I/O last 3 completed shifts: In: 65 [P.O.:660]  Out: 900 [Urine:900]    Lab Data:   All available labs and old records have been reviewed by me.     CBC:  Recent Labs     06/23/19  0400 06/24/19  0600 06/25/19  0450   WBC 15.5* 14.9* 17.5*   RBC 3.80* 3.70* 3.59*   HGB 10.7* 10.6* 10.2*   HCT 33.8* 33.3* 32.1*    197 198   MCV 89.0 90.0 89.4   MCH 28.2 28.5 is 3.5 cm above the merced. Increasing asymmetric left-sided airspace disease with more focal   consolidation in the left lung base. Findings may be related to asymmetric   edema and/or pneumonia. XR CHEST PORTABLE   Final Result   1. Trace pleural effusions and mild bibasilar atelectasis. 2. Low lung volumes. CT Head WO Contrast   Final Result   Mild motion artifact. No acute intracranial abnormality. Medications: All current and past medications were reviewed.      amoxicillin-clavulanate  1 tablet Oral 2 times per day    lactobacillus  1 capsule Oral BID WC    hydrocortisone   Rectal BID    VENELEX   Topical BID    ipratropium-albuterol  1 ampule Inhalation Q4H WA    FLUoxetine  40 mg Oral Daily    levothyroxine  112 mcg Oral Daily    pregabalin  150 mg Oral BID    mometasone-formoterol  2 puff Inhalation BID    sodium chloride flush  10 mL Intravenous 2 times per day    enoxaparin  40 mg Subcutaneous Nightly    pantoprazole  40 mg Intravenous Daily        sodium chloride 50 mL/hr at 06/21/19 0301    norepinephrine Stopped (06/21/19 1412)       HYDROcodone 5 mg - acetaminophen, albuterol, phenol, sodium chloride flush, magnesium hydroxide, ondansetron, potassium chloride, acetaminophen      Problem list:       Patient Active Problem List   Diagnosis Code    Pneumonia J18.9    Acute on chronic diastolic heart failure ([facility]) I50.33    Peripheral edema R60.9    COPD exacerbation ([facility]) J44.1    Essential hypertension I10    Hyperlipidemia E78.5    Morbid obesity with BMI of 50.0-59.9, adult ([address] E66.01, Z68.43    Acute on chronic respiratory failure with hypercapnia ([facility]) J96.22    Acute respiratory failure ([facility]) J96.00    Acute respiratory failure with hypoxia and hypercapnia ([facility]) J96.01, J96.02    Severe sepsis ([facility]) A41.9, R65.20    Septic shock ([facility]) A41.9, R65.21    Bilateral lower lobe pneumonia due to Escherichia coli ([address] J15.5    Endotracheally intubated Z97.8    On mechanically assisted ventilation (HCC) Z99.11    Acute kidney injury ([address] N17.9    History of recurrent UTI (urinary tract infection) Z87.440    Bandemia D72.825    Chronic obstructive pulmonary disease with acute lower respiratory infection (HCC) J44.0    Spinal stenosis O38.50    Complicated UTI (urinary tract infection) N39.0    Acute on chronic respiratory failure with hypoxia and hypercapnia (HCC) J96.21, J96.22       Please note that this chart was generated using Dragon dictation software. Although every effort was made to ensure the accuracy of this automated transcription, some errors in transcription may have occurred inadvertently. If you may need any clarification, please do not hesitate to contact me through EPIC or at the phone number provided below with my electronic signature.     Juan Luis Wilson MD, MPH  6/25/2019, 2:10 PM  [facility] Infect[facility]   Office: [phone]  Fax: [phone]  Tuesday AM clinic:   [address] [place] [address] 120  Thursday [id number] [facility]

## 2019-06-25 NOTE — PROGRESS NOTES
Shift assessment. Patient awake in chair, alert and oriented, Right IJ to saline lock. Patent patel in place, O2 via NC,  POC updated, call light in reach.

## 2019-06-26 ENCOUNTER — TELEPHONE (OUTPATIENT)
Dept: PULMONOLOGY | Age: 84
End: 2019-06-26

## 2019-06-26 VITALS
HEART RATE: 75 BPM | BODY MASS INDEX: 52.26 KG/M2 | DIASTOLIC BLOOD PRESSURE: 52 MMHG | RESPIRATION RATE: 16 BRPM | WEIGHT: 276.8 LBS | TEMPERATURE: 97.8 F | OXYGEN SATURATION: 96 % | HEIGHT: 61 IN | SYSTOLIC BLOOD PRESSURE: 118 MMHG

## 2019-06-26 LAB
ANION GAP SERPL CALCULATED.3IONS-SCNC: 10 MMOL/L (ref 3–16)
BUN BLDV-MCNC: 28 MG/DL (ref 7–20)
CALCIUM SERPL-MCNC: 8.9 MG/DL (ref 8.3–10.6)
CHLORIDE BLD-SCNC: 105 MMOL/L (ref 99–110)
CO2: 30 MMOL/L (ref 21–32)
CREAT SERPL-MCNC: 0.7 MG/DL (ref 0.6–1.2)
GFR AFRICAN AMERICAN: >60
GFR NON-AFRICAN AMERICAN: >60
GLUCOSE BLD-MCNC: 113 MG/DL (ref 70–99)
HCT VFR BLD CALC: 32 % (ref 36–48)
HEMOGLOBIN: 10.3 G/DL (ref 12–16)
MCH RBC QN AUTO: 29 PG (ref 26–34)
MCHC RBC AUTO-ENTMCNC: 32.3 G/DL (ref 31–36)
MCV RBC AUTO: 89.7 FL (ref 80–100)
ORGANISM: ABNORMAL
PDW BLD-RTO: 15.6 % (ref 12.4–15.4)
PLATELET # BLD: 186 K/UL (ref 135–450)
PMV BLD AUTO: 8.4 FL (ref 5–10.5)
POTASSIUM SERPL-SCNC: 4.1 MMOL/L (ref 3.5–5.1)
RBC # BLD: 3.57 M/UL (ref 4–5.2)
SODIUM BLD-SCNC: 145 MMOL/L (ref 136–145)
URINE CULTURE, ROUTINE: ABNORMAL
URINE CULTURE, ROUTINE: ABNORMAL
WBC # BLD: 13.5 K/UL (ref 4–11)

## 2019-06-26 PROCEDURE — 6370000000 HC RX 637 (ALT 250 FOR IP): Performed by: INTERNAL MEDICINE

## 2019-06-26 PROCEDURE — 94761 N-INVAS EAR/PLS OXIMETRY MLT: CPT

## 2019-06-26 PROCEDURE — 99232 SBSQ HOSP IP/OBS MODERATE 35: CPT | Performed by: INTERNAL MEDICINE

## 2019-06-26 PROCEDURE — 97530 THERAPEUTIC ACTIVITIES: CPT

## 2019-06-26 PROCEDURE — 85027 COMPLETE CBC AUTOMATED: CPT

## 2019-06-26 PROCEDURE — 2700000000 HC OXYGEN THERAPY PER DAY

## 2019-06-26 PROCEDURE — 2580000003 HC RX 258: Performed by: INTERNAL MEDICINE

## 2019-06-26 PROCEDURE — 97116 GAIT TRAINING THERAPY: CPT

## 2019-06-26 PROCEDURE — 99231 SBSQ HOSP IP/OBS SF/LOW 25: CPT | Performed by: INTERNAL MEDICINE

## 2019-06-26 PROCEDURE — 94640 AIRWAY INHALATION TREATMENT: CPT

## 2019-06-26 PROCEDURE — 6370000000 HC RX 637 (ALT 250 FOR IP): Performed by: HOSPITALIST

## 2019-06-26 PROCEDURE — 80048 BASIC METABOLIC PNL TOTAL CA: CPT

## 2019-06-26 RX ORDER — PANTOPRAZOLE SODIUM 40 MG/1
40 TABLET, DELAYED RELEASE ORAL
Status: DISCONTINUED | OUTPATIENT
Start: 2019-06-26 | End: 2019-06-26 | Stop reason: HOSPADM

## 2019-06-26 RX ADMIN — LEVOTHYROXINE SODIUM 112 MCG: 112 TABLET ORAL at 05:16

## 2019-06-26 RX ADMIN — Medication 10 ML: at 05:41

## 2019-06-26 RX ADMIN — PREGABALIN 150 MG: 75 CAPSULE ORAL at 08:31

## 2019-06-26 RX ADMIN — IPRATROPIUM BROMIDE AND ALBUTEROL SULFATE 1 AMPULE: .5; 3 SOLUTION RESPIRATORY (INHALATION) at 16:22

## 2019-06-26 RX ADMIN — FLUOXETINE 40 MG: 20 CAPSULE ORAL at 08:31

## 2019-06-26 RX ADMIN — Medication 1 CAPSULE: at 08:31

## 2019-06-26 RX ADMIN — Medication 10 ML: at 08:32

## 2019-06-26 RX ADMIN — IPRATROPIUM BROMIDE AND ALBUTEROL SULFATE 1 AMPULE: .5; 3 SOLUTION RESPIRATORY (INHALATION) at 11:51

## 2019-06-26 RX ADMIN — IPRATROPIUM BROMIDE AND ALBUTEROL SULFATE 1 AMPULE: .5; 3 SOLUTION RESPIRATORY (INHALATION) at 07:47

## 2019-06-26 RX ADMIN — Medication 2 PUFF: at 07:57

## 2019-06-26 RX ADMIN — Medication 1 CAPSULE: at 17:00

## 2019-06-26 RX ADMIN — AMOXICILLIN AND CLAVULANATE POTASSIUM 1 TABLET: 875; 125 TABLET, FILM COATED ORAL at 08:31

## 2019-06-26 RX ADMIN — PANTOPRAZOLE SODIUM 40 MG: 40 TABLET, DELAYED RELEASE ORAL at 08:31

## 2019-06-26 RX ADMIN — HYDROCORTISONE 2.5%: 25 CREAM TOPICAL at 08:33

## 2019-06-26 RX ADMIN — HYDROCODONE BITARTRATE AND ACETAMINOPHEN 2 TABLET: 5; 325 TABLET ORAL at 16:49

## 2019-06-26 RX ADMIN — CASTOR OIL AND BALSAM, PERU: 788; 87 OINTMENT TOPICAL at 08:33

## 2019-06-26 RX ADMIN — HYDROCODONE BITARTRATE AND ACETAMINOPHEN 2 TABLET: 5; 325 TABLET ORAL at 05:16

## 2019-06-26 ASSESSMENT — ENCOUNTER SYMPTOMS
DIARRHEA: 0
BLOOD IN STOOL: 0
NAUSEA: 0
FACIAL SWELLING: 0
STRIDOR: 0
COUGH: 0
ABDOMINAL PAIN: 0
CHOKING: 0
EYE REDNESS: 0
EYE DISCHARGE: 0
APNEA: 0
PHOTOPHOBIA: 0
RHINORRHEA: 0
SHORTNESS OF BREATH: 0
CHEST TIGHTNESS: 0
TROUBLE SWALLOWING: 0
COLOR CHANGE: 0

## 2019-06-26 ASSESSMENT — PAIN SCALES - GENERAL
PAINLEVEL_OUTOF10: 8
PAINLEVEL_OUTOF10: 9
PAINLEVEL_OUTOF10: 0
PAINLEVEL_OUTOF10: 0
PAINLEVEL_OUTOF10: 3
PAINLEVEL_OUTOF10: 5

## 2019-06-26 ASSESSMENT — PAIN SCALES - WONG BAKER: WONGBAKER_NUMERICALRESPONSE: 4

## 2019-06-26 ASSESSMENT — PAIN DESCRIPTION - LOCATION: LOCATION: LEG

## 2019-06-26 ASSESSMENT — PAIN DESCRIPTION - ORIENTATION: ORIENTATION: RIGHT;LEFT

## 2019-06-26 ASSESSMENT — PAIN DESCRIPTION - PAIN TYPE
TYPE: CHRONIC PAIN
TYPE: CHRONIC PAIN

## 2019-06-26 NOTE — PROGRESS NOTES
Bedside report given to ensure safe transfer of care. Patient awake with no needs at this time. Will return for complete assessment.

## 2019-06-26 NOTE — PROGRESS NOTES
Transport here and transferred to [facility]. Belongings given to daughter Tunde De Souza including her Bipap machine.

## 2019-06-26 NOTE — PROGRESS NOTES
[facility]    Follow Up Note    Subjective:   CHIEF COMPLAINT / HPI:   Chief Complaint   Patient presents with    Altered Mental Status     pt brought in by squad from home. pt family states pt is altered. pt combative upon arrival and taken directly to CT and assessed by Dr. Cony Wiggins. Patient is feeling better. She did wear BiPAP all night last night. She is agreeable to [facility] with outpatient sleep study. PT OT evaluation is in progress. Past Medical History:    Reviewed; no changes    Social History:    Reviewed; no changes    REVIEW OF SYSTEMS:    CONSTITUTIONAL:  negative for fevers and chills  RESPIRATORY:  See HPI  CARDIOVASCULAR:  negative for chest pain, palpitations, edema  GASTROINTESTINAL:  negative for nausea, vomiting, diarrhea, constipation and abdominal pain    Objective:   PHYSICAL EXAM:        VITALS:  BP (!) 112/45   Pulse 71   Temp 97.7 °F (36.5 °C) (Temporal)   Resp 16   Ht 5' 1\" (1.549 m)   Wt 276 lb 12.8 oz (125.6 kg)   SpO2 96%   BMI 52.30 kg/m²  on 4L NC    24HR INTAKE/OUTPUT:      Intake/Output Summary (Last 24 hours) at 6/26/2019 0827  Last data filed at 6/26/2019 0522  Gross per 24 hour   Intake 150 ml   Output 850 ml   Net -700 ml       CONSTITUTIONAL:  awake, alert,  no apparent distress, and appears stated age  LUNGS:  No increased work of breathing and clear to auscultation, no crackles or wheezes  CARDIOVASCULAR: S1 and S2 and no JVD  ABDOMEN:  normal bowel sounds, non-distended and non-tender to palpation  EXT: No edema, no calf tenderness. Pulses are present bilaterally. NEUROLOGIC:  Mental Status Exam:  Level of Alertness:   awake  Orientation:   person, place, time.  Non focal  SKIN:  normal skin color, texture, turgor, no redness, warmth, or swelling at IV sites    DATA:    CBC:  Recent Labs     06/24/19  0600 06/25/19  0450 06/26/19  0538   WBC 14.9* 17.5* 13.5*   RBC 3.70* 3.59* 3.57*   HGB 10.6* 10.2* 10.3*   HCT 33.3* 32.1* 32.0*

## 2019-06-26 NOTE — PROGRESS NOTES
Assessment and VS complete. See flowsheet. Pt A&O. Repositioned pt to right side for comfort. Anusol given per MD order for hemorrhoids and Venelex ointment to DTI on left buttock. Jessica care and groin/abdomen fold care provided. Tolerated well. Lungs clear, but diminished. Edema noted to all extremities. POC discussed. Pt denies further needs. Call light in reach.

## 2019-06-26 NOTE — PROGRESS NOTES
Reassessment and VS completed. See flowsheet. Pt still resting comfortably in bed. Denies needs. Tolerating home CPAP well- 95% SPO2. Pt still refusing to turn to left side. Denies needs. Call light in reach.

## 2019-06-26 NOTE — CARE COORDINATION
Discharge planning-    Spoke with [facility]/ [facility]. Pre cert not yet obtained- however, they hope to receive approval today. Called [facility], spoke with Vee Glover- transport arranged for 5pm. Chastity/ SNF notified. Patient notified at the bedside, who identifies no other needs at this time. ICU team notified. Hospitalist notified via [address] [place] [address]. HENS submitted. Will need completion of the GERALDINE, if discharging today. Addendum: Completed GERALDINE sent via fax. Pre cert not yet approved for SNF. Plan- Awaiting pre cert for [facility]. Tentative 5pm transport. Discharge is pre cert dependent.     Will continue to follow for support and discharge planning.    -Nona Harmon, MSW, LSW

## 2019-06-26 NOTE — PROGRESS NOTES
Infectious Diseases   Progress Note      Admission Date: 6/20/2019  Hospital Day: Hospital Day: 7  Attending: Marlene Gonzalez MD  Date of service: 6/26/2019    Chief complaint/ Reason for consult: The patient was seen today for the following:    · Acute on chronic respiratory failure with hypoxia and hypercarbia  · Bilateral lower lobe pneumonia -aspiration suspected  · Altered mental state  · Complicated UTI  · Bandemia    Microbiology:        I have reviewed all available micro lab data and cultures    · Blood culture (1/2) - collected on 6/20/2019: Corynebacterium      Antibiotics and immunizations:       Current antibiotics: All antibiotics and their doses were reviewed by me    Recent Abx Admin                   amoxicillin-clavulanate (AUGMENTIN) 875-125 MG per tablet 1 tablet (tablet) 1 tablet Given 06/26/19 0831     1 tablet Given 06/25/19 2016                  Immunization History: All immunization history was reviewed by me today. Immunization History   Administered Date(s) Administered    Influenza Virus Vaccine 10/24/2018    Pneumococcal Conjugate 13-valent (Shelva Cellar) 04/24/2013       Known drug allergies: All allergies were reviewed and updated    Allergies   Allergen Reactions    Sulfa Antibiotics      Unknown reaction         Assessment:     The patient is a 82 y.o. old female who  has a past medical history of Arthritis, COPD (chronic obstructive pulmonary disease) ([facility] [address] Hemorrhoids, Hernia of unspecified site of abdominal cavity without mention of obstruction or gangrene, Incontinence, Knee pain, bilateral, Spinal stenosis, and Spinal stenosis.  with following problems:    · Acute on chronic respiratory failure with hypoxia -resolved  · Bilateral lower lobe pneumonia -aspiration pneumonia, currently on Augmentin  · Altered mental state-resolved  · Bandemia-resolved  · History of recurrent UTI  · Acute kidney injury-this is resolved  · COPD  · History of spinal stenosis        Discussion:        She remains on oral Augmentin. White cell count is come down to 13,500 today which is reassuring. She remains afebrile. Blood culture from 6/20/2019 and urine culture from 6/24/2019 have stayed negative. Plan:     Diagnostic Workup:    · Continue to follow  fever curve, WBC count and blood cultures  · Follow up on liver and renal function    Antimicrobials:    · Will o continue Augmentin 875 mg every 12 hours for aspiration pneumonia coverage  · Continue probiotic twice daily while on Augmentin and continue to watch for diarrhea  · Plan to continue Augmentin until Monday  · Cough and deep breathing exercises  · Okay to discharge from ID standpoint  · Discussed all above with patient and RN      Drug Monitoring:    · Continue monitoring for antibiotic toxicity as follows: CMP  · Continue to watch for following: new or worsening fever, new hypotension, hives, lip swelling and redness or purulence at vascular access sites. I/v access Management:    · Continue to monitor i.v access sites for erythema, induration, discharge or tenderness. · As always, continue efforts to minimize tubes/lines/drains as clinically appropriate to reduce chances of line associated infections. Patient education and counseling:       · The patient was educated in detail about the side-effects of various antibiotics and things to watch for like new rashes, lip swelling, severe reaction, worsening diarrhea, break through fever etc.  · Discussed patient's condition and what to expect. All of the patient's questions were addressed in a satisfactory manner and patient verbalized understanding all instructions. Thanks for allowing me to participate in your patient's care. I will sign off today, but will be available to answer any further questions or concerns that may arise during patient's stay in the hospital.          Subjective:      Interval history: Patient was seen and examined at bedside. Interval history was obtained. She is afebrile. She is on Augmentin. Tolerating it okay. No diarrhea. REVIEW OF SYSTEMS:    Review of Systems   Constitutional: Negative for chills, diaphoresis and unexpected weight change. HENT: Negative for congestion, ear discharge, ear pain, facial swelling, hearing loss, rhinorrhea and trouble swallowing. Eyes: Negative for photophobia, discharge, redness and visual disturbance. Respiratory: Negative for apnea, cough, choking, chest tightness, shortness of breath and stridor. Cardiovascular: Negative for chest pain and palpitations. Gastrointestinal: Negative for abdominal pain, blood in stool, diarrhea and nausea. Endocrine: Negative for polydipsia, polyphagia and polyuria. Genitourinary: Negative for difficulty urinating, dysuria, frequency, hematuria, menstrual problem and vaginal discharge. Musculoskeletal: Negative for arthralgias, joint swelling, myalgias and neck stiffness. Skin: Negative for color change and rash. Allergic/Immunologic: Negative for immunocompromised state. Neurological: Negative for dizziness, seizures, speech difficulty, light-headedness and headaches. Hematological: Negative for adenopathy. Psychiatric/Behavioral: Negative for agitation, hallucinations and suicidal ideas. Past Medical History: All past medical history reviewed today. Past Medical History:   Diagnosis Date    Arthritis     osteo    COPD (chronic obstructive pulmonary disease) ([facility] [address]     Hemorrhoids     Hernia of unspecified site of abdominal cavity without mention of obstruction or gangrene     Incontinence     Knee pain, bilateral     pt states no cartilage    Spinal stenosis     Spinal stenosis        Past Surgical History: All past surgical history was reviewed today.     Past Surgical History:   Procedure Laterality Date    CHOLECYSTECTOMY      PARTIAL HYSTERECTOMY           Immunization History: All immunization history was reviewed by me today. Immunization History   Administered Date(s) Administered    Influenza Virus Vaccine 10/24/2018    Pneumococcal Conjugate 13-valent (Julio Denis) 04/24/2013       Family History: All family history was reviewed today. History reviewed. No pertinent family history. Objective:       PHYSICAL EXAM:      Vitals:   Vitals:    06/26/19 0800 06/26/19 0900 06/26/19 1000 06/26/19 1100   BP: (!) 112/45      Pulse: 71 78 78 79   Resp: 16 22 26 22   Temp: 97.7 °F (36.5 °C)      TempSrc: Temporal      SpO2: 96%      Weight:       Height:           Physical Exam   Constitutional: She is oriented to person, place, and time. She appears well-developed. No distress. HENT:   Head: Normocephalic. Right Ear: External ear normal.   Left Ear: External ear normal.   Nose: Nose normal.   Mouth/Throat: Oropharynx is clear and moist.   Eyes: Pupils are equal, round, and reactive to light. Conjunctivae are normal. Right eye exhibits no discharge. Left eye exhibits no discharge. No scleral icterus. Neck: Normal range of motion. Neck supple. Cardiovascular: Normal rate and regular rhythm. Exam reveals no friction rub. No murmur heard. Pulmonary/Chest: Effort normal. No stridor. She has no wheezes. She has no rales. She exhibits no tenderness. Abdominal: Soft. She exhibits no mass. There is no tenderness. There is no rebound and no guarding. Musculoskeletal: She exhibits no edema or tenderness. Lymphadenopathy:     She has no cervical adenopathy. Neurological: She is alert and oriented to person, place, and time. She exhibits normal muscle tone. Skin: Skin is warm and dry. No rash noted. She is not diaphoretic. No erythema. Psychiatric: She has a normal mood and affect. Judgment normal.   Nursing note and vitals reviewed. Lines: All vascular access sites are healthy with no local erythema, discharge or tenderness. Intake and output:    I/O last 3 completed shifts:   In: 150 [P.O.:120; I.V.:30]  Out: 850 [Urine:850]    Lab Data:   All available labs and old records have been reviewed by me. CBC:  Recent Labs     06/24/19  0600 06/25/19  0450 06/26/19  0538   WBC 14.9* 17.5* 13.5*   RBC 3.70* 3.59* 3.57*   HGB 10.6* 10.2* 10.3*   HCT 33.3* 32.1* 32.0*    198 186   MCV 90.0 89.4 89.7   MCH 28.5 28.4 29.0   MCHC 31.7 31.8 32.3   RDW 15.6* 15.6* 15.6*        BMP:  Recent Labs     06/24/19  0600 06/25/19  0450 06/26/19  0538   * 145 145   K 3.6 4.0 4.1    105 105   CO2 31 31 30   BUN 17 28* 28*   CREATININE 0.7 0.8 0.7   CALCIUM 8.8 9.0 8.9   GLUCOSE 130* 126* 113*        Hepatic Function Panel:   Lab Results   Component Value Date    ALKPHOS 106 06/21/2019    ALT 42 06/21/2019    AST 33 06/21/2019    PROT 6.6 06/21/2019    PROT 6.9 03/21/2011    BILITOT 0.3 06/21/2019    LABALBU 3.5 06/21/2019       CPK:   Lab Results   Component Value Date    CKTOTAL 139 04/24/2019     ESR: No results found for: SEDRATE  CRP: No results found for: CRP        Imaging: All pertinent images and reports for the current visit were reviewed by me during this visit. XR CHEST PORTABLE   Final Result   Improved left and stable right basilar airspace disease with stable right   pleural effusion         XR CHEST PORTABLE   Final Result   Markedly rotated exam.  Persistent left greater than right airspace disease   which can reflect asymmetric edema or pneumonia. CT ABDOMEN PELVIS W IV CONTRAST Additional Contrast? None   Final Result   1. Mild bibasilar segmental atelectasis versus pneumonia. 2. No acute abdominopelvic abnormality. 3. Apparent intravaginal Ngo catheter. Clinical correlation requested. CT CHEST PULMONARY EMBOLISM W CONTRAST   Final Result   1. No CT evidence of a pulmonary embolism.    2. Small bilateral pleural effusions, with dependent consolidative opacity   within the bilateral lower lobes, with differentials including atelectasis, aspiration, or pneumonia. XR CHEST PORTABLE   Final Result   No complication following right IJ central venous catheter placement. Stable   airspace changes at the left lung base with questionable new airspace changes   on the right that are partially excluded from field of view. XR CHEST PORTABLE   Final Result   Endotracheal tube tip is 3.5 cm above the merced. Increasing asymmetric left-sided airspace disease with more focal   consolidation in the left lung base. Findings may be related to asymmetric   edema and/or pneumonia. XR CHEST PORTABLE   Final Result   1. Trace pleural effusions and mild bibasilar atelectasis. 2. Low lung volumes. CT Head WO Contrast   Final Result   Mild motion artifact. No acute intracranial abnormality. Medications: All current and past medications were reviewed.      pantoprazole  40 mg Oral QAM AC    amoxicillin-clavulanate  1 tablet Oral 2 times per day    lactobacillus  1 capsule Oral BID WC    hydrocortisone   Rectal BID    VENELEX   Topical BID    ipratropium-albuterol  1 ampule Inhalation Q4H WA    FLUoxetine  40 mg Oral Daily    levothyroxine  112 mcg Oral Daily    pregabalin  150 mg Oral BID    mometasone-formoterol  2 puff Inhalation BID    sodium chloride flush  10 mL Intravenous 2 times per day    enoxaparin  40 mg Subcutaneous Nightly        sodium chloride 50 mL/hr at 06/21/19 0301    norepinephrine Stopped (06/21/19 1412)       HYDROcodone 5 mg - acetaminophen, albuterol, phenol, sodium chloride flush, magnesium hydroxide, ondansetron, potassium chloride, acetaminophen      Problem list:       Patient Active Problem List   Diagnosis Code    Pneumonia J18.9    Acute on chronic diastolic heart failure (HCC) I50.33    Peripheral edema R60.9    COPD exacerbation (HCC) J44.1    Essential hypertension I10    Hyperlipidemia E78.5    Morbid obesity with BMI of 50.0-59.9, adult [facility] E66.01, Z68.43    Acute on chronic respiratory failure with hypercapnia (HCC) J96.22    Acute respiratory failure (HCC) J96.00    Acute respiratory failure with hypoxia and hypercapnia (HCC) J96.01, J96.02    Severe sepsis (HCC) A41.9, R65.20    Septic shock (HCC) A41.9, R65.21    Bilateral lower lobe pneumonia due to Escherichia coli (HCC) J15.5    Endotracheally intubated Z97.8    On mechanically assisted ventilation (HCC) Z99.11    Acute kidney injury (HCC) N17.9    History of recurrent UTI (urinary tract infection) Z87.440    Bandemia D72.825    Chronic obstructive pulmonary disease with acute lower respiratory infection (HCC) J44.0    Spinal stenosis X21.90    Complicated UTI (urinary tract infection) N39.0    Acute on chronic respiratory failure with hypoxia and hypercapnia (HCC) J96.21, J96.22    Weight loss counseling, encounter for Z71.3       Please note that this chart was generated using Dragon dictation software. Although every effort was made to ensure the accuracy of this automated transcription, some errors in transcription may have occurred inadvertently. If you may need any clarification, please do not hesitate to contact me through EPIC or at the phone number provided below with my electronic signature.     Joe Rivera MD, MPH  6/26/2019, 12:29 PM  [facility] Infectious Disease   Office: [phone]  Fax: [phone]  Tuesday AM clinic:   [address] [place] [address] 120  Thursday AM [id number] [facility]

## 2019-06-26 NOTE — TELEPHONE ENCOUNTER
----- Message from Vera Iverson MD sent at 6/26/2019  8:24 AM EDT -----   Patient being discharged to day to [facility]  She needs OPT sleep study and appt with Dr Day FENTON!!! (He has promised to see these folks quickly)

## 2019-06-26 NOTE — PROGRESS NOTES
Physical Therapy  Facility/Department: [facility] ICU  Daily Treatment Note  NAME: Morelia Marina  : 1935  MRN: [id number]    Date of Service: 2019    Discharge Recommendations:Mari Barth Shows scored a 15/24 on the AM-PAC short mobility form. Current research shows that an AM-PAC score of 17 or less is typically not associated with a discharge to the patient's home setting. Based on the patients AM-PAC score and their current functional mobility deficits, it is recommended that the patient have 3-5 sessions per week of Physical Therapy at d/c to increase the patients independence. PT Equipment Recommendations  Equipment Needed: No    Assessment   Body structures, Functions, Activity limitations: Decreased functional mobility ; Decreased strength;Decreased endurance;Decreased balance  Assessment: Pt presenting with the above deficits and is functioning below her baseline status, demonstrating progress on this date with ambulation distance. Pt would benefit from skilled PT services to continue to improve strength and mobility for return to [facility]. Prognosis: Good  Decision Making: Low Complexity  Exam: functional mobility, balance, AMPAC  Clinical Presentation: stable  Patient Education: PT POC, DC recommendations, hand placement for transfers   REQUIRES PT FOLLOW UP: Yes  Activity Tolerance  Activity Tolerance: Patient limited by endurance; Patient limited by pain     Patient Diagnosis(es): The primary encounter diagnosis was Acute respiratory failure, unspecified whether with hypoxia or hypercapnia ([address] Diagnoses of Altered mental status, unspecified altered mental status type, Hypotension, unspecified hypotension type, Abnormal pulmonary finding, and Chronic prescription opiate use were also pertinent to this visit.      has a past medical history of Arthritis, COPD (chronic obstructive pulmonary disease) ([address] Hemorrhoids, Hernia of unspecified site of abdominal cavity without mention of obstruction or gangrene, Incontinence, Knee pain, bilateral, Spinal stenosis, and Spinal stenosis. has a past surgical history that includes Cholecystectomy and partial hysterectomy (cervix not removed). Restrictions  Restrictions/Precautions  Restrictions/Precautions: Modified Diet, Fall Risk  Position Activity Restriction  Other position/activity restrictions: Sona Trejo is a 82 y.o. female who presents to the emergency department respiratory distress. Patient came in hypoxic 85%. She was also apparently confused, and combative, tremulous to the upper extremities. Pt was intubated on 6/20. hypotensive and she has had a central line placed and has been started on some Levophed. Pt extubated on 6/21. She has been septic as a result of pneumonia. She also has obstructive sleep apnea/obesity hypoventilation syndrome. She does have BiPAP at home but the unit is broken and she does not use it.   Subjective   General  Chart Reviewed: Yes  Family / Caregiver Present: No  Subjective  Subjective: pt. is agreeable to PT on this date  General Comment  Comments: pt. supine in bed upon arrival   Pain Screening  Patient Currently in Pain: Yes  Pain Assessment  Pain Assessment: Faces  Whatley-Baker Pain Rating: Hurts little more  Pain Type: Chronic pain  Pain Location: Leg  Pain Orientation: Right;Left  Vital Signs  Patient Currently in Pain: Yes       Orientation  Orientation  Overall Orientation Status: Within Functional Limits    Objective   Bed mobility  Rolling to Right: Stand by assistance  Supine to Sit: Minimal assistance(min A for trunk )  Scooting: Contact guard assistance  Comment: HOB raised, use of hand rail, increased time to complete   Transfers  Sit to Stand: Contact guard assistance(CGA of 1 x4 (one from EOB and 3 from chair))  Stand to sit: Contact guard assistance  Bed to Chair: Contact guard assistance(CGA with RW (ambulated 5ft to chair))  Comment: required increased time to complete, vc for hand placement. fatigues quickly with increased LE pain   Ambulation  Ambulation?: Yes  More Ambulation?: No  Ambulation 1  Surface: level tile  Device: Rolling Walker  Assistance: Contact guard assistance  Quality of Gait: extreme flexed posture hunching over RW, slow haim with small step length   Distance: 5'  Comments: pt. ambulated from EOB to chair across room, reporting fatigue after   Stairs/Curb  Stairs?: No     Balance  Posture: Fair  Sitting - Static: Good  Sitting - Dynamic: Fair  Standing - Static: +;Fair  Standing - Dynamic: Fair  Exercises  Hip Flexion: marching in sitting x10 B   Knee Long Arc Quad: x10 B in sitting   Ankle Pumps: x10 B in sitting   Other exercises  Other exercises?: No                     AM-PAC Score  AM-PAC Inpatient Mobility Raw Score : 15 (06/26/19 1154)  AM-PAC Inpatient T-Scale Score : 39.45 (06/26/19 1154)  Mobility Inpatient CMS 0-100% Score: 57.7 (06/26/19 1154)  Mobility Inpatient CMS G-Code Modifier : CK (06/26/19 1154)          Goals-- no goals met   Short term goals  Time Frame for Short term goals: to be met by discharge  Short term goal 1: Pt able to perform bed mobility with SBA  Short term goal 2: Pt able to perform transfers with SBA  Short term goal 3: Pt able to ambulate 10 ft with RW and CGA  Patient Goals   Patient goals : \"to get stronger\"     Plan    Plan  Times per week: 3-5x/wk   Times per day: Daily  Current Treatment Recommendations: Strengthening, Transfer Training, Endurance Training, Balance Training, Gait Training, Functional Mobility Training  Safety Devices  Type of devices: All fall risk precautions in place, Call light within reach, Left in chair, Chair alarm in place, Nurse notified  Restraints  Initially in place: No     Therapy Time   Individual Concurrent Group Co-treatment   Time In 1110         Time Out 1139         Minutes 29         Timed Code Treatment Minutes: Ammy 91, [id number]  PT present/supervised treatment. Agrees with above note.     Chanda Adorno, SPT

## 2019-06-26 NOTE — PROGRESS NOTES
Resting quietly in bed. Resp easy non labored with O2 at 4 liters. O2 sat 97% and will decrease to 3 liters. Lungs sound clear bilaterally with equal air exchange but diminished. Heart sounds regular and monitor reveals NSR without ectopy at this time. Abdomen large, soft, nontender with BS present x 4. Ngo patent draining alejandrina urine. Complete assessment done and recorded. See flow sheet for details.

## 2019-06-26 NOTE — PROGRESS NOTES
Pt repositioned to left side for comfort, using pillows and wedge for support. Pt c/o pain \"all over\" especially to BLE. Entiat given per MD order. Will reassess.

## 2019-06-26 NOTE — TELEPHONE ENCOUNTER
Spoke to Harman from [facility] office to see where the patient can be worked in at

## 2019-06-26 NOTE — CARE COORDINATION
Patient is okay to be discharged from infectious disease standpoint, once cleared by primary service and other sub-specialties. My final orders are in the chart. Feel free to call me with questions, if needed.       Lisa Hoffman MD, MPH  6/26/2019, 2:16 PM  [facility] Infectious Disease   Office: [phone]  Fax: [phone]  Tuesday AM clinic:   [address] [address]  Thursday AM clinic: [address] [facility]

## 2019-06-26 NOTE — CARE COORDINATION
Discharge planning-    Spoke with Radha/ [facility] SN[facility]. Pre cert approved. 5pm transport arranged. Patient notified at the bedside, who identifies no other needs at this time. Addendum: IMM letter signed by the patient. Copy placed in hard chart, original given to the patient. [address] [facility], 5pm transport.     Will continue to follow for support and discharge planning.    -Omi Landin, MSW, LSW

## 2019-06-26 NOTE — PROGRESS NOTES
Dr Alex Wild and group here for interdisciplinary rounds. Doing well and ok'd for transfer when precert approved.

## 2019-06-26 NOTE — PROGRESS NOTES
Reassessment and VS completed. See flowsheet. Pt tolerating home CPAP machine. SPO2=94%. No acute changes. Afebrile. Pt refusing to be repositioned at this time. States \"I sleep better on my right side, plus that wound is over here. \" (pointing to wound on left side). Denies needs. Call light in reach.

## 2019-06-27 NOTE — PROGRESS NOTES
Physical Therapy  Physical Therapy Discharge Summary    Name: Morelia Marina  : 1935    The pt was evaluated by PT on 19 and seen for 1 treatment session prior to DC to SNF on 19 per MD order. The pt's acute therapy goals were:  Short term goals  Time Frame for Short term goals: to be met by discharge  Short term goal 1: Pt able to perform bed mobility with SBA  Short term goal 2: Pt able to perform transfers with SBA  Short term goal 3: Pt able to ambulate 10 ft with RW and CGA       Patient met 0 goals during stay. Number of Refusals:0  Number of Holds: 0  During this hospitalization, the patient was educated on:  Patient Education: PT POC, DC recommendations, hand placement for transfers     DC pt from PT caseload at this time. Thank you!     [address], [place] [address]

## 2019-06-27 NOTE — PROGRESS NOTES
Speech/Language Pathology  Discharge Note    Name: Rina Gibson   : 1935     Speech Therapy/Dysphagia  Pt discharged to [facility] on 19. Bedside Swallow Eval completed 19 (see report). No goals met prior to discharge. Recommend: Continued Dysphagia treatment at [facility], with goals and treatment per [facility]. DIET LEVEL AT DISCHARGE:  Dysphagia II (mechanical soft) diet with thin liquids/no straws/meds with puree    DYSPHAGIA GOALS:  1.) Pt will tolerate recommended diet without s/s of aspiration (GOAL NOT MET)  2.) If clinical symptoms of penetration/aspiration continue to be noted,Pt will tolerate MBS to r/o aspiration and determine appropriate diet/liquid level.  (GOAL NOT MET)  3.) Pt will tolerate diet advance to least restricted diet, as clinically indicated, with no signs of aspiration (GOAL NOT MET)  4.) Pt will improve oral motor function via bolus control exercises 5/5 (GOAL NOT MET)    Lauren Sams M.A., [address] [place] [address]  Speech-Language Pathologist

## 2019-06-28 NOTE — PROGRESS NOTES
Occupational Therapy  Occupational Therapy Discharge Summary    Name: Marlin Walker  : 1935    The pt was evaluated by OT on 19 and seen for 0 treatment sessions prior to DC to ECF on 19 per MD order. The pt's acute therapy goals were:  Short term goals  Time Frame for Short term goals: Discharge  Short term goal 1: Supervision for UB ADLs seated. Short term goal 2: Mod A for toileting and LB dressing. Short term goal 3: SBA for functional transfers. Short term goal 4: SBA for functional mobility. Long term goals  Time Frame for Long term goals : STG=LTG     Patient met 0 goals during stay. Number of Refusals:0  Number of Holds: 0  During this hospitalization, the patient was educated on:  Patient Education: Role of OT, OT eval, transfers, POC, d/c    DC pt from OT caseload at this time. Thank you!     Viviana Jamison, [facility], OTR/L [id number]

## 2019-06-28 NOTE — DISCHARGE SUMMARY
[facility] DISCHARGE SUMMARY    Patient Demographics    Patient. Morelia Marina  Date of Birth. 1935  MRN. [id number]     Primary care provider. Ranjith Mcgrath  (Tel: [phone])    Admit date: 6/20/2019    Discharge date (blank if same as Note Date): 6/26/2019  Note Date: 6/28/2019     Reason for Hospitalization. Chief Complaint   Patient presents with    Altered Mental Status     pt brought in by squad from home. pt family states pt is altered. pt combative upon arrival and taken directly to CT and assessed by Dr. Linda Perera. [facility] Course. Acute on chronic respiratory failure with hypoxia and hypercapnia  -extuabted 6/21/2019  -Known to have COPD and prone to exacerbations   - appears to have SHORTY and should wear CPAP or BiPAP at night or when sleeping  - abx adjusted to oral abx  - will require functional Bipap machine at SNF and sleep study as well for new bipaap     Discharge stable  COPD exacerbation acute   -wean steroids    extubated (6/22/2019)  - dulera           -Septic shock-resolved  -She was initially hypotensive in the emergency department but did perk up with some fluids however following intubation the patient's pressure again began to drop  - off levophed and maintaining blood pressure  - resolved.        Small bilateral pleural effusions  -Unclear if there is a component of heart failure here  Echo done last month shows a normal ejection fraction     LIZ  --resolved     Bilateral lower lobe pneumonia  She is on appropriate antibiotic coverage  Concern for aspiration, seen by maureen     Delay in dischrge due to nor able find biappa and precet in timely manner       Consults.   IP CONSULT TO HOSPITALIST  IP CONSULT TO SPIRITUAL SERVICES  IP CONSULT TO PULMONOLOGY  IP CONSULT TO DIETITIAN  IP CONSULT TO SOCIAL WORK  IP CONSULT TO INFECTIOUS DISEASES    Physical examination on discharge day. BP (!) 118/52   Pulse 75   Temp 97.8 °F (36.6 °C) (Temporal)   Resp 16   Ht 5' 1\" (1.549 m)   Wt 276 lb 12.8 oz (125.6 kg)   SpO2 96%   BMI 52.30 kg/m²   General appearance. Alert. Looks comfortable. HEENT. Sclera clear. Moist mucus membranes. Cardiovascular. Regular rate and rhythm, normal S1, S2. No murmur. Respiratory. Not using accessory muscles. Clear to auscultation bilaterally, no wheeze. Gastrointestinal. Abdomen soft, non-tender, not distended, normal bowel sounds  Neurology. Facial symmetry. No speech deficits. Moving all extremities equally. Extremities. No edema in lower extremities. Skin. Warm, dry, normal turgor    Condition at time of discharge stable     Medication instructions provided to patient at discharge. Medication List      START taking these medications    amoxicillin-clavulanate 875-125 MG per tablet  Commonly known as:  AUGMENTIN  Take 1 tablet by mouth every 12 hours for 5 days     hydrocortisone 2.5 % rectal cream  Commonly known as:  ANUSOL-HC  Place rectally 2 times daily. lactobacillus capsule  Take 1 capsule by mouth 2 times daily (with meals) for 5 days        CHANGE how you take these medications    oxyCODONE-acetaminophen  MG per tablet  Commonly known as:  PERCOCET  Take 1 tablet by mouth every 8 hours as needed for Pain for up to 3 days.   What changed:  when to take this        CONTINUE taking these medications    esomeprazole 40 MG delayed release capsule  Commonly known as:  NEXIUM     FLUoxetine 20 MG capsule  Commonly known as:  PROZAC     furosemide 40 MG tablet  Commonly known as:  LASIX     ipratropium-albuterol 0.5-2.5 (3) MG/3ML Soln nebulizer solution  Commonly known as:  DUONEB     levothyroxine 112 MCG tablet  Commonly known as:  SYNTHROID     lisinopril 5 MG tablet  Commonly known as:  PRINIVIL;ZESTRIL  Take 1 tablet by mouth daily     loratadine 10 MG tablet  Commonly known as:  CLARITIN     magnesium

## 2019-07-01 ENCOUNTER — APPOINTMENT (OUTPATIENT)
Dept: CT IMAGING | Age: 84
DRG: 683 | End: 2019-07-01
Payer: COMMERCIAL

## 2019-07-01 ENCOUNTER — HOSPITAL ENCOUNTER (INPATIENT)
Age: 84
LOS: 4 days | Discharge: HOME | DRG: 683 | End: 2019-07-05
Attending: INTERNAL MEDICINE | Admitting: INTERNAL MEDICINE
Payer: COMMERCIAL

## 2019-07-01 ENCOUNTER — APPOINTMENT (OUTPATIENT)
Dept: GENERAL RADIOLOGY | Age: 84
DRG: 683 | End: 2019-07-01
Payer: COMMERCIAL

## 2019-07-01 DIAGNOSIS — N17.9 AKI (ACUTE KIDNEY INJURY) (HCC): Primary | ICD-10-CM

## 2019-07-01 DIAGNOSIS — R77.8 ELEVATED TROPONIN: ICD-10-CM

## 2019-07-01 LAB
A/G RATIO: 1.1 (ref 1.1–2.2)
ALBUMIN SERPL-MCNC: 3.4 G/DL (ref 3.4–5)
ALP BLD-CCNC: 99 U/L (ref 40–129)
ALT SERPL-CCNC: 39 U/L (ref 10–40)
ANION GAP SERPL CALCULATED.3IONS-SCNC: 12 MMOL/L (ref 3–16)
AST SERPL-CCNC: 26 U/L (ref 15–37)
BASOPHILS ABSOLUTE: 0.1 K/UL (ref 0–0.2)
BASOPHILS RELATIVE PERCENT: 1.2 %
BILIRUB SERPL-MCNC: <0.2 MG/DL (ref 0–1)
BILIRUBIN URINE: NEGATIVE
BLOOD, URINE: NEGATIVE
BUN BLDV-MCNC: 45 MG/DL (ref 7–20)
CALCIUM SERPL-MCNC: 9.1 MG/DL (ref 8.3–10.6)
CHLORIDE BLD-SCNC: 96 MMOL/L (ref 99–110)
CLARITY: CLEAR
CO2: 25 MMOL/L (ref 21–32)
COLOR: YELLOW
CREAT SERPL-MCNC: 2.4 MG/DL (ref 0.6–1.2)
EOSINOPHILS ABSOLUTE: 0.3 K/UL (ref 0–0.6)
EOSINOPHILS RELATIVE PERCENT: 3.3 %
EPITHELIAL CELLS, UA: 3 /HPF (ref 0–5)
GFR AFRICAN AMERICAN: 23
GFR NON-AFRICAN AMERICAN: 19
GLOBULIN: 3.2 G/DL
GLUCOSE BLD-MCNC: 138 MG/DL (ref 70–99)
GLUCOSE URINE: NEGATIVE MG/DL
HCT VFR BLD CALC: 33.2 % (ref 36–48)
HEMOGLOBIN: 10.5 G/DL (ref 12–16)
HYALINE CASTS: 13 /LPF (ref 0–8)
KETONES, URINE: NEGATIVE MG/DL
LACTIC ACID, SEPSIS: 1.1 MMOL/L (ref 0.4–1.9)
LEUKOCYTE ESTERASE, URINE: ABNORMAL
LYMPHOCYTES ABSOLUTE: 1.5 K/UL (ref 1–5.1)
LYMPHOCYTES RELATIVE PERCENT: 18 %
MCH RBC QN AUTO: 28.7 PG (ref 26–34)
MCHC RBC AUTO-ENTMCNC: 31.5 G/DL (ref 31–36)
MCV RBC AUTO: 91.3 FL (ref 80–100)
MICROSCOPIC EXAMINATION: YES
MONOCYTES ABSOLUTE: 0.9 K/UL (ref 0–1.3)
MONOCYTES RELATIVE PERCENT: 10.5 %
NEUTROPHILS ABSOLUTE: 5.6 K/UL (ref 1.7–7.7)
NEUTROPHILS RELATIVE PERCENT: 67 %
NITRITE, URINE: NEGATIVE
PDW BLD-RTO: 15.7 % (ref 12.4–15.4)
PH UA: 5 (ref 5–8)
PLATELET # BLD: 167 K/UL (ref 135–450)
PMV BLD AUTO: 9.7 FL (ref 5–10.5)
POTASSIUM SERPL-SCNC: 4.6 MMOL/L (ref 3.5–5.1)
PRO-BNP: 184 PG/ML (ref 0–449)
PROTEIN UA: NEGATIVE MG/DL
RBC # BLD: 3.64 M/UL (ref 4–5.2)
RBC UA: 6 /HPF (ref 0–4)
SODIUM BLD-SCNC: 133 MMOL/L (ref 136–145)
SPECIFIC GRAVITY UA: 1.02 (ref 1–1.03)
TOTAL PROTEIN: 6.6 G/DL (ref 6.4–8.2)
TROPONIN: 0.05 NG/ML
URINE REFLEX TO CULTURE: YES
URINE TYPE: ABNORMAL
UROBILINOGEN, URINE: 0.2 E.U./DL
WBC # BLD: 8.4 K/UL (ref 4–11)
WBC UA: 2 /HPF (ref 0–5)

## 2019-07-01 PROCEDURE — 83880 ASSAY OF NATRIURETIC PEPTIDE: CPT

## 2019-07-01 PROCEDURE — 84155 ASSAY OF PROTEIN SERUM: CPT

## 2019-07-01 PROCEDURE — 84484 ASSAY OF TROPONIN QUANT: CPT

## 2019-07-01 PROCEDURE — 83605 ASSAY OF LACTIC ACID: CPT

## 2019-07-01 PROCEDURE — 70450 CT HEAD/BRAIN W/O DYE: CPT

## 2019-07-01 PROCEDURE — 93005 ELECTROCARDIOGRAM TRACING: CPT | Performed by: NURSE PRACTITIONER

## 2019-07-01 PROCEDURE — 84165 PROTEIN E-PHORESIS SERUM: CPT

## 2019-07-01 PROCEDURE — 81001 URINALYSIS AUTO W/SCOPE: CPT

## 2019-07-01 PROCEDURE — 2060000000 HC ICU INTERMEDIATE R&B

## 2019-07-01 PROCEDURE — 85025 COMPLETE CBC W/AUTO DIFF WBC: CPT

## 2019-07-01 PROCEDURE — 99285 EMERGENCY DEPT VISIT HI MDM: CPT

## 2019-07-01 PROCEDURE — 87086 URINE CULTURE/COLONY COUNT: CPT

## 2019-07-01 PROCEDURE — 80053 COMPREHEN METABOLIC PANEL: CPT

## 2019-07-01 PROCEDURE — 2580000003 HC RX 258: Performed by: NURSE PRACTITIONER

## 2019-07-01 PROCEDURE — 36415 COLL VENOUS BLD VENIPUNCTURE: CPT

## 2019-07-01 PROCEDURE — 71045 X-RAY EXAM CHEST 1 VIEW: CPT

## 2019-07-01 PROCEDURE — 87040 BLOOD CULTURE FOR BACTERIA: CPT

## 2019-07-01 RX ORDER — SODIUM CHLORIDE 9 MG/ML
INJECTION, SOLUTION INTRAVENOUS CONTINUOUS
Status: DISCONTINUED | OUTPATIENT
Start: 2019-07-02 | End: 2019-07-03

## 2019-07-01 RX ORDER — ONDANSETRON 2 MG/ML
4 INJECTION INTRAMUSCULAR; INTRAVENOUS EVERY 6 HOURS PRN
Status: DISCONTINUED | OUTPATIENT
Start: 2019-07-01 | End: 2019-07-05 | Stop reason: HOSPADM

## 2019-07-01 RX ORDER — HEPARIN SODIUM 5000 [USP'U]/ML
5000 INJECTION, SOLUTION INTRAVENOUS; SUBCUTANEOUS EVERY 8 HOURS SCHEDULED
Status: DISCONTINUED | OUTPATIENT
Start: 2019-07-02 | End: 2019-07-05 | Stop reason: HOSPADM

## 2019-07-01 RX ORDER — PANTOPRAZOLE SODIUM 40 MG/1
40 TABLET, DELAYED RELEASE ORAL
Status: DISCONTINUED | OUTPATIENT
Start: 2019-07-02 | End: 2019-07-05 | Stop reason: HOSPADM

## 2019-07-01 RX ORDER — SODIUM CHLORIDE 0.9 % (FLUSH) 0.9 %
10 SYRINGE (ML) INJECTION PRN
Status: DISCONTINUED | OUTPATIENT
Start: 2019-07-01 | End: 2019-07-05 | Stop reason: HOSPADM

## 2019-07-01 RX ORDER — PREGABALIN 75 MG/1
150 CAPSULE ORAL DAILY
Status: DISCONTINUED | OUTPATIENT
Start: 2019-07-02 | End: 2019-07-05 | Stop reason: HOSPADM

## 2019-07-01 RX ORDER — FLUOXETINE HYDROCHLORIDE 20 MG/1
40 CAPSULE ORAL DAILY
Status: DISCONTINUED | OUTPATIENT
Start: 2019-07-02 | End: 2019-07-05 | Stop reason: HOSPADM

## 2019-07-01 RX ORDER — SENNA PLUS 8.6 MG/1
1 TABLET ORAL DAILY
Status: DISCONTINUED | OUTPATIENT
Start: 2019-07-02 | End: 2019-07-05 | Stop reason: HOSPADM

## 2019-07-01 RX ORDER — SODIUM CHLORIDE 0.9 % (FLUSH) 0.9 %
10 SYRINGE (ML) INJECTION EVERY 12 HOURS SCHEDULED
Status: DISCONTINUED | OUTPATIENT
Start: 2019-07-02 | End: 2019-07-05 | Stop reason: HOSPADM

## 2019-07-01 RX ORDER — ACETAMINOPHEN 325 MG/1
650 TABLET ORAL EVERY 4 HOURS PRN
Status: DISCONTINUED | OUTPATIENT
Start: 2019-07-01 | End: 2019-07-05 | Stop reason: HOSPADM

## 2019-07-01 RX ORDER — IPRATROPIUM BROMIDE AND ALBUTEROL SULFATE 2.5; .5 MG/3ML; MG/3ML
1 SOLUTION RESPIRATORY (INHALATION) EVERY 6 HOURS PRN
Status: DISCONTINUED | OUTPATIENT
Start: 2019-07-01 | End: 2019-07-05 | Stop reason: HOSPADM

## 2019-07-01 RX ORDER — LEVOTHYROXINE SODIUM 112 UG/1
1 TABLET ORAL DAILY
Status: DISCONTINUED | OUTPATIENT
Start: 2019-07-02 | End: 2019-07-02 | Stop reason: SDUPTHER

## 2019-07-01 RX ORDER — BENZONATATE 100 MG/1
100 CAPSULE ORAL 3 TIMES DAILY PRN
Status: DISCONTINUED | OUTPATIENT
Start: 2019-07-01 | End: 2019-07-05 | Stop reason: HOSPADM

## 2019-07-01 RX ORDER — DOCUSATE SODIUM 100 MG/1
100 CAPSULE, LIQUID FILLED ORAL 2 TIMES DAILY PRN
Status: DISCONTINUED | OUTPATIENT
Start: 2019-07-01 | End: 2019-07-05 | Stop reason: HOSPADM

## 2019-07-01 RX ORDER — 0.9 % SODIUM CHLORIDE 0.9 %
1000 INTRAVENOUS SOLUTION INTRAVENOUS ONCE
Status: COMPLETED | OUTPATIENT
Start: 2019-07-01 | End: 2019-07-01

## 2019-07-01 RX ADMIN — SODIUM CHLORIDE 1000 ML: 9 INJECTION, SOLUTION INTRAVENOUS at 22:44

## 2019-07-01 ASSESSMENT — ENCOUNTER SYMPTOMS
ABDOMINAL PAIN: 0
VOMITING: 0
DIARRHEA: 0
CHEST TIGHTNESS: 0
COUGH: 1
SHORTNESS OF BREATH: 0
NAUSEA: 0

## 2019-07-01 ASSESSMENT — PAIN SCALES - GENERAL: PAINLEVEL_OUTOF10: 8

## 2019-07-02 LAB
ALBUMIN SERPL-MCNC: 3 G/DL (ref 3.4–5)
ANION GAP SERPL CALCULATED.3IONS-SCNC: 9 MMOL/L (ref 3–16)
BUN BLDV-MCNC: 44 MG/DL (ref 7–20)
CALCIUM SERPL-MCNC: 8.6 MG/DL (ref 8.3–10.6)
CHLORIDE BLD-SCNC: 102 MMOL/L (ref 99–110)
CO2: 26 MMOL/L (ref 21–32)
CREAT SERPL-MCNC: 2.2 MG/DL (ref 0.6–1.2)
EKG ATRIAL RATE: 78 BPM
EKG DIAGNOSIS: NORMAL
EKG P AXIS: 44 DEGREES
EKG P-R INTERVAL: 174 MS
EKG Q-T INTERVAL: 398 MS
EKG QRS DURATION: 116 MS
EKG QTC CALCULATION (BAZETT): 453 MS
EKG R AXIS: -25 DEGREES
EKG T AXIS: 3 DEGREES
EKG VENTRICULAR RATE: 78 BPM
GFR AFRICAN AMERICAN: 26
GFR NON-AFRICAN AMERICAN: 21
GLUCOSE BLD-MCNC: 137 MG/DL (ref 70–99)
HCT VFR BLD CALC: 30.4 % (ref 36–48)
HEMOGLOBIN: 9.7 G/DL (ref 12–16)
MCH RBC QN AUTO: 29.3 PG (ref 26–34)
MCHC RBC AUTO-ENTMCNC: 32 G/DL (ref 31–36)
MCV RBC AUTO: 91.7 FL (ref 80–100)
PDW BLD-RTO: 15.4 % (ref 12.4–15.4)
PHOSPHORUS: 5.6 MG/DL (ref 2.5–4.9)
PLATELET # BLD: 162 K/UL (ref 135–450)
PMV BLD AUTO: 9.2 FL (ref 5–10.5)
POTASSIUM SERPL-SCNC: 4.5 MMOL/L (ref 3.5–5.1)
RBC # BLD: 3.31 M/UL (ref 4–5.2)
SODIUM BLD-SCNC: 137 MMOL/L (ref 136–145)
TROPONIN: 0.04 NG/ML
TROPONIN: 0.05 NG/ML
TROPONIN: 0.06 NG/ML
TROPONIN: 0.06 NG/ML
WBC # BLD: 8.3 K/UL (ref 4–11)

## 2019-07-02 PROCEDURE — 94761 N-INVAS EAR/PLS OXIMETRY MLT: CPT

## 2019-07-02 PROCEDURE — 6370000000 HC RX 637 (ALT 250 FOR IP): Performed by: PHYSICIAN ASSISTANT

## 2019-07-02 PROCEDURE — 93010 ELECTROCARDIOGRAM REPORT: CPT | Performed by: INTERNAL MEDICINE

## 2019-07-02 PROCEDURE — 85027 COMPLETE CBC AUTOMATED: CPT

## 2019-07-02 PROCEDURE — 84156 ASSAY OF PROTEIN URINE: CPT

## 2019-07-02 PROCEDURE — 94640 AIRWAY INHALATION TREATMENT: CPT

## 2019-07-02 PROCEDURE — 2580000003 HC RX 258: Performed by: PHYSICIAN ASSISTANT

## 2019-07-02 PROCEDURE — 2700000000 HC OXYGEN THERAPY PER DAY

## 2019-07-02 PROCEDURE — 94660 CPAP INITIATION&MGMT: CPT

## 2019-07-02 PROCEDURE — 84484 ASSAY OF TROPONIN QUANT: CPT

## 2019-07-02 PROCEDURE — 2060000000 HC ICU INTERMEDIATE R&B

## 2019-07-02 PROCEDURE — 36415 COLL VENOUS BLD VENIPUNCTURE: CPT

## 2019-07-02 PROCEDURE — 80069 RENAL FUNCTION PANEL: CPT

## 2019-07-02 PROCEDURE — 6360000002 HC RX W HCPCS: Performed by: PHYSICIAN ASSISTANT

## 2019-07-02 PROCEDURE — 6370000000 HC RX 637 (ALT 250 FOR IP): Performed by: INTERNAL MEDICINE

## 2019-07-02 RX ORDER — LEVOTHYROXINE SODIUM 112 UG/1
112 TABLET ORAL
Status: DISCONTINUED | OUTPATIENT
Start: 2019-07-02 | End: 2019-07-05 | Stop reason: HOSPADM

## 2019-07-02 RX ORDER — OXYCODONE AND ACETAMINOPHEN 10; 325 MG/1; MG/1
1 TABLET ORAL EVERY 8 HOURS PRN
Status: DISCONTINUED | OUTPATIENT
Start: 2019-07-02 | End: 2019-07-05 | Stop reason: HOSPADM

## 2019-07-02 RX ADMIN — SENNOSIDES 8.6 MG: 8.6 TABLET, FILM COATED ORAL at 11:04

## 2019-07-02 RX ADMIN — HEPARIN SODIUM 5000 UNITS: 5000 INJECTION INTRAVENOUS; SUBCUTANEOUS at 06:19

## 2019-07-02 RX ADMIN — FLUOXETINE 40 MG: 20 CAPSULE ORAL at 11:04

## 2019-07-02 RX ADMIN — HEPARIN SODIUM 5000 UNITS: 5000 INJECTION INTRAVENOUS; SUBCUTANEOUS at 21:52

## 2019-07-02 RX ADMIN — Medication 2 PUFF: at 08:55

## 2019-07-02 RX ADMIN — OXYCODONE AND ACETAMINOPHEN 1 TABLET: 10; 325 TABLET ORAL at 17:02

## 2019-07-02 RX ADMIN — SODIUM CHLORIDE: 9 INJECTION, SOLUTION INTRAVENOUS at 21:50

## 2019-07-02 RX ADMIN — HEPARIN SODIUM 5000 UNITS: 5000 INJECTION INTRAVENOUS; SUBCUTANEOUS at 17:03

## 2019-07-02 RX ADMIN — LEVOTHYROXINE SODIUM 112 MCG: 112 TABLET ORAL at 06:19

## 2019-07-02 RX ADMIN — Medication 10 ML: at 00:33

## 2019-07-02 RX ADMIN — SODIUM CHLORIDE: 9 INJECTION, SOLUTION INTRAVENOUS at 11:12

## 2019-07-02 RX ADMIN — OXYCODONE AND ACETAMINOPHEN 1 TABLET: 10; 325 TABLET ORAL at 00:33

## 2019-07-02 RX ADMIN — SODIUM CHLORIDE: 9 INJECTION, SOLUTION INTRAVENOUS at 00:34

## 2019-07-02 RX ADMIN — Medication 2 PUFF: at 20:29

## 2019-07-02 RX ADMIN — PREGABALIN 150 MG: 75 CAPSULE ORAL at 11:05

## 2019-07-02 ASSESSMENT — PAIN SCALES - GENERAL
PAINLEVEL_OUTOF10: 8
PAINLEVEL_OUTOF10: 8
PAINLEVEL_OUTOF10: 9
PAINLEVEL_OUTOF10: 7
PAINLEVEL_OUTOF10: 0
PAINLEVEL_OUTOF10: 8

## 2019-07-02 ASSESSMENT — PAIN DESCRIPTION - LOCATION
LOCATION: BACK;KNEE
LOCATION: BACK;KNEE

## 2019-07-02 NOTE — CONSULTS
[address]                     [facility], [address]                                  CONSULTATION    PATIENT NAME: Sarah Alvarado                   :        1935  MED REC NO:   [id number]                          ROOM:       [id number]  ACCOUNT NO:   [de-identified]                           ADMIT DATE: 2019  PROVIDER:     Marcus Duffy MD    CONSULT DATE:  2019    CONSULTING PHYSICIAN:  Marcus Duffy MD.    REFERRING PROVIDER:  Randi Sheth MD.    REASON FOR CONSULT:  Acute kidney injury. HISTORY OF PRESENT ILLNESS:  The patient is an 27-year-old female with  history of hypertension, COPD, GERD, hypothyroidism, obesity, who  presented to the hospital secondary to fatigue. According to her, she  has been also having chronic cough. She denies any nausea, vomiting, or  diarrhea. She denies any regular NSAID use. I am asked to see the  patient because her creatinine was up to 2.4 with a BUN of 45. Lower  systolic blood pressure dropped to the 90s. Her outpatient medications  included Lasix and lisinopril. She is also on PPI. She denies any skin  rash or any skin itching. With IV fluid administration, her creatinine  in better today at 2.2. Her baseline creatinine is 0.7 noted from  2019. PAST MEDICAL HISTORY:  Includes osteoarthritis, COPD, hernia, and  obesity. ALLERGIES:  SULFA ANTIBIOTICS. MEDICATIONS:  Prior to admission includes hydrocortisone rectal cream,  ipratropium plus albuterol, Symbicort, levothyroxine, furosemide,  Loratadine, Movantik, Lisinopril, senna, fluoxetine, Pregabalin,  Esomeprazole, and magnesium hydroxide p.r.n. SOCIAL HISTORY:  She lives at home. Denies any active smoking, alcohol,  or drug abuse. FAMILY HISTORY:  Denies any history of kidney disease. REVIEW OF SYSTEMS:  GENERAL:  Positive malaise. SKIN:  No skin rashes,  itching, or discharge.   NEUROLOGIC:  No headaches or focal

## 2019-07-02 NOTE — ED PROVIDER NOTES
chest pain. Gastrointestinal: Negative for abdominal pain, diarrhea, nausea and vomiting. Genitourinary: Negative for dysuria. All other systems reviewed and are negative. Positives and Pertinent negatives as per HPI. Except as noted abovein the ROS, all other systems were reviewed and negative. PAST MEDICAL HISTORY     Past Medical History:   Diagnosis Date    Arthritis     osteo    COPD (chronic obstructive pulmonary disease) ([facility] [address]     Hemorrhoids     Hernia of unspecified site of abdominal cavity without mention of obstruction or gangrene     Incontinence     Knee pain, bilateral     pt states no cartilage    Spinal stenosis     Spinal stenosis          SURGICAL HISTORY     Past Surgical History:   Procedure Laterality Date    CHOLECYSTECTOMY      PARTIAL HYSTERECTOMY           CURRENTMEDICATIONS       Current Discharge Medication List      CONTINUE these medications which have NOT CHANGED    Details   hydrocortisone (ANUSOL-HC) 2.5 % rectal cream Place rectally 2 times daily. Qty: 1 Tube, Refills: 0      ipratropium-albuterol (DUONEB) 0.5-2.5 (3) MG/3ML SOLN nebulizer solution Inhale 1 vial into the lungs every 6 hours as needed for Shortness of Breath      SYMBICORT 160-4.5 MCG/ACT AERO TAKE 2 PUFFS TWICE A DAY  Refills: 3      levothyroxine (SYNTHROID) 112 MCG tablet TAKE 1 TABLET BY MOUTH EVERY DAY  Refills: 3      furosemide (LASIX) 40 MG tablet TAKE 1 TABLET BY MOUTH EVERY DAY  Refills: 3      loratadine (CLARITIN) 10 MG tablet Take 1 tablet by mouth daily      MOVANTIK 25 MG TABS tablet Take 25 mg by mouth daily as needed  Refills: 5      lisinopril (PRINIVIL;ZESTRIL) 5 MG tablet Take 1 tablet by mouth daily  Qty: 30 tablet, Refills: 0      senna (SENOKOT) 8.6 MG tablet Take 1 tablet by mouth daily      fluoxetine (PROZAC) 20 MG capsule Take 40 mg by mouth daily. pregabalin (LYRICA) 50 MG capsule Take 150 mg by mouth 2 times daily.        esomeprazole (NEXIUM) 40 MG capsule Take 40 mg by mouth every morning (before breakfast). magnesium hydroxide (MILK OF MAGNESIA) 400 MG/5ML suspension Take 30 mLs by mouth daily as needed for Constipation  Qty: 1 Bottle, Refills: 0               ALLERGIES     Sulfa antibiotics    FAMILYHISTORY     History reviewed. No pertinent family history. SOCIAL HISTORY       Social History     Socioeconomic History    Marital status:      Spouse name: None    Number of children: None    Years of education: None    Highest education level: None   Occupational History    None   Social Needs    Financial resource strain: None    Food insecurity:     Worry: None     Inability: None    Transportation needs:     Medical: None     Non-medical: None   Tobacco Use    Smoking status: Never Smoker    Smokeless tobacco: Never Used   Substance and Sexual Activity    Alcohol use: No    Drug use: No    Sexual activity: None   Lifestyle    Physical activity:     Days per week: None     Minutes per session: None    Stress: None   Relationships    Social connections:     Talks on phone: None     Gets together: None     Attends [employer]ism service: None     Active member of club or organization: None     Attends meetings of clubs or organizations: None     Relationship status: None    Intimate partner violence:     Fear of current or ex partner: None     Emotionally abused: None     Physically abused: None     Forced sexual activity: None   Other Topics Concern    None   Social History Narrative    None       SCREENINGS             PHYSICAL EXAM    (up to 7 for level 4, 8 or more for level 5)     ED Triage Vitals [07/01/19 1928]   BP Temp Temp src Pulse Resp SpO2 Height Weight   (!) 93/47 98.8 °F (37.1 °C) -- 82 17 92 % 5' 1\" (1.549 m) 277 lb (125.6 kg)       Physical Exam   Constitutional: She is oriented to person, place, and time. She appears well-developed and well-nourished. She appears ill. No distress.    HENT:   Head:

## 2019-07-02 NOTE — H&P
sounds. Extremities:  +BLE edema, at baseline per patient. No clubbing or cyanosis bilaterally. Full range of motion without deformity. +2 palpable pulses, equal bilaterally. Capillary refill brisk,< 3 seconds   Skin: No rashes or lesions. Warm/dry. Neurologic:  Neurovascularly intact without any focal sensory/motor deficits. Cranial nerves: II-XII intact, grossly non-focal. Alert and oriented x 3. Normal speech. Psychiatric:  Thought content appropriate, normal insight. Labs:   CBC   Recent Labs     07/01/19 2116   WBC 8.4   HGB 10.5*   HCT 33.2*           RENAL  Recent Labs     07/01/19 2116   *   K 4.6   CL 96*   CO2 25   BUN 45*   CREATININE 2.4*       LFTS  Recent Labs     07/01/19 2116   AST 26   ALT 39   BILITOT <0.2   ALKPHOS 99       COAG  No results for input(s): INR in the last 72 hours. CARDIAC ENZYMES  Recent Labs     07/01/19 2116   TROPONINI 0.05*       LIPIDS  Cholesterol, Total   Date/Time Value Ref Range Status   05/11/2019 05:14  0 - 199 mg/dL Final     Triglycerides   Date/Time Value Ref Range Status   05/11/2019 05:14  0 - 150 mg/dL Final     HDL   Date/Time Value Ref Range Status   05/11/2019 05:14 AM 37 (L) 40 - 60 mg/dL Final     LDL Calculated   Date/Time Value Ref Range Status   05/11/2019 05:14  (H) <100 mg/dL Final         Radiology:     CT Head WO Contrast   Final Result   Motion limited study. No acute findings. XR CHEST PORTABLE   Final Result   Mild pulmonary edema. Small nonspecific right lower lobe opacity.              EKG:   Status: Preliminary result (7/1/2019 21:08)  Component Value Ref Range & Units   Ventricular Rate 78  BPM   Atrial Rate 78  BPM   P-R Interval 174  ms   QRS Duration 116  ms   Q-T Interval 398  ms   QTc Calculation (Bazett) 453  ms   P Axis 44  degrees   R Axis -25  degrees   T Axis 3  degrees   Diagnosis     Normal sinus rhythm  Incomplete left bundle branch block        ASSESSMENT/PLAN:    LIZ  Cr baseline 0.7, today 2.4  Likely due to dehydration  IVF  Avoid nephrotoxic medications - hold lasix and lisinopril  Renally dose medications - decrease lyrica to daily  Monitor for electrolyte abnormalities  May need reduction of lasix upon discharge given recurrent LIZ  Nephrology consulted    Hyponatremia  Mild and asymptomatic  IVF with NS  Monitor Na    HTN  Hold home lisinopril and lasix as above  Patient currently hypotensive; consider PRN BP med if needed    Cough  CXR shows mild pulmonary edema; However, lungs CTAB, Pro-BNP normal, no increased O2 requirement; ECHO performed in May showed a normal EF  Suspect residual cough from recent PNA in the setting of COPD  Tessalon ordered    Elevated troponin  Patient denies chest pain  EKG shows no evidence of acute ischemia or infarction  Suspect due to LIZ  Will trend x 2 more draws    Suspected SHORTY  Required BiPAP during previous admission  Has not yet followed up with pulmonology for outpatient sleep study. Per chart review, patient had a follow up scheduled for today but patient states she was unaware of appt  Continue previous BiPAP settings    COPD  No evidence of acute exacerbation  Continue home meds  On 4L O2 per NC at home    Morbid obesity due to excess calories (Body mass index is 14.89 kg/m².)   Complicating assessment and treatment. Obesity places the patient at risk for multiple co-morbidities as well as early death and may be contributing to the patient's presentation. Supportive care. Encourage therapeutic lifestyle changes. Hypothyroidism  Continue home synthroid    GERD  PPI    Chronic pain syndrome  Continue home lyrica. Renally dose. Continue home percocet.  OARRS reviewed      DVT prophylaxis: Heparin  GI prophylaxis: Protonix  Probiotic if on abx: Not indicated    Diet: DIET GENERAL;  Code Status: Full Code    Consults:  IP CONSULT TO HOSPITALIST  IP CONSULT TO NEPHROLOGY    Disposition: Admit to Inpatient  ELOS: Greater than two midnights due to medical therapy     Kayla Vernon PA-C    Thank you [facility] for the opportunity to be involved in this patient's care. If you have any questions or concerns please feel free to contact me at [phone].

## 2019-07-02 NOTE — PROGRESS NOTES
4 Eyes Skin Assessment     The patient is being assess for  Admission    I agree that 2 RN's have performed a thorough Head to Toe Skin Assessment on the patient. ALL assessment sites listed below have been assessed. Areas assessed by both nurses: Adelene Eaves  [x]   Head, Face, and Ears   [x]   Shoulders, Back, and Chest  [x]   Arms, Elbows, and Hands   [x]   Coccyx, Sacrum, and IschIum  [x]   Legs, Feet, and Heels        Does the Patient have Skin Breakdown?   Yes LDA WOUND CARE was Initiated documentation include the Jessica-wound, Wound Assessment, Measurements, Dressing Treatment, Drainage, and Color\",         Jorge Luis Prevention initiated:  Yes   Wound Care Orders initiated:  Yes      [id number]  nurse consulted for Pressure Injury (Stage 3,4, Unstageable, DTI, NWPT, and Complex wounds), New and Established Ostomies:  NA      Nurse 1 eSignature: Electronically signed by Roland Vides RN on 7/2/19 at 12:19 AM    **SHARE this note so that the co-signing nurse is able to place an eSignature**    Nurse 2 eSignature: Electronically signed by Barrington Hubbard RN on 7/2/19 at 12:29 AM

## 2019-07-02 NOTE — PROGRESS NOTES
Hospitalist Progress Note      PCP: Sarah Munoz    Date of Admission: 7/1/2019    Chief Complaint:     Hospital Course:   Jose Moses is a 82 y.o. female with a PMH of HTN, COPD, GERD, hypothyroidism who presented to ED with complaint of increased fatigue and weakness. She was recently admitted with PNA and completed antibiotic course. She reports persistent cough but that it has improved. However, she also reports she has a chronic cough. She also reports chronic SOB and BLE edema which are currently at baseline. She denies fever, dizziness, chest pain, abdominal pain, N/V/D/C, pain with urination.            Subjective:   Asleep and not able to get ROS      Medications:  Reviewed    Infusion Medications    sodium chloride 100 mL/hr at 07/02/19 1112     Scheduled Medications    levothyroxine  112 mcg Oral QAM AC    pantoprazole  40 mg Oral QAM AC    FLUoxetine  40 mg Oral Daily    pregabalin  150 mg Oral Daily    senna  1 tablet Oral Daily    mometasone-formoterol  2 puff Inhalation BID    sodium chloride flush  10 mL Intravenous 2 times per day    heparin (porcine)  5,000 Units Subcutaneous 3 times per day     PRN Meds: oxyCODONE-acetaminophen, ipratropium-albuterol, sodium chloride flush, docusate sodium, ondansetron, acetaminophen, benzonatate    No intake or output data in the 24 hours ending 07/02/19 1800    Physical Exam Performed:    /66   Pulse 71   Temp 97.4 °F (36.3 °C) (Temporal)   Resp 20   Ht 5' 1\" (1.549 m)   Wt 292 lb (132.5 kg)   SpO2 95%   BMI 55.17 kg/m²     General appearance:  Asleep and obese   HEENT:  Normocephalic, atraumatic without obvious deformity. PERRL. EOM intact. Conjunctivae/corneas clear. Neck: Supple, with full range of motion. No JVD. Trachea midline. Respiratory:  Clear to auscultation bilaterally without rales, wheezes, or rhonchi. Normal respiratory effort. Cardiovascular:  Regular rate and rhythm without murmurs, rubs or gallops. Abdomen: Soft, NT, ND, without rebound or guarding. Normal bowel sounds. Extremities:  +BLE edema, at baseline per patient. No clubbing or cyanosis bilaterally. Full range of motion without deformity. +2 palpable pulses, equal bilaterally. Capillary refill brisk,< 3 seconds   Skin: No rashes or lesions. Warm/dry. Neurologic:  Neurovascularly intact without any focal sensory/motor deficits. Cranial nerves: II-XII intact, grossly non-focal. Alert and oriented x 3. Normal speech. Psychiatric:  Thought content appropriate, normal insight. Labs:   Recent Labs     07/01/19 2116 07/02/19 0421   WBC 8.4 8.3   HGB 10.5* 9.7*   HCT 33.2* 30.4*    162     Recent Labs     07/01/19 2116 07/02/19 0420   * 137   K 4.6 4.5   CL 96* 102   CO2 25 26   BUN 45* 44*   CREATININE 2.4* 2.2*   CALCIUM 9.1 8.6   PHOS  --  5.6*     Recent Labs     07/01/19 2116   AST 26   ALT 39   BILITOT <0.2   ALKPHOS 99     No results for input(s): INR in the last 72 hours. Recent Labs     07/02/19  0421 07/02/19  0807 07/02/19  1305   TROPONINI 0.05* 0.06* 0.06*       Urinalysis:      Lab Results   Component Value Date    NITRU Negative 07/01/2019    WBCUA 2 07/01/2019    BACTERIA 4+ 06/20/2019    RBCUA 6 07/01/2019    BLOODU Negative 07/01/2019    SPECGRAV 1.018 07/01/2019    GLUCOSEU Negative 07/01/2019    GLUCOSEU NEGATIVE 03/21/2011       Radiology:  CT Head WO Contrast   Final Result   Motion limited study. No acute findings. XR CHEST PORTABLE   Final Result   Mild pulmonary edema. Small nonspecific right lower lobe opacity.                  Assessment/Plan:    Active Hospital Problems    Diagnosis    LIZ (acute kidney injury) ([facility] [address] [N17.9]     LIZ  Cr baseline 0.7, today 2.4  Likely due to dehydration  IVF  Avoid nephrotoxic medications - hold lasix and lisinopril  Renally dose medications - decrease lyrica to daily  Monitor for electrolyte abnormalities  May need reduction of lasix upon discharge given recurrent LIZ  Nephrology consulted  Continue IVF cr 2.2 today     Hyponatremia  Mild and asymptomatic  IVF with NS  Monitor Na     HTN  Hold home lisinopril and lasix as above  Patient currently hypotensive; consider PRN BP med if needed  Hypotension improved.     Cough  CXR shows mild pulmonary edema; However, lungs CTAB, Pro-BNP normal, no increased O2 requirement; ECHO performed in May showed a normal EF  Suspect residual cough from recent PNA in the setting of COPD  Tessalon ordered     Elevated troponin  Patient denies chest pain  EKG shows no evidence of acute ischemia or infarction  Suspect due to LIZ  Will trend x 2 more draws     Suspected SHORTY  Required BiPAP during previous admission  Has not yet followed up with pulmonology for outpatient sleep study. Per chart review, patient had a follow up scheduled for today but patient states she was unaware of appt  Continue previous BiPAP settings  Should wear when sleeps even in the day     COPD  No evidence of acute exacerbation  Continue home meds  On 4L O2 per NC at home     Morbid obesity due to excess calories (Body mass index is 66.55 kg/m².)   Complicating assessment and treatment. Obesity places the patient at risk for multiple co-morbidities as well as early death and may be contributing to the patient's presentation. Supportive care. Encourage therapeutic lifestyle changes.     Hypothyroidism  Continue home synthroid     GERD  PPI     Chronic pain syndrome  Continue home lyrica. Renally dose. Continue home percocet.  OARRS reviewed           DVT Prophylaxis: heparin  Diet: DIET GENERAL;  Code Status: Full Code    PT/OT Eval Status: eval and treat     Dispo - Laurent Harrell MD

## 2019-07-02 NOTE — PROGRESS NOTES
Admission completed. Patient able to answer all questions. Reveiwed plan of care with patient. Patient verbalized understanding.  Vss, call light in reach, bed in lowest position, wheels locked, bed alarm  Activated Akbar Ojeda RN

## 2019-07-02 NOTE — ED PROVIDER NOTES
16    Glucose 138 (H) 70 - 99 mg/dL    BUN 45 (H) 7 - 20 mg/dL    CREATININE 2.4 (H) 0.6 - 1.2 mg/dL    GFR Non- 19 (A) >60    GFR  23 (A) >60    Calcium 9.1 8.3 - 10.6 mg/dL    Total Protein 6.6 6.4 - 8.2 g/dL    Alb 3.4 3.4 - 5.0 g/dL    Albumin/Globulin Ratio 1.1 1.1 - 2.2    Total Bilirubin <0.2 0.0 - 1.0 mg/dL    Alkaline Phosphatase 99 40 - 129 U/L    ALT 39 10 - 40 U/L    AST 26 15 - 37 U/L    Globulin 3.2 g/dL   Lactate, Sepsis   Result Value Ref Range    Lactic Acid, Sepsis 1.1 0.4 - 1.9 mmol/L   Urine, reflex to culture   Result Value Ref Range    Color, UA YELLOW Straw/Yellow    Clarity, UA Clear Clear    Glucose, Ur Negative Negative mg/dL    Bilirubin Urine Negative Negative    Ketones, Urine Negative Negative mg/dL    Specific Gravity, UA 1.018 1.005 - 1.030    Blood, Urine Negative Negative    pH, UA 5.0 5.0 - 8.0    Protein, UA Negative Negative mg/dL    Urobilinogen, Urine 0.2 <2.0 E.U./dL    Nitrite, Urine Negative Negative    Leukocyte Esterase, Urine TRACE (A) Negative    Microscopic Examination YES     Urine Reflex to Culture Yes     Urine Type Not Specified    Troponin   Result Value Ref Range    Troponin 0.05 (H) <0.01 ng/mL   Brain Natriuretic Peptide   Result Value Ref Range    Pro- 0 - 449 pg/mL   Microscopic Urinalysis   Result Value Ref Range    Hyaline Casts, UA 13 (H) 0 - 8 /LPF    WBC, UA 2 0 - 5 /HPF    RBC, UA 6 (H) 0 - 4 /HPF    Epi Cells 3 0 - 5 /HPF   EKG 12 Lead   Result Value Ref Range    Ventricular Rate 78 BPM    Atrial Rate 78 BPM    P-R Interval 174 ms    QRS Duration 116 ms    Q-T Interval 398 ms    QTc Calculation (Bazett) 453 ms    P Axis 44 degrees    R Axis -25 degrees    T Axis 3 degrees    Diagnosis       Normal sinus rhythmIncomplete left bundle branch block     Ct Head Wo Contrast    Result Date: 7/1/2019  EXAMINATION: CT OF THE HEAD WITHOUT CONTRAST  7/1/2019 8:35 pm TECHNIQUE: CT of the head was performed without the intracranial hemorrhage, mass effect or midline shift. No abnormal extra-axial fluid collection. The gray-white differentiation is maintained without evidence of an acute infarct. There is no evidence of hydrocephalus. ORBITS: The visualized portion of the orbits demonstrate no acute abnormality. SINUSES: The visualized paranasal sinuses and mastoid air cells demonstrate no acute abnormality. SOFT TISSUES/SKULL:  No acute abnormality of the visualized skull or soft tissues. Mild motion artifact. No acute intracranial abnormality. Ct Abdomen Pelvis W Iv Contrast Additional Contrast? None    Result Date: 6/20/2019  EXAMINATION: CT OF THE ABDOMEN AND PELVIS WITH CONTRAST 6/20/2019 2:08 pm TECHNIQUE: CT of the abdomen and pelvis was performed with the administration of intravenous contrast. Multiplanar reformatted images are provided for review. Dose modulation, iterative reconstruction, and/or weight based adjustment of the mA/kV was utilized to reduce the radiation dose to as low as reasonably achievable. COMPARISON: 04/30/2016 HISTORY: ORDERING SYSTEM PROVIDED HISTORY: [facility] TECHNOLOGIST PROVIDED HISTORY: Additional Contrast?->None Ordering Physician Provided Reason for Exam: [facility] Acuity: Unknown Type of Exam: Unknown FINDINGS: Lower Chest: There is mild bibasilar segmental airspace disease. The pleural spaces are clear. Organs: Postsurgical changes of cholecystectomy are present. The liver, pancreas, spleen, kidneys and adrenals are unremarkable aside from a benign 2.9 cm simple left renal cyst. GI/Bowel: There is no bowel dilatation, wall thickening or obstruction. Pelvis: The bladder is moderately distended. There is a Ngo catheter in place, the balloon of which appears to be centered within the upper vagina. Postsurgical changes of hysterectomy are present. Peritoneum/Retroperitoneum: There is no free air, free fluid or intraperitoneal inflammatory change. There is no adenopathy.  Bones/Soft low.  Study is limited due to low lung volumes and underpenetrationd. There has been placement endotracheal tube with the tip approximately 3.5 cm above the merced. There is asymmetric left-sided airspace disease with more focal consolidation in the left base. There is a probable small left-sided pleural effusion. No pneumothorax. Cardiac and mediastinal contours are unchanged when accounting for positioning. No acute osseous abnormality. Endotracheal tube tip is 3.5 cm above the merced. Increasing asymmetric left-sided airspace disease with more focal consolidation in the left lung base. Findings may be related to asymmetric edema and/or pneumonia. Xr Chest Portable    Result Date: 6/20/2019  EXAMINATION: ONE XRAY VIEW OF THE CHEST 6/20/2019 12:24 pm COMPARISON: 06/11/2019 HISTORY: ORDERING SYSTEM PROVIDED HISTORY: SOB/CP TECHNOLOGIST PROVIDED HISTORY: Reason for exam:->SOB/CP Ordering Physician Provided Reason for Exam: SOB/CP Acuity: Unknown Type of Exam: Unknown FINDINGS: Low lung volumes. Trace pleural effusions and mild bibasilar atelectasis. No other lung consolidation. Mild stable cardiomegaly. 1. Trace pleural effusions and mild bibasilar atelectasis. 2. Low lung volumes. Xr Chest Portable    Result Date: 6/11/2019  EXAMINATION: ONE XRAY VIEW OF THE CHEST 6/11/2019 11:33 am COMPARISON: May 10, 2019 HISTORY: ORDERING SYSTEM PROVIDED HISTORY: SOB TECHNOLOGIST PROVIDED HISTORY: Reason for exam:->SOB Acuity: Unknown Type of Exam: Unknown FINDINGS: Evaluation is limited by rotation and portable technique. There is vascular congestion without overt edema. Small faint areas of left basilar and right mid lung opacity also present. No evidence of pneumothorax or pleural effusion. Limited rotated portable chest x-ray demonstrating vascular congestion without overt edema. Small bilateral areas of focal opacity are favored to be atelectasis, less likely pneumonia.      Ct Chest Pulmonary Embolism W Contrast    Result Date: 6/21/2019  EXAMINATION: CTA OF THE CHEST 6/20/2019 2:08 pm TECHNIQUE: CTA of the chest was performed after the administration of intravenous contrast.  Multiplanar reformatted images are provided for review. MIP images are provided for review. Dose modulation, iterative reconstruction, and/or weight based adjustment of the mA/kV was utilized to reduce the radiation dose to as low as reasonably achievable. COMPARISON: 5/10/2019, 4/24/2019, 6/11/2013 HISTORY: ORDERING SYSTEM PROVIDED HISTORY: [facility] TECHNOLOGIST PROVIDED HISTORY: Ordering Physician Provided Reason for Exam: [facility] Acuity: Unknown Type of Exam: Unknown Hypoxia, weakness, confusion. FINDINGS: Pulmonary Arteries: Pulmonary arteries are adequately opacified for evaluation. No evidence of intraluminal filling defect to suggest pulmonary embolism. Main pulmonary artery is normal in caliber. Mediastinum: The thyroid is unremarkable. There is no pathologic lymphadenopathy. There is atherosclerotic disease of the thoracic aorta, without evidence of aneurysm or dissection. No pericardial abnormality is identified. Lungs/pleura: There is an endotracheal tube in place with tip terminating within the midthoracic trachea. The central airways are otherwise patent. There are small bilateral pleural effusions, new from prior exam.  There is dependent consolidative opacity within the basilar aspects of both lower lobes, representing either atelectasis, aspiration, or pneumonia. This has progressed from the prior exam of 5/10/2019. There is curvilinear opacity within the posterior left upper lobe, likely additional atelectasis. No additional focus of airspace consolidation is identified. There is no evidence of a pneumothorax. There is a stable small 3 mm noncalcified granuloma within the right upper lobe, unchanged from 6/11/2013, and consistent with a benign nodule requiring no further follow-up given its stability.   No new

## 2019-07-02 NOTE — PROGRESS NOTES
Patient removed iv from right forearm. Site not bleeding. ivf switched to left thumb iv site.  Vincent Donato RN

## 2019-07-03 LAB
ALBUMIN SERPL-MCNC: 3 G/DL (ref 3.1–4.9)
ALPHA-1-GLOBULIN: 0.3 G/DL (ref 0.2–0.4)
ALPHA-2-GLOBULIN: 1.1 G/DL (ref 0.4–1.1)
ANION GAP SERPL CALCULATED.3IONS-SCNC: 11 MMOL/L (ref 3–16)
BETA GLOBULIN: 1.4 G/DL (ref 0.9–1.6)
BUN BLDV-MCNC: 26 MG/DL (ref 7–20)
CALCIUM SERPL-MCNC: 8.9 MG/DL (ref 8.3–10.6)
CHLORIDE BLD-SCNC: 107 MMOL/L (ref 99–110)
CO2: 25 MMOL/L (ref 21–32)
CREAT SERPL-MCNC: 0.9 MG/DL (ref 0.6–1.2)
GAMMA GLOBULIN: 0.8 G/DL (ref 0.6–1.8)
GFR AFRICAN AMERICAN: >60
GFR NON-AFRICAN AMERICAN: 60
GLUCOSE BLD-MCNC: 178 MG/DL (ref 70–99)
POTASSIUM SERPL-SCNC: 4.3 MMOL/L (ref 3.5–5.1)
SODIUM BLD-SCNC: 143 MMOL/L (ref 136–145)
SPE/IFE INTERPRETATION: NORMAL
TOTAL PROTEIN: 6.6 G/DL (ref 6.4–8.2)
URINE CULTURE, ROUTINE: NORMAL

## 2019-07-03 PROCEDURE — 80048 BASIC METABOLIC PNL TOTAL CA: CPT

## 2019-07-03 PROCEDURE — 2580000003 HC RX 258: Performed by: PHYSICIAN ASSISTANT

## 2019-07-03 PROCEDURE — 6370000000 HC RX 637 (ALT 250 FOR IP): Performed by: INTERNAL MEDICINE

## 2019-07-03 PROCEDURE — 6360000002 HC RX W HCPCS: Performed by: PHYSICIAN ASSISTANT

## 2019-07-03 PROCEDURE — 6370000000 HC RX 637 (ALT 250 FOR IP): Performed by: PHYSICIAN ASSISTANT

## 2019-07-03 PROCEDURE — 2700000000 HC OXYGEN THERAPY PER DAY

## 2019-07-03 PROCEDURE — 94761 N-INVAS EAR/PLS OXIMETRY MLT: CPT

## 2019-07-03 PROCEDURE — 36415 COLL VENOUS BLD VENIPUNCTURE: CPT

## 2019-07-03 PROCEDURE — 94640 AIRWAY INHALATION TREATMENT: CPT

## 2019-07-03 PROCEDURE — 2060000000 HC ICU INTERMEDIATE R&B

## 2019-07-03 PROCEDURE — 94660 CPAP INITIATION&MGMT: CPT

## 2019-07-03 PROCEDURE — 94760 N-INVAS EAR/PLS OXIMETRY 1: CPT

## 2019-07-03 RX ADMIN — Medication 2 PUFF: at 08:06

## 2019-07-03 RX ADMIN — HEPARIN SODIUM 5000 UNITS: 5000 INJECTION INTRAVENOUS; SUBCUTANEOUS at 15:11

## 2019-07-03 RX ADMIN — Medication 10 ML: at 23:31

## 2019-07-03 RX ADMIN — HYDROCORTISONE 2.5%: 25 CREAM TOPICAL at 23:30

## 2019-07-03 RX ADMIN — FLUOXETINE 40 MG: 20 CAPSULE ORAL at 08:35

## 2019-07-03 RX ADMIN — HEPARIN SODIUM 5000 UNITS: 5000 INJECTION INTRAVENOUS; SUBCUTANEOUS at 05:50

## 2019-07-03 RX ADMIN — SENNOSIDES 8.6 MG: 8.6 TABLET, FILM COATED ORAL at 08:35

## 2019-07-03 RX ADMIN — Medication 10 ML: at 09:17

## 2019-07-03 RX ADMIN — PREGABALIN 150 MG: 75 CAPSULE ORAL at 08:35

## 2019-07-03 RX ADMIN — OXYCODONE AND ACETAMINOPHEN 1 TABLET: 10; 325 TABLET ORAL at 23:26

## 2019-07-03 RX ADMIN — LEVOTHYROXINE SODIUM 112 MCG: 112 TABLET ORAL at 05:55

## 2019-07-03 RX ADMIN — Medication 2 PUFF: at 19:36

## 2019-07-03 RX ADMIN — OXYCODONE AND ACETAMINOPHEN 1 TABLET: 10; 325 TABLET ORAL at 05:59

## 2019-07-03 RX ADMIN — PANTOPRAZOLE SODIUM 40 MG: 40 TABLET, DELAYED RELEASE ORAL at 05:55

## 2019-07-03 RX ADMIN — HEPARIN SODIUM 5000 UNITS: 5000 INJECTION INTRAVENOUS; SUBCUTANEOUS at 23:26

## 2019-07-03 RX ADMIN — OXYCODONE AND ACETAMINOPHEN 1 TABLET: 10; 325 TABLET ORAL at 14:17

## 2019-07-03 ASSESSMENT — PAIN DESCRIPTION - FREQUENCY: FREQUENCY: CONTINUOUS

## 2019-07-03 ASSESSMENT — ENCOUNTER SYMPTOMS
VOMITING: 0
ABDOMINAL DISTENTION: 0
EYE REDNESS: 0
CONSTIPATION: 0
PHOTOPHOBIA: 0
COUGH: 0
FACIAL SWELLING: 0
SHORTNESS OF BREATH: 0
DIARRHEA: 0
EYE DISCHARGE: 0
ABDOMINAL PAIN: 0
CHEST TIGHTNESS: 0
NAUSEA: 0
BLOOD IN STOOL: 0

## 2019-07-03 ASSESSMENT — PAIN SCALES - GENERAL
PAINLEVEL_OUTOF10: 10
PAINLEVEL_OUTOF10: 8
PAINLEVEL_OUTOF10: 0
PAINLEVEL_OUTOF10: 8
PAINLEVEL_OUTOF10: 10
PAINLEVEL_OUTOF10: 10
PAINLEVEL_OUTOF10: 0

## 2019-07-03 ASSESSMENT — PAIN DESCRIPTION - DESCRIPTORS: DESCRIPTORS: ACHING

## 2019-07-03 ASSESSMENT — PAIN DESCRIPTION - ONSET: ONSET: ON-GOING

## 2019-07-03 ASSESSMENT — PAIN DESCRIPTION - PROGRESSION: CLINICAL_PROGRESSION: GRADUALLY WORSENING

## 2019-07-03 ASSESSMENT — PAIN DESCRIPTION - LOCATION: LOCATION: BACK

## 2019-07-03 ASSESSMENT — PAIN DESCRIPTION - PAIN TYPE: TYPE: CHRONIC PAIN

## 2019-07-03 ASSESSMENT — PAIN DESCRIPTION - ORIENTATION: ORIENTATION: LOWER

## 2019-07-03 NOTE — PROGRESS NOTES
[facility] Note    Patient Active Problem List   Diagnosis    Pneumonia    Acute on chronic diastolic [facility] failure [facility] [address]    Peripheral edema    COPD exacerbation ([facility] [address] [facility]    Essential hypertension    Hyperlipidemia    Morbid obesity with BMI of 50.0-59.9, adult ([facility] [address] [facility]    Acute on chronic respiratory failure with hypercapnia (HCC)    Acute respiratory failure (HCC)    Acute respiratory failure with hypoxia and hypercapnia (HCC)    Severe sepsis (HCC)    Septic shock (HCC)    Bilateral lower lobe pneumonia due to Escherichia coli ([facility] [address]    Endotracheally intubated    On mechanically assisted ventilation (HCC)    Acute kidney injury ([facility] [address]    History of recurrent UTI (urinary tract infection)    Bandemia    Chronic obstructive pulmonary disease with acute lower respiratory infection ([facility] [address] [facility]    Spinal stenosis    Complicated UTI (urinary tract infection)    Acute on chronic respiratory failure with hypoxia and hypercapnia (HCC)    Weight loss counseling, encounter for    LIZ (acute kidney injury) ([facility] [address]       Past Medical History:   has a past medical history of Arthritis, COPD (chronic obstructive pulmonary disease) ([facility] [address] Hemorrhoids, Hernia of unspecified site of abdominal cavity without mention of obstruction or gangrene, Incontinence, Knee pain, bilateral, Spinal stenosis, and Spinal stenosis. Past Social History:   reports that she has never smoked. She has never used smokeless tobacco. She reports that she does not drink alcohol or use drugs. Subjective:  Feels tired    Review of Systems   Constitutional: Positive for fatigue. Negative for activity change, appetite change, chills, fever and unexpected weight change. HENT: Negative for congestion and facial swelling. Eyes: Negative for photophobia, discharge and redness. Respiratory: Negative for cough, chest tightness and shortness of breath.     Cardiovascular: Positive for

## 2019-07-03 NOTE — CARE COORDINATION
CM was approached by pt's nurse to initiate rehab request. CM advised RN that pt has declined this. CM returned to pt to discuss possible SNF referral, pt still states she will not go to a facility but is agreeable to have PT/OT evaluation. This CM requested pt nurse to obtain PT/OT evaluation.     GRETCHEN DonN, CCM, RN  [facility]  [phone]

## 2019-07-03 NOTE — PROGRESS NOTES
rhonchi. Normal respiratory effort. Cardiovascular:  Regular rate and rhythm without murmurs, rubs or gallops. Abdomen: Soft, NT, ND, without rebound or guarding. Normal bowel sounds. Extremities:  +BLE edema, at baseline per patient. No clubbing or cyanosis bilaterally. Full range of motion without deformity. +2 palpable pulses, equal bilaterally. Capillary refill brisk,< 3 seconds   Skin: No rashes or lesions. Warm/dry. Neurologic:  Neurovascularly intact without any focal sensory/motor deficits. Cranial nerves: II-XII intact, grossly non-focal. Alert and oriented x 3. Normal speech. Psychiatric:  Thought content appropriate, normal insight. Labs:   Recent Labs     07/01/19 2116 07/02/19  0421   WBC 8.4 8.3   HGB 10.5* 9.7*   HCT 33.2* 30.4*    162     Recent Labs     07/01/19 2116 07/02/19  0420 07/03/19  0933   * 137 143   K 4.6 4.5 4.3   CL 96* 102 107   CO2 25 26 25   BUN 45* 44* 26*   CREATININE 2.4* 2.2* 0.9   CALCIUM 9.1 8.6 8.9   PHOS  --  5.6*  --      Recent Labs     07/01/19 2116   AST 26   ALT 39   BILITOT <0.2   ALKPHOS 99     No results for input(s): INR in the last 72 hours. Recent Labs     07/02/19  1305 07/02/19  1723 07/02/19  2127   TROPONINI 0.06* 0.05* 0.04*       Urinalysis:      Lab Results   Component Value Date    NITRU Negative 07/01/2019    WBCUA 2 07/01/2019    BACTERIA 4+ 06/20/2019    RBCUA 6 07/01/2019    BLOODU Negative 07/01/2019    SPECGRAV 1.018 07/01/2019    GLUCOSEU Negative 07/01/2019    GLUCOSEU NEGATIVE 03/21/2011       Radiology:  CT Head WO Contrast   Final Result   Motion limited study. No acute findings. XR CHEST PORTABLE   Final Result   Mild pulmonary edema. Small nonspecific right lower lobe opacity. Assessment/Plan:    Active Hospital Problems    Diagnosis    LIZ (acute kidney injury) ([facility] [address] [N17.9]     LIZ  Cr baseline 0.7, today she is back to baseline  She is finishing a 24 hour urine.   Likely due to

## 2019-07-04 LAB
24HR URINE VOLUME (ML): 5325 ML
ANION GAP SERPL CALCULATED.3IONS-SCNC: 7 MMOL/L (ref 3–16)
BUN BLDV-MCNC: 15 MG/DL (ref 7–20)
CALCIUM SERPL-MCNC: 9.4 MG/DL (ref 8.3–10.6)
CHLORIDE BLD-SCNC: 105 MMOL/L (ref 99–110)
CO2: 31 MMOL/L (ref 21–32)
CREAT SERPL-MCNC: 0.6 MG/DL (ref 0.6–1.2)
CREATININE 24 HOUR URINE: 2.6 G/24HR (ref 0.6–1.5)
GFR AFRICAN AMERICAN: >60
GFR NON-AFRICAN AMERICAN: >60
GLUCOSE BLD-MCNC: 133 MG/DL (ref 70–99)
POTASSIUM SERPL-SCNC: 4.3 MMOL/L (ref 3.5–5.1)
PROTEIN 24 HOUR URINE: 0.75 G/24HR
SODIUM BLD-SCNC: 143 MMOL/L (ref 136–145)

## 2019-07-04 PROCEDURE — 36415 COLL VENOUS BLD VENIPUNCTURE: CPT

## 2019-07-04 PROCEDURE — 2580000003 HC RX 258: Performed by: PHYSICIAN ASSISTANT

## 2019-07-04 PROCEDURE — 6360000002 HC RX W HCPCS: Performed by: PHYSICIAN ASSISTANT

## 2019-07-04 PROCEDURE — 2060000000 HC ICU INTERMEDIATE R&B

## 2019-07-04 PROCEDURE — 80048 BASIC METABOLIC PNL TOTAL CA: CPT

## 2019-07-04 PROCEDURE — 6370000000 HC RX 637 (ALT 250 FOR IP): Performed by: PHYSICIAN ASSISTANT

## 2019-07-04 PROCEDURE — 94640 AIRWAY INHALATION TREATMENT: CPT

## 2019-07-04 PROCEDURE — 97530 THERAPEUTIC ACTIVITIES: CPT

## 2019-07-04 PROCEDURE — 2700000000 HC OXYGEN THERAPY PER DAY

## 2019-07-04 PROCEDURE — 97162 PT EVAL MOD COMPLEX 30 MIN: CPT

## 2019-07-04 PROCEDURE — 6370000000 HC RX 637 (ALT 250 FOR IP): Performed by: INTERNAL MEDICINE

## 2019-07-04 PROCEDURE — 94761 N-INVAS EAR/PLS OXIMETRY MLT: CPT

## 2019-07-04 PROCEDURE — 97166 OT EVAL MOD COMPLEX 45 MIN: CPT

## 2019-07-04 PROCEDURE — 84166 PROTEIN E-PHORESIS/URINE/CSF: CPT

## 2019-07-04 PROCEDURE — 84156 ASSAY OF PROTEIN URINE: CPT

## 2019-07-04 RX ADMIN — PANTOPRAZOLE SODIUM 40 MG: 40 TABLET, DELAYED RELEASE ORAL at 06:11

## 2019-07-04 RX ADMIN — Medication 10 ML: at 10:31

## 2019-07-04 RX ADMIN — SENNOSIDES 8.6 MG: 8.6 TABLET, FILM COATED ORAL at 10:30

## 2019-07-04 RX ADMIN — FLUOXETINE 40 MG: 20 CAPSULE ORAL at 10:30

## 2019-07-04 RX ADMIN — LEVOTHYROXINE SODIUM 112 MCG: 112 TABLET ORAL at 07:06

## 2019-07-04 RX ADMIN — Medication 2 PUFF: at 08:08

## 2019-07-04 RX ADMIN — OXYCODONE AND ACETAMINOPHEN 1 TABLET: 10; 325 TABLET ORAL at 16:12

## 2019-07-04 RX ADMIN — HEPARIN SODIUM 5000 UNITS: 5000 INJECTION INTRAVENOUS; SUBCUTANEOUS at 22:54

## 2019-07-04 RX ADMIN — HYDROCORTISONE 2.5%: 25 CREAM TOPICAL at 11:06

## 2019-07-04 RX ADMIN — OXYCODONE AND ACETAMINOPHEN 1 TABLET: 10; 325 TABLET ORAL at 07:06

## 2019-07-04 RX ADMIN — ACETAMINOPHEN 650 MG: 325 TABLET, FILM COATED ORAL at 22:53

## 2019-07-04 RX ADMIN — Medication 2 PUFF: at 19:48

## 2019-07-04 RX ADMIN — HEPARIN SODIUM 5000 UNITS: 5000 INJECTION INTRAVENOUS; SUBCUTANEOUS at 06:06

## 2019-07-04 RX ADMIN — HYDROCORTISONE 2.5%: 25 CREAM TOPICAL at 22:53

## 2019-07-04 RX ADMIN — PREGABALIN 150 MG: 75 CAPSULE ORAL at 10:29

## 2019-07-04 ASSESSMENT — PAIN SCALES - PAIN ASSESSMENT IN ADVANCED DEMENTIA (PAINAD)
BODYLANGUAGE: 0
FACIALEXPRESSION: 0
BREATHING: 0
BODYLANGUAGE: 0
FACIALEXPRESSION: 0
BODYLANGUAGE: 0
CONSOLABILITY: 0
NEGVOCALIZATION: 0
BREATHING: 0
TOTALSCORE: 0
FACIALEXPRESSION: 0
CONSOLABILITY: 0
NEGVOCALIZATION: 0
FACIALEXPRESSION: 0
BREATHING: 0
NEGVOCALIZATION: 0
FACIALEXPRESSION: 0
TOTALSCORE: 0
BODYLANGUAGE: 0
BODYLANGUAGE: 0
FACIALEXPRESSION: 0
NEGVOCALIZATION: 0
TOTALSCORE: 0
CONSOLABILITY: 0
CONSOLABILITY: 0
BODYLANGUAGE: 0
NEGVOCALIZATION: 0
CONSOLABILITY: 0
TOTALSCORE: 0
CONSOLABILITY: 0
FACIALEXPRESSION: 0
TOTALSCORE: 0
FACIALEXPRESSION: 0
BREATHING: 0
BREATHING: 0
BODYLANGUAGE: 0
NEGVOCALIZATION: 0
TOTALSCORE: 0
CONSOLABILITY: 0
FACIALEXPRESSION: 0
BODYLANGUAGE: 0
NEGVOCALIZATION: 0
BREATHING: 0
CONSOLABILITY: 0
TOTALSCORE: 0
TOTALSCORE: 0
BODYLANGUAGE: 0
BREATHING: 0
CONSOLABILITY: 0
TOTALSCORE: 0

## 2019-07-04 ASSESSMENT — ENCOUNTER SYMPTOMS
PHOTOPHOBIA: 0
EYE DISCHARGE: 0
COUGH: 0
EYE REDNESS: 0
BLOOD IN STOOL: 0
DIARRHEA: 0
ABDOMINAL PAIN: 0
CONSTIPATION: 0
NAUSEA: 0
SHORTNESS OF BREATH: 0
CHEST TIGHTNESS: 0
FACIAL SWELLING: 0
ABDOMINAL DISTENTION: 0
VOMITING: 0

## 2019-07-04 ASSESSMENT — PAIN DESCRIPTION - LOCATION: LOCATION: OTHER (COMMENT)

## 2019-07-04 ASSESSMENT — PAIN SCALES - GENERAL
PAINLEVEL_OUTOF10: 5
PAINLEVEL_OUTOF10: 0
PAINLEVEL_OUTOF10: 9
PAINLEVEL_OUTOF10: 8
PAINLEVEL_OUTOF10: 0
PAINLEVEL_OUTOF10: 10
PAINLEVEL_OUTOF10: 0
PAINLEVEL_OUTOF10: 8
PAINLEVEL_OUTOF10: 0
PAINLEVEL_OUTOF10: 6
PAINLEVEL_OUTOF10: 9

## 2019-07-04 ASSESSMENT — PAIN SCALES - WONG BAKER
WONGBAKER_NUMERICALRESPONSE: 0

## 2019-07-04 ASSESSMENT — PAIN DESCRIPTION - PAIN TYPE: TYPE: CHRONIC PAIN

## 2019-07-04 NOTE — PROGRESS NOTES
assistance  Grooming: Moderate assistance  Feeding: Independent  Toileting: Independent  Homemaking Assistance: Needs assistance  Homemaking Responsibilities: No(Daughter does all)  Ambulation Assistance: Independent(Short distance Mod I with RW)  Transfer Assistance: Needs assistance  Active : No  Patient's  Info: daughter takes her to [facility]   Occupation: Retired  Leisure & Hobbies: reads, watches TV  Additional Comments: Pt back home after 1 day at SNF, when she discharged herself d/t issues with the SNF. Some of pt hx from previous chart from 6/25/19. Pt reports one recent fall; daughter is pt's caregiver - daughter assists [phone] and from 530-8pm, pt is alone between those times and at night. Pt reports that she toilets at night. Pt has had several recent hospital admissions       Objective   Vision: Impaired  Vision Exceptions: Wears glasses for reading  Hearing: Exceptions to [facility]  Hearing Exceptions: Hard of hearing/hearing concerns; No hearing aid    Orientation  Overall Orientation Status: Within Normal Limits     Balance  Sitting Balance: Contact guard assistance(on EOB; pt's feet not touching the floor)  Standing Balance: Dependent/Total(Max A of 2, Mod A of 1 for transfer)  Standing Balance  Time: ~10 sec  Activity: stand step transfer EOB to recliner on R side  ADL  Feeding: Independent  LE Dressing: Dependent/Total(socks)  Tone RUE  RUE Tone: Normotonic  Tone LUE  LUE Tone: Normotonic  Coordination  Movements Are Fluid And Coordinated: No  Coordination and Movement description: Fine motor impairments;Decreased speed;Decreased accuracy; Right UE;Left UE     Bed mobility  Rolling to Left: Maximum assistance; Moderate assistance  Supine to Sit: Maximum assistance  Scooting: Contact guard assistance(in chair, not attempted in bed )  Transfers  Stand Step Transfers: Dependent/Total(Max A of 2 & Mod A of 1, no AD)  Sit to stand: Dependent/Total(Max A of 1 & Mod A of 1 from EOB;  Mod A of 1 from

## 2019-07-04 NOTE — PROGRESS NOTES
Hospitalist Progress Note      PCP: Cristi Hoyt    Date of Admission: 7/1/2019    Chief Complaint:     Hospital Course:   Ravindra Johnston is a 82 y.o. female with a PMH of HTN, COPD, GERD, hypothyroidism who presented to ED with complaint of increased fatigue and weakness. She was recently admitted with PNA and completed antibiotic course. She reports persistent cough but that it has improved. However, she also reports she has a chronic cough. She also reports chronic SOB and BLE edema which are currently at baseline. She denies fever, dizziness, chest pain, abdominal pain, N/V/D/C, pain with urination.            Subjective:   Sleeping today  Willing to work with PT/OT they are consulted. Worked with them and she was a 9/24     Medications:  Reviewed    Infusion Medications     Scheduled Medications    hydrocortisone   Rectal BID    levothyroxine  112 mcg Oral QAM AC    pantoprazole  40 mg Oral QAM AC    FLUoxetine  40 mg Oral Daily    pregabalin  150 mg Oral Daily    senna  1 tablet Oral Daily    mometasone-formoterol  2 puff Inhalation BID    sodium chloride flush  10 mL Intravenous 2 times per day    heparin (porcine)  5,000 Units Subcutaneous 3 times per day     PRN Meds: oxyCODONE-acetaminophen, ipratropium-albuterol, sodium chloride flush, docusate sodium, ondansetron, acetaminophen, benzonatate      Intake/Output Summary (Last 24 hours) at 7/4/2019 1522  Last data filed at 7/4/2019 1111  Gross per 24 hour   Intake 240 ml   Output 4350 ml   Net -4110 ml       Physical Exam Performed:    /66   Pulse 88   Temp 98.6 °F (37 °C) (Temporal)   Resp 14   Ht 5' 1\" (1.549 m)   Wt 277 lb 1.6 oz (125.7 kg)   SpO2 94%   BMI 52.36 kg/m²     General appearance:  Asleep and obese   HEENT:  Normocephalic, atraumatic without obvious deformity. PERRL. EOM intact. Conjunctivae/corneas clear. Neck: Supple, with full range of motion. No JVD. Trachea midline.   Respiratory:  Clear to auscultation

## 2019-07-04 NOTE — PROGRESS NOTES
impaired balance  Prognosis: Fair  Decision Making: Medium Complexity  History: see above  Exam: see above  Clinical Presentation: stable  Patient Education: PT role and plan  Barriers to Learning: [place]  REQUIRES PT FOLLOW UP: Yes  Activity Tolerance  Activity Tolerance: Patient limited by fatigue;Patient limited by pain  Activity Tolerance: pt states she will not go back to skilled nursing or rehab - last time she went to a SNF she stayed one night because the nursing was very bad       Patient Diagnosis(es): The primary encounter diagnosis was LIZ (acute kidney injury) ([facility] [address] A diagnosis of Elevated troponin was also pertinent to this visit. has a past medical history of Arthritis, COPD (chronic obstructive pulmonary disease) ([facility] [address] Hemorrhoids, Hernia of unspecified site of abdominal cavity without mention of obstruction or gangrene, Incontinence, Knee pain, bilateral, Spinal stenosis, and Spinal stenosis. has a past surgical history that includes Cholecystectomy and partial hysterectomy (cervix not removed). Restrictions  Restrictions/Precautions  Restrictions/Precautions: Fall Risk(high fall risk)  Vision/Hearing  Vision: Impaired  Vision Exceptions: Wears glasses for reading  Hearing: Exceptions to [facility]  Hearing Exceptions: Hard of hearing/hearing concerns; No hearing aid     Subjective  General  Chart Reviewed: Yes  Subjective  Subjective: Pt reports she came to the hospital a few days ago, but has been in and out over the last few months.    Pain Screening  Patient Currently in Pain: Yes  Pain Assessment  Pain Assessment: 0-10  Pain Level: 9  Pain Type: Chronic pain  Pain Location: Other (Comment)(all over)  Vital Signs  Patient Currently in Pain: Yes  Oxygen Therapy  O2 Device: Nasal cannula  O2 Flow Rate (L/min): 4 L/min       Orientation  Orientation  Overall Orientation Status: Within Normal Limits  Social/Functional History  Social/Functional History  Lives With: Alone  Type of Home: [facility] Layout: One level  Bathroom Equipment: Commode  Home Equipment: Oxygen, Roll About  Receives Help From: Family, Personal care attendant(daughter is a paid caregiver: 8 am to 2:30 pm then returns at [address] [facility] and stays until 8 pm)  ADL Assistance: Needs assistance  Homemaking Assistance: Needs assistance  Homemaking Responsibilities: No  Ambulation Assistance: Needs assistance  Transfer Assistance: Needs assistance  Active : No  Patient's  Info: daughter takes her to [facility]   Occupation: Retired  Cognition        Objective          AROM RLE (degrees)  RLE AROM: Exceptions  RLE General AROM: limited due to excess weight   AROM LLE (degrees)  LLE AROM : Exceptions  LLE General AROM: limited due to excess weight   Strength RLE  Strength RLE: Exception  Comment: grossly 4/5  Strength LLE  Strength LLE: Exception  Comment: grossly 4/5  Tone RLE  RLE Tone: Normotonic  Tone LLE  LLE Tone: Normotonic  Motor Control  Gross Motor?: WNL  Sensation  Overall Sensation Status: Impaired(diminished on R LE)  Bed mobility  Rolling to Left: Maximum assistance  Supine to Sit: Maximum assistance  Scooting: Contact guard assistance(in chair, not attempted in bed )  Transfers  Sit to Stand: Maximum Assistance; Moderate Assistance(max A x 1 from chair to RW)  Stand to sit: Maximum Assistance(from RW to chair)  Squat Pivot Transfers: Maximum Assistance; Moderate Assistance(max A x 2, mod A x 1 from bed to chair)  Comment: due to patient's height and unknown mobility with RW, used stand/squat pivot to transfer into chair - nurse assisted   Ambulation  Ambulation?: No  Stairs/Curb  Stairs?: No     Balance  Posture: Fair  Sitting - Static: Good  Sitting - Dynamic: Good  Standing - Static: Fair  Standing - Dynamic: [address]  Times per week: 3-5x  Current Treatment Recommendations: Strengthening, ROM, Balance Training, Functional Mobility Training, Endurance Training, Gait Training, Safety Education & Training, Home Exercise

## 2019-07-04 NOTE — PROGRESS NOTES
Spoke with daughter informed her of P OT findings and recommended need of SNF rehab.   She said she would speak with her mother tonight 1700hrs and pick a place in the am.

## 2019-07-05 VITALS
HEIGHT: 61 IN | WEIGHT: 293 LBS | BODY MASS INDEX: 55.32 KG/M2 | TEMPERATURE: 98.3 F | RESPIRATION RATE: 16 BRPM | SYSTOLIC BLOOD PRESSURE: 144 MMHG | OXYGEN SATURATION: 95 % | HEART RATE: 79 BPM | DIASTOLIC BLOOD PRESSURE: 79 MMHG

## 2019-07-05 LAB — URINE ELECTROPHORESIS INTERP: NORMAL

## 2019-07-05 PROCEDURE — 2700000000 HC OXYGEN THERAPY PER DAY

## 2019-07-05 PROCEDURE — 94640 AIRWAY INHALATION TREATMENT: CPT

## 2019-07-05 PROCEDURE — 6360000002 HC RX W HCPCS: Performed by: PHYSICIAN ASSISTANT

## 2019-07-05 PROCEDURE — 2580000003 HC RX 258: Performed by: PHYSICIAN ASSISTANT

## 2019-07-05 PROCEDURE — 94761 N-INVAS EAR/PLS OXIMETRY MLT: CPT

## 2019-07-05 PROCEDURE — 6370000000 HC RX 637 (ALT 250 FOR IP): Performed by: PHYSICIAN ASSISTANT

## 2019-07-05 PROCEDURE — 6370000000 HC RX 637 (ALT 250 FOR IP): Performed by: INTERNAL MEDICINE

## 2019-07-05 RX ADMIN — FLUOXETINE 40 MG: 20 CAPSULE ORAL at 08:57

## 2019-07-05 RX ADMIN — Medication 10 ML: at 09:07

## 2019-07-05 RX ADMIN — Medication 2 PUFF: at 08:05

## 2019-07-05 RX ADMIN — SENNOSIDES 8.6 MG: 8.6 TABLET, FILM COATED ORAL at 08:57

## 2019-07-05 RX ADMIN — OXYCODONE AND ACETAMINOPHEN 1 TABLET: 10; 325 TABLET ORAL at 08:57

## 2019-07-05 RX ADMIN — HEPARIN SODIUM 5000 UNITS: 5000 INJECTION INTRAVENOUS; SUBCUTANEOUS at 06:16

## 2019-07-05 RX ADMIN — PANTOPRAZOLE SODIUM 40 MG: 40 TABLET, DELAYED RELEASE ORAL at 06:16

## 2019-07-05 RX ADMIN — LEVOTHYROXINE SODIUM 112 MCG: 112 TABLET ORAL at 06:16

## 2019-07-05 RX ADMIN — OXYCODONE AND ACETAMINOPHEN 1 TABLET: 10; 325 TABLET ORAL at 00:53

## 2019-07-05 RX ADMIN — PREGABALIN 150 MG: 75 CAPSULE ORAL at 08:57

## 2019-07-05 ASSESSMENT — PAIN SCALES - WONG BAKER
WONGBAKER_NUMERICALRESPONSE: 0

## 2019-07-05 ASSESSMENT — ENCOUNTER SYMPTOMS
EYE DISCHARGE: 0
SHORTNESS OF BREATH: 0
DIARRHEA: 0
CHEST TIGHTNESS: 0
VOMITING: 0
NAUSEA: 0
BLOOD IN STOOL: 0
CONSTIPATION: 0
ABDOMINAL DISTENTION: 0
ABDOMINAL PAIN: 0
FACIAL SWELLING: 0
PHOTOPHOBIA: 0
EYE REDNESS: 0
COUGH: 0

## 2019-07-05 ASSESSMENT — PAIN SCALES - PAIN ASSESSMENT IN ADVANCED DEMENTIA (PAINAD)
BODYLANGUAGE: 1
TOTALSCORE: 6
FACIALEXPRESSION: 1
CONSOLABILITY: 2
BODYLANGUAGE: 1
NEGVOCALIZATION: 2
BREATHING: 0
CONSOLABILITY: 2
BREATHING: 0
TOTALSCORE: 6
BODYLANGUAGE: 1
NEGVOCALIZATION: 2
TOTALSCORE: 6
BREATHING: 0
NEGVOCALIZATION: 2
BREATHING: 0
NEGVOCALIZATION: 2
BODYLANGUAGE: 1
FACIALEXPRESSION: 1
BREATHING: 0
FACIALEXPRESSION: 1
BODYLANGUAGE: 1
NEGVOCALIZATION: 2
CONSOLABILITY: 2
TOTALSCORE: 6
FACIALEXPRESSION: 1
CONSOLABILITY: 2
BREATHING: 0
NEGVOCALIZATION: 2
NEGVOCALIZATION: 2
CONSOLABILITY: 2
TOTALSCORE: 6
FACIALEXPRESSION: 1
BODYLANGUAGE: 1
FACIALEXPRESSION: 1
BREATHING: 0
CONSOLABILITY: 2
FACIALEXPRESSION: 1
TOTALSCORE: 6
CONSOLABILITY: 2
TOTALSCORE: 6
BODYLANGUAGE: 1

## 2019-07-05 ASSESSMENT — PAIN SCALES - GENERAL
PAINLEVEL_OUTOF10: 7
PAINLEVEL_OUTOF10: 8
PAINLEVEL_OUTOF10: 9

## 2019-07-05 NOTE — DISCHARGE INSTR - COC
mechanically assisted ventilation (HCC) Z99.11    Acute kidney injury ([facility] [address] N17.9    History of recurrent UTI (urinary tract infection) Z87.440    Bandemia D72.825    Chronic obstructive pulmonary disease with acute lower respiratory infection ([facility]) J44.0    Spinal stenosis J00.32    Complicated UTI (urinary tract infection) N39.0    Acute on chronic respiratory failure with hypoxia and hypercapnia ([facility]) J96.21, J96.22    Weight loss counseling, encounter for Z71.3    LIZ (acute kidney injury) ([facility] [address] N17.9       Isolation/Infection:   Isolation          No Isolation            Nurse Assessment:  Last Vital Signs: BP (!) 142/70   Pulse 72   Temp 98 °F (36.7 °C) (Temporal)   Resp 18   Ht 5' 1\" (1.549 m)   Wt 298 lb 9.6 oz (135.4 kg)   SpO2 93%   BMI 56.42 kg/m²     Last documented pain score (0-10 scale): Pain Level: 9  Last Weight:   Wt Readings from Last 1 Encounters:   19 298 lb 9.6 oz (135.4 kg)     Mental Status:  {IP PT MENTAL STATUS:}    IV Access:  { [facility]ER[facility]INE IV ACCESS:109924780}    Nursing Mobility/ADLs:  Walking   {[facility] DME XRHD:886534057}  Transfer  {[facility] DME YKSV:771534903}  Bathing  {[facility] DME SBMZ:294724610}  Dressing  {[facility] DME XPT}  Toileting  {[facility] DME KAJY:970432883}  Feeding  {[facility] CYMM:402152304}  Med Admin  {[facility] DME TPPS:684682586}  Med Delivery   {[facility] MED Delivery:441073168}    Wound Care Documentation and Therapy:  Wound 19 Coccyx Mid open  (Active)   Number of days: 71       Wound 19 Buttocks Left Deep tissue injury (Active)   Wound Image   2019 10:00 AM   Wound Deep tissue/Injury 2019  8:48 PM   Dressing Status Clean;Dry; Intact 2019  8:48 PM   Dressing Changed Changed/New 2019  8:48 PM   Dressing/Treatment Foam 2019  8:48 PM   Wound Length (cm) 6 cm 2019 10:00 AM   Wound Width (cm) 8 cm 2019 10:00 AM   Wound Surface Area (cm^2) 48 cm^2 2019 10:00 AM   Wound Assessment Clean;Dry; Intact 2019  8:48 PM   Drainage Amount None 2019  8:48 PM   Odor None 2019  8:48 PM   Margins Defined edges 2019  4:00 PM   Jessica-wound Assessment Pink 2019 12:00 PM   Red%Wound Bed 75 2019 10:00 AM   Purple%Wound Bed 25 2019 10:00 AM   Number of days: 14        Elimination:  Continence:   · Bowel: {YES / UP:70467}  · Bladder: {YES / PD:61323}  Urinary Catheter: {Urinary Catheter:625451593}   Colostomy/Ileostomy/Ileal Conduit: {YES / KAREEM:08873}       Date of Last BM: ***    Intake/Output Summary (Last 24 hours) at 2019 4175  Last data filed at 2019 8899  Gross per 24 hour   Intake --   Output 1950 ml   Net -1950 ml     I/O last 3 completed shifts:  In: -   Out: 1950 [Urine:1950]    Safety Concerns:     [address] Safety Concerns:027175649}    Impairments/Disabilities:      [address] Impairments/Disabilities:034711759}    Nutrition Therapy:  Current Nutrition Therapy:   [address] Diet List:371569884}    Routes of Feeding: {[facility] DME Other Feedings:364453422}  Liquids: {Slp liquid thickness:85420}  Daily Fluid Restriction: {[facility] DME Yes amt example:769102354}  Last Modified Barium Swallow with Video (Video Swallowing Test): {Done Not Done GSSW:645500892}    Treatments at the Time of Hospital Discharge:   Respiratory Treatments: ***  Oxygen Therapy:  {Therapy; copd oxygen:01435}  Ventilator:    { CC Vent FXXO:551734253}    Rehab Therapies: {THERAPEUTIC INTERVENTION:4627057494}  Weight Bearing Status/Restrictions: [address]  Weight Bearin}  Other Medical Equipment (for information only, NOT a DME order):  {EQUIPMENT:393167766}  Other Treatments: ***    Patient's personal belongings (please select all that are sent with patient):  {P DME Belongings:357842351}    RN SIGNATURE:  {Esignature:730546484}    CASE MANAGEMENT/SOCIAL WORK SECTION    Inpatient Status Date: ***    Readmission Risk Assessment Score:  Readmission Risk              Risk of Unplanned Readmission:        22           Discharging to Facility/ Agency

## 2019-07-05 NOTE — CARE COORDINATION
Patient discharged 7-5-19 home with daughter at 1:00pm via [facility], and [facility]. pending a home care and discharge order. [facility] ,  [id number], fax [phone], and [address] [place] [address], [phone], fax [phone].   All discharge needs met per case management

## 2019-07-05 NOTE — PLAN OF CARE
Fall risk precautions in place. Bed in lowest position with wheels locked, bed alarm in place and activated, yellow blanket on bed, non-skid socks on pt, fall risk ID on pt, call light in reach, pt encouraged to call before getting out of bed and for any other needs or complaints.    Clarisa Hall RN
Problem: Falls - Risk of:  Goal: Will remain free from falls  Description  Will remain free from falls  Outcome: Ongoing  Note:      High fall risk, precautions in place, call light in reach, frequent monitoring.alaina     Problem: Pain:  Goal: Pain level will decrease  Description  Pain level will decrease  Outcome: Ongoing  Note:      Denies having any pain.alaina     Problem: Risk for Impaired Skin Integrity  Goal: Tissue integrity - skin and mucous membranes  Description  Structural intactness and normal physiological function of skin and  mucous membranes. Outcome: Ongoing  Note:     Skin assessment completed. Noted with deep tissue injury to left buttocks, mepilex border applied, repositioned. Some redness noted under left abdominal fold, also some bruising to abdomen, skin intact. Bariatric low air loss mattress in use.alaina     Problem: OXYGENATION/RESPIRATORY FUNCTION  Goal: Patient will maintain patent airway  Outcome: Ongoing  Note:      RR 16/min, even. Lung sounds diminished throughout. Oxygen saturation 93% Bi-PAP, FiO2 40%. Denies chest pain,SOB or diff breathing. No respiratory distress noted. alaina     Problem: HEMODYNAMIC STATUS  Goal: Patient has stable vital signs and fluid balance  Outcome: Ongoing
Pt remains free from falls. Safety precautions in place. Bed in lowest position, bed wheels locked, call light with in reach, bed alarm on, yellow blanket in place, fall risk wrist band on, SAFE outside of doorway. Will continue to monitor.
Pt remains free from falls. Safety precautions in place. Bed in lowest position, bed wheels locked, call light with in reach, bed alarm on, yellow blanket in place, fall risk wrist band on, SAFE outside of doorway. Will continue to monitor.
patient  Note:   Patients respiratory status stable at this time. No signs/symptoms distress noted. Respirations even, easy and regular. Nasal canula 4L 02 in place as needed to maintain sp02>90% or per md order. Will continue to monitor. Problem: HEMODYNAMIC STATUS  Goal: Patient has stable vital signs and fluid balance  Outcome: Ongoing  Note:   Pt vital signs will be assessed per floor protocol and as needed. Medications will be administered accordingly to keep vitals signs within parameters that are normal or that are chosen by MD.       Problem: FLUID AND ELECTROLYTE IMBALANCE  Goal: Fluid and electrolyte balance are achieved/maintained  Outcome: Ongoing  Note:   Pt on fluid restriction. Pt is compliant with restrictions at this time. Problem: Musculor/Skeletal Functional Status  Goal: Highest potential functional level  Outcome: Ongoing  Note:   Pt can get up to the [facility]. Pt must be encouraged to help as much as she can. Pt is able to pull herself up in the bed. Pt is able to stand and pivot to the [facility].

## 2019-07-05 NOTE — PROGRESS NOTES
change, chills, fever and unexpected weight change. HENT: Negative for congestion and facial swelling. Eyes: Negative for photophobia, discharge and redness. Respiratory: Negative for cough, chest tightness and shortness of breath. Cardiovascular: Positive for leg swelling. Negative for chest pain and palpitations. Gastrointestinal: Negative for abdominal distention, abdominal pain, blood in stool, constipation, diarrhea, nausea and vomiting. Endocrine: Negative for cold intolerance, heat intolerance and polyuria. Genitourinary: Negative for decreased urine volume, difficulty urinating, flank pain and hematuria. Musculoskeletal: Negative for joint swelling and neck pain. Neurological: Negative for dizziness, seizures, syncope, speech difficulty, light-headedness and headaches. Hematological: Does not bruise/bleed easily. Psychiatric/Behavioral: Negative for agitation, confusion and hallucinations.        Objective:      BP (!) 142/70   Pulse 72   Temp 98 °F (36.7 °C) (Temporal)   Resp 18   Ht 5' 1\" (1.549 m)   Wt 298 lb 9.6 oz (135.4 kg)   SpO2 93%   BMI 56.42 kg/m²     Wt Readings from Last 3 Encounters:   07/05/19 298 lb 9.6 oz (135.4 kg)   06/26/19 276 lb 12.8 oz (125.6 kg)   06/14/19 283 lb (128.4 kg)       BP Readings from Last 3 Encounters:   07/05/19 (!) 142/70   06/26/19 (!) 118/52   06/14/19 122/80       Gen-obese  Chest- clear  Heart-regular  Abd-soft  Ext- 2+ edema    Labs  Hemoglobin   Date Value Ref Range Status   07/02/2019 9.7 (L) 12.0 - 16.0 g/dL Final     Hematocrit   Date Value Ref Range Status   07/02/2019 30.4 (L) 36.0 - 48.0 % Final     WBC   Date Value Ref Range Status   07/02/2019 8.3 4.0 - 11.0 K/uL Final     Platelets   Date Value Ref Range Status   07/02/2019 162 135 - 450 K/uL Final     Lab Results   Component Value Date    CREATININE 0.6 07/04/2019    BUN 15 07/04/2019     07/04/2019    K 4.3 07/04/2019     07/04/2019    CO2 31 07/04/2019     SPEP

## 2019-07-06 ENCOUNTER — TELEPHONE (OUTPATIENT)
Dept: INTERNAL MEDICINE CLINIC | Age: 84
End: 2019-07-06

## 2019-07-06 LAB
BLOOD CULTURE, ROUTINE: NORMAL
CULTURE, BLOOD 2: NORMAL

## 2019-08-14 NOTE — DISCHARGE SUMMARY
BY MOUTH EVERY DAY, R-3Historical Med      loratadine (CLARITIN) 10 MG tablet Take 1 tablet by mouth dailyHistorical Med      MOVANTIK 25 MG TABS tablet Take 25 mg by mouth daily as needed, R-5, DAWHistorical Med      senna (SENOKOT) 8.6 MG tablet Take 1 tablet by mouth daily      magnesium hydroxide (MILK OF MAGNESIA) 400 MG/5ML suspension Take 30 mLs by mouth daily as needed for Constipation, Disp-1 Bottle, R-0      fluoxetine (PROZAC) 20 MG capsule Take 40 mg by mouth daily. pregabalin (LYRICA) 50 MG capsule Take 150 mg by mouth 2 times daily. Historical Med      esomeprazole (NEXIUM) 40 MG capsule Take 40 mg by mouth every morning (before breakfast). Discharge Medication List as of 7/5/2019  1:03 PM      STOP taking these medications       lisinopril (PRINIVIL;ZESTRIL) 5 MG tablet Comments:   Reason for Stopping:               Discharge ROS:  A complete review of systems was asked and negative except for     Discharge Exam:    BP (!) 144/79   Pulse 79   Temp 98.3 °F (36.8 °C) (Temporal)   Resp 16   Ht 5' 1\" (1.549 m)   Wt 298 lb 9.6 oz (135.4 kg)   SpO2 95%   BMI 56.42 kg/m²   General appearance:  NAD  HEENT:   Normal cephalic, atraumatic, moist mucous membranes, no oropharyngeal erythema or exudate  Neck: Supple, trachea midline, no anterior cervical or SC LAD  Heart[de-identified] Normal s1/s2, RRR, no murmurs, gallops, or rubs. No  leg edema  Lungs:  Normal respiratory effort. Clear to auscultation, bilaterally without Rales/Wheezes/Rhonchi. Abdomen: Soft, non-tender, non-distended, bowel sounds present, no masses  Musculoskeletal:  No clubbing, no cyanosis, no edema  Skin: No lesion or masses  Neurologic:  Neurovascularly intact without any focal sensory/motor deficits. Cranial nerves: II-XII intact, grossly non-focal.  Psychiatric:  A & O x3  Neuro: Grossly intact, moves all four extremities     Labs:  For convenience and continuity at follow-up the following most recent labs are

## 2020-10-10 ENCOUNTER — HOSPITAL ENCOUNTER (INPATIENT)
Age: 85
LOS: 4 days | Discharge: OTHER INSTITUTION | DRG: 884 | End: 2020-10-14
Attending: EMERGENCY MEDICINE | Admitting: INTERNAL MEDICINE
Payer: COMMERCIAL

## 2020-10-10 ENCOUNTER — APPOINTMENT (OUTPATIENT)
Dept: CT IMAGING | Age: 85
DRG: 884 | End: 2020-10-10
Payer: COMMERCIAL

## 2020-10-10 ENCOUNTER — APPOINTMENT (OUTPATIENT)
Dept: GENERAL RADIOLOGY | Age: 85
DRG: 884 | End: 2020-10-10
Payer: COMMERCIAL

## 2020-10-10 PROBLEM — R55 SYNCOPE AND COLLAPSE: Status: ACTIVE | Noted: 2020-10-10
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
## 2020-10-10 PROBLEM — M62.82 RHABDOMYOLYSIS: Status: ACTIVE | Noted: 2020-10-10

## 2020-10-10 PROBLEM — N17.9 AKI (ACUTE KIDNEY INJURY) (HCC): Status: RESOLVED | Noted: 2019-07-01 | Resolved: 2020-10-10

## 2020-10-10 PROBLEM — R41.82 ALTERED MENTAL STATE: Status: ACTIVE | Noted: 2020-10-10
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
## 2020-10-10 LAB
A/G RATIO: 0.9 (ref 1.1–2.2)
ALBUMIN SERPL-MCNC: 3.5 G/DL (ref 3.4–5)
ALP BLD-CCNC: 124 U/L (ref 40–129)
ALT SERPL-CCNC: 19 U/L (ref 10–40)
ANION GAP SERPL CALCULATED.3IONS-SCNC: 7 MMOL/L (ref 3–16)
AST SERPL-CCNC: 21 U/L (ref 15–37)
BASOPHILS ABSOLUTE: 0 K/UL (ref 0–0.2)
BASOPHILS RELATIVE PERCENT: 0.3 %
BILIRUB SERPL-MCNC: 0.3 MG/DL (ref 0–1)
BILIRUBIN URINE: NEGATIVE
BLOOD, URINE: NEGATIVE
BUN BLDV-MCNC: 12 MG/DL (ref 7–20)
CALCIUM SERPL-MCNC: 9.6 MG/DL (ref 8.3–10.6)
CHLORIDE BLD-SCNC: 96 MMOL/L (ref 99–110)
CLARITY: CLEAR
CO2: 36 MMOL/L (ref 21–32)
COLOR: YELLOW
CREAT SERPL-MCNC: 0.6 MG/DL (ref 0.6–1.2)
EOSINOPHILS ABSOLUTE: 0.1 K/UL (ref 0–0.6)
EOSINOPHILS RELATIVE PERCENT: 0.7 %
GFR AFRICAN AMERICAN: >60
GFR NON-AFRICAN AMERICAN: >60
GLOBULIN: 3.7 G/DL
GLUCOSE BLD-MCNC: 131 MG/DL (ref 70–99)
GLUCOSE URINE: NEGATIVE MG/DL
HCT VFR BLD CALC: 33.9 % (ref 36–48)
HEMOGLOBIN: 10.9 G/DL (ref 12–16)
KETONES, URINE: NEGATIVE MG/DL
LACTIC ACID: 0.9 MMOL/L (ref 0.4–2)
LEUKOCYTE ESTERASE, URINE: NEGATIVE
LYMPHOCYTES ABSOLUTE: 1.2 K/UL (ref 1–5.1)
LYMPHOCYTES RELATIVE PERCENT: 9.7 %
MCH RBC QN AUTO: 29.4 PG (ref 26–34)
MCHC RBC AUTO-ENTMCNC: 32.1 G/DL (ref 31–36)
MCV RBC AUTO: 91.4 FL (ref 80–100)
MICROSCOPIC EXAMINATION: NORMAL
MONOCYTES ABSOLUTE: 0.8 K/UL (ref 0–1.3)
MONOCYTES RELATIVE PERCENT: 6.1 %
NEUTROPHILS ABSOLUTE: 10.4 K/UL (ref 1.7–7.7)
NEUTROPHILS RELATIVE PERCENT: 83.2 %
NITRITE, URINE: NEGATIVE
PDW BLD-RTO: 15.2 % (ref 12.4–15.4)
PH UA: 6 (ref 5–8)
PLATELET # BLD: 143 K/UL (ref 135–450)
PMV BLD AUTO: 9.1 FL (ref 5–10.5)
POTASSIUM SERPL-SCNC: 3.7 MMOL/L (ref 3.5–5.1)
PROTEIN UA: NEGATIVE MG/DL
RBC # BLD: 3.71 M/UL (ref 4–5.2)
SODIUM BLD-SCNC: 139 MMOL/L (ref 136–145)
SPECIFIC GRAVITY UA: 1.01 (ref 1–1.03)
TOTAL CK: 354 U/L (ref 26–192)
TOTAL PROTEIN: 7.2 G/DL (ref 6.4–8.2)
TROPONIN: 0.01 NG/ML
TROPONIN: <0.01 NG/ML
TROPONIN: <0.01 NG/ML
URINE REFLEX TO CULTURE: NORMAL
URINE TYPE: NORMAL
UROBILINOGEN, URINE: 0.2 E.U./DL
WBC # BLD: 12.5 K/UL (ref 4–11)

## 2020-10-10 PROCEDURE — 72192 CT PELVIS W/O DYE: CPT

## 2020-10-10 PROCEDURE — 82550 ASSAY OF CK (CPK): CPT

## 2020-10-10 PROCEDURE — 6370000000 HC RX 637 (ALT 250 FOR IP): Performed by: INTERNAL MEDICINE

## 2020-10-10 PROCEDURE — 36415 COLL VENOUS BLD VENIPUNCTURE: CPT

## 2020-10-10 PROCEDURE — 72125 CT NECK SPINE W/O DYE: CPT

## 2020-10-10 PROCEDURE — 70450 CT HEAD/BRAIN W/O DYE: CPT

## 2020-10-10 PROCEDURE — 99285 EMERGENCY DEPT VISIT HI MDM: CPT

## 2020-10-10 PROCEDURE — 83605 ASSAY OF LACTIC ACID: CPT

## 2020-10-10 PROCEDURE — 80053 COMPREHEN METABOLIC PANEL: CPT

## 2020-10-10 PROCEDURE — 71045 X-RAY EXAM CHEST 1 VIEW: CPT

## 2020-10-10 PROCEDURE — 93005 ELECTROCARDIOGRAM TRACING: CPT | Performed by: PHYSICIAN ASSISTANT

## 2020-10-10 PROCEDURE — 6360000002 HC RX W HCPCS: Performed by: INTERNAL MEDICINE

## 2020-10-10 PROCEDURE — 87040 BLOOD CULTURE FOR BACTERIA: CPT

## 2020-10-10 PROCEDURE — 96372 THER/PROPH/DIAG INJ SC/IM: CPT

## 2020-10-10 PROCEDURE — 1200000000 HC SEMI PRIVATE

## 2020-10-10 PROCEDURE — 2580000003 HC RX 258: Performed by: PHYSICIAN ASSISTANT

## 2020-10-10 PROCEDURE — 81003 URINALYSIS AUTO W/O SCOPE: CPT

## 2020-10-10 PROCEDURE — 84484 ASSAY OF TROPONIN QUANT: CPT

## 2020-10-10 PROCEDURE — 72131 CT LUMBAR SPINE W/O DYE: CPT

## 2020-10-10 PROCEDURE — 2580000003 HC RX 258: Performed by: INTERNAL MEDICINE

## 2020-10-10 PROCEDURE — 73130 X-RAY EXAM OF HAND: CPT

## 2020-10-10 PROCEDURE — 73090 X-RAY EXAM OF FOREARM: CPT

## 2020-10-10 PROCEDURE — G0378 HOSPITAL OBSERVATION PER HR: HCPCS

## 2020-10-10 PROCEDURE — 85025 COMPLETE CBC W/AUTO DIFF WBC: CPT

## 2020-10-10 RX ORDER — POLYETHYLENE GLYCOL 3350 17 G/17G
17 POWDER, FOR SOLUTION ORAL DAILY PRN
Status: DISCONTINUED | OUTPATIENT
Start: 2020-10-10 | End: 2020-10-14 | Stop reason: HOSPADM

## 2020-10-10 RX ORDER — BUDESONIDE AND FORMOTEROL FUMARATE DIHYDRATE 160; 4.5 UG/1; UG/1
1 AEROSOL RESPIRATORY (INHALATION) 2 TIMES DAILY
Status: DISCONTINUED | OUTPATIENT
Start: 2020-10-10 | End: 2020-10-14 | Stop reason: HOSPADM

## 2020-10-10 RX ORDER — LEVOTHYROXINE SODIUM 112 UG/1
112 TABLET ORAL DAILY
Status: DISCONTINUED | OUTPATIENT
Start: 2020-10-10 | End: 2020-10-14 | Stop reason: HOSPADM

## 2020-10-10 RX ORDER — 0.9 % SODIUM CHLORIDE 0.9 %
1000 INTRAVENOUS SOLUTION INTRAVENOUS ONCE
Status: COMPLETED | OUTPATIENT
Start: 2020-10-10 | End: 2020-10-10

## 2020-10-10 RX ORDER — IPRATROPIUM BROMIDE AND ALBUTEROL SULFATE 2.5; .5 MG/3ML; MG/3ML
1 SOLUTION RESPIRATORY (INHALATION) EVERY 6 HOURS PRN
Status: DISCONTINUED | OUTPATIENT
Start: 2020-10-10 | End: 2020-10-14 | Stop reason: HOSPADM

## 2020-10-10 RX ORDER — OXYCODONE AND ACETAMINOPHEN 10; 325 MG/1; MG/1
1 TABLET ORAL EVERY 6 HOURS PRN
Status: ON HOLD | COMMUNITY
End: 2021-12-09 | Stop reason: SDUPTHER

## 2020-10-10 RX ORDER — CETIRIZINE HYDROCHLORIDE 10 MG/1
10 TABLET ORAL DAILY
Status: DISCONTINUED | OUTPATIENT
Start: 2020-10-10 | End: 2020-10-14 | Stop reason: HOSPADM

## 2020-10-10 RX ORDER — FLUOXETINE HYDROCHLORIDE 20 MG/1
40 CAPSULE ORAL DAILY
Status: DISCONTINUED | OUTPATIENT
Start: 2020-10-10 | End: 2020-10-14 | Stop reason: HOSPADM

## 2020-10-10 RX ORDER — ACETAMINOPHEN 650 MG/1
650 SUPPOSITORY RECTAL EVERY 6 HOURS PRN
Status: DISCONTINUED | OUTPATIENT
Start: 2020-10-10 | End: 2020-10-14 | Stop reason: HOSPADM

## 2020-10-10 RX ORDER — SODIUM CHLORIDE 0.9 % (FLUSH) 0.9 %
10 SYRINGE (ML) INJECTION EVERY 12 HOURS SCHEDULED
Status: DISCONTINUED | OUTPATIENT
Start: 2020-10-10 | End: 2020-10-14 | Stop reason: HOSPADM

## 2020-10-10 RX ORDER — FUROSEMIDE 40 MG/1
40 TABLET ORAL DAILY
Status: DISCONTINUED | OUTPATIENT
Start: 2020-10-10 | End: 2020-10-14 | Stop reason: HOSPADM

## 2020-10-10 RX ORDER — SODIUM CHLORIDE 9 MG/ML
INJECTION, SOLUTION INTRAVENOUS CONTINUOUS
Status: ACTIVE | OUTPATIENT
Start: 2020-10-10 | End: 2020-10-11

## 2020-10-10 RX ORDER — SENNA PLUS 8.6 MG/1
1 TABLET ORAL DAILY
Status: DISCONTINUED | OUTPATIENT
Start: 2020-10-10 | End: 2020-10-14 | Stop reason: HOSPADM

## 2020-10-10 RX ORDER — PROMETHAZINE HYDROCHLORIDE 25 MG/1
12.5 TABLET ORAL EVERY 6 HOURS PRN
Status: DISCONTINUED | OUTPATIENT
Start: 2020-10-10 | End: 2020-10-14 | Stop reason: HOSPADM

## 2020-10-10 RX ORDER — LORATADINE 10 MG/1
10 TABLET ORAL DAILY
Status: DISCONTINUED | OUTPATIENT
Start: 2020-10-10 | End: 2020-10-10 | Stop reason: CLARIF

## 2020-10-10 RX ORDER — PANTOPRAZOLE SODIUM 40 MG/1
40 TABLET, DELAYED RELEASE ORAL
Status: DISCONTINUED | OUTPATIENT
Start: 2020-10-10 | End: 2020-10-14 | Stop reason: HOSPADM

## 2020-10-10 RX ORDER — ONDANSETRON 2 MG/ML
4 INJECTION INTRAMUSCULAR; INTRAVENOUS EVERY 6 HOURS PRN
Status: DISCONTINUED | OUTPATIENT
Start: 2020-10-10 | End: 2020-10-14 | Stop reason: HOSPADM

## 2020-10-10 RX ORDER — OXYCODONE HCL 10 MG/1
10 TABLET, FILM COATED, EXTENDED RELEASE ORAL EVERY 12 HOURS
Status: DISCONTINUED | OUTPATIENT
Start: 2020-10-10 | End: 2020-10-14 | Stop reason: HOSPADM

## 2020-10-10 RX ORDER — SODIUM CHLORIDE 0.9 % (FLUSH) 0.9 %
10 SYRINGE (ML) INJECTION PRN
Status: DISCONTINUED | OUTPATIENT
Start: 2020-10-10 | End: 2020-10-14 | Stop reason: HOSPADM

## 2020-10-10 RX ORDER — ACETAMINOPHEN 325 MG/1
650 TABLET ORAL EVERY 6 HOURS PRN
Status: DISCONTINUED | OUTPATIENT
Start: 2020-10-10 | End: 2020-10-14 | Stop reason: HOSPADM

## 2020-10-10 RX ORDER — OXYCODONE AND ACETAMINOPHEN 10; 325 MG/1; MG/1
1 TABLET ORAL EVERY 6 HOURS PRN
Status: DISCONTINUED | OUTPATIENT
Start: 2020-10-10 | End: 2020-10-14 | Stop reason: HOSPADM

## 2020-10-10 RX ORDER — OXYCODONE HCL 10 MG/1
10 TABLET, FILM COATED, EXTENDED RELEASE ORAL EVERY 12 HOURS
Status: ON HOLD | COMMUNITY
End: 2021-12-09 | Stop reason: HOSPADM

## 2020-10-10 RX ORDER — ESOMEPRAZOLE MAGNESIUM 40 MG/1
40 CAPSULE, DELAYED RELEASE ORAL
Status: DISCONTINUED | OUTPATIENT
Start: 2020-10-11 | End: 2020-10-10 | Stop reason: CLARIF

## 2020-10-10 RX ORDER — PREGABALIN 75 MG/1
150 CAPSULE ORAL 2 TIMES DAILY
Status: DISCONTINUED | OUTPATIENT
Start: 2020-10-10 | End: 2020-10-14 | Stop reason: HOSPADM

## 2020-10-10 RX ADMIN — SODIUM CHLORIDE 1000 ML: 9 INJECTION, SOLUTION INTRAVENOUS at 13:21

## 2020-10-10 RX ADMIN — SODIUM CHLORIDE: 9 INJECTION, SOLUTION INTRAVENOUS at 18:38

## 2020-10-10 RX ADMIN — ENOXAPARIN SODIUM 40 MG: 40 INJECTION SUBCUTANEOUS at 20:16

## 2020-10-10 RX ADMIN — FUROSEMIDE 40 MG: 40 TABLET ORAL at 18:40

## 2020-10-10 RX ADMIN — PREGABALIN 150 MG: 75 CAPSULE ORAL at 20:15

## 2020-10-10 RX ADMIN — OXYCODONE HYDROCHLORIDE 10 MG: 10 TABLET, FILM COATED, EXTENDED RELEASE ORAL at 20:16

## 2020-10-10 RX ADMIN — FLUOXETINE 40 MG: 20 CAPSULE ORAL at 18:39

## 2020-10-10 RX ADMIN — CETIRIZINE HYDROCHLORIDE 10 MG: 10 TABLET, FILM COATED ORAL at 18:40

## 2020-10-10 RX ADMIN — LEVOTHYROXINE SODIUM 112 MCG: 0.11 TABLET ORAL at 18:39

## 2020-10-10 RX ADMIN — SENNOSIDES 8.6 MG: 8.6 TABLET, FILM COATED ORAL at 18:40

## 2020-10-10 ASSESSMENT — ENCOUNTER SYMPTOMS
SHORTNESS OF BREATH: 0
NAUSEA: 0
COUGH: 0
ABDOMINAL PAIN: 0
CONSTIPATION: 0
DIARRHEA: 0
BACK PAIN: 1
STRIDOR: 0
ABDOMINAL DISTENTION: 0
COLOR CHANGE: 0
WHEEZING: 0
VOMITING: 0

## 2020-10-10 ASSESSMENT — PAIN DESCRIPTION - PAIN TYPE
TYPE: ACUTE PAIN
TYPE: ACUTE PAIN

## 2020-10-10 ASSESSMENT — PAIN DESCRIPTION - LOCATION
LOCATION: OTHER (COMMENT)
LOCATION: BACK;ARM;LEG

## 2020-10-10 ASSESSMENT — PAIN SCALES - GENERAL
PAINLEVEL_OUTOF10: 8
PAINLEVEL_OUTOF10: 0
PAINLEVEL_OUTOF10: 9
PAINLEVEL_OUTOF10: 8
PAINLEVEL_OUTOF10: 9

## 2020-10-10 ASSESSMENT — PAIN DESCRIPTION - ORIENTATION: ORIENTATION: LEFT

## 2020-10-10 NOTE — ED NOTES
Daughter arrived, she reports she is the caregiver for her mother, arrived at her home this am and found her on the floor, without her oxygen. Unknown on how long she has been there, pt has limited mobility can transfer with walker to bed and bedside commode. Pt lives alone, her daughter comes multiple times a day to take care of her. Daughter states pt is  Normally alert and oriented x 3, pt is pleasantly confused and daughter is realizing this after arriving here. Daughter reports pt does have chronic pain per daughter. Has had no meds today. Daughter and pt updated on plan for admission.        Sandra Cast RN  10/10/20 1016

## 2020-10-10 NOTE — H&P
Taking? Authorizing Provider   hydrocortisone (ANUSOL-HC) 2.5 % rectal cream Place rectally 2 times daily. 6/25/19   Buffy Aguilar MD   ipratropium-albuterol (DUONEB) 0.5-2.5 (3) MG/3ML SOLN nebulizer solution Inhale 1 vial into the lungs every 6 hours as needed for Shortness of Breath    Historical Provider, MD   SYMBICORT 160-4.5 MCG/ACT AERO TAKE 2 PUFFS TWICE A DAY 5/30/19   Historical Provider, MD   levothyroxine (SYNTHROID) 112 MCG tablet TAKE 1 TABLET BY MOUTH EVERY DAY 5/15/19   Historical Provider, MD   furosemide (LASIX) 40 MG tablet TAKE 1 TABLET BY MOUTH EVERY DAY 5/14/19   Historical Provider, MD   loratadine (CLARITIN) 10 MG tablet Take 1 tablet by mouth daily    Historical Provider, MD   MOVANTIK 25 MG TABS tablet Take 25 mg by mouth daily as needed 5/11/19   Historical Provider, MD   senna (SENOKOT) 8.6 MG tablet Take 1 tablet by mouth daily    Historical Provider, MD   magnesium hydroxide (MILK OF MAGNESIA) 400 MG/5ML suspension Take 30 mLs by mouth daily as needed for Constipation 4/30/16   Stoney Fass, APRN - CNP   fluoxetine (PROZAC) 20 MG capsule Take 40 mg by mouth daily. Historical Provider, MD   pregabalin (LYRICA) 50 MG capsule Take 150 mg by mouth 2 times daily. Historical Provider, MD   esomeprazole (NEXIUM) 40 MG capsule Take 40 mg by mouth every morning (before breakfast). Historical Provider, MD       Allergies:  Sulfa antibiotics    Social History:  The patient currently lives alone    TOBACCO:   reports that she has never smoked. She has never used smokeless tobacco.  ETOH:   reports no history of alcohol use. Family History:  Reviewed in detail and negative for DM, Early CAD, Cancer, CVA. Positive as follows:    History reviewed. No pertinent family history. REVIEW OF SYSTEMS:   Positive for fall at home with syncope of unknown duration on the floor, complains of back pain and as noted in the HPI. All other systems reviewed and negative.     PHYSICAL EXAM:    BP (!) 133/47   Pulse 75   Temp 98 °F (36.7 °C) (Oral)   Resp 18   Ht 5' 1.5\" (1.562 m)   Wt (!) 325 lb (147.4 kg)   SpO2 100%   BMI 60.41 kg/m²     General appearance: No apparent distress appears stated age and cooperative. HEENT Normal cephalic, atraumatic without obvious deformity. Pupils equal, round, and reactive to light. Extra ocular muscles intact. Conjunctivae/corneas clear. Neck: Supple, No jugular venous distention/bruits. Trachea midline without thyromegaly or adenopathy with full range of motion. Lungs: Clear to auscultation, bilaterally without Rales/Wheezes/Rhonchi with good respiratory effort. Heart: Regular rate and rhythm with Normal S1/S2 without murmurs, rubs or gallops, point of maximum impulse non-displaced  Abdomen: Soft, non-tender or non-distended without rigidity or guarding and positive bowel sounds all four quadrants. Extremities: No clubbing, cyanosis, or edema bilaterally. Full range of motion without deformity and normal gait intact. Skin: Skin color, texture, turgor normal.  No rashes or lesions. Neurologic: Awake and alert, remains confused, neurovascularly intact with sensory/motor intact upper extremities/lower extremities, bilaterally. Cranial nerves: II-XII intact, grossly non-focal.      CBC   Recent Labs     10/10/20  1153   WBC 12.5*   HGB 10.9*   HCT 33.9*         RENAL  Recent Labs     10/10/20  1153      K 3.7   CL 96*   CO2 36*   BUN 12   CREATININE 0.6     LFT'S  Recent Labs     10/10/20  1153   AST 21   ALT 19   BILITOT 0.3   ALKPHOS 124     COAG  No results for input(s): INR in the last 72 hours.   CARDIAC ENZYMES  Recent Labs     10/10/20  [address] <0.01       U/A:    Lab Results   Component Value Date    COLORU YELLOW 10/10/2020    WBCUA 2 07/01/2019    RBCUA 6 07/01/2019    BACTERIA 4+ 06/20/2019    CLARITYU Clear 10/10/2020    SPECGRAV 1.014 10/10/2020    LEUKOCYTESUR Negative 10/10/2020    BLOODU enoxaparin  Diet: Cardiac diet  Code Status: Full code         David Jacobs MD    Thank you Matheus Salcedo for the opportunity to be involved in this patient's care. If you have any questions or concerns please feel free to contact me at [phone].

## 2020-10-10 NOTE — ED PROVIDER NOTES
[address] [facility]        Pt Name: Faviola Romero  MRN: [id number]  [place] 1935  Date of evaluation: 10/10/2020  Provider: Zhane Badillo PA-C  PCP: Michael Parkinson     I have seen and evaluated this patient with my supervising physician Roberto Hernandez, *. CHIEF COMPLAINT       Chief Complaint   Patient presents with    Fall     Pt brought in per [facility] pt fell out of her chair daughter found her on floor this am.  Pt lives alone, pt states she layed on the floor for a couple of hours, pt awake and confused, unsure of baseline. Hard of hearing. pt c/o pain to her back, legs, left forearm, left hand. HISTORY OF PRESENT ILLNESS   (Location, Timing/Onset, Context/Setting, Quality, Duration, Modifying Factors, Severity, Associated Signs and Symptoms)  Note limiting factors. Faviola Romero is a 82 y.o. female with history of COPD, sacral ulcer, spinal stenosis, severe arthritis, morbid obesity who presents to the emergency department complaining of pain all over her body. She had apparently fallen out of bed this morning and was on the ground for several hours according to paramedics. Patient states that she did not fall out of bed. She tells me that she may have fallen out of her assisted chair. She is a poor historian. She does have small abrasions to her left forearm and is complaining of some discomfort in this extremity. She has a known left buttock ulcer. Daughter arrived shortly after patient and did clarify some information for us. She lives a few houses down from the patient and takes care of her medical and household needs. The patient has a bedside commode near her bed. She found her mother face down in her room this morning. She is not sure how long she was on the ground for. Her oxygen was off as well. She typically wears nasal cannula oxygen.  Mother seems more confused than normal.     Nursing Notes were all reviewed and agreed with or any disagreements were addressed in the HPI. REVIEW OF SYSTEMS    (2-9 systems for level 4, 10 or more for level 5)     Review of Systems   Constitutional: Negative for chills and fever. HENT: Negative. Eyes: Negative for visual disturbance. Respiratory: Negative for cough, shortness of breath, wheezing and stridor. Cardiovascular: Negative for chest pain, palpitations and leg swelling. Gastrointestinal: Negative for abdominal distention, abdominal pain, constipation, diarrhea, nausea and vomiting. Genitourinary: Negative. Musculoskeletal: Positive for back pain and myalgias. Negative for neck pain and neck stiffness. Skin: Positive for wound. Negative for color change, pallor and rash. Neurological: Negative for dizziness, tremors, seizures, syncope, facial asymmetry, speech difficulty, weakness, light-headedness, numbness and headaches. Psychiatric/Behavioral: Positive for confusion. All other systems reviewed and are negative. Positives and Pertinent negatives as per HPI. Except as noted above in the ROS, all other systems were reviewed and negative. PAST MEDICAL HISTORY     Past Medical History:   Diagnosis Date    Arthritis     osteo    COPD (chronic obstructive pulmonary disease) ([facility] [address]     Hemorrhoids     Hernia of unspecified site of abdominal cavity without mention of obstruction or gangrene     Incontinence     Knee pain, bilateral     pt states no cartilage    Spinal stenosis     Spinal stenosis          SURGICAL HISTORY     Past Surgical History:   Procedure Laterality Date    CHOLECYSTECTOMY      PARTIAL HYSTERECTOMY           CURRENTMEDICATIONS       Previous Medications    ESOMEPRAZOLE (NEXIUM) 40 MG CAPSULE    Take 40 mg by mouth every morning (before breakfast). FLUOXETINE (PROZAC) 20 MG CAPSULE    Take 40 mg by mouth daily.       FUROSEMIDE (LASIX) 40 MG TABLET    TAKE 1 TABLET BY MOUTH EVERY DAY    HYDROCORTISONE (ANUSOL-HC) 2.5 % RECTAL CREAM    Place rectally 2 times daily. IPRATROPIUM-ALBUTEROL (DUONEB) 0.5-2.5 (3) MG/3ML SOLN NEBULIZER SOLUTION    Inhale 1 vial into the lungs every 6 hours as needed for Shortness of Breath    LEVOTHYROXINE (SYNTHROID) 112 MCG TABLET    TAKE 1 TABLET BY MOUTH EVERY DAY    LORATADINE (CLARITIN) 10 MG TABLET    Take 1 tablet by mouth daily    MAGNESIUM HYDROXIDE (MILK OF MAGNESIA) 400 MG/5ML SUSPENSION    Take 30 mLs by mouth daily as needed for Constipation    MOVANTIK 25 MG TABS TABLET    Take 25 mg by mouth daily as needed    PREGABALIN (LYRICA) 50 MG CAPSULE    Take 150 mg by mouth 2 times daily. SENNA (SENOKOT) 8.6 MG TABLET    Take 1 tablet by mouth daily    SYMBICORT 160-4.5 MCG/ACT AERO    TAKE 2 PUFFS TWICE A DAY         ALLERGIES     Sulfa antibiotics    FAMILYHISTORY     History reviewed. No pertinent family history. SOCIAL HISTORY       Social History     Tobacco Use    Smoking status: Never Smoker    Smokeless tobacco: Never Used   Substance Use Topics    Alcohol use: No    Drug use: No       SCREENINGS             PHYSICAL EXAM    (up to 7 for level 4, 8 or more for level 5)     ED Triage Vitals [10/10/20 0944]   BP Temp Temp Source Pulse Resp SpO2 Height Weight   (!) 120/54 98 °F (36.7 °C) Oral 75 17 97 % 5' 1.5\" (1.562 m) (!) 325 lb (147.4 kg)       Physical Exam  Vitals signs and nursing note reviewed. Constitutional:       Appearance: Normal appearance. She is well-developed. She is morbidly obese. She is not toxic-appearing or diaphoretic. HENT:      Head: Normocephalic and atraumatic. Right Ear: Tympanic membrane, ear canal and external ear normal.      Left Ear: Tympanic membrane, ear canal and external ear normal.      Nose: Nose normal.      Mouth/Throat:      Mouth: Mucous membranes are moist.      Pharynx: Oropharynx is clear. Eyes:      General: No scleral icterus. Right eye: No discharge. components within normal limits    Narrative:     Performed at:  [facility] [address] [place]  [place] [address] [address]   Phone [phone]   CBC WITH AUTO DIFFERENTIAL - Abnormal; Notable for the following components:    WBC 12.5 (*)     RBC 3.71 (*)     Hemoglobin 10.9 (*)     Hematocrit 33.9 (*)     Neutrophils Absolute 10.4 (*)     All other components within normal limits    Narrative:     Performed at:  [facility] [address] [place],  [place] [address] [place]   Phone [phone]   COMPREHENSIVE METABOLIC PANEL - Abnormal; Notable for the following components:    Chloride 96 (*)     CO2 36 (*)     Glucose 131 (*)     Albumin/Globulin Ratio 0.9 (*)     All other components within normal limits    Narrative:     Performed at:  [facility] [address]  [place] [address] [address]   Phone [phone]   CULTURE, BLOOD 1   CULTURE, BLOOD 2   LACTIC ACID, PLASMA    Narrative:     Performed at:  [facility] [address] [facility] [place],  [place] [address] [address]   Phone [phone]   URINE RT REFLEX TO CULTURE    Narrative:     Performed at:  [facility] [address] [address] [address]   Phone [phone]   TROPONIN    Narrative:     Performed at:  [facility] [address]  [place] [address] [address]   Phone [phone]       All other labs were within normal range or not returned as of this dictation. EKG: All EKG's are interpreted by the Emergency Department Physician in the absence of a cardiologist.  Please see their note for interpretation of EKG.       RADIOLOGY:   Non-plain film images such as CT, Ultrasound and MRI are read by the radiologist. Plain radiographic images are visualized and preliminarily interpreted by the ED Provider with the below findings:        Interpretation per the Radiologist below, if available at the time of this note:    XR CHEST PORTABLE   Final Result   No acute cardiopulmonary disease is appreciated. XR RADIUS ULNA LEFT (2 VIEWS)   Final Result   Negative. No fracture or other acute abnormality of the left forearm or left   hand. XR HAND LEFT (MIN 3 VIEWS)   Final Result   Negative. No fracture or other acute abnormality of the left forearm or left   hand. CT PELVIS WO CONTRAST Additional Contrast? None   Final Result   No acute osseous injury of the pelvis or hips. Mild osteoarthritis of the hips. Mild distention of the urinary bladder. CT LUMBAR SPINE WO CONTRAST   Final Result   No acute fracture or subluxation of the lumbar spine. Multilevel severe spondylosis. The most significant levels are noted below-      L3-4 severe central stenosis,      L4-5 mild anterolisthesis and severe central stenosis, and      L5-S1 mild anterolisthesis and moderate central stenosis. Severe left   foraminal stenosis. Suspected distention of the urinary bladder. CT CERVICAL SPINE WO CONTRAST   Final Result   Head: No acute intracranial abnormality. Cervical spine: No acute abnormality of the cervical spine. CT HEAD WO CONTRAST   Final Result   Head: No acute intracranial abnormality. Cervical spine: No acute abnormality of the cervical spine.                  PROCEDURES   Unless otherwise noted below, none     Procedures    CRITICAL CARE TIME   N/A    CONSULTS:  hospitalist      EMERGENCY DEPARTMENT COURSE and DIFFERENTIAL DIAGNOSIS/MDM:   Vitals:    Vitals:    10/10/20 0944 10/10/20 1245 10/10/20 1322   BP: (!) 120/54 (!) 123/55 (!) 121/43   Pulse: 75  75   Resp: 17  18   Temp: 98 °F (36.7 °C)     TempSrc: Oral     SpO2: 97%  99%   Weight: (!) 325 lb (147.4 kg)     Height: 5' 1.5\" (1.562 m)         Patient was given the following medications:  Medications   0.9 % sodium chloride bolus (1,000 mLs Intravenous New Bag 10/10/20 1321)           This Prescriptions    No medications on file       DISCONTINUED MEDICATIONS:  Discontinued Medications    No medications on file              (Please note that portions of this note were completed with a voice recognition program.  Efforts were made to edit the dictations but occasionally words are mis-transcribed.)    Rosalie Man PA-C (electronically signed)           Rosalie Man PA-C  10/10/20 9599

## 2020-10-10 NOTE — ED PROVIDER NOTES
[facility] Emergency Department      Pt Name: Juliana Bob  MRN: [id number]  [place] 1935  Date of evaluation: 10/10/2020  Provider: Nelida Powers MD  I independently performed a history and physical on Juliana Bob. All diagnostic, treatment, and disposition decisions were made by myself in conjunction with the advanced practice provider. HPI: Juliana Bob presented with   Chief Complaint   Patient presents with    Fall     Pt brought in per [facility] pt fell out of her chair daughter found her on floor this am.  Pt lives alone, pt states she layed on the floor for a couple of hours, pt awake and confused, unsure of baseline. Hard of hearing. pt c/o pain to her back, legs, left forearm, left hand. Juliana Bob has a past medical history of Arthritis, COPD (chronic obstructive pulmonary disease) ([address] Hemorrhoids, Hernia of unspecified site of abdominal cavity without mention of obstruction or gangrene, Incontinence, Knee pain, bilateral, Spinal stenosis, and Spinal stenosis. She has a past surgical history that includes Cholecystectomy and partial hysterectomy (cervix not removed). No current facility-administered medications on file prior to encounter. Current Outpatient Medications on File Prior to Encounter   Medication Sig Dispense Refill    oxyCODONE (OXYCONTIN) 10 MG extended release tablet Take 10 mg by mouth every 12 hours.  oxyCODONE-acetaminophen (PERCOCET)  MG per tablet Take 1 tablet by mouth every 6 hours as needed for Pain.       SYMBICORT 160-4.5 MCG/ACT AERO TAKE 2 PUFFS TWICE A DAY  3    levothyroxine (SYNTHROID) 112 MCG tablet TAKE 1 TABLET BY MOUTH EVERY DAY  3    furosemide (LASIX) 40 MG tablet TAKE 1 TABLET BY MOUTH EVERY DAY  3    loratadine (CLARITIN) 10 MG tablet Take 1 tablet by mouth daily      senna (SENOKOT) 8.6 MG tablet Take 1 tablet by mouth daily      magnesium hydroxide (MILK OF MAGNESIA) 400 MG/5ML suspension Take 30 mLs by mouth daily as needed for Constipation 1 Bottle 0    fluoxetine (PROZAC) 20 MG capsule Take 40 mg by mouth daily.  pregabalin (LYRICA) 50 MG capsule Take 150 mg by mouth 2 times daily.  esomeprazole (NEXIUM) 40 MG capsule Take 40 mg by mouth every morning (before breakfast).  hydrocortisone (ANUSOL-HC) 2.5 % rectal cream Place rectally 2 times daily. 1 Tube 0    ipratropium-albuterol (DUONEB) 0.5-2.5 (3) MG/3ML SOLN nebulizer solution Inhale 1 vial into the lungs every 6 hours as needed for Shortness of Breath      MOVANTIK 25 MG TABS tablet Take 25 mg by mouth daily as needed  5     PHYSICAL EXAM  Vitals: BP (!) 120/54   Pulse 75   Temp 98 °F (36.7 °C) (Oral)   Resp 17   Ht 5' 1.5\" (1.562 m)   Wt (!) 325 lb (147.4 kg)   SpO2 97%   BMI 60.41 kg/m²   Constitutional:  82 y.o. female alert, mildly confused  HENT:  Atraumatic, oral mucosa moist  Neck:  No visible JVD, supple  Chest/Lungs:  Respiratory effort normal, clear, regular  Abdomen:  Non-distended, soft, NT  Back:  No gross deformity  Extremities:  Normal tone and perfusion, no deformity    Medical Decision Making and Plan: Briefly, this is an 82 y. o.female who presented with concern for fall, confusion. Not sure how long she laid on the ground. Diagnostics as below. Plan is to admit for further care. For further details of [facility] encounter, please see documentation by advanced practice provider BLAINE Donald.     Labs Reviewed   CK - Abnormal; Notable for the following components:       Result Value    Total  (*)     All other components within normal limits    Narrative:     Performed at:  [facility] [address], [place], [address]   Phone [phone]   CBC WITH AUTO DIFFERENTIAL - Abnormal; Notable for the following components:    WBC 12.5 (*)     RBC 3.71 (*)     Hemoglobin 10.9 (*)     Hematocrit 33.9 (*) SPINE WO CONTRAST   Final Result   No acute fracture or subluxation of the lumbar spine. Multilevel severe spondylosis. The most significant levels are noted below-      L3-4 severe central stenosis,      L4-5 mild anterolisthesis and severe central stenosis, and      L5-S1 mild anterolisthesis and moderate central stenosis. Severe left   foraminal stenosis. Suspected distention of the urinary bladder. CT CERVICAL SPINE WO CONTRAST   Final Result   Head: No acute intracranial abnormality. Cervical spine: No acute abnormality of the cervical spine. CT HEAD WO CONTRAST   Final Result   Head: No acute intracranial abnormality. Cervical spine: No acute abnormality of the cervical spine.            EKG:  Read by me in the absence of a cardiologist shows: Sinus rhythm, rate 74, left axis, left anterior fascicular block, no acute injury pattern, minimal change when compared to June 2019    Medications administered:  Medications   FLUoxetine (PROZAC) capsule 40 mg (40 mg Oral Given 10/10/20 1839)   furosemide (LASIX) tablet 40 mg (40 mg Oral Given 10/10/20 1840)   ipratropium-albuterol (DUONEB) nebulizer solution 3 mL (has no administration in time range)   levothyroxine (SYNTHROID) tablet 112 mcg (112 mcg Oral Given 10/10/20 1839)   pregabalin (LYRICA) capsule 150 mg (has no administration in time range)   senna (SENOKOT) tablet 8.6 mg (8.6 mg Oral Given 10/10/20 1840)   budesonide-formoterol (SYMBICORT) 160-4.5 MCG/ACT inhaler 1 puff (1 puff Inhalation Not Given 10/10/20 1956)   sodium chloride flush 0.9 % injection 10 mL (has no administration in time range)   sodium chloride flush 0.9 % injection 10 mL (has no administration in time range)   acetaminophen (TYLENOL) tablet 650 mg (has no administration in time range)     Or   acetaminophen (TYLENOL) suppository 650 mg (has no administration in time range)   polyethylene glycol (GLYCOLAX) packet 17 g (has no administration in time range)   promethazine (PHENERGAN) tablet 12.5 mg (has no administration in time range)     Or   ondansetron (ZOFRAN) injection 4 mg (has no administration in time range)   enoxaparin (LOVENOX) injection 40 mg (has no administration in time range)   0.9 % sodium chloride infusion ( Intravenous New Bag 10/10/20 1838)   perflutren lipid microspheres (DEFINITY) injection 1.65 mg (has no administration in time range)   pantoprazole (PROTONIX) tablet 40 mg (40 mg Oral Not Given 10/10/20 1840)   cetirizine (ZYRTEC) tablet 10 mg (10 mg Oral Given 10/10/20 1840)   oxyCODONE (OXYCONTIN) extended release tablet 10 mg (has no administration in time range)   oxyCODONE-acetaminophen (PERCOCET)  MG per tablet 1 tablet (has no administration in time range)   0.9 % sodium chloride bolus (0 mLs Intravenous Stopped 10/10/20 1719)     FINAL IMPRESSION:    1. Altered mental status, unspecified altered mental status type    2. Acute exacerbation of chronic low back pain    3. Spinal stenosis, unspecified spinal region    4. Fall from bed, initial encounter    5.  Elevated CK       DISPOSITION Admitted 10/10/2020 04:01:32 PM       Rosalba Mistry MD  10/10/20 2007

## 2020-10-10 NOTE — ED NOTES
Abrasion noted to her left fore arm, pt is unkept in appearance shirt soiled and wet, feet very dirty. Wears home oxygen around the clock, pt reports she lives alone, and that a daughter lives close by. Pt hard of hearing and seems confused at times.        Art Schroeder RN  10/10/20 8098

## 2020-10-11 LAB
ANION GAP SERPL CALCULATED.3IONS-SCNC: 6 MMOL/L (ref 3–16)
BUN BLDV-MCNC: 8 MG/DL (ref 7–20)
CALCIUM SERPL-MCNC: 8.8 MG/DL (ref 8.3–10.6)
CHLORIDE BLD-SCNC: 100 MMOL/L (ref 99–110)
CO2: 35 MMOL/L (ref 21–32)
CREAT SERPL-MCNC: 0.6 MG/DL (ref 0.6–1.2)
EKG ATRIAL RATE: 74 BPM
EKG DIAGNOSIS: NORMAL
EKG P AXIS: 55 DEGREES
EKG P-R INTERVAL: 186 MS
EKG Q-T INTERVAL: 372 MS
EKG QRS DURATION: 120 MS
EKG QTC CALCULATION (BAZETT): 412 MS
EKG R AXIS: -31 DEGREES
EKG T AXIS: 35 DEGREES
EKG VENTRICULAR RATE: 74 BPM
GFR AFRICAN AMERICAN: >60
GFR NON-AFRICAN AMERICAN: >60
GLUCOSE BLD-MCNC: 106 MG/DL (ref 70–99)
HCT VFR BLD CALC: 30.8 % (ref 36–48)
HEMOGLOBIN: 10 G/DL (ref 12–16)
MAGNESIUM: 2 MG/DL (ref 1.8–2.4)
MCH RBC QN AUTO: 29.8 PG (ref 26–34)
MCHC RBC AUTO-ENTMCNC: 32.5 G/DL (ref 31–36)
MCV RBC AUTO: 91.6 FL (ref 80–100)
PDW BLD-RTO: 15.4 % (ref 12.4–15.4)
PLATELET # BLD: 150 K/UL (ref 135–450)
PMV BLD AUTO: 9.1 FL (ref 5–10.5)
POTASSIUM REFLEX MAGNESIUM: 3.4 MMOL/L (ref 3.5–5.1)
RBC # BLD: 3.37 M/UL (ref 4–5.2)
SODIUM BLD-SCNC: 141 MMOL/L (ref 136–145)
TOTAL CK: 189 U/L (ref 26–192)
WBC # BLD: 6.8 K/UL (ref 4–11)

## 2020-10-11 PROCEDURE — 93010 ELECTROCARDIOGRAM REPORT: CPT | Performed by: INTERNAL MEDICINE

## 2020-10-11 PROCEDURE — 80048 BASIC METABOLIC PNL TOTAL CA: CPT

## 2020-10-11 PROCEDURE — 83735 ASSAY OF MAGNESIUM: CPT

## 2020-10-11 PROCEDURE — 85027 COMPLETE CBC AUTOMATED: CPT

## 2020-10-11 PROCEDURE — 1200000000 HC SEMI PRIVATE

## 2020-10-11 PROCEDURE — 36415 COLL VENOUS BLD VENIPUNCTURE: CPT

## 2020-10-11 PROCEDURE — 2580000003 HC RX 258: Performed by: INTERNAL MEDICINE

## 2020-10-11 PROCEDURE — 96372 THER/PROPH/DIAG INJ SC/IM: CPT

## 2020-10-11 PROCEDURE — G0378 HOSPITAL OBSERVATION PER HR: HCPCS

## 2020-10-11 PROCEDURE — 6370000000 HC RX 637 (ALT 250 FOR IP): Performed by: INTERNAL MEDICINE

## 2020-10-11 PROCEDURE — 6360000002 HC RX W HCPCS: Performed by: INTERNAL MEDICINE

## 2020-10-11 PROCEDURE — 82550 ASSAY OF CK (CPK): CPT

## 2020-10-11 RX ORDER — DOCUSATE SODIUM 100 MG/1
100 CAPSULE, LIQUID FILLED ORAL 2 TIMES DAILY
Status: ON HOLD | COMMUNITY
End: 2021-10-30

## 2020-10-11 RX ORDER — POTASSIUM CHLORIDE 20 MEQ/1
20 TABLET, EXTENDED RELEASE ORAL 2 TIMES DAILY WITH MEALS
Status: DISCONTINUED | OUTPATIENT
Start: 2020-10-11 | End: 2020-10-14 | Stop reason: HOSPADM

## 2020-10-11 RX ORDER — AMOXICILLIN 250 MG
1 CAPSULE ORAL DAILY
Status: ON HOLD | COMMUNITY
End: 2021-10-30

## 2020-10-11 RX ADMIN — SENNOSIDES 8.6 MG: 8.6 TABLET, FILM COATED ORAL at 08:04

## 2020-10-11 RX ADMIN — Medication 10 ML: at 08:04

## 2020-10-11 RX ADMIN — FLUOXETINE 40 MG: 20 CAPSULE ORAL at 08:04

## 2020-10-11 RX ADMIN — FUROSEMIDE 40 MG: 40 TABLET ORAL at 08:04

## 2020-10-11 RX ADMIN — CETIRIZINE HYDROCHLORIDE 10 MG: 10 TABLET, FILM COATED ORAL at 08:04

## 2020-10-11 RX ADMIN — LEVOTHYROXINE SODIUM 112 MCG: 0.11 TABLET ORAL at 06:26

## 2020-10-11 RX ADMIN — Medication 1 PUFF: at 09:47

## 2020-10-11 RX ADMIN — OXYCODONE HYDROCHLORIDE 10 MG: 10 TABLET, FILM COATED, EXTENDED RELEASE ORAL at 07:30

## 2020-10-11 RX ADMIN — PREGABALIN 150 MG: 75 CAPSULE ORAL at 20:56

## 2020-10-11 RX ADMIN — PREGABALIN 150 MG: 75 CAPSULE ORAL at 08:04

## 2020-10-11 RX ADMIN — POTASSIUM CHLORIDE 20 MEQ: 1500 TABLET, EXTENDED RELEASE ORAL at 18:06

## 2020-10-11 RX ADMIN — OXYCODONE HYDROCHLORIDE AND ACETAMINOPHEN 1 TABLET: 10; 325 TABLET ORAL at 10:08

## 2020-10-11 RX ADMIN — OXYCODONE HYDROCHLORIDE 10 MG: 10 TABLET, FILM COATED, EXTENDED RELEASE ORAL at 18:06

## 2020-10-11 RX ADMIN — PANTOPRAZOLE SODIUM 40 MG: 40 TABLET, DELAYED RELEASE ORAL at 06:25

## 2020-10-11 RX ADMIN — ENOXAPARIN SODIUM 40 MG: 40 INJECTION SUBCUTANEOUS at 20:56

## 2020-10-11 RX ADMIN — Medication 10 ML: at 20:56

## 2020-10-11 RX ADMIN — OXYCODONE HYDROCHLORIDE AND ACETAMINOPHEN 1 TABLET: 10; 325 TABLET ORAL at 19:46

## 2020-10-11 ASSESSMENT — PAIN SCALES - GENERAL
PAINLEVEL_OUTOF10: 8
PAINLEVEL_OUTOF10: 0
PAINLEVEL_OUTOF10: 8
PAINLEVEL_OUTOF10: 8
PAINLEVEL_OUTOF10: 0
PAINLEVEL_OUTOF10: 7
PAINLEVEL_OUTOF10: 0
PAINLEVEL_OUTOF10: 8
PAINLEVEL_OUTOF10: 0
PAINLEVEL_OUTOF10: 0

## 2020-10-11 NOTE — PLAN OF CARE
Problem: Falls - Risk of:  Goal: Will remain free from falls  Description: Will remain free from falls  Outcome: Ongoing   Fall precaution in place, bed alarm on. Non skid socks on, call light within reach. Will monitor. Problem: Skin Integrity:  Goal: Will show no infection signs and symptoms  Description: Will show no infection signs and symptoms  Outcome: Ongoing  Pt repositioned q2hrs. Pillows are in use. Problem: Pain:  Goal: Pain level will decrease  Description: Pain level will decrease  Outcome: Ongoing  Pt aware on need of pain mediation. Scheduled pain med given. Will continue to monitor.

## 2020-10-11 NOTE — PROGRESS NOTES
Hospitalist Progress Note      PCP: Yolanda Holliday    Date of Admission: 10/10/2020    LOS: 1    Chief Complaint:   Chief Complaint   Patient presents with    Fall     Pt brought in per [facility] pt fell out of her chair daughter found her on floor this am.  Pt lives alone, pt states she layed on the floor for a couple of hours, pt awake and confused, unsure of baseline. Hard of hearing. pt c/o pain to her back, legs, left forearm, left hand. Case Summary:   82 y.o. female with history of COPD, chronic respiratory failure, hypertension, hyperlipidemia, chronic back pain who was found on the floor for unclear duration and without recollection of events found to have mild rhabdomyolysis with acute on chronic back pain.         Active Hospital Problems    Diagnosis Date Noted    Fall at home [F73. Keith Lonnie, T12.024] 10/10/2020    Rhabdomyolysis [M62.82] 10/10/2020    Altered mental state [R41.82] 10/10/2020    Syncope and collapse [R55] 10/10/2020    Morbid obesity with BMI of 50.0-59.9, adult ([facility]) [E66.01, Z68.43]          Principal Problem:    Syncope and collapse: Feels much better today. Clear mentation.   -Follow-up echocardiogram  - We will check orthostatics  -Continue telemetry  -PT OT evaluation      Rhabdomyolysis: CPK down to normal.  We will stop IV hydration    Active Problems: Morbid obesity with BMI of 50.0-59.9, adult ([facility] [address]    Fall at home: Get PT OT evaluations in view of discharge planning.   Patient declining nursing home at discharge and will likely discharge home to with home care service    Resolved Problems:    Altered mental state        Medications:  Reviewed  Infusion Medications   Scheduled Medications    FLUoxetine  40 mg Oral Daily    furosemide  40 mg Oral Daily    levothyroxine  112 mcg Oral Daily    pregabalin  150 mg Oral BID    senna  1 tablet Oral Daily    budesonide-formoterol  1 puff Inhalation BID    sodium chloride flush  10 mL Intravenous 2 times per day    enoxaparin  40 mg Subcutaneous Nightly    pantoprazole  40 mg Oral QAM AC    cetirizine  10 mg Oral Daily    oxyCODONE  10 mg Oral Q12H     PRN Meds: ipratropium-albuterol, sodium chloride flush, acetaminophen **OR** acetaminophen, polyethylene glycol, promethazine **OR** ondansetron, perflutren lipid microspheres, oxyCODONE-acetaminophen      DVT Prophylaxis: Subcut enoxaparin  Diet: DIET CARDIAC;  Code Status: Full Code    Dispo: Anticipate discharge in the next 24 hrs    ____________________________________________________________________________    Subjective:   Overnight Events:   Uneventful overnight  Awake this morning and alert oriented following commands  Mentation at baseline      Physical Exam:  BP 99/64   Pulse 78   Temp 98 °F (36.7 °C) (Oral)   Resp 18   Ht 5' 1.5\" (1.562 m)   Wt 259 lb 14.4 oz (117.9 kg)   SpO2 94%   BMI 48.31 kg/m²   General appearance: No apparent distress, appears stated age and cooperative. HEENT: Normocephalic, atraumatic, MMM, No sclera icterus/conjuctival palor  Neck: Supple, no thyromegally. No jugular venous distention. Respiratory:  Normal respiratory effort. Clear to auscultation, no Rales/Wheezes/Rhonchi. Cardiovascular: S1/S2 without murmurs, rubs or gallops. RRR  Abdomen: Soft, non-tender, non-distended, bowel sounds present. Musculoskeletal: No clubbing, cyanosis or edema bilaterally. Skin: Skin color, texture, turgor normal.  No rashes or lesions.   Neurologic:  Cranial nerves: II-XII intact, PRASHANTH, No focal sensory/motor deficits  Psychiatric: Alert and oriented, thought content appropriate  Capillary Refill: Brisk,< 3 seconds   Peripheral Pulses: +2 palpable, equal bilaterally       Intake/Output Summary (Last 24 hours) at 10/11/2020 1159  Last data filed at 10/11/2020 0807  Gross per 24 hour   Intake 60 ml   Output 2350 ml   Net -2290 ml       Labs:   Recent Labs     10/10/20  1153 10/11/20  0648   WBC 12.5* 6.8   HGB 10.9* 10.0* HCT 33.9* 30.8*    150      Recent Labs     10/10/20  1153 10/11/20  0648    141   K 3.7 3.4*   CL 96* 100   CO2 36* 35*   BUN 12 8   CREATININE 0.6 0.6   CALCIUM 9.6 8.8   AST 21  --    ALT 19  --    BILITOT 0.3  --    ALKPHOS 124  --      Recent Labs     10/10/20  1153 10/10/20  1736 10/10/20  2217 10/11/20  0648   CKTOTAL 354*  --   --  189   TROPONINI <0.01 <0.01 0.01  --        Urinalysis:    Lab Results   Component Value Date    NITRU Negative 10/10/2020    WBCUA 2 07/01/2019    BACTERIA 4+ 06/20/2019    RBCUA 6 07/01/2019    BLOODU Negative 10/10/2020    SPECGRAV 1.014 10/10/2020    GLUCOSEU Negative 10/10/2020    GLUCOSEU NEGATIVE 03/21/2011       Radiology:  XR CHEST PORTABLE   Final Result   No acute cardiopulmonary disease is appreciated. XR RADIUS ULNA LEFT (2 VIEWS)   Final Result   Negative. No fracture or other acute abnormality of the left forearm or left   hand. XR HAND LEFT (MIN 3 VIEWS)   Final Result   Negative. No fracture or other acute abnormality of the left forearm or left   hand. CT PELVIS WO CONTRAST Additional Contrast? None   Final Result   No acute osseous injury of the pelvis or hips. Mild osteoarthritis of the hips. Mild distention of the urinary bladder. CT LUMBAR SPINE WO CONTRAST   Final Result   No acute fracture or subluxation of the lumbar spine. Multilevel severe spondylosis. The most significant levels are noted below-      L3-4 severe central stenosis,      L4-5 mild anterolisthesis and severe central stenosis, and      L5-S1 mild anterolisthesis and moderate central stenosis. Severe left   foraminal stenosis. Suspected distention of the urinary bladder. CT CERVICAL SPINE WO CONTRAST   Final Result   Head: No acute intracranial abnormality. Cervical spine: No acute abnormality of the cervical spine. CT HEAD WO CONTRAST   Final Result   Head: No acute intracranial abnormality. Cervical spine: No acute abnormality of the cervical spine. Demetrius Romo MD      Please excuse brevity and/or typos. This report was transcribed using voice recognition software. Every effort was made to ensure accuracy, however, inadvertent computerized transcription errors may be present.

## 2020-10-12 LAB
ANION GAP SERPL CALCULATED.3IONS-SCNC: 5 MMOL/L (ref 3–16)
BASOPHILS ABSOLUTE: 0.1 K/UL (ref 0–0.2)
BASOPHILS RELATIVE PERCENT: 0.8 %
BUN BLDV-MCNC: 9 MG/DL (ref 7–20)
CALCIUM SERPL-MCNC: 9.1 MG/DL (ref 8.3–10.6)
CHLORIDE BLD-SCNC: 99 MMOL/L (ref 99–110)
CO2: 37 MMOL/L (ref 21–32)
CREAT SERPL-MCNC: 0.7 MG/DL (ref 0.6–1.2)
EOSINOPHILS ABSOLUTE: 0.4 K/UL (ref 0–0.6)
EOSINOPHILS RELATIVE PERCENT: 6.1 %
GFR AFRICAN AMERICAN: >60
GFR NON-AFRICAN AMERICAN: >60
GLUCOSE BLD-MCNC: 102 MG/DL (ref 70–99)
HCT VFR BLD CALC: 31.5 % (ref 36–48)
HEMOGLOBIN: 10.1 G/DL (ref 12–16)
LV EF: 58 %
LVEF MODALITY: NORMAL
LYMPHOCYTES ABSOLUTE: 2.3 K/UL (ref 1–5.1)
LYMPHOCYTES RELATIVE PERCENT: 34.7 %
MCH RBC QN AUTO: 29.3 PG (ref 26–34)
MCHC RBC AUTO-ENTMCNC: 32 G/DL (ref 31–36)
MCV RBC AUTO: 91.5 FL (ref 80–100)
MONOCYTES ABSOLUTE: 0.7 K/UL (ref 0–1.3)
MONOCYTES RELATIVE PERCENT: 10.3 %
NEUTROPHILS ABSOLUTE: 3.1 K/UL (ref 1.7–7.7)
NEUTROPHILS RELATIVE PERCENT: 48.1 %
PDW BLD-RTO: 15.3 % (ref 12.4–15.4)
PLATELET # BLD: 158 K/UL (ref 135–450)
PMV BLD AUTO: 9 FL (ref 5–10.5)
POTASSIUM SERPL-SCNC: 3.6 MMOL/L (ref 3.5–5.1)
RBC # BLD: 3.44 M/UL (ref 4–5.2)
SODIUM BLD-SCNC: 141 MMOL/L (ref 136–145)
WBC # BLD: 6.5 K/UL (ref 4–11)

## 2020-10-12 PROCEDURE — 6360000002 HC RX W HCPCS: Performed by: INTERNAL MEDICINE

## 2020-10-12 PROCEDURE — 97530 THERAPEUTIC ACTIVITIES: CPT

## 2020-10-12 PROCEDURE — 2580000003 HC RX 258: Performed by: INTERNAL MEDICINE

## 2020-10-12 PROCEDURE — 97162 PT EVAL MOD COMPLEX 30 MIN: CPT

## 2020-10-12 PROCEDURE — C8929 TTE W OR WO FOL WCON,DOPPLER: HCPCS

## 2020-10-12 PROCEDURE — 94761 N-INVAS EAR/PLS OXIMETRY MLT: CPT

## 2020-10-12 PROCEDURE — 80048 BASIC METABOLIC PNL TOTAL CA: CPT

## 2020-10-12 PROCEDURE — 97166 OT EVAL MOD COMPLEX 45 MIN: CPT

## 2020-10-12 PROCEDURE — 1200000000 HC SEMI PRIVATE

## 2020-10-12 PROCEDURE — G0378 HOSPITAL OBSERVATION PER HR: HCPCS

## 2020-10-12 PROCEDURE — 6370000000 HC RX 637 (ALT 250 FOR IP): Performed by: INTERNAL MEDICINE

## 2020-10-12 PROCEDURE — 96372 THER/PROPH/DIAG INJ SC/IM: CPT

## 2020-10-12 PROCEDURE — 85025 COMPLETE CBC W/AUTO DIFF WBC: CPT

## 2020-10-12 PROCEDURE — 94640 AIRWAY INHALATION TREATMENT: CPT

## 2020-10-12 PROCEDURE — 2700000000 HC OXYGEN THERAPY PER DAY

## 2020-10-12 PROCEDURE — 93306 TTE W/DOPPLER COMPLETE: CPT

## 2020-10-12 RX ADMIN — Medication 10 ML: at 21:53

## 2020-10-12 RX ADMIN — LEVOTHYROXINE SODIUM 112 MCG: 0.11 TABLET ORAL at 06:38

## 2020-10-12 RX ADMIN — OXYCODONE HYDROCHLORIDE AND ACETAMINOPHEN 1 TABLET: 10; 325 TABLET ORAL at 15:38

## 2020-10-12 RX ADMIN — FLUOXETINE 40 MG: 20 CAPSULE ORAL at 08:41

## 2020-10-12 RX ADMIN — PREGABALIN 150 MG: 75 CAPSULE ORAL at 21:52

## 2020-10-12 RX ADMIN — POTASSIUM CHLORIDE 20 MEQ: 1500 TABLET, EXTENDED RELEASE ORAL at 08:41

## 2020-10-12 RX ADMIN — Medication 1 PUFF: at 22:09

## 2020-10-12 RX ADMIN — POTASSIUM CHLORIDE 20 MEQ: 1500 TABLET, EXTENDED RELEASE ORAL at 15:37

## 2020-10-12 RX ADMIN — OXYCODONE HYDROCHLORIDE AND ACETAMINOPHEN 1 TABLET: 10; 325 TABLET ORAL at 08:41

## 2020-10-12 RX ADMIN — PANTOPRAZOLE SODIUM 40 MG: 40 TABLET, DELAYED RELEASE ORAL at 06:38

## 2020-10-12 RX ADMIN — ENOXAPARIN SODIUM 40 MG: 40 INJECTION SUBCUTANEOUS at 21:52

## 2020-10-12 RX ADMIN — PREGABALIN 150 MG: 75 CAPSULE ORAL at 08:41

## 2020-10-12 RX ADMIN — Medication 1 PUFF: at 08:27

## 2020-10-12 RX ADMIN — CETIRIZINE HYDROCHLORIDE 10 MG: 10 TABLET, FILM COATED ORAL at 08:41

## 2020-10-12 RX ADMIN — Medication 10 ML: at 08:43

## 2020-10-12 RX ADMIN — OXYCODONE HYDROCHLORIDE 10 MG: 10 TABLET, FILM COATED, EXTENDED RELEASE ORAL at 06:58

## 2020-10-12 RX ADMIN — OXYCODONE HYDROCHLORIDE 10 MG: 10 TABLET, FILM COATED, EXTENDED RELEASE ORAL at 19:51

## 2020-10-12 RX ADMIN — FUROSEMIDE 40 MG: 40 TABLET ORAL at 08:41

## 2020-10-12 RX ADMIN — SENNOSIDES 8.6 MG: 8.6 TABLET, FILM COATED ORAL at 08:41

## 2020-10-12 RX ADMIN — OXYCODONE HYDROCHLORIDE AND ACETAMINOPHEN 1 TABLET: 10; 325 TABLET ORAL at 02:42

## 2020-10-12 ASSESSMENT — PAIN SCALES - GENERAL
PAINLEVEL_OUTOF10: 0
PAINLEVEL_OUTOF10: 7
PAINLEVEL_OUTOF10: 10
PAINLEVEL_OUTOF10: 6
PAINLEVEL_OUTOF10: 9
PAINLEVEL_OUTOF10: 8
PAINLEVEL_OUTOF10: 9
PAINLEVEL_OUTOF10: 6
PAINLEVEL_OUTOF10: 10
PAINLEVEL_OUTOF10: 0
PAINLEVEL_OUTOF10: 7

## 2020-10-12 ASSESSMENT — PAIN DESCRIPTION - LOCATION: LOCATION: ARM;BACK;LEG;CHEST

## 2020-10-12 ASSESSMENT — PAIN DESCRIPTION - PAIN TYPE: TYPE: ACUTE PAIN

## 2020-10-12 NOTE — PLAN OF CARE
Problem: Falls - Risk of:  Goal: Will remain free from falls  Description: Will remain free from falls  10/12/2020 0141 by Darrian Garduno RN  Outcome: Ongoing  Note: Pt is wearing the fall bracelet, S.A.F.E. sign is posted outside door, and yellow blanket is on the bed. Pt informed of fall risks, verbalizes understanding, and agrees to ask for help to ambulate. Will monitor.       Problem: Skin Integrity:  Goal: Absence of new skin breakdown  Description: Absence of new skin breakdown  Outcome: Ongoing     Problem: Pain:  Goal: Pain level will decrease  Description: Pain level will decrease  Outcome: Ongoing

## 2020-10-12 NOTE — DISCHARGE INSTR - COC
Continuity of Care Form    Patient Name: Fadi Knight   :  1935  MRN:  [id number]    Admit date:  10/10/2020  Discharge date:  10/14/2020    Code Status Order: Full Code   Advance Directives:   [facility] Documentation       Date/Time Healthcare Directive Type of Healthcare Directive Copy in [facility] [address] Agent's Name Healthcare Agent's Phone Number    10/10/20 5238  No, patient does not have an advance directive for healthcare treatment -- -- -- -- --            Admitting Physician:  Rose Quigley MD  PCP: Jermain Alvarado    Discharging Nurse: [address] Unit/Room#: [id number]/2938-58  Discharging Unit Phone Number: [phone]    Emergency Contact:   Extended Emergency Contact Information  Primary Emergency Contact: [address] [facility] Phone: [phone]  Relation: Child  Secondary Emergency Contact: Minh Contreras, [address] Phone: [phone]  Relation: Grandchild    Past Surgical History:  Past Surgical History:   Procedure Laterality Date    CHOLECYSTECTOMY      PARTIAL HYSTERECTOMY         Immunization History:   Immunization History   Administered Date(s) Administered    Influenza Virus Vaccine 10/24/2018    Pneumococcal Conjugate 13-valent (Noreen Lynne) 2013       Active Problems:  Patient Active Problem List   Diagnosis Code    Pneumonia J18.9    Acute on chronic diastolic heart failure (HCC) I50.33    Peripheral edema R60.9    COPD exacerbation ([address] J44.1    Essential hypertension I10    Hyperlipidemia E78.5    Morbid obesity with BMI of 50.0-59.9, adult ([address] E66.01, Z68.43    Acute on chronic respiratory failure with hypercapnia ([address] J96.22    Acute respiratory failure ([address] J96.00    Acute respiratory failure with hypoxia and hypercapnia (HCC) J96.01, J96.02    Acute kidney injury ([address] N17.9    History of recurrent UTI (urinary tract infection) Z87.440    Chronic obstructive pulmonary disease Thigh Right; Inner moisture associated dermatitis, rt inner thigh (Active)   Wound Image   10/12/20 1255   Wound Etiology Other 10/12/20 1255   Dressing/Treatment Open to air 10/12/20 1255   Jessica-wound Assessment Erosion; Excoriated;Fragile 10/12/20 1255   Number of days: 2       Wound 10/10/20 Toe (Comment  which one) Left left great toe bruise (Active)   Wound Image   10/12/20 1318   Wound Etiology Traumatic 10/12/20 1318   Wound Assessment Other (Comment) 10/12/20 1318   Number of days: 2        Elimination:  Continence:   · Bowel: {YES / BC:96288}  · Bladder: {YES / BL:74799}  Urinary Catheter: Removal Date 10/14/2020   Colostomy/Ileostomy/Ileal Conduit: No       Date of Last BM: 10/13/2020    Intake/Output Summary (Last 24 hours) at 10/12/2020 1446  Last data filed at 10/12/2020 1429  Gross per 24 hour   Intake 240 ml   Output 3255 ml   Net -3015 ml     I/O last 3 completed shifts: In: 540 [P.O.:540]  Out: 1850 [Urine:1850]    Safety Concerns:     History of Falls (last 30 days) and At Risk for Falls    Impairments/Disabilities:      Vision and Hearing    Nutrition Therapy:  Current Nutrition Therapy:   - Oral Diet:  General    Routes of Feeding: Oral  Liquids: Thin Liquids  Daily Fluid Restriction: no  Last Modified Barium Swallow with Video (Video Swallowing Test): not done    Treatments at the Time of Hospital Discharge:   Respiratory Treatments: Symbicort inhaler twice a day and Duoneb HHN as needed  Oxygen Therapy:  is on oxygen at 4 L/min per nasal cannula.   Ventilator:    - No ventilator support    Rehab Therapies: Physical Therapy and Occupational Therapy  Weight Bearing Status/Restrictions: No weight bearing restirctions  Other Medical Equipment (for information only, NOT a DME order):  wheelchair, walker, bedside commode and hospital bed  Other Treatments: Calmoseptine to buttock and right gluteal fold, sure prep to right great toe    Patient's personal belongings (please select all that are sent with patient):  Paz KENNEDY SIGNATURE:  Electronically signed by Hank Gregg RN on 10/14/20 at 12:33 PM EDT        CASE MANAGEMENT/SOCIAL WORK SECTION    Inpatient Status Date:  10/10/20    Readmission Risk Assessment Score:  Readmission Risk              Risk of Unplanned Readmission:        13           Discharging to Facility/ Agency   Name:   Discharging to [address] [place] [address]   Fax: [phone]  · Report: [phone]  · Address:  · Phone:  · Fax:      / signature: Electronically signed by Barrington Marquez RN on 10/14/20 at 3:07 PM EDT    PHYSICIAN SECTION    Prognosis: Good    Condition at Discharge: Stable    Rehab Potential (if transferring to Rehab): Good    Recommended Labs or Other Treatments After Discharge:   Wound dressing as recommended and directed    Physician Certification: I certify the above information and transfer of Gloria Hale  is necessary for the continuing treatment of the diagnosis listed and that she requires Home Care for greater 30 days.      Update Admission H&P: No change in H&P    PHYSICIAN SIGNATURE:  Electronically signed by Nallely Dorsey MD on 10/12/20 at 2:46 PM EDT

## 2020-10-12 NOTE — CARE COORDINATION
[facility] Continence Nurse  Consult Note       NAME:  Joelle [facility] RECORD NUMBER:  [id number]  AGE: 82 y.o. GENDER: female  : 1935  TODAY'S DATE:  10/12/2020    Subjective   Reason for WOCN Evaluation and Assessment: wounds to left buttocks and right inner thigh      Fausto Shepherd is a 82 y.o. female referred by:   [x] Physician  [x] Nursing  [] Other:     Wound Identification:  Wound Type: incontinence associated dermatitis  Contributing Factors: incontinence of urine    Wound History: present on admission  Current Wound Care Treatment: foam dressing to left buttocks, moisture controlled: urinary catheter     Patient Goal of Care:  [x] Wound Healing  [] Odor Control  [] Palliative Care  [] Pain Control   [] Other:         PAST MEDICAL HISTORY        Diagnosis Date    Arthritis     osteo    COPD (chronic obstructive pulmonary disease) (HCC)     Hemorrhoids     Hernia of unspecified site of abdominal cavity without mention of obstruction or gangrene     Incontinence     Knee pain, bilateral     pt states no cartilage    Spinal stenosis     Spinal stenosis        PAST SURGICAL HISTORY    Past Surgical History:   Procedure Laterality Date    CHOLECYSTECTOMY      PARTIAL HYSTERECTOMY         FAMILY HISTORY    History reviewed. No pertinent family history. SOCIAL HISTORY    Social History     Tobacco Use    Smoking status: Never Smoker    Smokeless tobacco: Never Used   Substance Use Topics    Alcohol use: No    Drug use: No       ALLERGIES    Allergies   Allergen Reactions    Sulfa Antibiotics      Unknown reaction       MEDICATIONS    No current facility-administered medications on file prior to encounter.       Current Outpatient Medications on File Prior to Encounter   Medication Sig Dispense Refill    senna-docusate (PERICOLACE) 8.6-50 MG per tablet Take 1 tablet by mouth daily      docusate sodium (COLACE) 100 MG capsule Take 100 mg by mouth 2 times daily  oxyCODONE (OXYCONTIN) 10 MG extended release tablet Take 10 mg by mouth every 12 hours.  oxyCODONE-acetaminophen (PERCOCET)  MG per tablet Take 1 tablet by mouth every 6 hours as needed for Pain.  hydrocortisone (ANUSOL-HC) 2.5 % rectal cream Place rectally 2 times daily. 1 Tube 0    ipratropium-albuterol (DUONEB) 0.5-2.5 (3) MG/3ML SOLN nebulizer solution Inhale 1 vial into the lungs every 6 hours as needed for Shortness of Breath      SYMBICORT 160-4.5 MCG/ACT AERO TAKE 2 PUFFS TWICE A DAY  3    levothyroxine (SYNTHROID) 112 MCG tablet TAKE 1 TABLET BY MOUTH EVERY DAY  3    furosemide (LASIX) 40 MG tablet TAKE 1 TABLET BY MOUTH EVERY DAY  3    loratadine (CLARITIN) 10 MG tablet Take 1 tablet by mouth daily      magnesium hydroxide (MILK OF MAGNESIA) 400 MG/5ML suspension Take 30 mLs by mouth daily as needed for Constipation 1 Bottle 0    fluoxetine (PROZAC) 20 MG capsule Take 40 mg by mouth daily.  pregabalin (LYRICA) 50 MG capsule Take 150 mg by mouth 2 times daily.  esomeprazole (NEXIUM) 40 MG capsule Take 40 mg by mouth every morning (before breakfast).         MOVANTIK 25 MG TABS tablet Take 25 mg by mouth daily as needed  5       Objective    /73   Pulse 66   Temp 98.1 °F (36.7 °C) (Oral)   Resp 14   Ht 5' 1.5\" (1.562 m)   Wt 261 lb 4.8 oz (118.5 kg)   SpO2 93%   BMI 48.57 kg/m²     LABS:  WBC:    Lab Results   Component Value Date    WBC 6.5 10/12/2020     H/H:    Lab Results   Component Value Date    HGB 10.1 10/12/2020    HCT 31.5 10/12/2020     PTT:    Lab Results   Component Value Date    APTT 31.2 06/20/2019   [APTT}  PT/INR:    Lab Results   Component Value Date    PROTIME 10.8 06/20/2019    INR 0.95 06/20/2019     HgBA1c:  No results found for: LABA1C    Assessment   Jorge Luis Risk Score: Jorge Luis Scale Score: 14    Patient Active Problem List   Diagnosis Code    Pneumonia J18.9    Acute on chronic diastolic heart failure (HCC) I50.33    Peripheral edema R60.9    COPD exacerbation ([facility]) J44.1    Essential hypertension I10    Hyperlipidemia E78.5    Morbid obesity with BMI of 50.0-59.9, adult ([facility]) E66.01, Z68.43    Acute on chronic respiratory failure with hypercapnia ([facility]) J96.22    Acute respiratory failure ([facility]) J96.00    Acute respiratory failure with hypoxia and hypercapnia ([facility]) J96.01, J96.02    Acute kidney injury ([facility]) N17.9    History of recurrent UTI (urinary tract infection) Z87.440    Chronic obstructive pulmonary disease with acute lower respiratory infection ([facility]) J44.0    Spinal stenosis R08.24    Complicated UTI (urinary tract infection) N39.0    Acute on chronic respiratory failure with hypoxia and hypercapnia ([facility]) J96.21, J96.22    Weight loss counseling, encounter for Z71.3    Fall at home W19. Cathye Spar, Y92.009    Rhabdomyolysis M62.82    Altered mental state R41.82    Syncope and collapse R55       Measurements:  Patient seen for wounds to rt inner thigh and left buttocks. Patient in for fall. Wounds assessed and picture taken. Patient has area of redness, with scattered open areas and peeling skin to right inner thigh and left buttocks. Patient reports she does has episodes of being wet when she can't get to bathroom. Border dressing to left buttocks. Also patient is on oxygen and says she uses oxygen at home. Patient has urinary catheter at this time. Bruise to left great toe. Patient said this happened when she fell before admission                     Response to treatment:  Well tolerated by patient. Pain Assessment:  Severity:  0 / 10  Quality of pain: N/A,  Wound Pain Timing/Severity: none  Premedicated: No    Plan   Plan of Care:  bariatric bed, calmoseptine requested for IAD, foam dressing to buttocks. Specialty Bed Required : Yes   [x] Low Air Loss   [x] Pressure Redistribution  [] Fluid Immersion  [x] Bariatric  [] Total Pressure Relief  [] Other:     Current Diet: DIET CARDIAC;   Dietician consult:

## 2020-10-12 NOTE — PROGRESS NOTES
Physical Therapy    Facility/Department: [address] NURSING  Initial Assessment    NAME: Larissa Salas  : 1935  MRN: [id number]    Date of Service: 10/12/2020    Discharge Recommendations: Larissa Salas scored a 6/24 on the AM-PAC short mobility form. Current research shows that an AM-PAC score of 17 or less is typically not associated with a discharge to the patient's home setting. Based on the patient's AM-PAC score and their current functional mobility deficits, it is recommended that the patient have 3-5 sessions per week of Physical Therapy at d/c to increase the patient's independence. Please see assessment section for further patient specific details. If patient discharges prior to next session this note will serve as a discharge summary. Please see below for the latest assessment towards goals. PT Equipment Recommendations  Equipment Needed: No    Assessment   Body structures, Functions, Activity limitations: Decreased functional mobility ; Decreased ROM; Decreased strength;Decreased safe awareness;Decreased cognition;Decreased endurance;Decreased balance;Decreased sensation; Increased pain  Assessment: Pt is limited by the above deficits and would benefit from skilled PT services to maximize pt functional mobility and safety prior to discharge. Pt is Depenedent for bed mobility and transfers, and currently lives alone. Pt is not safe to discharge home. Recommend SNF at discharge.   Treatment Diagnosis: decreased strength, functional mobility  Prognosis: Fair  Decision Making: Medium Complexity  PT Education: Goals;PT Role;Plan of Care;Transfer Training  Patient Education: Pt verbalized understanding; will require follow up education  Barriers to Learning: cognitive, hearing  REQUIRES PT FOLLOW UP: Yes  Activity Tolerance  Activity Tolerance: Patient limited by endurance       Patient Diagnosis(es): The primary encounter diagnosis was Altered mental status, unspecified altered mental status type. Diagnoses of Acute exacerbation of chronic low back pain, Spinal stenosis, unspecified spinal region, Fall from bed, initial encounter, and Elevated CK were also pertinent to this visit. has a past medical history of Arthritis, COPD (chronic obstructive pulmonary disease) ([address] Hemorrhoids, Hernia of unspecified site of abdominal cavity without mention of obstruction or gangrene, Incontinence, Knee pain, bilateral, Spinal stenosis, and Spinal stenosis. has a past surgical history that includes Cholecystectomy and partial hysterectomy (cervix not removed). Restrictions  Restrictions/Precautions  Restrictions/Precautions: Fall Risk(High Fall Risk)  Required Braces or Orthoses?: No  Position Activity Restriction  Other position/activity restrictions: Karin Rico is a 82 y.o. female with history of COPD, sacral ulcer, spinal stenosis, severe arthritis, morbid obesity who presents to the emergency department complaining of pain all over her body. She had apparently fallen out of bed this morning and was on the ground for several hours according to paramedics. Patient states that she did not fall out of bed. She tells me that she may have fallen out of her assisted chair. She is a poor historian. She does have small abrasions to her left forearm and is complaining of some discomfort in this extremity. She has a known left buttock ulcer.      Vision/Hearing  Vision: Impaired  Vision Exceptions: Wears glasses for distance;Wears glasses for reading  Hearing: Exceptions to [facility]  Hearing Exceptions: Hard of hearing/hearing concerns       Subjective  General  Chart Reviewed: Yes  Family / Caregiver Present: Yes(daughter present for PLOF questions only)  Diagnosis: Syncope and Collapse  Follows Commands: Within Functional Limits  Other (Comment): [facility]  General Comment  Comments: Pt supine in bed upon arrival; agreeable to PT/OT  Subjective  Subjective: Pt initially reports chronic back pain 8/10, received pain meds, and shortly after reports pain 1/10; no reports of back pain during mobility  Pain Screening  Patient Currently in Pain: Yes     Orientation  Orientation  Overall Orientation Status: Impaired  Orientation Level: Oriented to person;Oriented to place; Disoriented to time;Disoriented to situation     Social/Functional History  Social/Functional History  Lives With: Alone  Type of Home: House(Townhouse)  Home Layout: One level  Home Access: Ramped entrance  Bathroom Shower/Tub: Tub/Shower unit(Does not access tub/ sponge bathes)  Bathroom Toilet: Bedside commode  Home Equipment: Rolling walker, Wheelchair-electric, Wheelchair-manual, Lift chair  Receives Help From: Family  ADL Assistance: Needs assistance(dress/toilet IND, bathing dependent)  Homemaking Assistance: Needs assistance(dtr assists w/ cooking/laundry/cleaning)  Homemaking Responsibilities: No  Ambulation Assistance: Independent  Transfer Assistance: Independent  Leisure & Hobbies: read  Additional Comments: No falls past 6 months except upon admission    Objective     AROM RLE (degrees)  RLE AROM: WFL  RLE General AROM: hip AROM limited by body habitus  AROM LLE (degrees)  LLE AROM : WFL  LLE General AROM: hip AROM limited by body habitus  Strength RLE  Comment: ankle and knee grossly +3/5; hip -3/5  Strength LLE  Comment: ankle and knee grossly +3/5; hip -3/5     Sensation  Overall Sensation Status: Impaired(peripheral neuropathy BLEs)     Bed mobility  Rolling to Left: 2 Person assistance(max(a) of 2)  Rolling to Right: 2 Person assistance(max(a) of 2)  Supine to Sit: 2 Person assistance;Dependent/Total(dependent of 2)  Sit to Supine: 2 Person assistance;Dependent/Total(dependent of 2)  Scooting: Dependent/Total;2 Person assistance(dependent of 2)     Transfers  Sit to Stand: Dependent/Total;2 Person Assistance(Max A of 2, 2x from bed; 1x to [facility])  Stand to sit: Dependent/Total;2 Person Assistance     Ambulation  Ambulation?: No Balance  Posture: Fair  Sitting - Static: Fair(SBA on EOB)  Sitting - Dynamic: Poor  Standing - Static: Poor  Standing - Dynamic: Poor  Comments: Pt stood with RW and Vivian Guzmán, briefly all 3 times; pt unable to take small steps with RW, therefore Vivian Blacksmith was attempted; unable to transfer to chair due to Vivianyasmany Arthurh not fitting around or under recliner chair; pt returned to supine in bed; when standing, pt leaning heavily on forearms on AD; very limited standing tolerance        Plan   Plan  Times per week: 3-5x  Times per day: Daily  Current Treatment Recommendations: Strengthening, ROM, Balance Training, Functional Mobility Training, Transfer Training, Safety Education & Training  Safety Devices  Type of devices:  All fall risk precautions in place, Bed alarm in place, Call light within reach, Gait belt, Patient at risk for falls, Left in bed, Nurse notified(MARCIA Little)             AM-PAC Score  AM-PAC Inpatient Mobility Raw Score : 6 (10/12/20 0957)  AM-PAC Inpatient T-Scale Score : 23.55 (10/12/20 0957)  Mobility Inpatient CMS 0-100% Score: 100 (10/12/20 0957)  Mobility Inpatient CMS G-Code Modifier : CN (10/12/20 0957)          Goals  Short term goals  Time Frame for Short term goals: discharge  Short term goal 1: bed mobililty with max A  Short term goal 2: sit to/from stand with max A  Short term goal 3: bed to/from chair with max A  Patient Goals   Patient goals : return home       Therapy Time   Individual Concurrent Group Co-treatment   Time In 0840         Time Out 0935         Minutes 55         Timed Code Treatment Minutes: Pineda 1978, [address] [facility], [address] [place] [address]

## 2020-10-12 NOTE — CARE COORDINATION
Talked to patient's daughter Siobhan Ramírez regarding d/c plan of care,  and  the therapy recommendation for SNF- low AM-PAC score. Patient wants to go home with [address]. uJan Mcdermott stated she is on her way to the hospital to talk to the patient and will provide us with the decision . Will continue to follow.

## 2020-10-12 NOTE — CARE COORDINATION
Attempted to meet with patient to offer assistance with discharge but she was having a procedure done in the room. CM RN spoke to daughter, who is patient's main caregiver regarding assessment. Discharge Planning Assessment    RN discharge planner met with family member to discuss reason for admission, current living situation, and potential needs at the time of discharge. Demographics/Insurance verified: Yes    Current type of dwelling: [facility] with ramp to enter    Patient from ECF/ confirmed with: NA    Living arrangements: Alone but daughter is main caregiver and lives minutes away. She also stays with patient overnight and plans to increase overnight stays after discharge. Level of function/Support: Homebound. Daughter is main caregiver. PCP: Eloy Arguello NP with [address]      Last Visit to PCP: Sees him regularly      DME: ramp, O2 from [address], wheelchair, BSC, walker     Active with any community resources/agencies/skilled home care: [facility], [facility], [facility] for SN wound care. Daughter would like for the patient to add PT/OT to care with [facility] at discharge. Medication compliance issues: Denies    Financial issues that could impact healthcare: None      Tentative discharge plan: Daughter states that all needs met with OSEAS Marie Pay at [facility]. Daughter informed of SNF recommendation. Patient went to SNF for one day in the past and insisted on going home. Per Shai Ho, patient already told her that she does not want SNF at discharge when she visted the patient recently. Daughter is main caregiver and okay with patient returning home with the addition of PT/OT with [facility]. Discussed and provided facilities of choice if transition to a skilled nursing facility is required at the time of discharge      Discussed with patient and/or family that on the day of discharge home tentative time of discharge will be between 10 AM and noon.     Transportation at the time of discharge: Transportation home with ramp to enter. *Case management will continue to follow progress and update discharge plan as needed.     GRETCHEN LozadaN, RN  [phone]

## 2020-10-12 NOTE — PROGRESS NOTES
[facility] PROGRESS NOTE    10/12/2020 3:32 PM        Name: Dora Winn . Admitted: 10/10/2020  Primary Care Provider: Yolanda Holliday (Tel: [phone])                        Subjective:  . No acute events overnight. Resting well. Pain control. Diet ok. Labs reviewed  Denies any chest pain sob.      Reviewed interval ancillary notes    Current Medications  menthol-zinc oxide (CALMOSEPTINE) 0.44-20.6 % ointment, BID PRN  potassium chloride (KLOR-CON M) extended release tablet 20 mEq, BID WC  influenza quadrivalent split vaccine (FLUZONE;FLUARIX;FLULAVAL;AFLURIA) injection 0.5 mL, Prior to discharge  FLUoxetine (PROZAC) capsule 40 mg, Daily  furosemide (LASIX) tablet 40 mg, Daily  ipratropium-albuterol (DUONEB) nebulizer solution 3 mL, Q6H PRN  levothyroxine (SYNTHROID) tablet 112 mcg, Daily  pregabalin (LYRICA) capsule 150 mg, BID  senna (SENOKOT) tablet 8.6 mg, Daily  budesonide-formoterol (SYMBICORT) 160-4.5 MCG/ACT inhaler 1 puff, BID  sodium chloride flush 0.9 % injection 10 mL, 2 times per day  sodium chloride flush 0.9 % injection 10 mL, PRN  acetaminophen (TYLENOL) tablet 650 mg, Q6H PRN    Or  acetaminophen (TYLENOL) suppository 650 mg, Q6H PRN  polyethylene glycol (GLYCOLAX) packet 17 g, Daily PRN  promethazine (PHENERGAN) tablet 12.5 mg, Q6H PRN    Or  ondansetron (ZOFRAN) injection 4 mg, Q6H PRN  enoxaparin (LOVENOX) injection 40 mg, Nightly  perflutren lipid microspheres (DEFINITY) injection 1.65 mg, ONCE PRN  pantoprazole (PROTONIX) tablet 40 mg, QAM AC  cetirizine (ZYRTEC) tablet 10 mg, Daily  oxyCODONE (OXYCONTIN) extended release tablet 10 mg, Q12H  oxyCODONE-acetaminophen (PERCOCET)  MG per tablet 1 tablet, Q6H PRN        Objective:  /73   Pulse 66   Temp 98.1 °F (36.7 °C) (Oral)   Resp 14   Ht 5' 1.5\" (1.562 m)   Wt 261 lb 4.8 oz history of COPD, chronic respiratory failure, hypertension, hyperlipidemia, chronic back pain who was found on the floor for unclear duration and without recollection of events found to have mild rhabdomyolysis with acute on chronic back pain.       Assement & Plan:   1. Syncope  -Likely multifactorial  -Although work-up thus far has been negative. Echocardiogram is negative.  -Patient is severely deconditioned. PT OT evaluation after highly recommend skilled nursing facility but patient persistently refusing. Can barely maneuver out of the bed without any issues.  -Social work working with daughter for possible placement if she agrees    Diet: DIET CARDIAC;  Code:Full Code  DVT PPX lovenox  Disposition-patient is very high risk with probably unable to care for herself at home.   Otherwise medically stable for discharge if she agrees to rehab      Jonatan Ray MD   10/12/2020 3:32 PM

## 2020-10-12 NOTE — PROGRESS NOTES
Occupational Therapy   Occupational Therapy Initial Assessment  Date: 10/12/2020   Patient Name: Dougie Rivera  MRN: [id number]     : 1935    Date of Service: 10/12/2020    Discharge Recommendations: Dougie Rivera scored a 8/24 on the AM-PAC ADL Inpatient form. Current research shows that an AM-PAC score of 17 or less is typically not associated with a discharge to the patient's home setting. Based on the patient's AM-PAC score and their current ADL deficits, it is recommended that the patient have 3-5 sessions per week of Occupational Therapy at d/c to increase the patient's independence. Please see assessment section for further patient specific details. If patient discharges prior to next session this note will serve as a discharge summary. Please see below for the latest assessment towards goals. OT Equipment Recommendations  Equipment Needed: No    Assessment   Performance deficits / Impairments: Decreased functional mobility ; Decreased strength;Decreased endurance;Decreased ADL status; Decreased safe awareness;Decreased cognition;Decreased posture  Assessment: Patient is below baseline function presenting with the above deficits; limiting ability to participate in ADLs and functional mobility. Pt would benefit from further skilled OT services in order to maximize independence with tasks  Treatment Diagnosis: Above deficits associated with syncope and collapse  Prognosis: Fair  Decision Making: Medium Complexity  OT Education: OT Role;Plan of Care;Orientation;Transfer Training  Patient Education: Pt verbalized understanding, would benefit from further reinforcement  Barriers to Learning: cognition, hearing  REQUIRES OT FOLLOW UP: Yes  Activity Tolerance  Activity Tolerance: Patient Tolerated treatment well  Activity Tolerance: Patient was negative for orthostatic BP  Safety Devices  Safety Devices in place: Yes  Type of devices: All fall risk precautions in place; Bed alarm in place;Call ability. ADL  Feeding: Setup  Toileting: Dependent/Total(don briefs in bed)  Tone RUE  RUE Tone: Normotonic  Tone LUE  LUE Tone: Normotonic  Coordination  Movements Are Fluid And Coordinated: Yes     Bed mobility  Rolling to Left: 2 Person assistance(max(a) of 2)  Rolling to Right: 2 Person assistance(max(a) of 2)  Supine to Sit: 2 Person assistance;Dependent/Total(dependent of 2)  Sit to Supine: 2 Person assistance;Dependent/Total(dependent of 2)  Scooting: Dependent/Total;2 Person assistance(dependent of 2)  Transfers  Stand Step Transfers: Unable to assess(pt attempted to move feet w/ RW)  Sit to stand: 2 Person assistance;Dependent/Total(Dependent of 2, x2 from EOB)  Stand to sit: 2 Person assistance;Dependent/Total(Dependent of 2)  Transfer Comments: Jodee Ra steady used for sit > stand transfers. Steady unable to fit under or around recliner in room. Patient place back in bed supine and HOB elevated for breakfast.     Cognition  Overall Cognitive Status: Exceptions  Arousal/Alertness: Delayed responses to stimuli  Following Commands: Follows one step commands with increased time; Follows one step commands with repetition  Attention Span: Attends with cues to redirect  Memory: Decreased recall of biographical Information  Safety Judgement: Decreased awareness of need for assistance;Decreased awareness of need for safety  Problem Solving: Decreased awareness of errors  Insights: Decreased awareness of deficits  Initiation: Requires cues for some  Sequencing: Requires cues for some  Perception  Overall Perceptual Status: WFL     Sensation  Overall Sensation Status: Impaired(peripheral neuropathy BLEs)        LUE AROM (degrees)  LUE AROM : Exceptions  LUE General AROM: limited range  L Shoulder Flexion 0-180: 90  RUE AROM (degrees)  RUE AROM : Exceptions  RUE General AROM: limited range  R Shoulder Flexion 0-180: 90  LUE Strength  Gross LUE Strength: WFL  LUE Strength Comment: unequal bilateral squeeze; weakness noted in LUE  RUE Strength  Gross RUE Strength: WFL                   Plan   Plan  Times per week: 3-5x/wk  Times per day: Daily  Current Treatment Recommendations: Strengthening, Pain Management, Functional Mobility Training, Self-Care / ADL, Safety Education & Training, Patient/Caregiver Education & Training      AM-PAC Score        AM-PAC Inpatient Daily Activity Raw Score: 8 (10/12/20 0954)  AM-PAC Inpatient ADL T-Scale Score : 22.86 (10/12/20 0954)  ADL Inpatient CMS 0-100% Score: 85.69 (10/12/20 0954)  ADL Inpatient CMS G-Code Modifier : CM (10/12/20 0954)    Goals  Short term goals  Short term goal 1: SBA UB dressing tasks  Short term goal 2: SBA LB dressing tasks  Short term goal 3: Mod A bathing tasks  Short term goal 4: Mod A Fxl mobility  Short term goal 5: Mod A Fxl transfers       Therapy Time   Individual Concurrent Group Co-treatment   Time In 0840         Time Out 0935         Minutes 55              Timed Code Treatment Minutes:  40 minutes    Total Treatment Minutes:  55 minutes    BEST Velazquez  Therapist directly observed and directed this treatment.   Karishma Rendon, OTR/L [id number]    Brent Santos, OT

## 2020-10-13 PROCEDURE — 97530 THERAPEUTIC ACTIVITIES: CPT

## 2020-10-13 PROCEDURE — 2700000000 HC OXYGEN THERAPY PER DAY

## 2020-10-13 PROCEDURE — 94760 N-INVAS EAR/PLS OXIMETRY 1: CPT

## 2020-10-13 PROCEDURE — 97535 SELF CARE MNGMENT TRAINING: CPT

## 2020-10-13 PROCEDURE — 2580000003 HC RX 258: Performed by: INTERNAL MEDICINE

## 2020-10-13 PROCEDURE — 1200000000 HC SEMI PRIVATE

## 2020-10-13 PROCEDURE — 6370000000 HC RX 637 (ALT 250 FOR IP): Performed by: INTERNAL MEDICINE

## 2020-10-13 PROCEDURE — 96372 THER/PROPH/DIAG INJ SC/IM: CPT

## 2020-10-13 PROCEDURE — 6360000002 HC RX W HCPCS: Performed by: INTERNAL MEDICINE

## 2020-10-13 PROCEDURE — G0378 HOSPITAL OBSERVATION PER HR: HCPCS

## 2020-10-13 RX ADMIN — SENNOSIDES 8.6 MG: 8.6 TABLET, FILM COATED ORAL at 08:31

## 2020-10-13 RX ADMIN — FLUOXETINE 40 MG: 20 CAPSULE ORAL at 08:30

## 2020-10-13 RX ADMIN — ENOXAPARIN SODIUM 40 MG: 40 INJECTION SUBCUTANEOUS at 21:35

## 2020-10-13 RX ADMIN — POTASSIUM CHLORIDE 20 MEQ: 1500 TABLET, EXTENDED RELEASE ORAL at 17:36

## 2020-10-13 RX ADMIN — Medication 10 ML: at 08:31

## 2020-10-13 RX ADMIN — OXYCODONE HYDROCHLORIDE AND ACETAMINOPHEN 1 TABLET: 10; 325 TABLET ORAL at 06:33

## 2020-10-13 RX ADMIN — Medication 10 ML: at 21:36

## 2020-10-13 RX ADMIN — OXYCODONE HYDROCHLORIDE 10 MG: 10 TABLET, FILM COATED, EXTENDED RELEASE ORAL at 18:26

## 2020-10-13 RX ADMIN — OXYCODONE HYDROCHLORIDE 10 MG: 10 TABLET, FILM COATED, EXTENDED RELEASE ORAL at 08:30

## 2020-10-13 RX ADMIN — FUROSEMIDE 40 MG: 40 TABLET ORAL at 08:31

## 2020-10-13 RX ADMIN — LEVOTHYROXINE SODIUM 112 MCG: 0.11 TABLET ORAL at 06:29

## 2020-10-13 RX ADMIN — PREGABALIN 150 MG: 75 CAPSULE ORAL at 08:31

## 2020-10-13 RX ADMIN — PREGABALIN 150 MG: 75 CAPSULE ORAL at 21:36

## 2020-10-13 RX ADMIN — CETIRIZINE HYDROCHLORIDE 10 MG: 10 TABLET, FILM COATED ORAL at 08:31

## 2020-10-13 RX ADMIN — POTASSIUM CHLORIDE 20 MEQ: 1500 TABLET, EXTENDED RELEASE ORAL at 08:31

## 2020-10-13 RX ADMIN — OXYCODONE HYDROCHLORIDE AND ACETAMINOPHEN 1 TABLET: 10; 325 TABLET ORAL at 00:27

## 2020-10-13 RX ADMIN — PANTOPRAZOLE SODIUM 40 MG: 40 TABLET, DELAYED RELEASE ORAL at 06:29

## 2020-10-13 ASSESSMENT — PAIN DESCRIPTION - PAIN TYPE
TYPE: ACUTE PAIN
TYPE: CHRONIC PAIN

## 2020-10-13 ASSESSMENT — PAIN SCALES - GENERAL
PAINLEVEL_OUTOF10: 8
PAINLEVEL_OUTOF10: 5
PAINLEVEL_OUTOF10: 9
PAINLEVEL_OUTOF10: 7
PAINLEVEL_OUTOF10: 8

## 2020-10-13 ASSESSMENT — PAIN DESCRIPTION - ONSET: ONSET: ON-GOING

## 2020-10-13 ASSESSMENT — PAIN DESCRIPTION - LOCATION
LOCATION: GENERALIZED
LOCATION: BACK;ARM;LEG

## 2020-10-13 ASSESSMENT — PAIN - FUNCTIONAL ASSESSMENT: PAIN_FUNCTIONAL_ASSESSMENT: PREVENTS OR INTERFERES SOME ACTIVE ACTIVITIES AND ADLS

## 2020-10-13 ASSESSMENT — PAIN DESCRIPTION - FREQUENCY: FREQUENCY: CONTINUOUS

## 2020-10-13 ASSESSMENT — PAIN DESCRIPTION - PROGRESSION: CLINICAL_PROGRESSION: NOT CHANGED

## 2020-10-13 NOTE — PROGRESS NOTES
Occupational Therapy  Facility/Department: [facility] 3A NURSING  Daily Treatment Note  NAME: Candida Song  : 1935  MRN: [id number]    Date of Service: 10/13/2020    Discharge Recommendations: Candida Song scored a  on the AM-PAC ADL Inpatient form. Current research shows that an AM-PAC score of 17 or less is typically not associated with a discharge to the patient's home setting. Based on the patient's AM-PAC score and their current ADL deficits, it is recommended that the patient have 3-5 sessions per week of Occupational Therapy at d/c to increase the patient's independence. Please see assessment section for further patient specific details. If patient discharges prior to next session this note will serve as a discharge summary. Please see below for the latest assessment towards goals. Assessment   Performance deficits / Impairments: Decreased functional mobility ; Decreased strength;Decreased endurance;Decreased ADL status; Decreased safe awareness;Decreased cognition;Decreased posture  Assessment: Patient is below baseline function presenting with the above deficits; limiting ability to participate in ADLs and functional mobility. Pt would benefit from further skilled OT services in order to maximize independence with tasks  Treatment Diagnosis: Above deficits associated with syncope and collapse  Prognosis: Fair  OT Education: OT Role;Plan of Care;Orientation;Transfer Training  Patient Education: Pt verbalized understanding, would benefit from further reinforcement  Barriers to Learning: cognition, hearing  REQUIRES OT FOLLOW UP: Yes  Activity Tolerance  Activity Tolerance: Patient Tolerated treatment well  Safety Devices  Safety Devices in place: Yes  Type of devices: All fall risk precautions in place; Bed alarm in place;Call light within reach; Left in bed;Patient at risk for falls;Gait belt;Nurse notified         Patient Diagnosis(es): The primary encounter diagnosis was Altered mental status, unspecified altered mental status type. Diagnoses of Acute exacerbation of chronic low back pain, Spinal stenosis, unspecified spinal region, Fall from bed, initial encounter, and Elevated CK were also pertinent to this visit. has a past medical history of Arthritis, COPD (chronic obstructive pulmonary disease) ([address] Hemorrhoids, Hernia of unspecified site of abdominal cavity without mention of obstruction or gangrene, Incontinence, Knee pain, bilateral, Spinal stenosis, and Spinal stenosis. has a past surgical history that includes Cholecystectomy and partial hysterectomy (cervix not removed). Restrictions  Restrictions/Precautions  Restrictions/Precautions: Fall Risk(High Fall Risk)  Required Braces or Orthoses?: No  Position Activity Restriction  Other position/activity restrictions: Didi Betancur is a 82 y.o. female with history of COPD, sacral ulcer, spinal stenosis, severe arthritis, morbid obesity who presents to the emergency department complaining of pain all over her body. She had apparently fallen out of bed this morning and was on the ground for several hours according to paramedics. Patient states that she did not fall out of bed. She tells me that she may have fallen out of her assisted chair. She is a poor historian. She does have small abrasions to her left forearm and is complaining of some discomfort in this extremity. She has a known left buttock ulcer. Subjective   General  Chart Reviewed: Yes  Family / Caregiver Present: No  Diagnosis: Syncope and collapse  Subjective  Subjective: Patient supine in bed upon arrival, agreeable to cotx session      Orientation  Orientation  Overall Orientation Status: Within Functional Limits  Objective    ADL  Feeding: Setup  Grooming: Setup(Washed face EOB)  UE Bathing:  Moderate assistance(EOB w/ soapy water; requires assist to get under breasts for washing/drying)  LE Bathing: Dependent/Total(assist to wash legs EOB)  UE Dressing: Setup(gown)  LE Dressing: Dependent/Total(don socks)  Additional Comments: Patient participated in UB/LB bathing EOB requiring assist and increased time to complete wash/dry UB/LB; pericare completed supine in bed with rolling to right side; zinc cream applied to UB and venessa area        Balance  Sitting Balance: Contact guard assistance  Standing Balance: Unable to assess(comment)  Standing Balance  Time: ~30 minutes  Activity: participate in UB/LB bathing/dressing tasks  Comment: pt fatigued quickly throughout ADL tasks  Functional Mobility  Functional Mobility Comments: no ambulation attempted on this date  Bed mobility  Rolling to Left: Maximum assistance(HOB flat. VC's for use of bedrail to assist.)  Rolling to Right: Maximum assistance(HOB flat. VC's for use of bedrail to assist.)  Supine to Sit: Maximum assistance;2 Person assistance(Pt able to asssit with LE's to EOB. Increased time and effort with heavy use of bedrail. MaxAx2 at trunk. HOB elevated.)  Sit to Supine: Maximum assistance;2 Person assistance(For BLE's and trunk.)  Scooting: Dependent/Total;2 Person assistance(Scoot EOB and up in bed.)  Transfers  Transfer Comments: no transfers on this date due to wanting to participate in ADLs only         Cognition  Overall Cognitive Status: Exceptions  Arousal/Alertness: Delayed responses to stimuli  Following Commands: Follows one step commands with increased time; Follows one step commands with repetition  Attention Span: Attends with cues to redirect  Memory: Decreased recall of biographical Information  Safety Judgement: Decreased awareness of need for assistance;Decreased awareness of need for safety  Problem Solving: Decreased awareness of errors  Insights: Decreased awareness of deficits  Initiation: Requires cues for some  Sequencing: Requires cues for some         Plan   Plan  Times per week: 3-5x/wk  Times per day: Daily  Current Treatment Recommendations: Strengthening, Pain Management, Functional Mobility Training, Self-Care / ADL, Safety Education & Training, Patient/Caregiver Education & Training    AM-PAC Score        AM-PAC Inpatient Daily Activity Raw Score: 9 (10/13/20 1211)  AM-PAC Inpatient ADL T-Scale Score : 25.33 (10/13/20 1211)  ADL Inpatient CMS 0-100% Score: 79.59 (10/13/20 1211)  ADL Inpatient CMS G-Code Modifier : CL (10/13/20 1211)    Goals  Short term goals  Short term goal 1: SBA UB dressing tasks- setup for gown 10/13  Short term goal 2: SBA LB dressing tasks- dependent for socks 10/13  Short term goal 3: Mod A bathing tasks- UB mod(a), LB dependent 10/13  Short term goal 4: Mod A Fxl mobility- ongoing 10/13  Short term goal 5: Mod A Fxl transfers- ongoing 10/13       Therapy Time   Individual Concurrent Group Co-treatment   Time In       1117   Time Out       1155   Minutes       38        Timed Code Treatment Minutes:  38 minutes    Total Treatment Minutes:  38 minutes    BEST Bull  OT provided direct supervision to student, facilitated in making skilled judgements throughout duration of session.     ARANZA Caba OTR/L [id number]     Yara Jones OT

## 2020-10-13 NOTE — DISCHARGE SUMMARY
Patient: Minal Coburn     Gender: female  : 1935   Age: 82 y.o. MRN: [id number]    Admitting Physician: Ivan Chung MD  Discharge Physician: Oma Ramírez MD     Code Status: Full Code     Admit Date: 10/10/2020   Discharge Date:  10/14/2020    Disposition:  snf    Discharge Diagnoses:  Fall with possible syncope strongly suspect multifactorial  Rhabdomyolysis mild acute resolved with IV fluids  Chronic respiratory failure with COPD on 2 L oxygen  Generalized debility    Active Hospital Problems    Diagnosis Date Noted    Fall at home [W19. Osmel Kamara, G91.534] 10/10/2020    Rhabdomyolysis [M62.82] 10/10/2020    Altered mental state [R41.82] 10/10/2020    Syncope and collapse [R55] 10/10/2020    Morbid obesity with BMI of 50.0-59.9, adult ([facility]) [E66.01, Z68.43]        Follow-up appointments:  one week    Outpatient to do list: none    Condition at Discharge:  [address]:   81-year-old lady admitted to the hospital with fall and possible syncope  An extensive work-up including orthostatic echocardiogram was all normal  There were no acute events on telemetry  EKG and troponins were unremarkable  Strongly suspect that this was multifactorial due to her general debility  She did extremely poorly with physical and Occupational Therapy evaluation  She is being discharged to a nursing facility  She has chronic respiratory failure due to COPD and is on baseline 2 L oxygen      Discharge Medications:   Current Discharge Medication List      START taking these medications    Details   menthol-zinc oxide (CALMOSEPTINE) 0.44-20.6 % OINT ointment Apply topically 2 times daily as needed (incontinence associated dermatitis) Max 30 ml per day.   Qty: 1 Tube, Refills: 4           Current Discharge Medication List        Current Discharge Medication List      CONTINUE these medications which have NOT CHANGED    Details   senna-docusate (PERICOLACE) 8.6-50 MG per tablet Take 1 tablet by mouth daily docusate sodium (COLACE) 100 MG capsule Take 100 mg by mouth 2 times daily      oxyCODONE (OXYCONTIN) 10 MG extended release tablet Take 10 mg by mouth every 12 hours. oxyCODONE-acetaminophen (PERCOCET)  MG per tablet Take 1 tablet by mouth every 6 hours as needed for Pain.      hydrocortisone (ANUSOL-HC) 2.5 % rectal cream Place rectally 2 times daily. Qty: 1 Tube, Refills: 0      ipratropium-albuterol (DUONEB) 0.5-2.5 (3) MG/3ML SOLN nebulizer solution Inhale 1 vial into the lungs every 6 hours as needed for Shortness of Breath      SYMBICORT 160-4.5 MCG/ACT AERO TAKE 2 PUFFS TWICE A DAY  Refills: 3      levothyroxine (SYNTHROID) 112 MCG tablet TAKE 1 TABLET BY MOUTH EVERY DAY  Refills: 3      furosemide (LASIX) 40 MG tablet TAKE 1 TABLET BY MOUTH EVERY DAY  Refills: 3      magnesium hydroxide (MILK OF MAGNESIA) 400 MG/5ML suspension Take 30 mLs by mouth daily as needed for Constipation  Qty: 1 Bottle, Refills: 0      fluoxetine (PROZAC) 20 MG capsule Take 40 mg by mouth daily. pregabalin (LYRICA) 50 MG capsule Take 150 mg by mouth 2 times daily. esomeprazole (NEXIUM) 40 MG capsule Take 40 mg by mouth every morning (before breakfast). MOVANTIK 25 MG TABS tablet Take 25 mg by mouth daily as needed  Refills: 5           Current Discharge Medication List      STOP taking these medications       loratadine (CLARITIN) 10 MG tablet Comments:   Reason for Stopping:         senna (SENOKOT) 8.6 MG tablet Comments:   Reason for Stopping:               Discharge ROS:  A complete review of systems was asked and negative     Discharge Exam:    /68   Pulse 66   Temp 98.6 °F (37 °C) (Oral)   Resp 18   Ht 5' 1.5\" (1.562 m)   Wt 262 lb 11.2 oz (119.2 kg) Comment: with pump, HOB flat, 2 pillows, fitted and a sheet.   SpO2 95%   BMI 48.83 kg/m²   General appearance:  NAD  HEENT:   Normal cephalic, atraumatic, moist mucous membranes, no oropharyngeal erythema or exudate  Heart[de-identified] Normal s1/s2, RRR, no murmurs, gallops, or rubs. no leg edema  Lungs:  Normal respiratory effort. Clear to auscultation, bilaterally without Rales/Wheezes/Rhonchi. Abdomen: Soft, non-tender, non-distended, bowel sounds present, no masses  Musculoskeletal:  No clubbing, no cyanosis, *  Neurologic:  Neurovascularly intact without any focal sensory/motor deficits. Cranial nerves: II-XII intact, grossly non-focal.    Labs: For convenience and continuity at follow-up the following most recent labs are provided:    Lab Results   Component Value Date    WBC 6.5 10/12/2020    HGB 10.1 10/12/2020    HCT 31.5 10/12/2020    MCV 91.5 10/12/2020     10/12/2020     10/12/2020    K 3.6 10/12/2020    K 3.4 10/11/2020    CL 99 10/12/2020    CO2 37 10/12/2020    BUN 9 10/12/2020    CREATININE 0.7 10/12/2020    CALCIUM 9.1 10/12/2020    PHOS 5.6 07/02/2019    BNP 8.1 03/21/2011    ALKPHOS 124 10/10/2020    ALT 19 10/10/2020    AST 21 10/10/2020    BILITOT 0.3 10/10/2020    LABALBU 3.5 10/10/2020    LDLCALC 141 05/11/2019    TRIG 103 05/11/2019     Lab Results   Component Value Date    INR 0.95 06/20/2019    INR 0.91 05/10/2019    INR 0.90 08/20/2012           The patient was seen and examined on day of discharge and this discharge summary is in conjunction with any daily progress note from day of discharge. Time Spent on discharge is 45 minutes  in the examination, evaluation, counseling and review of medications and discharge plan. Note that greater  than 30 minutes was spent in preparing discharge papers, discussing discharge with patient, medication review, etc.       Signed:    Essie Batista MD   10/13/2020      Thank you Matheus Salcedo for the opportunity to be involved in this patient's care.  If you have any questions or concerns please feel free to contact me

## 2020-10-13 NOTE — PROGRESS NOTES
Physician Progress Note      PATIENT:               Omar Agudelo  CSN #:                  [id number]  :                       1935  ADMIT DATE:       10/10/2020 9:30 AM  [address] [place] DATE:  RESPONDING  PROVIDER #:        Mckayla Adamson MD          QUERY TEXT:    Pt admitted with fall, syncope and rhabdomyolysis, noted to have general   debility. If possible, please document in the progress notes and/or discharge   summary if you are evaluating and / or treating any of the following: The medical record reflects the following:  Risk Factors: Elderly patient  Clinical Indicators: 82 yrs old; documentation states \"general debility\"  Treatment: Seen by OT and PT  Options provided:  -- Age Related Physical Debility  -- Frailty  -- Unspecified general debility due to other, Please document other cause. -- Other - I will add my own diagnosis  -- Disagree - Not applicable / Not valid  -- Disagree - Clinically unable to determine / Unknown  -- Refer to Clinical Documentation Reviewer    PROVIDER RESPONSE TEXT:    This patient has Age Related physical debility.     Query created by: Betsey Louise on 10/13/2020 2:37 PM      Electronically signed by:  Mckayla Adamson MD 10/13/2020 2:40 PM

## 2020-10-13 NOTE — CARE COORDINATION
Patient and daughter were provided with the list of [employer] approved SNF. Choices were. [facility], Home at [facility], and [facility] . Referral sent , waiting for call back.

## 2020-10-13 NOTE — PROGRESS NOTES
Physical Therapy  Facility/Department: [address] NURSING  Daily Treatment Note  NAME: Zaid Sahni  : 1935  MRN: [id number]    Date of Service: 10/13/2020    Discharge Recommendations:    Zaid Sahni scored a 7/24 on the AM-PAC short mobility form. Current research shows that an AM-PAC score of 17 or less is typically not associated with a discharge to the patient's home setting. Based on the patient's AM-PAC score and their current functional mobility deficits, it is recommended that the patient have 3-5 sessions per week of Physical Therapy at d/c to increase the patient's independence. Please see assessment section for further patient specific details. If patient discharges prior to next session this note will serve as a discharge summary. Please see below for the latest assessment towards goals. PT Equipment Recommendations  Equipment Needed: No    Assessment   Body structures, Functions, Activity limitations: Decreased functional mobility ; Decreased ROM; Decreased strength;Decreased safe awareness;Decreased cognition;Decreased endurance;Decreased balance;Decreased sensation; Increased pain  Assessment: Pt demonstrates globally impaired strength and endurance below baseline. Pt currently requiring assist of 2 for mobility and is unsafe to attempt further mobility and ambulation d/t severe deconditioning. Pt is hopeful to d/c to SNF and would benefit from continued skilled therapy to facilitate overall improved functional mobility and independence. Treatment Diagnosis: decreased strength, functional mobility  Prognosis: Fair  Decision Making: Medium Complexity  PT Education: Goals; General Safety;PT Role;Functional Mobility Training  Patient Education: d/c recommendations. Pt verbalizing understanding.   Barriers to Learning: cognitive, hearing  REQUIRES PT FOLLOW UP: Yes  Activity Tolerance  Activity Tolerance: Patient limited by endurance     Patient Diagnosis(es): The primary encounter seen previous data.)       Orientation  Orientation  Overall Orientation Status: Within Functional Limits  Cognition      Objective   Bed mobility  Rolling to Left: Maximum assistance(HOB flat. VC's for use of bedrail to assist.)  Rolling to Right: Maximum assistance(HOB flat. VC's for use of bedrail to assist.)  Supine to Sit: Maximum assistance;2 Person assistance(Pt able to asssit with LE's to EOB. Increased time and effort with heavy use of bedrail. MaxAx2 at trunk. HOB elevated.)  Sit to Supine: Maximum assistance;2 Person assistance(For BLE's and trunk.)  Scooting: Dependent/Total;2 Person assistance(Scoot EOB and up in bed.)     Ambulation  Ambulation?: No  Stairs/Curb  Stairs?: No     Balance  Posture: Fair  Sitting - Static: Fair;+  Sitting - Dynamic: Poor  Comments: Pt sat EOB ~30 minutes to complete ADL's SBA-CGA at times. Pt unable to perform dynamic reaching outside VILMA safely, requiring DepA.  Pt fatiguing quickly, leaning arms on bedrail at times to rest.                           G-Code     OutComes Score                                                     AM-PAC Score  -PAC Inpatient Mobility Raw Score : 7 (10/13/20 1210)  AM-PAC Inpatient T-Scale Score : 26.42 (10/13/20 1210)  Mobility Inpatient CMS 0-100% Score: 92.36 (10/13/20 1210)  Mobility Inpatient CMS G-Code Modifier : CM (10/13/20 1210)          Goals  Short term goals  Time Frame for Short term goals: discharge  Short term goal 1: bed mobililty with max A  Short term goal 2: sit to/from stand with max A  Short term goal 3: bed to/from chair with max A  Patient Goals   Patient goals : return home  NO GOALS MET THIS DATE    Plan    Plan  Times per week: 3-5x  Times per day: Daily  Current Treatment Recommendations: Strengthening, ROM, Balance Training, Functional Mobility Training, Transfer Training, Safety Education & Training  Safety Devices  Type of devices: Bed alarm in place, Left in bed, Nurse notified, Call light within reach Therapy Time   Individual Concurrent Group Co-treatment   Time In       2222   Time Out       1155   Minutes       [address], [place]      I agree with the note above. Goals addressed by PT this session.    [address] RodriguezLogansport Memorial Hospitaljudy [id number]

## 2020-10-14 VITALS
RESPIRATION RATE: 18 BRPM | HEIGHT: 62 IN | DIASTOLIC BLOOD PRESSURE: 84 MMHG | OXYGEN SATURATION: 96 % | BODY MASS INDEX: 49.87 KG/M2 | HEART RATE: 68 BPM | WEIGHT: 271 LBS | TEMPERATURE: 97.8 F | SYSTOLIC BLOOD PRESSURE: 131 MMHG

## 2020-10-14 LAB
BLOOD CULTURE, ROUTINE: NORMAL
CULTURE, BLOOD 2: NORMAL
SARS-COV-2, NAAT: NOT DETECTED

## 2020-10-14 PROCEDURE — 2580000003 HC RX 258: Performed by: INTERNAL MEDICINE

## 2020-10-14 PROCEDURE — 94761 N-INVAS EAR/PLS OXIMETRY MLT: CPT

## 2020-10-14 PROCEDURE — U0002 COVID-19 LAB TEST NON-CDC: HCPCS

## 2020-10-14 PROCEDURE — 94640 AIRWAY INHALATION TREATMENT: CPT

## 2020-10-14 PROCEDURE — 2700000000 HC OXYGEN THERAPY PER DAY

## 2020-10-14 PROCEDURE — 6370000000 HC RX 637 (ALT 250 FOR IP): Performed by: INTERNAL MEDICINE

## 2020-10-14 RX ADMIN — OXYCODONE HYDROCHLORIDE AND ACETAMINOPHEN 1 TABLET: 10; 325 TABLET ORAL at 06:26

## 2020-10-14 RX ADMIN — POTASSIUM CHLORIDE 20 MEQ: 1500 TABLET, EXTENDED RELEASE ORAL at 17:40

## 2020-10-14 RX ADMIN — LEVOTHYROXINE SODIUM 112 MCG: 0.11 TABLET ORAL at 06:26

## 2020-10-14 RX ADMIN — SENNOSIDES 8.6 MG: 8.6 TABLET, FILM COATED ORAL at 09:06

## 2020-10-14 RX ADMIN — Medication 1 PUFF: at 07:43

## 2020-10-14 RX ADMIN — PANTOPRAZOLE SODIUM 40 MG: 40 TABLET, DELAYED RELEASE ORAL at 06:27

## 2020-10-14 RX ADMIN — FLUOXETINE 40 MG: 20 CAPSULE ORAL at 09:06

## 2020-10-14 RX ADMIN — OXYCODONE HYDROCHLORIDE AND ACETAMINOPHEN 1 TABLET: 10; 325 TABLET ORAL at 00:20

## 2020-10-14 RX ADMIN — FUROSEMIDE 40 MG: 40 TABLET ORAL at 09:06

## 2020-10-14 RX ADMIN — OXYCODONE HYDROCHLORIDE 10 MG: 10 TABLET, FILM COATED, EXTENDED RELEASE ORAL at 09:06

## 2020-10-14 RX ADMIN — CETIRIZINE HYDROCHLORIDE 10 MG: 10 TABLET, FILM COATED ORAL at 09:07

## 2020-10-14 RX ADMIN — PREGABALIN 150 MG: 75 CAPSULE ORAL at 09:06

## 2020-10-14 RX ADMIN — ANORECTAL OINTMENT: 15.7; .44; 24; 20.6 OINTMENT TOPICAL at 09:05

## 2020-10-14 RX ADMIN — Medication 10 ML: at 09:09

## 2020-10-14 RX ADMIN — POTASSIUM CHLORIDE 20 MEQ: 1500 TABLET, EXTENDED RELEASE ORAL at 09:06

## 2020-10-14 RX ADMIN — OXYCODONE HYDROCHLORIDE AND ACETAMINOPHEN 1 TABLET: 10; 325 TABLET ORAL at 17:41

## 2020-10-14 ASSESSMENT — PAIN SCALES - GENERAL
PAINLEVEL_OUTOF10: 6
PAINLEVEL_OUTOF10: 8
PAINLEVEL_OUTOF10: 0
PAINLEVEL_OUTOF10: 9
PAINLEVEL_OUTOF10: 8

## 2020-10-14 ASSESSMENT — PAIN DESCRIPTION - LOCATION: LOCATION: GENERALIZED

## 2020-10-14 NOTE — CARE COORDINATION
Discharge Plan:     Patient discharged to:  Discharging to [facility]   Fax: [phone]  Report: [phone]    SW/DC Planner faxed, [address] and AVS   Narcotic Prescriptions faxed were:  N/A  RN: Hurley Boxer will call report       Medical Transport with: [facility]  [phone]   time:  5 pm  Family advised of discharge?:  Yes   Daughter- Hira Swan?:    Yes     All discharge needs met per case management.

## 2020-10-14 NOTE — PROGRESS NOTES
Data- discharge order received, pt and daughter (appointed legal authority) verbalized agreement to discharge, disposition to [facility] # [phone], [address] reviewed and signed by physician. Action- AVS prepared, GERALDINE completed/ reported faxed by case management/. Discharge instruction summary: Diet- Cardiac, Activity- as tolerated, immunizations reviewed and pt was reluctant to take flu shot, medications prescriptions to be filled at receiving facility except for the controlled prescriptions to be sent: with pt to [address], Transfer code status: Full Code. Response- Bedside RN tcalled report to receiving facility. Pt belongings gathered, cardiac monitoring removed. Disposition to Discharged via cart/stretcher to skilled nursing by EMS transportation, no complications reported.

## 2020-10-14 NOTE — CARE COORDINATION
Reviewed IMM Letter with patient's daughterMartin Bruner over the phone. Copy provided to patient, original placed on the paper chart.

## 2021-09-10 ENCOUNTER — HOSPITAL ENCOUNTER (INPATIENT)
Age: 86
LOS: 3 days | Discharge: HOME HEALTH | DRG: 208 | End: 2021-09-13
Attending: EMERGENCY MEDICINE | Admitting: INTERNAL MEDICINE
Payer: COMMERCIAL

## 2021-09-10 ENCOUNTER — APPOINTMENT (OUTPATIENT)
Dept: CT IMAGING | Age: 86
DRG: 208 | End: 2021-09-10
Payer: COMMERCIAL

## 2021-09-10 ENCOUNTER — APPOINTMENT (OUTPATIENT)
Dept: GENERAL RADIOLOGY | Age: 86
DRG: 208 | End: 2021-09-10
Payer: COMMERCIAL

## 2021-09-10 DIAGNOSIS — R09.02 HYPOXIA: Primary | ICD-10-CM

## 2021-09-10 DIAGNOSIS — J96.01 ACUTE RESPIRATORY FAILURE WITH HYPOXIA AND HYPERCAPNIA (HCC): ICD-10-CM

## 2021-09-10 DIAGNOSIS — J96.02 ACUTE RESPIRATORY FAILURE WITH HYPOXIA AND HYPERCAPNIA (HCC): ICD-10-CM

## 2021-09-10 DIAGNOSIS — I24.8 DEMAND ISCHEMIA OF MYOCARDIUM (HCC): ICD-10-CM

## 2021-09-10 DIAGNOSIS — N17.9 AKI (ACUTE KIDNEY INJURY) (HCC): ICD-10-CM

## 2021-09-10 PROBLEM — J96.21 ACUTE ON CHRONIC RESPIRATORY FAILURE WITH HYPOXIA (HCC): Status: ACTIVE | Noted: 2021-09-10

## 2021-09-10 LAB
A/G RATIO: 1 (ref 1.1–2.2)
ABO/RH: NORMAL
ALBUMIN SERPL-MCNC: 3.8 G/DL (ref 3.4–5)
ALP BLD-CCNC: 140 U/L (ref 40–129)
ALT SERPL-CCNC: 14 U/L (ref 10–40)
AMYLASE: 34 U/L (ref 25–115)
ANION GAP SERPL CALCULATED.3IONS-SCNC: 8 MMOL/L (ref 3–16)
ANTIBODY SCREEN: NORMAL
APTT: 32.1 SEC (ref 26.2–38.6)
AST SERPL-CCNC: 17 U/L (ref 15–37)
BASE EXCESS ARTERIAL: 2 MMOL/L (ref -3–3)
BASE EXCESS ARTERIAL: 4.9 MMOL/L (ref -3–3)
BASE EXCESS VENOUS: -0.6 MMOL/L (ref -3–3)
BASOPHILS ABSOLUTE: 0.1 K/UL (ref 0–0.2)
BASOPHILS RELATIVE PERCENT: 1.1 %
BILIRUB SERPL-MCNC: <0.2 MG/DL (ref 0–1)
BILIRUBIN URINE: NEGATIVE
BLOOD, URINE: NEGATIVE
BUN BLDV-MCNC: 16 MG/DL (ref 7–20)
CALCIUM SERPL-MCNC: 8.5 MG/DL (ref 8.3–10.6)
CARBOXYHEMOGLOBIN ARTERIAL: 0.9 % (ref 0–1.5)
CARBOXYHEMOGLOBIN ARTERIAL: 1.2 % (ref 0–1.5)
CARBOXYHEMOGLOBIN: 4 % (ref 0–1.5)
CHLORIDE BLD-SCNC: 104 MMOL/L (ref 99–110)
CLARITY: ABNORMAL
CO2: 29 MMOL/L (ref 21–32)
COLOR: YELLOW
CREAT SERPL-MCNC: 1.4 MG/DL (ref 0.6–1.2)
EKG ATRIAL RATE: 95 BPM
EKG DIAGNOSIS: NORMAL
EKG P AXIS: 80 DEGREES
EKG P-R INTERVAL: 198 MS
EKG Q-T INTERVAL: 362 MS
EKG QRS DURATION: 114 MS
EKG QTC CALCULATION (BAZETT): 454 MS
EKG R AXIS: -26 DEGREES
EKG T AXIS: 83 DEGREES
EKG VENTRICULAR RATE: 95 BPM
EOSINOPHILS ABSOLUTE: 0.5 K/UL (ref 0–0.6)
EOSINOPHILS RELATIVE PERCENT: 3.3 %
EPITHELIAL CELLS, UA: 6 /HPF (ref 0–5)
FERRITIN: 43.4 NG/ML (ref 15–150)
GFR AFRICAN AMERICAN: 43
GFR NON-AFRICAN AMERICAN: 36
GLOBULIN: 3.8 G/DL
GLUCOSE BLD-MCNC: 138 MG/DL (ref 70–99)
GLUCOSE URINE: NEGATIVE MG/DL
HCO3 ARTERIAL: 29.8 MMOL/L (ref 21–29)
HCO3 ARTERIAL: 31.8 MMOL/L (ref 21–29)
HCO3 VENOUS: 28.5 MMOL/L (ref 23–29)
HCT VFR BLD CALC: 27.3 % (ref 36–48)
HEMOGLOBIN, ART, EXTENDED: 7.7 G/DL (ref 12–16)
HEMOGLOBIN, ART, EXTENDED: 8.2 G/DL (ref 12–16)
HEMOGLOBIN, VEN, REDUCED: 3 %
HEMOGLOBIN: 8.7 G/DL (ref 12–16)
HYALINE CASTS: 8 /LPF (ref 0–8)
INR BLD: 1 (ref 0.88–1.12)
KETONES, URINE: NEGATIVE MG/DL
LACTIC ACID, SEPSIS: 0.8 MMOL/L (ref 0.4–1.9)
LEUKOCYTE ESTERASE, URINE: ABNORMAL
LIPASE: 17 U/L (ref 13–60)
LYMPHOCYTES ABSOLUTE: 2.9 K/UL (ref 1–5.1)
LYMPHOCYTES RELATIVE PERCENT: 20.4 %
MCH RBC QN AUTO: 30.7 PG (ref 26–34)
MCHC RBC AUTO-ENTMCNC: 32 G/DL (ref 31–36)
MCV RBC AUTO: 95.9 FL (ref 80–100)
METHEMOGLOBIN ARTERIAL: 0.4 %
METHEMOGLOBIN ARTERIAL: 0.8 %
METHEMOGLOBIN VENOUS: 0.2 %
MICROSCOPIC EXAMINATION: YES
MONOCYTES ABSOLUTE: 0.9 K/UL (ref 0–1.3)
MONOCYTES RELATIVE PERCENT: 6.3 %
NEUTROPHILS ABSOLUTE: 9.6 K/UL (ref 1.7–7.7)
NEUTROPHILS RELATIVE PERCENT: 68.9 %
NITRITE, URINE: NEGATIVE
O2 CONTENT, VEN: 12 VOL %
O2 SAT, ARTERIAL: 97.8 %
O2 SAT, ARTERIAL: 99.8 %
O2 SAT, VEN: 97 %
O2 THERAPY: ABNORMAL
PCO2 ARTERIAL: 45.7 MMHG (ref 35–45)
PCO2 ARTERIAL: 89.6 MMHG (ref 35–45)
PCO2, VEN: 75.8 MMHG (ref 40–50)
PDW BLD-RTO: 16.2 % (ref 12.4–15.4)
PH ARTERIAL: 7.16 (ref 7.35–7.45)
PH ARTERIAL: 7.42 (ref 7.35–7.45)
PH UA: 6 (ref 5–8)
PH VENOUS: 7.18 (ref 7.35–7.45)
PLATELET # BLD: 190 K/UL (ref 135–450)
PMV BLD AUTO: 9.4 FL (ref 5–10.5)
PO2 ARTERIAL: 101 MMHG (ref 75–108)
PO2 ARTERIAL: 132 MMHG (ref 75–108)
PO2, VEN: 93.9 MMHG (ref 25–40)
POTASSIUM REFLEX MAGNESIUM: 3.8 MMOL/L (ref 3.5–5.1)
PRO-BNP: 815 PG/ML (ref 0–449)
PROCALCITONIN: 0.13 NG/ML (ref 0–0.15)
PROTEIN UA: NEGATIVE MG/DL
PROTHROMBIN TIME: 11.3 SEC (ref 9.9–12.7)
RBC # BLD: 2.84 M/UL (ref 4–5.2)
RBC UA: 2 /HPF (ref 0–4)
SARS-COV-2: NOT DETECTED
SODIUM BLD-SCNC: 141 MMOL/L (ref 136–145)
SPECIFIC GRAVITY UA: 1.02 (ref 1–1.03)
TCO2 ARTERIAL: 70 MMOL/L
TCO2 ARTERIAL: 77.5 MMOL/L
TCO2 CALC VENOUS: 69 MMOL/L
TOTAL PROTEIN: 7.6 G/DL (ref 6.4–8.2)
TROPONIN: 0.02 NG/ML
URINE TYPE: ABNORMAL
UROBILINOGEN, URINE: 0.2 E.U./DL
WBC # BLD: 14 K/UL (ref 4–11)
WBC UA: 7 /HPF (ref 0–5)

## 2021-09-10 PROCEDURE — 02HV33Z INSERTION OF INFUSION DEVICE INTO SUPERIOR VENA CAVA, PERCUTANEOUS APPROACH: ICD-10-PCS | Performed by: INTERNAL MEDICINE

## 2021-09-10 PROCEDURE — 5A1935Z RESPIRATORY VENTILATION, LESS THAN 24 CONSECUTIVE HOURS: ICD-10-PCS | Performed by: INTERNAL MEDICINE

## 2021-09-10 PROCEDURE — 2700000000 HC OXYGEN THERAPY PER DAY

## 2021-09-10 PROCEDURE — 94660 CPAP INITIATION&MGMT: CPT

## 2021-09-10 PROCEDURE — 83880 ASSAY OF NATRIURETIC PEPTIDE: CPT

## 2021-09-10 PROCEDURE — 94002 VENT MGMT INPAT INIT DAY: CPT

## 2021-09-10 PROCEDURE — 6360000002 HC RX W HCPCS: Performed by: INTERNAL MEDICINE

## 2021-09-10 PROCEDURE — 85730 THROMBOPLASTIN TIME PARTIAL: CPT

## 2021-09-10 PROCEDURE — U0003 INFECTIOUS AGENT DETECTION BY NUCLEIC ACID (DNA OR RNA); SEVERE ACUTE RESPIRATORY SYNDROME CORONAVIRUS 2 (SARS-COV-2) (CORONAVIRUS DISEASE [COVID-19]), AMPLIFIED PROBE TECHNIQUE, MAKING USE OF HIGH THROUGHPUT TECHNOLOGIES AS DESCRIBED BY CMS-2020-01-R: HCPCS

## 2021-09-10 PROCEDURE — 2000000000 HC ICU R&B

## 2021-09-10 PROCEDURE — 99284 EMERGENCY DEPT VISIT MOD MDM: CPT

## 2021-09-10 PROCEDURE — 6360000004 HC RX CONTRAST MEDICATION: Performed by: INTERNAL MEDICINE

## 2021-09-10 PROCEDURE — 0BH17EZ INSERTION OF ENDOTRACHEAL AIRWAY INTO TRACHEA, VIA NATURAL OR ARTIFICIAL OPENING: ICD-10-PCS | Performed by: INTERNAL MEDICINE

## 2021-09-10 PROCEDURE — 85025 COMPLETE CBC W/AUTO DIFF WBC: CPT

## 2021-09-10 PROCEDURE — 51702 INSERT TEMP BLADDER CATH: CPT

## 2021-09-10 PROCEDURE — 74018 RADEX ABDOMEN 1 VIEW: CPT

## 2021-09-10 PROCEDURE — 87086 URINE CULTURE/COLONY COUNT: CPT

## 2021-09-10 PROCEDURE — 71045 X-RAY EXAM CHEST 1 VIEW: CPT

## 2021-09-10 PROCEDURE — 85610 PROTHROMBIN TIME: CPT

## 2021-09-10 PROCEDURE — 6360000002 HC RX W HCPCS: Performed by: FAMILY MEDICINE

## 2021-09-10 PROCEDURE — 82150 ASSAY OF AMYLASE: CPT

## 2021-09-10 PROCEDURE — 71260 CT THORAX DX C+: CPT

## 2021-09-10 PROCEDURE — 94761 N-INVAS EAR/PLS OXIMETRY MLT: CPT

## 2021-09-10 PROCEDURE — 86901 BLOOD TYPING SEROLOGIC RH(D): CPT

## 2021-09-10 PROCEDURE — 93005 ELECTROCARDIOGRAM TRACING: CPT | Performed by: EMERGENCY MEDICINE

## 2021-09-10 PROCEDURE — 86850 RBC ANTIBODY SCREEN: CPT

## 2021-09-10 PROCEDURE — 36415 COLL VENOUS BLD VENIPUNCTURE: CPT

## 2021-09-10 PROCEDURE — 80053 COMPREHEN METABOLIC PANEL: CPT

## 2021-09-10 PROCEDURE — 96374 THER/PROPH/DIAG INJ IV PUSH: CPT

## 2021-09-10 PROCEDURE — 82728 ASSAY OF FERRITIN: CPT

## 2021-09-10 PROCEDURE — 84484 ASSAY OF TROPONIN QUANT: CPT

## 2021-09-10 PROCEDURE — 83690 ASSAY OF LIPASE: CPT

## 2021-09-10 PROCEDURE — 93010 ELECTROCARDIOGRAM REPORT: CPT | Performed by: INTERNAL MEDICINE

## 2021-09-10 PROCEDURE — U0005 INFEC AGEN DETEC AMPLI PROBE: HCPCS

## 2021-09-10 PROCEDURE — 6360000002 HC RX W HCPCS: Performed by: EMERGENCY MEDICINE

## 2021-09-10 PROCEDURE — 87040 BLOOD CULTURE FOR BACTERIA: CPT

## 2021-09-10 PROCEDURE — 2580000003 HC RX 258: Performed by: FAMILY MEDICINE

## 2021-09-10 PROCEDURE — 83605 ASSAY OF LACTIC ACID: CPT

## 2021-09-10 PROCEDURE — 81001 URINALYSIS AUTO W/SCOPE: CPT

## 2021-09-10 PROCEDURE — 86900 BLOOD TYPING SEROLOGIC ABO: CPT

## 2021-09-10 PROCEDURE — 84145 PROCALCITONIN (PCT): CPT

## 2021-09-10 PROCEDURE — 87449 NOS EACH ORGANISM AG IA: CPT

## 2021-09-10 PROCEDURE — 82803 BLOOD GASES ANY COMBINATION: CPT

## 2021-09-10 PROCEDURE — 99291 CRITICAL CARE FIRST HOUR: CPT | Performed by: INTERNAL MEDICINE

## 2021-09-10 RX ORDER — POLYETHYLENE GLYCOL 3350 17 G/17G
17 POWDER, FOR SOLUTION ORAL DAILY PRN
Status: DISCONTINUED | OUTPATIENT
Start: 2021-09-10 | End: 2021-09-14 | Stop reason: HOSPADM

## 2021-09-10 RX ORDER — PROPOFOL 10 MG/ML
5-50 INJECTION, EMULSION INTRAVENOUS
Status: DISCONTINUED | OUTPATIENT
Start: 2021-09-10 | End: 2021-09-13

## 2021-09-10 RX ORDER — SODIUM CHLORIDE 0.9 % (FLUSH) 0.9 %
5-40 SYRINGE (ML) INJECTION EVERY 12 HOURS SCHEDULED
Status: DISCONTINUED | OUTPATIENT
Start: 2021-09-10 | End: 2021-09-14 | Stop reason: HOSPADM

## 2021-09-10 RX ORDER — MIDAZOLAM HYDROCHLORIDE 1 MG/ML
INJECTION INTRAMUSCULAR; INTRAVENOUS
Status: DISPENSED
Start: 2021-09-10 | End: 2021-09-11

## 2021-09-10 RX ORDER — ALBUTEROL SULFATE 90 UG/1
2 AEROSOL, METERED RESPIRATORY (INHALATION) 4 TIMES DAILY
Status: DISCONTINUED | OUTPATIENT
Start: 2021-09-10 | End: 2021-09-11

## 2021-09-10 RX ORDER — BUDESONIDE AND FORMOTEROL FUMARATE DIHYDRATE 160; 4.5 UG/1; UG/1
2 AEROSOL RESPIRATORY (INHALATION) 2 TIMES DAILY
Status: DISCONTINUED | OUTPATIENT
Start: 2021-09-10 | End: 2021-09-14 | Stop reason: HOSPADM

## 2021-09-10 RX ORDER — PANTOPRAZOLE SODIUM 40 MG/1
40 TABLET, DELAYED RELEASE ORAL
Status: DISCONTINUED | OUTPATIENT
Start: 2021-09-11 | End: 2021-09-14 | Stop reason: HOSPADM

## 2021-09-10 RX ORDER — METHYLPREDNISOLONE SODIUM SUCCINATE 125 MG/2ML
60 INJECTION, POWDER, LYOPHILIZED, FOR SOLUTION INTRAMUSCULAR; INTRAVENOUS ONCE
Status: COMPLETED | OUTPATIENT
Start: 2021-09-10 | End: 2021-09-10

## 2021-09-10 RX ORDER — ATROPINE SULFATE 0.1 MG/ML
INJECTION INTRAVENOUS
Status: DISCONTINUED
Start: 2021-09-10 | End: 2021-09-10 | Stop reason: WASHOUT

## 2021-09-10 RX ORDER — METHYLPREDNISOLONE SODIUM SUCCINATE 40 MG/ML
40 INJECTION, POWDER, LYOPHILIZED, FOR SOLUTION INTRAMUSCULAR; INTRAVENOUS EVERY 6 HOURS
Status: DISCONTINUED | OUTPATIENT
Start: 2021-09-11 | End: 2021-09-12

## 2021-09-10 RX ORDER — ACETAMINOPHEN 325 MG/1
650 TABLET ORAL EVERY 6 HOURS PRN
Status: DISCONTINUED | OUTPATIENT
Start: 2021-09-10 | End: 2021-09-14 | Stop reason: HOSPADM

## 2021-09-10 RX ORDER — PREDNISONE 20 MG/1
40 TABLET ORAL DAILY
Status: DISCONTINUED | OUTPATIENT
Start: 2021-09-13 | End: 2021-09-12

## 2021-09-10 RX ORDER — PROPOFOL 10 MG/ML
INJECTION, EMULSION INTRAVENOUS
Status: DISPENSED
Start: 2021-09-10 | End: 2021-09-11

## 2021-09-10 RX ORDER — ACETAMINOPHEN 650 MG/1
650 SUPPOSITORY RECTAL EVERY 6 HOURS PRN
Status: DISCONTINUED | OUTPATIENT
Start: 2021-09-10 | End: 2021-09-14 | Stop reason: HOSPADM

## 2021-09-10 RX ORDER — FLUOXETINE HYDROCHLORIDE 20 MG/1
40 CAPSULE ORAL DAILY
Status: DISCONTINUED | OUTPATIENT
Start: 2021-09-10 | End: 2021-09-14 | Stop reason: HOSPADM

## 2021-09-10 RX ORDER — OXYCODONE AND ACETAMINOPHEN 10; 325 MG/1; MG/1
1 TABLET ORAL EVERY 6 HOURS PRN
Status: DISCONTINUED | OUTPATIENT
Start: 2021-09-10 | End: 2021-09-14 | Stop reason: HOSPADM

## 2021-09-10 RX ORDER — SODIUM CHLORIDE 9 MG/ML
25 INJECTION, SOLUTION INTRAVENOUS PRN
Status: DISCONTINUED | OUTPATIENT
Start: 2021-09-10 | End: 2021-09-14 | Stop reason: HOSPADM

## 2021-09-10 RX ORDER — OXYCODONE HCL 10 MG/1
10 TABLET, FILM COATED, EXTENDED RELEASE ORAL EVERY 12 HOURS
Status: DISCONTINUED | OUTPATIENT
Start: 2021-09-10 | End: 2021-09-11

## 2021-09-10 RX ORDER — ONDANSETRON 2 MG/ML
4 INJECTION INTRAMUSCULAR; INTRAVENOUS EVERY 6 HOURS PRN
Status: DISCONTINUED | OUTPATIENT
Start: 2021-09-10 | End: 2021-09-14 | Stop reason: HOSPADM

## 2021-09-10 RX ORDER — DOCUSATE SODIUM 100 MG/1
100 CAPSULE, LIQUID FILLED ORAL 2 TIMES DAILY
Status: DISCONTINUED | OUTPATIENT
Start: 2021-09-10 | End: 2021-09-14 | Stop reason: HOSPADM

## 2021-09-10 RX ORDER — ETOMIDATE 2 MG/ML
INJECTION INTRAVENOUS
Status: DISPENSED
Start: 2021-09-10 | End: 2021-09-11

## 2021-09-10 RX ORDER — SENNA AND DOCUSATE SODIUM 50; 8.6 MG/1; MG/1
2 TABLET, FILM COATED ORAL NIGHTLY
Status: DISCONTINUED | OUTPATIENT
Start: 2021-09-10 | End: 2021-09-14 | Stop reason: HOSPADM

## 2021-09-10 RX ORDER — ONDANSETRON 4 MG/1
4 TABLET, ORALLY DISINTEGRATING ORAL EVERY 8 HOURS PRN
Status: DISCONTINUED | OUTPATIENT
Start: 2021-09-10 | End: 2021-09-14 | Stop reason: HOSPADM

## 2021-09-10 RX ORDER — FUROSEMIDE 10 MG/ML
40 INJECTION INTRAMUSCULAR; INTRAVENOUS ONCE
Status: COMPLETED | OUTPATIENT
Start: 2021-09-10 | End: 2021-09-10

## 2021-09-10 RX ORDER — SODIUM CHLORIDE 0.9 % (FLUSH) 0.9 %
5-40 SYRINGE (ML) INJECTION PRN
Status: DISCONTINUED | OUTPATIENT
Start: 2021-09-10 | End: 2021-09-14 | Stop reason: HOSPADM

## 2021-09-10 RX ORDER — LEVOTHYROXINE SODIUM 112 UG/1
112 TABLET ORAL DAILY
Status: DISCONTINUED | OUTPATIENT
Start: 2021-09-10 | End: 2021-09-14 | Stop reason: HOSPADM

## 2021-09-10 RX ORDER — PREGABALIN 100 MG/1
100 CAPSULE ORAL 2 TIMES DAILY
Status: DISCONTINUED | OUTPATIENT
Start: 2021-09-10 | End: 2021-09-11

## 2021-09-10 RX ORDER — IPRATROPIUM BROMIDE AND ALBUTEROL SULFATE 2.5; .5 MG/3ML; MG/3ML
1 SOLUTION RESPIRATORY (INHALATION)
Status: DISCONTINUED | OUTPATIENT
Start: 2021-09-10 | End: 2021-09-10

## 2021-09-10 RX ADMIN — METHYLPREDNISOLONE SODIUM SUCCINATE 40 MG: 40 INJECTION, POWDER, FOR SOLUTION INTRAMUSCULAR; INTRAVENOUS at 23:39

## 2021-09-10 RX ADMIN — IOPAMIDOL 75 ML: 755 INJECTION, SOLUTION INTRAVENOUS at 23:24

## 2021-09-10 RX ADMIN — ENOXAPARIN SODIUM 40 MG: 40 INJECTION SUBCUTANEOUS at 21:39

## 2021-09-10 RX ADMIN — PROPOFOL 40 MCG/KG/MIN: 10 INJECTION, EMULSION INTRAVENOUS at 21:43

## 2021-09-10 RX ADMIN — FUROSEMIDE 40 MG: 10 INJECTION, SOLUTION INTRAMUSCULAR; INTRAVENOUS at 11:47

## 2021-09-10 RX ADMIN — SODIUM CHLORIDE 25 ML: 9 INJECTION, SOLUTION INTRAVENOUS at 21:57

## 2021-09-10 RX ADMIN — PIPERACILLIN AND TAZOBACTAM 3375 MG: 3; .375 INJECTION, POWDER, LYOPHILIZED, FOR SOLUTION INTRAVENOUS at 22:00

## 2021-09-10 RX ADMIN — Medication 12.5 MCG/HR: at 20:39

## 2021-09-10 RX ADMIN — METHYLPREDNISOLONE SODIUM SUCCINATE 60 MG: 125 INJECTION, POWDER, FOR SOLUTION INTRAMUSCULAR; INTRAVENOUS at 18:23

## 2021-09-10 RX ADMIN — Medication 30 ML: at 21:41

## 2021-09-10 ASSESSMENT — PULMONARY FUNCTION TESTS
PIF_VALUE: 26
PIF_VALUE: 38

## 2021-09-10 NOTE — ED NOTES
This RN, Woodroe Litten and RT transported patient , on [place], to [id number]. Bedside report given to Margarita KENNEDY no questions at this time. Propofol IV infusing at time of transport.         Jethro Kevni RN  09/10/21 4899

## 2021-09-10 NOTE — ED NOTES
Dr. Mary Lopez, this RN, Demetra Galvez RN. Elaine Koo RN, RT at pt bedside for intubation. Per Dr. Mary Lopez, 30mg of Etomidate IV push and 4mg of Versed IV pulled for intubation  1608 Pt safety check  1609 Pt given 30mg of Etomidate IV push given  1610 2mg of Versed IV push (of 4mg total pulled)  1612 Pt intubated with 7.5mm ETT at 22 at the lip, positive color change, bilateral lung sounds/expansion present  1613 additional 2mg of Versed IV push given.         Messi Yoon RN  09/10/21 1616

## 2021-09-10 NOTE — H&P
Hospital Medicine History & Physical      PCP: Alexey Milan    Date of Admission: 9/10/2021    Date of Service: Pt seen/examined on 9/10/2021 and Admitted to Inpatient    Chief Complaint: Poorly responsive, short of breath  Pt brought in by [facility] from home (lives with daughter). Per squad patient was found without NC on (4L NC at baseline). SpO2 in 70's, Pt placed on non-rebreather at 15L. Pt alert but not talking at this time. hx of COPD andCHF    History Of Present Illness:  Kylie Escobar is a 82 y.o. female here today with a chief complaint of shortness of breath. Patient herself cannot give much history. From what I can ascertain she is on baseline nasal cannula for some reason took it off this morning and was found by family a bit altered and hypoxic. At the time of my evaluation the patient is on BiPAP. She is obtunded and not able to provide any history no family are present at the bedside. My history is obtained from the chart.       Past Medical History:        Diagnosis Date    Arthritis     osteo    COPD (chronic obstructive pulmonary disease) ([address]     Hemorrhoids     Hernia of unspecified site of abdominal cavity without mention of obstruction or gangrene     Incontinence     Knee pain, bilateral     pt states no cartilage    Spinal stenosis     Spinal stenosis        Past Surgical History:        Procedure Laterality Date    CHOLECYSTECTOMY      PARTIAL HYSTERECTOMY         Medications Prior to Admission:    Prior to Admission medications    Medication Sig Start Date End Date Taking?  Authorizing Provider   senna-docusate (PERICOLACE) 8.6-50 MG per tablet Take 1 tablet by mouth daily    Historical Provider, MD   docusate sodium (COLACE) 100 MG capsule Take 100 mg by mouth 2 times daily    Historical Provider, MD   oxyCODONE (OXYCONTIN) 10 MG extended release tablet Take 10 mg by mouth every 12 hours. Historical Provider, MD   oxyCODONE-acetaminophen (PERCOCET)  MG per tablet Take 1 tablet by mouth every 6 hours as needed for Pain. Historical Provider, MD   hydrocortisone (ANUSOL-HC) 2.5 % rectal cream Place rectally 2 times daily. 6/25/19   Moriah Mercado MD   ipratropium-albuterol (DUONEB) 0.5-2.5 (3) MG/3ML SOLN nebulizer solution Inhale 1 vial into the lungs every 6 hours as needed for Shortness of Breath    Historical Provider, MD   SYMBICORT 160-4.5 MCG/ACT AERO TAKE 2 PUFFS TWICE A DAY 5/30/19   Helder Provider, MD   levothyroxine (SYNTHROID) 112 MCG tablet TAKE 1 TABLET BY MOUTH EVERY DAY 5/15/19   Historical Provider, MD   furosemide (LASIX) 40 MG tablet TAKE 1 TABLET BY MOUTH EVERY DAY 5/14/19   Historical Provider, MD   MOVANTIK 25 MG TABS tablet Take 25 mg by mouth daily as needed 5/11/19   Historical Provider, MD   magnesium hydroxide (MILK OF MAGNESIA) 400 MG/5ML suspension Take 30 mLs by mouth daily as needed for Constipation 4/30/16   ADRIANO Trejo CNP   fluoxetine (PROZAC) 20 MG capsule Take 40 mg by mouth daily. Historical Provider, MD   pregabalin (LYRICA) 50 MG capsule Take 150 mg by mouth 2 times daily. Historical Provider, MD   esomeprazole (NEXIUM) 40 MG capsule Take 40 mg by mouth every morning (before breakfast). Historical Provider, MD       Allergies:  Sulfa antibiotics    Social History:  The patient currently lives at home with family    TOBACCO:   reports that she has never smoked. She has never used smokeless tobacco.  ETOH:   reports no history of alcohol use. Family History:    No family history as noted in the patient's chart patient remains obtunded and is not able to provide any family history for me.     REVIEW OF SYSTEMS:     Was positive for shortness of breath but otherwise not able to be obtained as the patient is obtunded and on BiPAP    PHYSICAL EXAM:    /68   Pulse 85   Temp 98.7 °F (37.1 °C) (Axillary)   Resp 15   Ht 5' 1.5\" (1.562 m)   Wt 271 lb (122.9 kg)   SpO2 100%   BMI 50.38 kg/m²     General appearance: She is lethargic and on BiPAP and only responds to deep pain stimuli. She is morbidly obese  HEENT Normal cephalic, atraumatic without obvious deformity. Pupils equal, round, and reactive to light. Extra ocular muscles intact. Conjunctivae/corneas clear. Neck: Supple, No jugular venous distention/bruits. Trachea midline without thyromegaly or adenopathy with full range of motion. Lungs: Decreased air movement throughout no wheeze no rales or rhonchi auscultated. Heart: Regular rate and rhythm with Normal S1/S2 without murmurs, rubs or gallops, point of maximum impulse non-displaced  Abdomen: Soft, non-tender or non-distended without rigidity or guarding and positive bowel sounds all four quadrants. Extremities: No clubbing, cyanosis, or edema bilaterally. Full range of motion without deformity and normal gait intact. Skin: Skin color, texture, turgor normal.  No rashes or lesions.   Neurologic: She is obtunded, cranial nerves: II-XII intact, grossly non-focal.  Mental status: She is not alert she is obtunded and only responds to deep pain stimuli she wakes up momentarily and tries to pull the BiPAP off before she becomes obtunded again  Capillary Refill: Acceptable  < 3 seconds  Peripheral Pulses: +3 Easily felt, not easily obliterated with pressure      CXR:  I have reviewed the CXR with the following interpretation:   Endotracheal tube projects in normal position.       Low lung volumes, with suspected vascular congestion.  Mild bibasilar   atelectasis, versus airspace disease       EKG:  I have reviewed the EKG with the following interpretation:   Normal sinus rhythmMinimal voltage criteria for LVH, may be normal variantPossible Anterior infarct , age undetermined Poor data quality, interpretation may be adversely affected    CBC   Recent Labs     09/10/21  0950   WBC 14.0*   HGB 8.7*   HCT 27.3*         RENAL  Recent Labs     09/10/21  0950      K 3.8      CO2 29   BUN 16   CREATININE 1.4*     LFT'S  Recent Labs     09/10/21  0950   AST 17   ALT 14   BILITOT <0.2   ALKPHOS 140*     COAG  Recent Labs     09/10/21  0950   INR 1.00     CARDIAC ENZYMES  Recent Labs     09/10/21  0950   TROPONINI 0.02*       U/A:    Lab Results   Component Value Date    COLORU YELLOW 09/10/2021    WBCUA 7 09/10/2021    RBCUA 2 09/10/2021    BACTERIA 4+ 06/20/2019    CLARITYU CLOUDY 09/10/2021    SPECGRAV 1.018 09/10/2021    LEUKOCYTESUR SMALL 09/10/2021    BLOODU Negative 09/10/2021    GLUCOSEU Negative 09/10/2021    GLUCOSEU NEGATIVE 03/21/2011       ABG    Lab Results   Component Value Date    DYF3QMZ 31.8 09/10/2021    BEART 2.0 09/10/2021    Q3BWPCZZ 97.8 09/10/2021    PHART 7.158 09/10/2021    NSM2YFF 89.6 09/10/2021    PO2ART 101.0 09/10/2021    NHD7BUB 77.5 09/10/2021           Active Hospital Problems    Diagnosis Date Noted    Acute on chronic respiratory failure with hypoxia ([facility] [address] [J96.21] 09/10/2021         PHYSICIANS CERTIFICATION:    I certify that Vickey Perry is expected to be hospitalized for greater than 2 midnights based on the following assessment and plan:      ASSESSMENT/PLAN:    Acute respiratory failure with hypoxia and hypercarbia  -Initially the patient was on BiPAP. Repeat blood gas showed worsening PCO2 and she was obtunded and not able to respond to anything other than deep pain. Based on this side made the decision to intubate the patient. She was intubated with a 7.5 ET tube. Started on propofol and fentanyl. Possible pneumonia  -Chest x-ray appears to show low lung volumes difficult to tell if there is pneumonia on the right versus just her diaphragm.  -I will cover her with some IV antibiotics.     Morbid obesity  Probably contributing to obstructive sleep apnea versus obesity hypoventilation syndrome contributing to the patient's overall issues. Anemia  -No sign of acute bleeding  Appears to be chronic in nature. Possible COVID-19  -The patient has been swabbed  Seems unlikely to be Covid at this point        DVT Prophylaxis: Lovenox  Diet: Diet NPO  Code Status: Full Code  PT/OT Eval Status: Eval and treat    Dispo -patient is being admitted to the intensive care unit proximately 40 minutes of critical care time spent this is not including separately billable procedures. Pascual Gregory MD    Thank you [facility] for the opportunity to be involved in this patient's care.  If you have any questions or concerns please feel free to contact me at [phone]

## 2021-09-10 NOTE — CONSULTS
(PERCOCET)  MG per tablet 1 tablet, 1 tablet, Oral, Q6H PRN  pregabalin (LYRICA) capsule 100 mg, 100 mg, Oral, BID  senna-docusate (PERICOLACE) 8.6-50 MG per tablet 2 tablet, 2 tablet, Oral, Nightly  budesonide-formoterol (SYMBICORT) 160-4.5 MCG/ACT inhaler 2 puff, 2 puff, Inhalation, BID  sodium chloride flush 0.9 % injection 5-40 mL, 5-40 mL, IntraVENous, 2 times per day  sodium chloride flush 0.9 % injection 5-40 mL, 5-40 mL, IntraVENous, PRN  0.9 % sodium chloride infusion, 25 mL, IntraVENous, PRN  ondansetron (ZOFRAN-ODT) disintegrating tablet 4 mg, 4 mg, Oral, Q8H PRN **OR** ondansetron (ZOFRAN) injection 4 mg, 4 mg, IntraVENous, Q6H PRN  polyethylene glycol (GLYCOLAX) packet 17 g, 17 g, Oral, Daily PRN  enoxaparin (LOVENOX) injection 40 mg, 40 mg, SubCUTAneous, Nightly  acetaminophen (TYLENOL) tablet 650 mg, 650 mg, Oral, Q6H PRN **OR** acetaminophen (TYLENOL) suppository 650 mg, 650 mg, Rectal, Q6H PRN  ipratropium-albuterol (DUONEB) nebulizer solution 1 ampule, 1 ampule, Inhalation, Q4H WA  [START ON 9/11/2021] methylPREDNISolone sodium (SOLU-MEDROL) injection 40 mg, 40 mg, IntraVENous, Q6H **FOLLOWED BY** [START ON 9/13/2021] predniSONE (DELTASONE) tablet 40 mg, 40 mg, Oral, Daily  piperacillin-tazobactam (ZOSYN) 3,375 mg in dextrose 5 % 50 mL IVPB extended infusion (mini-bag), 3,375 mg, IntraVENous, Q8H  midazolam (VERSED) 2 MG/2ML injection, , ,   etomidate (AMIDATE) 2 MG/ML injection, , ,   propofol 1000 MG/100ML injection, , ,   propofol injection, 5-50 mcg/kg/min, IntraVENous, Titrated  fentaNYL 10 mcg/mL infusion, 12.5-200 mcg/hr, IntraVENous, Continuous  fentaNYL 10 mcg/mL infusion, 12.5-200 mcg/hr, IntraVENous, Continuous  norepinephrine (LEVOPHED) 16 mg in dextrose 5% 250 mL infusion, 2-100 mcg/min, IntraVENous, Continuous    Allergies   Allergen Reactions    Sulfa Antibiotics      Unknown reaction       Social History:    TOBACCO:   reports that she has never smoked.  She has never used smokeless tobacco.  ETOH:   reports no history of alcohol use. Patient currently lives independently    Family History:   No family history on file. REVIEW OF SYSTEMS:    Unable to obtain since the patient is currently intubated and sedated      Objective:     Patient Vitals for the past 8 hrs:   BP Pulse Resp SpO2   09/10/21 1830 132/68 85 15    09/10/21 1730 (!) 101/40 87 20 100 %   09/10/21 1645 (!) 124/49 82 11 98 %   09/10/21 1630 (!) 113/45 81 10 98 %   09/10/21 1445 (!) 104/44 81 15    09/10/21 1430 (!) 109/40 83 15    09/10/21 1415 104/72 83 16    09/10/21 1400 (!) 105/46 81 19    09/10/21 1345 (!) 99/52 89 15    09/10/21 1330 110/61 86 28    09/10/21 1315 (!) 108/45 81 13 100 %   09/10/21 1300 (!) 115/39 80 12    09/10/21 1245 (!) 109/51 72 13    09/10/21 1230 (!) 102/57 73 13 100 %   09/10/21 1215 (!) 117/56 88 12 100 %   09/10/21 1200 (!) 118/44 100 17 99 %   09/10/21 1145 (!) 122/42 89 15 99 %   09/10/21 1130 (!) 122/46 91 19    09/10/21 1128   16      No intake/output data recorded.   I/O this shift:  In: -   Out: 1500 [Urine:1500]    Physical Exam:  General Appearance: Intubated, sedated, morbidly obese, in no acute distress  Skin: warm and dry, no rash or erythema  Head: normocephalic and atraumatic  Eyes: pupils equal, round, and reactive to light, extraocular eye movements intact, conjunctivae normal  ENT: external ear and ear canal normal bilaterally, nose without deformity, nasal mucosa and turbinates normal  Neck: supple and non-tender without mass, no cervical lymphadenopathy  Pulmonary/Chest: clear to auscultation bilaterally- no wheezes, rales or rhonchi, normal air movement, no respiratory distress  Cardiovascular: normal rate, regular rhythm,  no murmurs, rubs, distal pulses intact, no carotid bruits  Abdomen: soft, non-tender, non-distended, normal bowel sounds, no masses or organomegaly  Lymph Nodes: Cervical, supraclavicular normal  Extremities: no cyanosis, clubbing or syndrome  Assessment/Plan:     Acute on chronic hypercapnic respiratory failure-prior ABG suggestive of chronic hypercapnia with PCO2 in the 50s and 60s, currently 89. Patient has worsening acute hypercapnia and required intubation. Currently on volume assist control mode of ventilation. 500/20/5/50 percent. ABG awaited. Monitor serial ABGs. On empiric bronchodilators and Solu-Medrol. Altered mental status noted from acute hypercapnia. Will reevaluate need for CT head if no clear improvement in mental status despite correction of hypercapnia. Patient is high risk for PE, given her morbid obesity-order CTA chest to evaluate further. No significant signs of volume overload noted on chest x-ray. proBNP 800, troponin 0.02. No significant ST changes noted on EKG.     Critical care team will follow    Tisha Gomez MD    Critical care time 28 Min excluding procedures

## 2021-09-10 NOTE — ED PROVIDER NOTES
[facility]        Pt Name: Deanne Swenson  MRN: [id number]  [place] 1935  Date of evaluation: 9/10/2021  Provider: Christopher Bee MD  PCP: Jodie Anne    This patient was seen and evaluated by the attending physician Christopher Bee MD.      [facility]       Chief Complaint   Patient presents with    Shortness of Breath     Pt brought in by [facility] from home (lives with daughter). Per squad patient was found without NC on (4L NC at baseline). SpO2 in 70's, Pt placed on non-rebreather at 15L. Pt alert but not talking at this time. hx of COPD andCHF       HISTORY OF PRESENT ILLNESS   (Location/Symptom, Timing/Onset, Context/Setting, Quality, Duration, Modifying Factors, Severity)  Note limiting factors. Deanne Swenson is a 82 y.o. female here today with a chief complaint of shortness of breath. Patient herself cannot give much history. From what I can ascertain she is on baseline nasal cannula for some reason took it off this morning and was found by family a bit altered and hypoxic. Nursing Notes were all reviewed and agreed with or any disagreements were addressed  in the HPI. REVIEW OF SYSTEMS    (2-9 systems for level 4, 10 or more for level 5)     Review of Systems    Positives and Pertinent negatives as per HPI. Except as noted abovein the ROS, all other systems were reviewed and negative.        PAST MEDICAL HISTORY     Past Medical History:   Diagnosis Date    Arthritis     osteo    COPD (chronic obstructive pulmonary disease) ([facility] [address]     Hemorrhoids     Hernia of unspecified site of abdominal cavity without mention of obstruction or gangrene     Incontinence     Knee pain, bilateral     pt states no cartilage    Spinal stenosis     Spinal stenosis          SURGICAL HISTORY     Past Surgical History:   Procedure Laterality Date    CHOLECYSTECTOMY      PARTIAL HYSTERECTOMY CURRENTMEDICATIONS       Previous Medications    DOCUSATE SODIUM (COLACE) 100 MG CAPSULE    Take 100 mg by mouth 2 times daily    ESOMEPRAZOLE (NEXIUM) 40 MG CAPSULE    Take 40 mg by mouth every morning (before breakfast). FLUOXETINE (PROZAC) 20 MG CAPSULE    Take 40 mg by mouth daily. FUROSEMIDE (LASIX) 40 MG TABLET    TAKE 1 TABLET BY MOUTH EVERY DAY    HYDROCORTISONE (ANUSOL-HC) 2.5 % RECTAL CREAM    Place rectally 2 times daily. IPRATROPIUM-ALBUTEROL (DUONEB) 0.5-2.5 (3) MG/3ML SOLN NEBULIZER SOLUTION    Inhale 1 vial into the lungs every 6 hours as needed for Shortness of Breath    LEVOTHYROXINE (SYNTHROID) 112 MCG TABLET    TAKE 1 TABLET BY MOUTH EVERY DAY    MAGNESIUM HYDROXIDE (MILK OF MAGNESIA) 400 MG/5ML SUSPENSION    Take 30 mLs by mouth daily as needed for Constipation    MOVANTIK 25 MG TABS TABLET    Take 25 mg by mouth daily as needed    OXYCODONE (OXYCONTIN) 10 MG EXTENDED RELEASE TABLET    Take 10 mg by mouth every 12 hours. OXYCODONE-ACETAMINOPHEN (PERCOCET)  MG PER TABLET    Take 1 tablet by mouth every 6 hours as needed for Pain. PREGABALIN (LYRICA) 50 MG CAPSULE    Take 150 mg by mouth 2 times daily. SENNA-DOCUSATE (PERICOLACE) 8.6-50 MG PER TABLET    Take 1 tablet by mouth daily    SYMBICORT 160-4.5 MCG/ACT AERO    TAKE 2 PUFFS TWICE A DAY         ALLERGIES     Sulfa antibiotics    FAMILYHISTORY     No family history on file.        SOCIAL HISTORY       Social History     Socioeconomic History    Marital status:      Spouse name: Not on file    Number of children: Not on file    Years of education: Not on file    Highest education level: Not on file   Occupational History    Not on file   Tobacco Use    Smoking status: Never Smoker    Smokeless tobacco: Never Used   Vaping Use    Vaping Use: Never used   Substance and Sexual Activity    Alcohol use: No    Drug use: No    Sexual activity: Not on file   Other Topics Concern    Not on file DIFFERENTIAL - Abnormal; Notable for the following components:       Result Value    WBC 14.0 (*)     RBC 2.84 (*)     Hemoglobin 8.7 (*)     Hematocrit 27.3 (*)     RDW 16.2 (*)     Neutrophils Absolute 9.6 (*)     All other components within normal limits    Narrative:     Performed at:  [facility] [address]   Phone [phone]   COMPREHENSIVE METABOLIC PANEL W/ REFLEX TO MG FOR LOW K - Abnormal; Notable for the following components:    Glucose 138 (*)     CREATININE 1.4 (*)     GFR Non- 36 (*)     GFR African American 43 (*)     Albumin/Globulin Ratio 1.0 (*)     Alkaline Phosphatase 140 (*)     All other components within normal limits    Narrative:     Performed at:  [facility] [address] [facility] [address]   Phone [phone]   TROPONIN - Abnormal; Notable for the following components:    Troponin 0.02 (*)     All other components within normal limits    Narrative:     Performed at:  [facility] [address]   Phone [phone]  - Abnormal; Notable for the following components:    Pro- (*)     All other components within normal limits    Narrative:     Performed at:  [facility] [address]   Phone [phone] - Abnormal; Notable for the following components:    Clarity, UA CLOUDY (*)     Leukocyte Esterase, Urine SMALL (*)     All other components within normal limits    Narrative:     Performed at:  [facility] [address]   Phone [phone]   BLOOD GAS, VENOUS - Abnormal; Notable for the following components:    pH, Jad 7.184 (*)     pCO2, Jad 75.8 (*)     pO2, Jad 93.9 (*)     Carboxyhemoglobin 4.0 (*)     All other components within normal limits Narrative:     Peggy Perla  [facility] tel. [phone],  Chemistry results called to and read back by demian evangelista, 09/10/2021 11:12,  by [facility]   Chemistry results called to and read back by demian evangelista, 09/10/2021 11:02,  by [facility]   Performed at:  [facility] [address] [place], [address]   Phone [phone]   MICROSCOPIC URINALYSIS - Abnormal; Notable for the following components:    WBC, UA 7 (*)     Epithelial Cells, UA 6 (*)     All other components within normal limits    Narrative:     Performed at:  [facility] [address] [place], [address]   Phone [phone]   CULTURE, URINE   CULTURE, BLOOD 2   CULTURE, BLOOD 1   LIPASE    Narrative:     Performed at:  [facility] [address],  [place], [address]   Phone [phone]   AMYLASE    Narrative:     Performed at:  [facility] [address],  [place], [address]   Phone [phone]   PROTIME-INR    Narrative:     Performed at:  [facility] [address],  [place], [address]   Phone [phone]   APTT    Narrative:     Performed at:  [facility] [address],  [place], [address]   Phone [phone]   LACTATE, SEPSIS    Narrative:     Performed at:  [facility] [address],  [place], [address]   Phone [phone]   PROCALCITONIN    Narrative:     Performed at:  [facility] [address],  [place], [address]   Phone [phone]   TYPE AND SCREEN    Narrative:     Performed at:  [facility] [address],  [place], [address]   Phone [phone]       All other labs were within normal range or not returned as of this dictation. EKG:  All EKG's are interpreted by the Emergency Department Physician in the absence of a cardiologist.  Please see their note for interpretation of EKG. EKG is reviewed by myself. Dated today at 09 31. Rate 95 sinus rhythm with sinus arrhythmia. Otherwise normal EKG. I do not see much change compared to October 2020    RADIOLOGY:   Non-plain film images such as CT, Ultrasound and MRI are read by the radiologist. Plain radiographic images are visualized andpreliminarily interpreted by the  ED Provider with the below findings:        Interpretation perthe Radiologist below, if available at the time of this note:    XR CHEST PORTABLE   Final Result   1. Limited exam due to rotation. 2. Bibasilar pulmonary opacities are present, representing infection,   atelectasis or edema. 3. Likely bilateral small pleural fluid and atelectasis. No results found. PROCEDURES   Unless otherwise noted below, none     Procedures    CRITICAL CARE TIME   N/A    CONSULTS:  IP CONSULT TO HOSPITALIST  IP CONSULT TO PULMONOLOGY      EMERGENCY DEPARTMENT COURSE and DIFFERENTIAL DIAGNOSIS/MDM:   Vitals:    Vitals:    09/10/21 1128 09/10/21 1130 09/10/21 1145 09/10/21 1200   BP:  (!) 122/46 (!) 122/42 (!) 118/44   Pulse:  91 89 100   Resp: 16 19 15 17   Temp:       TempSrc:       SpO2:   99% 99%   Weight:       Height:           Patient was given thefollowing medications:  Medications   furosemide (LASIX) injection 40 mg (40 mg IntraVENous Given 9/10/21 1147)       51-year-old female with acute dyspnea. From what I can tell the patient took her oxygen mask off at home and became hypoxic and was in a little hypercarbic. Looks well at present time. A bit fluid overloaded perhaps. Placed on oxygen. Checking labs. Diuresing. Reviewed workup; Hypercarbic respiratory failure, mild LIZ, demand ischemia of myocardium. Started Bipap  Will admit to ICU. CS placed to hospitalist/pulm as above. 45min of critical care time; indication and intervention as above.  No other procedures. FINAL IMPRESSION      1. Hypoxia    2. Acute respiratory failure with hypoxia and hypercapnia (HCC)    3. LIZ (acute kidney injury) ([facility] [address]    4. Demand ischemia of myocardium ([facility] [address]          DISPOSITION/PLAN   DISPOSITION        PATIENT REFERREDTO:  No follow-up provider specified.     DISCHARGE MEDICATIONS:  New Prescriptions    No medications on file       DISCONTINUED MEDICATIONS:  Discontinued Medications    No medications on file              (Please note that portions ofthis note were completed with a voice recognition program.  Efforts were made to edit the dictations but occasionally words are mis-transcribed.)    Beba Carrera MD (electronically signed)           Beba Carrera MD  09/10/21 [address], [place]  09/10/21 124

## 2021-09-10 NOTE — FLOWSHEET NOTE
4 Eyes Skin Assessment     NAME:  Fadi Knight  YOB: 1935  MEDICAL RECORD NUMBER:  [id number]    The patient is being assess for  Admission    I agree that 2 RN's have performed a thorough Head to Toe Skin Assessment on the patient. ALL assessment sites listed below have been assessed. Areas assessed by both nurses:    Head, Face, Ears, Shoulders, Back, Chest, Arms, Elbows, Hands, Sacrum. Buttock, Coccyx, Ischium and Legs. Feet and Heels        Does the Patient have a Wound? Yes wound(s) were present on assessment.  LDA wound assessment was Initiated and completed        Jorge Luis Prevention initiated:  Yes   Wound Care Orders initiated:  Yes    Pressure Injury (Stage 3,4, Unstageable, DTI, NWPT, and Complex wounds) if present place consult order under [de-identified] No    New and Established Ostomies if present place consult order under : No      Nurse 1 eSignature: Electronically signed by Vernell Wilson RN on 9/10/21 at 7:16 PM EDT    **SHARE this note so that the co-signing nurse is able to place an eSignature**    Nurse 2 eSignature: Electronically signed by Damaris Clark RN on 9/10/21 at 7:17 PM EDT    Wound consult placed department physician    Rhythm: normal sinus   Rate: normal  Axis: left  Ectopy: none  Conduction: normal  ST Segments: depression in  v4, v5, v6 and II  T Waves: non specific changes  Q Waves: nonspecific    Clinical Impression: non-specific EKG    Amanda Vázquez MD  Attending Emergency  Physician              Bin Vázquez MD  12/03/17 [id number]

## 2021-09-10 NOTE — ED NOTES
This RN, Raisa Anne RT, and April  tech transported patient, on lifepack, to 60. Bedside report given to Jeanette Glover. No further questions at this time. Propofol IV infusing through medial port at time of transport.       Bela Washburn RN  09/10/21 2057

## 2021-09-10 NOTE — FLOWSHEET NOTE
Pt arrived from ED in stable condition with two ED RNs at bedside, RT, and transport. Weight obtained on bed scale.      Electronically signed by JERONIMO Callahan RN

## 2021-09-11 LAB
ANION GAP SERPL CALCULATED.3IONS-SCNC: 12 MMOL/L (ref 3–16)
BASE EXCESS ARTERIAL: 4.8 MMOL/L (ref -3–3)
BUN BLDV-MCNC: 17 MG/DL (ref 7–20)
CALCIUM SERPL-MCNC: 8.8 MG/DL (ref 8.3–10.6)
CARBOXYHEMOGLOBIN ARTERIAL: 0.9 % (ref 0–1.5)
CHLORIDE BLD-SCNC: 101 MMOL/L (ref 99–110)
CO2: 25 MMOL/L (ref 21–32)
CREAT SERPL-MCNC: 1 MG/DL (ref 0.6–1.2)
D DIMER: 505 NG/ML DDU (ref 0–229)
GFR AFRICAN AMERICAN: >60
GFR NON-AFRICAN AMERICAN: 53
GLUCOSE BLD-MCNC: 165 MG/DL (ref 70–99)
HCO3 ARTERIAL: 28.6 MMOL/L (ref 21–29)
HCT VFR BLD CALC: 23.1 % (ref 36–48)
HEMOGLOBIN, ART, EXTENDED: 7.5 G/DL (ref 12–16)
HEMOGLOBIN: 7.8 G/DL (ref 12–16)
LACTIC ACID: 0.9 MMOL/L (ref 0.4–2)
MAGNESIUM: 1.9 MG/DL (ref 1.8–2.4)
MCH RBC QN AUTO: 31.1 PG (ref 26–34)
MCHC RBC AUTO-ENTMCNC: 33.8 G/DL (ref 31–36)
MCV RBC AUTO: 91.9 FL (ref 80–100)
METHEMOGLOBIN ARTERIAL: 0.7 %
O2 SAT, ARTERIAL: 100 %
O2 THERAPY: ABNORMAL
PCO2 ARTERIAL: 38.2 MMHG (ref 35–45)
PDW BLD-RTO: 15.1 % (ref 12.4–15.4)
PH ARTERIAL: 7.48 (ref 7.35–7.45)
PLATELET # BLD: 145 K/UL (ref 135–450)
PMV BLD AUTO: 9.7 FL (ref 5–10.5)
PO2 ARTERIAL: 155 MMHG (ref 75–108)
POTASSIUM REFLEX MAGNESIUM: 3.4 MMOL/L (ref 3.5–5.1)
PROCALCITONIN: 0.13 NG/ML (ref 0–0.15)
RBC # BLD: 2.51 M/UL (ref 4–5.2)
SODIUM BLD-SCNC: 138 MMOL/L (ref 136–145)
TCO2 ARTERIAL: 66.8 MMOL/L
URINE CULTURE, ROUTINE: NORMAL
WBC # BLD: 9.9 K/UL (ref 4–11)

## 2021-09-11 PROCEDURE — 2580000003 HC RX 258: Performed by: FAMILY MEDICINE

## 2021-09-11 PROCEDURE — 2500000003 HC RX 250 WO HCPCS: Performed by: INTERNAL MEDICINE

## 2021-09-11 PROCEDURE — 80048 BASIC METABOLIC PNL TOTAL CA: CPT

## 2021-09-11 PROCEDURE — 87070 CULTURE OTHR SPECIMN AEROBIC: CPT

## 2021-09-11 PROCEDURE — 85027 COMPLETE CBC AUTOMATED: CPT

## 2021-09-11 PROCEDURE — 94761 N-INVAS EAR/PLS OXIMETRY MLT: CPT

## 2021-09-11 PROCEDURE — 2000000000 HC ICU R&B

## 2021-09-11 PROCEDURE — 84145 PROCALCITONIN (PCT): CPT

## 2021-09-11 PROCEDURE — 85379 FIBRIN DEGRADATION QUANT: CPT

## 2021-09-11 PROCEDURE — 87205 SMEAR GRAM STAIN: CPT

## 2021-09-11 PROCEDURE — 6370000000 HC RX 637 (ALT 250 FOR IP): Performed by: FAMILY MEDICINE

## 2021-09-11 PROCEDURE — 83735 ASSAY OF MAGNESIUM: CPT

## 2021-09-11 PROCEDURE — 94003 VENT MGMT INPAT SUBQ DAY: CPT

## 2021-09-11 PROCEDURE — 94640 AIRWAY INHALATION TREATMENT: CPT

## 2021-09-11 PROCEDURE — 99291 CRITICAL CARE FIRST HOUR: CPT | Performed by: INTERNAL MEDICINE

## 2021-09-11 PROCEDURE — 6360000002 HC RX W HCPCS: Performed by: INTERNAL MEDICINE

## 2021-09-11 PROCEDURE — 36415 COLL VENOUS BLD VENIPUNCTURE: CPT

## 2021-09-11 PROCEDURE — 83605 ASSAY OF LACTIC ACID: CPT

## 2021-09-11 PROCEDURE — 2700000000 HC OXYGEN THERAPY PER DAY

## 2021-09-11 PROCEDURE — 87186 SC STD MICRODIL/AGAR DIL: CPT

## 2021-09-11 PROCEDURE — 6370000000 HC RX 637 (ALT 250 FOR IP): Performed by: INTERNAL MEDICINE

## 2021-09-11 PROCEDURE — 6360000002 HC RX W HCPCS: Performed by: FAMILY MEDICINE

## 2021-09-11 PROCEDURE — 82803 BLOOD GASES ANY COMBINATION: CPT

## 2021-09-11 RX ORDER — DEXMEDETOMIDINE HYDROCHLORIDE 4 UG/ML
.2-1.4 INJECTION, SOLUTION INTRAVENOUS CONTINUOUS
Status: DISCONTINUED | OUTPATIENT
Start: 2021-09-11 | End: 2021-09-13

## 2021-09-11 RX ORDER — PREGABALIN 100 MG/1
100 CAPSULE ORAL 3 TIMES DAILY
Status: DISCONTINUED | OUTPATIENT
Start: 2021-09-11 | End: 2021-09-14 | Stop reason: HOSPADM

## 2021-09-11 RX ORDER — IPRATROPIUM BROMIDE AND ALBUTEROL SULFATE 2.5; .5 MG/3ML; MG/3ML
1 SOLUTION RESPIRATORY (INHALATION) 4 TIMES DAILY
Status: DISCONTINUED | OUTPATIENT
Start: 2021-09-11 | End: 2021-09-14 | Stop reason: HOSPADM

## 2021-09-11 RX ORDER — OXYCODONE HCL 10 MG/1
10 TABLET, FILM COATED, EXTENDED RELEASE ORAL EVERY 8 HOURS PRN
Status: DISCONTINUED | OUTPATIENT
Start: 2021-09-11 | End: 2021-09-14 | Stop reason: HOSPADM

## 2021-09-11 RX ADMIN — PROPOFOL 40 MCG/KG/MIN: 10 INJECTION, EMULSION INTRAVENOUS at 01:12

## 2021-09-11 RX ADMIN — DEXMEDETOMIDINE HYDROCHLORIDE IN 0.9% SODIUM CHLORIDE 0.3 MCG/KG/HR: 4 INJECTION INTRAVENOUS at 18:06

## 2021-09-11 RX ADMIN — FLUOXETINE 40 MG: 20 CAPSULE ORAL at 10:59

## 2021-09-11 RX ADMIN — BUDESONIDE AND FORMOTEROL FUMARATE DIHYDRATE 2 PUFF: 160; 4.5 AEROSOL RESPIRATORY (INHALATION) at 08:04

## 2021-09-11 RX ADMIN — ENOXAPARIN SODIUM 40 MG: 40 INJECTION SUBCUTANEOUS at 20:23

## 2021-09-11 RX ADMIN — PIPERACILLIN AND TAZOBACTAM 3375 MG: 3; .375 INJECTION, POWDER, LYOPHILIZED, FOR SOLUTION INTRAVENOUS at 04:30

## 2021-09-11 RX ADMIN — Medication 10 ML: at 20:42

## 2021-09-11 RX ADMIN — PROPOFOL 40 MCG/KG/MIN: 10 INJECTION, EMULSION INTRAVENOUS at 05:01

## 2021-09-11 RX ADMIN — METHYLPREDNISOLONE SODIUM SUCCINATE 40 MG: 40 INJECTION, POWDER, FOR SOLUTION INTRAMUSCULAR; INTRAVENOUS at 04:55

## 2021-09-11 RX ADMIN — METHYLPREDNISOLONE SODIUM SUCCINATE 40 MG: 40 INJECTION, POWDER, FOR SOLUTION INTRAMUSCULAR; INTRAVENOUS at 17:30

## 2021-09-11 RX ADMIN — Medication 20 ML: at 11:16

## 2021-09-11 RX ADMIN — DEXMEDETOMIDINE HYDROCHLORIDE IN 0.9% SODIUM CHLORIDE 0.4 MCG/KG/HR: 4 INJECTION INTRAVENOUS at 10:59

## 2021-09-11 RX ADMIN — PIPERACILLIN AND TAZOBACTAM 3375 MG: 3; .375 INJECTION, POWDER, LYOPHILIZED, FOR SOLUTION INTRAVENOUS at 20:42

## 2021-09-11 RX ADMIN — PIPERACILLIN AND TAZOBACTAM 3375 MG: 3; .375 INJECTION, POWDER, LYOPHILIZED, FOR SOLUTION INTRAVENOUS at 12:35

## 2021-09-11 RX ADMIN — METHYLPREDNISOLONE SODIUM SUCCINATE 40 MG: 40 INJECTION, POWDER, FOR SOLUTION INTRAMUSCULAR; INTRAVENOUS at 12:33

## 2021-09-11 RX ADMIN — PREGABALIN 100 MG: 100 CAPSULE ORAL at 11:00

## 2021-09-11 RX ADMIN — IPRATROPIUM BROMIDE 2 PUFF: 17 AEROSOL, METERED RESPIRATORY (INHALATION) at 08:04

## 2021-09-11 RX ADMIN — PREGABALIN 100 MG: 100 CAPSULE ORAL at 20:24

## 2021-09-11 RX ADMIN — LEVOTHYROXINE SODIUM 112 MCG: 0.11 TABLET ORAL at 11:00

## 2021-09-11 RX ADMIN — IPRATROPIUM BROMIDE AND ALBUTEROL SULFATE 1 AMPULE: .5; 3 SOLUTION RESPIRATORY (INHALATION) at 17:15

## 2021-09-11 RX ADMIN — ALBUTEROL SULFATE 2 PUFF: 90 AEROSOL, METERED RESPIRATORY (INHALATION) at 08:04

## 2021-09-11 RX ADMIN — SENNOSIDES AND DOCUSATE SODIUM 2 TABLET: 50; 8.6 TABLET ORAL at 20:24

## 2021-09-11 RX ADMIN — HYDROCORTISONE: 25 CREAM TOPICAL at 23:05

## 2021-09-11 RX ADMIN — DOCUSATE SODIUM 100 MG: 100 CAPSULE, LIQUID FILLED ORAL at 20:24

## 2021-09-11 ASSESSMENT — PAIN SCALES - PAIN ASSESSMENT IN ADVANCED DEMENTIA (PAINAD)
BODYLANGUAGE: 1
BODYLANGUAGE: 1
CONSOLABILITY: 0
BREATHING: 0
TOTALSCORE: 2
TOTALSCORE: 0
FACIALEXPRESSION: 1
BREATHING: 0
FACIALEXPRESSION: 1
CONSOLABILITY: 0
NEGVOCALIZATION: 0
NEGVOCALIZATION: 0
TOTALSCORE: 2
CONSOLABILITY: 0
NEGVOCALIZATION: 0
BREATHING: 0
NEGVOCALIZATION: 0
BREATHING: 0
CONSOLABILITY: 0
BODYLANGUAGE: 0
TOTALSCORE: 2
CONSOLABILITY: 0
BREATHING: 0
NEGVOCALIZATION: 0
TOTALSCORE: 2
FACIALEXPRESSION: 1
FACIALEXPRESSION: 0
FACIALEXPRESSION: 1
BODYLANGUAGE: 1
BODYLANGUAGE: 1

## 2021-09-11 ASSESSMENT — PULMONARY FUNCTION TESTS
PIF_VALUE: 16
PIF_VALUE: 26
PIF_VALUE: 26
PIF_VALUE: 27
PIF_VALUE: 16
PIF_VALUE: 16
PIF_VALUE: 22

## 2021-09-11 ASSESSMENT — PAIN SCALES - WONG BAKER
WONGBAKER_NUMERICALRESPONSE: 0

## 2021-09-11 NOTE — PROGRESS NOTES
09/10/21 1958   [facility] Patient Data   Peak Inspiratory Pressure 26 cmH2O   Mean Airway Pressure 12 cmH20   Plateau Pressure 18 [id number]   Static Compliance 41.23 mL/cmH2O   Dynamic Compliance 25.52 mL/cmH2O

## 2021-09-11 NOTE — PROGRESS NOTES
Comprehensive Nutrition Assessment    Type and Reason for Visit:  Initial (RD triggered d/t pt on vent)    Nutrition Recommendations/Plan:   1. Recommend EN support to begin within 24-48 hours best practice. 2. Recommend Vital High Protein (peptide based high protein formula) at goal rate 35 mL per hour to provide 840 mL total volume, 840 calories, 74 grams protein, 702 mL free water. Rate is limited by calories from propofol. 3. Recommend water bolus 140 mL q 4 hours. 4. Recommend 1 Bottle Proteinex P2Go daily via syringe. Flush with 30 mL H20 before and after. Proteinex P2Go should not be mixed directly with the tube feeding formula. This provides an additional 104 calories and 26 grams protein daily. 5. Ensure head of bed is 30 - 45 degrees during continuous or bolus gastric feeding and for one hour after bolus. Turn off the feeding if head of bed is lowered less than 30 degrees. 6. Monitor for tolerance (bowel habits, N/V, cramping, abdominal exam findings: distended, firm, tense, guarded, discomfort). Nutrition Assessment:  Pt is at risk for nutrition compromise as evidenced by NPO on mechanical ventilation. Propofol at 22.1 mL per hour to provide 583 calories from fat daily. No family at bedside to provide nutrition hx. No significant weight loss noted per hx in EMR. Unable to complete physical assessment d/t droplet plus isolation. Recommend EN support to begin within 24-48 hours best practice. Malnutrition Assessment:  Malnutrition Status:  Insufficient data      Estimated Daily Nutrient Needs:  Energy (kcal):  1716-6325; Weight Used for Energy Requirements:  Current (111 kg)     Protein (g):  98 grams; Weight Used for Protein Requirements:  Ideal (49 kg; 2 grams per kg)        Fluid (ml/day):   ; Method Used for Fluid Requirements:  1 ml/kcal      Nutrition Related Findings:  Generalized non-pitting edema. K+ 3.4. No BM noted. -1.6 liters.       Wounds:  None       Current Nutrition Therapies:    Diet NPO    Anthropometric Measures:  · Height: 5' 1.5\" (156.2 cm)  · Current Body Weight: 244 lb 4.3 oz (110.8 kg)   · Admission Body Weight: 244 lb 4.3 oz (110.8 kg)     · Ideal Body Weight: 108 lbs; % Ideal Body Weight 226.2 %   · BMI: 45.4  · BMI Categories: Obese Class 3 (BMI 40.0 or greater)       Nutrition Diagnosis:   · Inadequate oral intake related to impaired respiratory function as evidenced by intubation    Nutrition Interventions:   Food and/or Nutrient Delivery:  Continue NPO (Consider EN support to begin within 24-48 hours as appropriate per MD)  Nutrition Education/Counseling:  Education not indicated   Coordination of Nutrition Care:  Continue to monitor while inpatient    Goals:  Diet advancement vs nutrition support       Nutrition Monitoring and Evaluation:   Behavioral-Environmental Outcomes:  None Identified   Food/Nutrient Intake Outcomes:  None Identified  Physical Signs/Symptoms Outcomes:  Biochemical Data, GI Status, Fluid Status or Edema     Discharge Planning:     Too soon to determine     Electronically signed by Anders Segal RD, LD on 9/11/21 at 8:23 AM EDT    Contact: [phone]

## 2021-09-11 NOTE — PLAN OF CARE
participate in self-care as condition permits will improve  Description: Ability to participate in self-care as condition permits will improve  Outcome: Ongoing

## 2021-09-11 NOTE — CARE COORDINATION
CM reviewed chart at this time. Pt from home with daughter. 4 liters oxygen baseline per chart. Covid test pending at this time. Pt intubated.      Batsheva Deleon RN, BSN  [phone]

## 2021-09-11 NOTE — PROGRESS NOTES
Shift assessment complete, see flow sheets. Vitals stable. Intubated and sedated on ventilator. Ngo in place. Patient opening eyes during repositioning, does not follow commands. Passive ROM performed. Agitated with repositioning. RASS -1. Plan to wean sedation for possible extubation this afternoon. Current vent settings AC/VC 20/500/5 40% FiO2. Bilateral soft wrist restraints continued. No further needs at this time. Will continue to monitor.

## 2021-09-11 NOTE — PROGRESS NOTES
[facility] Pulmonary/CCM Progress note      Admit Date: 9/10/2021    Chief Complaint: Altered mental status and shortness of breath    Subjective: Interval History: Patient is awake and following commands. Remains intubated, hemodynamically stable. Scheduled Meds:   pregabalin  100 mg Oral TID    ipratropium-albuterol  1 ampule Inhalation 4x daily    docusate sodium  100 mg Oral BID    pantoprazole  40 mg Oral QAM AC    FLUoxetine  40 mg Oral Daily    hydrocortisone   Rectal BID    levothyroxine  112 mcg Oral Daily    sennosides-docusate sodium  2 tablet Oral Nightly    budesonide-formoterol  2 puff Inhalation BID    sodium chloride flush  5-40 mL IntraVENous 2 times per day    enoxaparin  40 mg SubCUTAneous Nightly    methylPREDNISolone  40 mg IntraVENous Q6H    Followed by   Archer Fossa ON 9/13/2021] predniSONE  40 mg Oral Daily    piperacillin-tazobactam  3,375 mg IntraVENous Q8H     Continuous Infusions:   dexmedetomidine 0.4 mcg/kg/hr (09/11/21 1059)    sodium chloride 25 mL (09/10/21 2157)    propofol Stopped (09/11/21 1053)    fentaNYL Stopped (09/11/21 1059)    norepinephrine       PRN Meds:oxyCODONE, naloxegol, oxyCODONE-acetaminophen, sodium chloride flush, sodium chloride, ondansetron **OR** ondansetron, polyethylene glycol, acetaminophen **OR** acetaminophen    Review of Systems  Unable to obtain due to intubation and sedation status  Objective:     Patient Vitals for the past 8 hrs:   BP Temp Temp src Pulse Resp SpO2 Height   09/11/21 1300 116/78   64 20 93 %    09/11/21 1200 105/64 97.8 °F (36.6 °C) Temporal 64 17 94 %    09/11/21 1115    86 22 95 %    09/11/21 1106    77 20 100 %    09/11/21 1100 (!) 148/73   79 18     09/11/21 1005    68 19 100 %    09/11/21 1000 (!) 143/53   68 20     09/11/21 0900 (!) 123/56   67 21     09/11/21 0825       5' 1.5\" (1.562 m)     I/O last 3 completed shifts:   In: 422.9 [I.V.:301.7; NG/GT:60; IV Piggyback:61.2]  [facility]  No intake/output data recorded. General Appearance: Alert but intubated, in no acute distress  Skin: warm and dry, no rash or erythema  Head: normocephalic and atraumatic  Eyes: pupils equal, round, and reactive to light, extraocular eye movements intact, conjunctivae normal  ENT: external ear and ear canal normal bilaterally, nose without deformity, nasal mucosa and turbinates normal  Neck: supple and non-tender without mass, no cervical lymphadenopathy  Pulmonary/Chest: clear to auscultation bilaterally- no wheezes, rales or rhonchi, normal air movement, no respiratory distress  Cardiovascular: normal rate, regular rhythm,  no murmurs, rubs, distal pulses intact, no carotid bruits  Abdomen: soft, non-tender, non-distended, normal bowel sounds, no masses or organomegaly  Lymph Nodes: Cervical, supraclavicular normal  Extremities: no cyanosis, clubbing or edema  Musculoskeletal: normal range of motion, no joint swelling, deformity or tenderness  Neurologic: alert, no focal neurologic deficits    Data Review:  CBC:   Lab Results   Component Value Date    WBC 9.9 2021    RBC 2.51 2021     BMP:   Lab Results   Component Value Date    GLUCOSE 165 2021    CO2 25 2021    BUN 17 2021    CREATININE 1.0 2021    CALCIUM 8.8 2021     ABG:   Lab Results   Component Value Date    UEU5HGD 28.6 2021    BEART 4.8 2021    Y8AKBJYT 100.0 2021    PHART 7.483 2021    XTY8KHV 38.2 2021    PO2ART 155.0 2021    DFY8ZHW 66.8 2021       Radiology: All pertinent images / reports were reviewed as a part of this visit. Narrative   EXAMINATION:   CTA OF THE CHEST 9/10/2021 11:06 pm       TECHNIQUE:   CTA of the chest was performed after the administration of intravenous   contrast.  Multiplanar reformatted images are provided for review.  MIP   images are provided for review.  Dose modulation, iterative reconstruction,   and/or weight based adjustment of the mA/kV was utilized to reduce the   radiation dose to as low as reasonably achievable.       COMPARISON:   None.       HISTORY:   ORDERING SYSTEM PROVIDED HISTORY: assess and r/o PE   TECHNOLOGIST PROVIDED HISTORY:   Reason for exam:->assess and r/o PE   Reason for Exam: assess and r/o PE   Acuity: Acute   Type of Exam: Initial   Relevant Medical/Surgical History: assess and r/o PE       FINDINGS:   Pulmonary Arteries: Pulmonary arteries are upper normal limits in size. .  No   filling defect is identified in the pulmonary arteries to the level of   theproximal segmental arteries.       Mediastinum: Heart is enlarged. No pericardial effusion. Aorta is within   normal limits in size. No luminal defect is appreciated in the visualized   thoracic aorta.       Lungs/pleura: Central airways are patent. Endotracheal tube with tip   terminating in the distal 3rd trachea.  Lower lobe opacities.  No pleural   effusion.  Scattered atelectasis noted.  No pneumothorax.       Soft Tissues/Bones: No adenopathy. Calcified lymph nodes in the mediastinum   and left hilum. Scattered degenerative changes noted in the visualized spine   with spondylolistheses.       Upper Abdomen: Granulomas in the liver and spleen.  Partially visualized   low-attenuation focus in the left kidney.           Impression   1. Lower lobe airspace disease.  Correlate with presentation for aspiration   or pneumonia.  Follow-up to resolution recommended. 2. No acute pulmonary embolism to the proximal segmental arteries. 3. Other findings as described. Problem List:     Acute on chronic hypercapnic respiratory failure  Altered mental status  Obesity hypoventilation syndrome    Assessment/Plan:     Acute on chronic hypercapnic respiratory failure-acute on chronic hypercapnia, improved significantly since intubation. This has also improved patient's altered mental status.   CTA chest was personally reviewed by me, no evidence of PE or clear evidence of infiltrates. Perform sedation vacation and spontaneous breathing trial, evaluate if patient can be extubated.     Altered mental status noted from acute hypercapnia. Hold off on CT head.     Mild troponin elevation and proBNP elevation, related to demand ischemia only. No significant ST segment changes.     Patient should use BiPAP with sleep at nighttime following extubation.     Critical care team will follow    James Fuchs MD     Critical care time of 31 minutes excluding procedures

## 2021-09-11 NOTE — PROGRESS NOTES
Patient has been successfully weaned from Mechanical Ventilation. RSBI before extubation was 32 with EtCO2 of 37 and SpO2 of 100 on 40% FiO2. Patient extubated and placed on 4 liters/min via nasal cannula. Post extubation SpO2 is 93% with HR  75 bpm and RR 16 breaths/min. Patient had moderate cough that was productive of clear  and white sputum. Extubation Well tolerated by patient. Ramona Cabral

## 2021-09-11 NOTE — PLAN OF CARE
Nutrition Problem #1: Inadequate oral intake  Intervention: Food and/or Nutrient Delivery: Continue NPO (Consider EN support to begin within 24-48 hours as appropriate per MD)  Nutritional Goals: Diet advancement vs nutrition support

## 2021-09-11 NOTE — PROCEDURES
Fadi Knight is a 82 y.o. female patient. 1. Hypoxia    2. Acute respiratory failure with hypoxia and hypercapnia (HCC)    3. LIZ (acute kidney injury) ([facility] [address]    4. Demand ischemia of myocardium [facility])      Past Medical History:   Diagnosis Date    Arthritis     osteo    COPD (chronic obstructive pulmonary disease) (HCC)     Hemorrhoids     Hernia of unspecified site of abdominal cavity without mention of obstruction or gangrene     Incontinence     Knee pain, bilateral     pt states no cartilage    Spinal stenosis     Spinal stenosis      Blood pressure 132/68, pulse 85, temperature 98.7 °F (37.1 °C), temperature source Axillary, resp. rate 15, height 5' 1.5\" (1.562 m), weight 271 lb (122.9 kg), SpO2 100 %. Central Line    Date/Time: 9/10/2021 8:19 PM  Performed by: Robi Morales MD  Authorized by: Robi Morales MD   Consent: The procedure was performed in an emergent situation. Patient identity confirmed: arm band  Time out: Immediately prior to procedure a \"time out\" was called to verify the correct patient, procedure, equipment, support staff and site/side marked as required.   Indications: vascular access  Anesthesia: local infiltration    Anesthesia:  Local Anesthetic: lidocaine 1% without epinephrine    Sedation:  Patient sedated: yes  Sedatives: propofol    Preparation: skin prepped with 2% chlorhexidine  Skin prep agent dried: skin prep agent completely dried prior to procedure  Sterile barriers: all five maximum sterile barriers used - cap, mask, sterile gown, sterile gloves, and large sterile sheet  Hand hygiene: hand hygiene performed prior to central venous catheter insertion  Location details: right femoral  Site selection rationale: not able to advance guide wire in right IJ  Catheter type: triple lumen  Catheter size: 7 Fr  Ultrasound guidance: yes  Number of attempts: 2  Successful placement: yes  Post-procedure: line sutured  Assessment: blood return through all ports  Patient tolerance: patient tolerated the procedure well with no immediate complications  Comments: About 10 cc total blood loss          Mindi Elise MD  9/10/2021

## 2021-09-11 NOTE — PROGRESS NOTES
09/11/21 0804   Vent Patient Data   Plateau Pressure 17 [id number]   Static Compliance 37.75 mL/cmH2O   Dynamic Compliance 26.6 mL/cmH2O

## 2021-09-11 NOTE — PROGRESS NOTES
Hospitalist Progress Note      PCP: Reagan Kessler    Date of Admission: 9/10/2021    Chief Complaint: SOB, obtunded    Hospital Course:   Chay Molina a 82 y. o. female here today with a chief complaint of shortness of breath. Patient herself cannot give much history.  From what I can ascertain she is on baseline nasal cannula for some reason took it off this morning and was found by family a bit altered and hypoxic.      At the time of my evaluation the patient is on BiPAP. She is obtunded and not able to provide any history no family are present at the bedside. My history is obtained from the chart. She failed BiPAP and she was intubated by me. Also placed a central line in her femoral       Subjective:   She was able to be extubated today.        Medications:  Reviewed    Infusion Medications    dexmedetomidine 0.4 mcg/kg/hr (09/11/21 1059)    sodium chloride 25 mL (09/10/21 2157)    propofol Stopped (09/11/21 1053)    fentaNYL Stopped (09/11/21 1059)    norepinephrine       Scheduled Medications    pregabalin  100 mg Oral TID    docusate sodium  100 mg Oral BID    pantoprazole  40 mg Oral QAM AC    FLUoxetine  40 mg Oral Daily    hydrocortisone   Rectal BID    levothyroxine  112 mcg Oral Daily    sennosides-docusate sodium  2 tablet Oral Nightly    budesonide-formoterol  2 puff Inhalation BID    sodium chloride flush  5-40 mL IntraVENous 2 times per day    enoxaparin  40 mg SubCUTAneous Nightly    methylPREDNISolone  40 mg IntraVENous Q6H    Followed by   Dannial Redhead ON 9/13/2021] predniSONE  40 mg Oral Daily    piperacillin-tazobactam  3,375 mg IntraVENous Q8H    albuterol sulfate HFA  2 puff Inhalation 4x daily    ipratropium  2 puff Inhalation 4x daily     PRN Meds: oxyCODONE, naloxegol, oxyCODONE-acetaminophen, sodium chloride flush, sodium chloride, ondansetron **OR** ondansetron, polyethylene glycol, acetaminophen **OR** acetaminophen      Intake/Output Summary (Last 24 hours) at 9/11/2021 1554  Last data filed at 9/11/2021 1200  Gross per 24 hour   Intake 422.89 ml   Output 1975 ml   Net -1552.11 ml       Physical Exam Performed:    /78   Pulse 64   Temp 97.8 °F (36.6 °C) (Temporal)   Resp 20   Ht 5' 1.5\" (1.562 m)   Wt 244 lb 3.2 oz (110.8 kg)   SpO2 93%   BMI 45.39 kg/m²     General appearance: No apparent distress, appears stated age and cooperative. HEENT: Pupils equal, round, and reactive to light. Conjunctivae/corneas clear. Neck: Supple, with full range of motion. No jugular venous distention. Trachea midline. Respiratory:  Normal respiratory effort. Clear to auscultation, bilaterally without Rales/Wheezes/Rhonchi. Cardiovascular: Regular rate and rhythm with normal S1/S2 without murmurs, rubs or gallops. Abdomen: Soft, non-tender, non-distended with normal bowel sounds. Musculoskeletal: No clubbing, cyanosis or edema bilaterally. Full range of motion without deformity. Skin: Skin color, texture, turgor normal.  No rashes or lesions. Neurologic:  Neurovascularly intact without any focal sensory/motor deficits.  Cranial nerves: II-XII intact, grossly non-focal.  Psychiatric: Alert and oriented, thought content appropriate, normal insight  Capillary Refill: Brisk,3 seconds, normal   Peripheral Pulses: +2 palpable, equal bilaterally       Labs:   Recent Labs     09/10/21  0950 09/11/21  0459   WBC 14.0* 9.9   HGB 8.7* 7.8*   HCT 27.3* 23.1*    145     Recent Labs     09/10/21  0950 09/11/21  0459    138   K 3.8 3.4*    101   CO2 29 25   BUN 16 17   CREATININE 1.4* 1.0   CALCIUM 8.5 8.8     Recent Labs     09/10/21  0950   AST 17   ALT 14   BILITOT <0.2   ALKPHOS 140*     Recent Labs     09/10/21  0950   INR 1.00     Recent Labs     09/10/21  0950   TROPONINI 0.02*       Urinalysis:      Lab Results   Component Value Date    NITRU Negative 09/10/2021    WBCUA 7 09/10/2021    BACTERIA 4+ 06/20/2019    RBCUA 2 09/10/2021    BLOODU Negative 09/10/2021    SPECGRAV 1.018 09/10/2021    GLUCOSEU Negative 09/10/2021    GLUCOSEU NEGATIVE 03/21/2011       Radiology:  CT CHEST PULMONARY EMBOLISM W CONTRAST   Final Result   1. Lower lobe airspace disease. Correlate with presentation for aspiration   or pneumonia. Follow-up to resolution recommended. 2. No acute pulmonary embolism to the proximal segmental arteries. 3. Other findings as described. XR CHEST PORTABLE   Final Result   Endotracheal tube projects in normal position. Low lung volumes, with suspected vascular congestion. Mild bibasilar   atelectasis, versus airspace disease. XR ABDOMEN FOR NG/OG/NE TUBE PLACEMENT   Final Result   Tip of NG tube projects in the region of the gastric fundus         XR CHEST PORTABLE   Final Result   1. Limited exam due to rotation. 2. Bibasilar pulmonary opacities are present, representing infection,   atelectasis or edema. 3. Likely bilateral small pleural fluid and atelectasis. Assessment/Plan:    Active Hospital Problems    Diagnosis     Acute on chronic respiratory failure with hypoxia (HCC) [J96.21]      Acute respiratory failure with hypoxia and hypercarbia  -Initially the patient was on BiPAP. Repeat blood gas showed worsening PCO2 and she was obtunded and not able to respond to anything other than deep pain. Based on this side made the decision to intubate the patient. She was intubated with a 7.5 ET tube. Started on propofol and fentanyl. She has been extubated at this time.   She is tolerating this well.     Possible pneumonia  -Chest x-ray appears to show low lung volumes difficult to tell if there is pneumonia on the right versus just her diaphragm.  -I will cover her with some IV antibiotics.     Morbid obesity  Probably contributing to obstructive sleep apnea versus obesity hypoventilation syndrome contributing to the patient's overall issues.     Anemia  -No sign of acute bleeding  Appears to be chronic in nature.     Possible COVID-19  -The patient has been swabbed  Seems unlikely to be Covid at this point  COVID-19 negative    DVT Prophylaxis: lovenox  Diet: Diet NPO  Code Status: Full Code    PT/OT Eval Status: eval and treat     Dispo - doing well, extubated.  Possible d.c in the next 24 hours if she continues to well    Pascual Gregory MD

## 2021-09-11 NOTE — PROCEDURES
Kylie Escobar is a 82 y.o. female patient. 1. Hypoxia    2. Acute respiratory failure with hypoxia and hypercapnia (HCC)    3. LIZ (acute kidney injury) ([facility] [place] [address]    4. Demand ischemia of myocardium [facility])      Past Medical History:   Diagnosis Date    Arthritis     osteo    COPD (chronic obstructive pulmonary disease) (HCC)     Hemorrhoids     Hernia of unspecified site of abdominal cavity without mention of obstruction or gangrene     Incontinence     Knee pain, bilateral     pt states no cartilage    Spinal stenosis     Spinal stenosis      Blood pressure 132/68, pulse 85, temperature 98.7 °F (37.1 °C), temperature source Axillary, resp. rate 15, height 5' 1.5\" (1.562 m), weight 271 lb (122.9 kg), SpO2 100 %. Intubation    Date/Time: 9/10/2021 8:13 PM  Performed by: Keven June MD  Authorized by: Keven June MD   Consent: The procedure was performed in an emergent situation. Patient identity confirmed: arm band  Time out: Immediately prior to procedure a \"time out\" was called to verify the correct patient, procedure, equipment, support staff and site/side marked as required. Indications: respiratory failure  Intubation method: fiberoptic oral  Patient status: sedated  Preoxygenation: BVM  Pretreatment medications: midazolam  Sedatives: etomidate  Paralytic: none  Laryngoscope size: Mac 4  Tube size: 7.5 mm  Tube type: cuffed  Number of attempts: 1  Cricoid pressure: no  Cords visualized: yes  Breath sounds: equal  Cuff inflated: yes  Tube secured with: ETT gallo  Chest x-ray interpreted by me.   Chest x-ray findings: endotracheal tube in appropriate position  Patient tolerance: patient tolerated the procedure well with no immediate complications          Keven June MD  9/10/2021

## 2021-09-12 LAB
BASE EXCESS ARTERIAL: 6.5 MMOL/L (ref -3–3)
CARBOXYHEMOGLOBIN ARTERIAL: 1.6 % (ref 0–1.5)
HCO3 ARTERIAL: 30.8 MMOL/L (ref 21–29)
HEMOGLOBIN, ART, EXTENDED: 8.2 G/DL (ref 12–16)
L. PNEUMOPHILA SEROGP 1 UR AG: NORMAL
METHEMOGLOBIN ARTERIAL: 0 %
O2 SAT, ARTERIAL: 94.8 %
O2 THERAPY: ABNORMAL
PCO2 ARTERIAL: 42.3 MMHG (ref 35–45)
PH ARTERIAL: 7.47 (ref 7.35–7.45)
PO2 ARTERIAL: 63.4 MMHG (ref 75–108)
STREP PNEUMONIAE ANTIGEN, URINE: NORMAL
TCO2 ARTERIAL: 71.9 MMOL/L

## 2021-09-12 PROCEDURE — 99233 SBSQ HOSP IP/OBS HIGH 50: CPT | Performed by: INTERNAL MEDICINE

## 2021-09-12 PROCEDURE — 92526 ORAL FUNCTION THERAPY: CPT

## 2021-09-12 PROCEDURE — 6360000002 HC RX W HCPCS: Performed by: FAMILY MEDICINE

## 2021-09-12 PROCEDURE — 6370000000 HC RX 637 (ALT 250 FOR IP): Performed by: INTERNAL MEDICINE

## 2021-09-12 PROCEDURE — 6360000002 HC RX W HCPCS: Performed by: INTERNAL MEDICINE

## 2021-09-12 PROCEDURE — 2700000000 HC OXYGEN THERAPY PER DAY

## 2021-09-12 PROCEDURE — 2500000003 HC RX 250 WO HCPCS: Performed by: INTERNAL MEDICINE

## 2021-09-12 PROCEDURE — 6370000000 HC RX 637 (ALT 250 FOR IP): Performed by: FAMILY MEDICINE

## 2021-09-12 PROCEDURE — 82803 BLOOD GASES ANY COMBINATION: CPT

## 2021-09-12 PROCEDURE — 2060000000 HC ICU INTERMEDIATE R&B

## 2021-09-12 PROCEDURE — 2580000003 HC RX 258: Performed by: FAMILY MEDICINE

## 2021-09-12 PROCEDURE — 92610 EVALUATE SWALLOWING FUNCTION: CPT

## 2021-09-12 PROCEDURE — 94640 AIRWAY INHALATION TREATMENT: CPT

## 2021-09-12 PROCEDURE — 94761 N-INVAS EAR/PLS OXIMETRY MLT: CPT

## 2021-09-12 RX ORDER — PREDNISONE 20 MG/1
40 TABLET ORAL DAILY
Status: DISCONTINUED | OUTPATIENT
Start: 2021-09-12 | End: 2021-09-14 | Stop reason: HOSPADM

## 2021-09-12 RX ORDER — FUROSEMIDE 10 MG/ML
40 INJECTION INTRAMUSCULAR; INTRAVENOUS ONCE
Status: COMPLETED | OUTPATIENT
Start: 2021-09-12 | End: 2021-09-12

## 2021-09-12 RX ADMIN — FLUOXETINE 40 MG: 20 CAPSULE ORAL at 10:36

## 2021-09-12 RX ADMIN — PIPERACILLIN AND TAZOBACTAM 3375 MG: 3; .375 INJECTION, POWDER, LYOPHILIZED, FOR SOLUTION INTRAVENOUS at 04:28

## 2021-09-12 RX ADMIN — METHYLPREDNISOLONE SODIUM SUCCINATE 40 MG: 40 INJECTION, POWDER, FOR SOLUTION INTRAMUSCULAR; INTRAVENOUS at 08:30

## 2021-09-12 RX ADMIN — FUROSEMIDE 40 MG: 10 INJECTION, SOLUTION INTRAMUSCULAR; INTRAVENOUS at 11:01

## 2021-09-12 RX ADMIN — IPRATROPIUM BROMIDE AND ALBUTEROL SULFATE 1 AMPULE: .5; 3 SOLUTION RESPIRATORY (INHALATION) at 11:55

## 2021-09-12 RX ADMIN — ENOXAPARIN SODIUM 40 MG: 40 INJECTION SUBCUTANEOUS at 20:14

## 2021-09-12 RX ADMIN — Medication 10 ML: at 10:52

## 2021-09-12 RX ADMIN — IPRATROPIUM BROMIDE AND ALBUTEROL SULFATE 1 AMPULE: .5; 3 SOLUTION RESPIRATORY (INHALATION) at 15:13

## 2021-09-12 RX ADMIN — PREGABALIN 100 MG: 100 CAPSULE ORAL at 20:14

## 2021-09-12 RX ADMIN — DEXMEDETOMIDINE HYDROCHLORIDE IN 0.9% SODIUM CHLORIDE 0.2 MCG/KG/HR: 4 INJECTION INTRAVENOUS at 05:31

## 2021-09-12 RX ADMIN — PIPERACILLIN AND TAZOBACTAM 3375 MG: 3; .375 INJECTION, POWDER, LYOPHILIZED, FOR SOLUTION INTRAVENOUS at 20:20

## 2021-09-12 RX ADMIN — PIPERACILLIN AND TAZOBACTAM 3375 MG: 3; .375 INJECTION, POWDER, LYOPHILIZED, FOR SOLUTION INTRAVENOUS at 12:33

## 2021-09-12 RX ADMIN — OXYCODONE AND ACETAMINOPHEN 1 TABLET: 10; 325 TABLET ORAL at 20:14

## 2021-09-12 RX ADMIN — OXYCODONE HYDROCHLORIDE 10 MG: 10 TABLET, FILM COATED, EXTENDED RELEASE ORAL at 16:48

## 2021-09-12 RX ADMIN — METHYLPREDNISOLONE SODIUM SUCCINATE 40 MG: 40 INJECTION, POWDER, FOR SOLUTION INTRAMUSCULAR; INTRAVENOUS at 00:09

## 2021-09-12 RX ADMIN — ACETAMINOPHEN 650 MG: 325 TABLET ORAL at 22:29

## 2021-09-12 RX ADMIN — DOCUSATE SODIUM 100 MG: 100 CAPSULE, LIQUID FILLED ORAL at 20:13

## 2021-09-12 RX ADMIN — LEVOTHYROXINE SODIUM 112 MCG: 0.11 TABLET ORAL at 10:36

## 2021-09-12 RX ADMIN — HYDROCORTISONE: 25 CREAM TOPICAL at 12:33

## 2021-09-12 RX ADMIN — BUDESONIDE AND FORMOTEROL FUMARATE DIHYDRATE 2 PUFF: 160; 4.5 AEROSOL RESPIRATORY (INHALATION) at 19:42

## 2021-09-12 RX ADMIN — PANTOPRAZOLE SODIUM 40 MG: 40 TABLET, DELAYED RELEASE ORAL at 10:36

## 2021-09-12 RX ADMIN — HYDROCORTISONE: 25 CREAM TOPICAL at 22:24

## 2021-09-12 RX ADMIN — PREGABALIN 100 MG: 100 CAPSULE ORAL at 14:26

## 2021-09-12 RX ADMIN — OXYCODONE AND ACETAMINOPHEN 1 TABLET: 10; 325 TABLET ORAL at 14:26

## 2021-09-12 RX ADMIN — IPRATROPIUM BROMIDE AND ALBUTEROL SULFATE 1 AMPULE: .5; 3 SOLUTION RESPIRATORY (INHALATION) at 07:43

## 2021-09-12 RX ADMIN — SENNOSIDES AND DOCUSATE SODIUM 2 TABLET: 50; 8.6 TABLET ORAL at 20:14

## 2021-09-12 RX ADMIN — PREGABALIN 100 MG: 100 CAPSULE ORAL at 10:36

## 2021-09-12 RX ADMIN — Medication 10 ML: at 22:24

## 2021-09-12 RX ADMIN — BUDESONIDE AND FORMOTEROL FUMARATE DIHYDRATE 2 PUFF: 160; 4.5 AEROSOL RESPIRATORY (INHALATION) at 07:45

## 2021-09-12 RX ADMIN — IPRATROPIUM BROMIDE AND ALBUTEROL SULFATE 1 AMPULE: .5; 3 SOLUTION RESPIRATORY (INHALATION) at 19:42

## 2021-09-12 ASSESSMENT — PAIN SCALES - PAIN ASSESSMENT IN ADVANCED DEMENTIA (PAINAD)
BODYLANGUAGE: 1
BREATHING: 0
FACIALEXPRESSION: 1
CONSOLABILITY: 0
TOTALSCORE: 2
FACIALEXPRESSION: 1
NEGVOCALIZATION: 0
BREATHING: 0
FACIALEXPRESSION: 1
NEGVOCALIZATION: 0
BREATHING: 0
CONSOLABILITY: 0
CONSOLABILITY: 0
BODYLANGUAGE: 1
BREATHING: 0
CONSOLABILITY: 0
BREATHING: 0
CONSOLABILITY: 0
BODYLANGUAGE: 1
TOTALSCORE: 2
CONSOLABILITY: 0
CONSOLABILITY: 0
FACIALEXPRESSION: 1
TOTALSCORE: 2
TOTALSCORE: 2
FACIALEXPRESSION: 1
NEGVOCALIZATION: 0
NEGVOCALIZATION: 0
BODYLANGUAGE: 1
NEGVOCALIZATION: 0
BODYLANGUAGE: 1
BODYLANGUAGE: 1
TOTALSCORE: 2
CONSOLABILITY: 0
CONSOLABILITY: 0
FACIALEXPRESSION: 1
FACIALEXPRESSION: 1
NEGVOCALIZATION: 0
NEGVOCALIZATION: 0
BREATHING: 0
BODYLANGUAGE: 1
NEGVOCALIZATION: 0
BREATHING: 0
FACIALEXPRESSION: 1
CONSOLABILITY: 0
FACIALEXPRESSION: 1
TOTALSCORE: 2
FACIALEXPRESSION: 1
BODYLANGUAGE: 1
NEGVOCALIZATION: 0
TOTALSCORE: 2
BODYLANGUAGE: 1
NEGVOCALIZATION: 0
BODYLANGUAGE: 1
BREATHING: 0
TOTALSCORE: 2

## 2021-09-12 ASSESSMENT — PAIN DESCRIPTION - LOCATION: LOCATION: BACK

## 2021-09-12 ASSESSMENT — PAIN SCALES - GENERAL
PAINLEVEL_OUTOF10: 7
PAINLEVEL_OUTOF10: 6
PAINLEVEL_OUTOF10: 7
PAINLEVEL_OUTOF10: 10
PAINLEVEL_OUTOF10: 6
PAINLEVEL_OUTOF10: 9
PAINLEVEL_OUTOF10: 6
PAINLEVEL_OUTOF10: 7

## 2021-09-12 ASSESSMENT — PAIN SCALES - WONG BAKER
WONGBAKER_NUMERICALRESPONSE: 0

## 2021-09-12 ASSESSMENT — PAIN DESCRIPTION - PAIN TYPE: TYPE: CHRONIC PAIN

## 2021-09-12 NOTE — PROGRESS NOTES
Patient was very sleepy so turned off precedex. Patient is alert to self. Wound care was done on buttocks. And patient was bathed. Assessment done. Vitals completed. Patient tolderating being off precedex well she is not agitated. Pt on 3L of oxygen. Talked to daughter who is the caregiver she wasn't information on advance directives and who can be the POA. Pt does not have a cpap of a bipap at home anymore. Patient does not want to go to a facility.

## 2021-09-12 NOTE — PLAN OF CARE
Problem: Non-Violent Restraints  Goal: Removal from restraints as soon as assessed to be safe  Outcome: Completed  Goal: No harm/injury to patient while restraints in use  Outcome: Completed  Goal: Patient's dignity will be maintained  Outcome: Completed - patient removed from restraints   Problem: Infection - Ventilator-Associated Pneumonia:  Goal: Prevent a ventilator associated event (VAE)  Description: Prevent a ventilator associated event (VAE)  Outcome: Completed  Goal: Absence of pulmonary infection  Description: Absence of pulmonary infection  Outcome: Completed  Patient extubated   Problem: Infection - Ventilator-Associated Pneumonia:  Goal: Prevent a ventilator associated event (VAE)  Description: Prevent a ventilator associated event (VAE)  Outcome: Completed  Goal: Absence of pulmonary infection  Description: Absence of pulmonary infection  Outcome: Completed     Problem: Skin Integrity:  Goal: Will show no infection signs and symptoms  Description: Will show no infection signs and symptoms  Outcome: Ongoing  Goal: Absence of new skin breakdown  Description: Absence of new skin breakdown  Outcome: Ongoing     Problem: Infection:  Goal: Will remain free from infection  Description: Will remain free from infection  Outcome: Ongoing     Problem: Safety:  Goal: Free from accidental physical injury  Description: Free from accidental physical injury  Outcome: Ongoing  Goal: Free from intentional harm  Description: Free from intentional harm  Outcome: Ongoing  Goal: Ability to remain free from injury will improve  Description: Ability to remain free from injury will improve  Outcome: Ongoing     Problem: Daily Care:  Goal: Daily care needs are met  Description: Daily care needs are met  Outcome: Ongoing     Problem: Pain:  Goal: Patient's pain/discomfort is manageable  Description: Patient's pain/discomfort is manageable  Outcome: Ongoing     Problem: Skin Integrity:  Goal: Skin integrity will stabilize  Description: Skin integrity will stabilize  Outcome: Ongoing     Problem: Discharge Planning:  Goal: Patients continuum of care needs are met  Description: Patients continuum of care needs are met  Outcome: Ongoing     Problem: Coping:  Goal: Ability to remain calm will improve  Description: Ability to remain calm will improve  Outcome: Ongoing     Problem: Self-Care:  Goal: Ability to participate in self-care as condition permits will improve  Description: Ability to participate in self-care as condition permits will improve  Outcome: Ongoing     Problem: Nutrition  Goal: Optimal nutrition therapy  Outcome: Ongoing           Problem: ABCDS Injury Assessment  Goal: Absence of physical injury  Outcome: Ongoing

## 2021-09-12 NOTE — PROGRESS NOTES
Hospitalist Progress Note      PCP: Ricky Jenkins    Date of Admission: 9/10/2021    Chief Complaint: SOB, obtunded    Hospital Course:   Aron Dee a 82 y. o. female here today with a chief complaint of shortness of breath. Patient herself cannot give much history.  From what I can ascertain she is on baseline nasal cannula for some reason took it off this morning and was found by family a bit altered and hypoxic.      At the time of my evaluation the patient is on BiPAP. She is obtunded and not able to provide any history no family are present at the bedside. My history is obtained from the chart. She failed BiPAP and she was intubated by me. Also placed a central line in her femoral       Subjective:   She is awake and alert today.   Tells us she is supposed to wear cpap or Bipap at home but stopped and then gave the machine back  She really needs to wear it when she sleeps/.      Medications:  Reviewed    Infusion Medications    dexmedetomidine Stopped (09/12/21 1025)    sodium chloride 25 mL (09/10/21 2157)    propofol Stopped (09/11/21 1053)    fentaNYL Stopped (09/11/21 1059)    norepinephrine       Scheduled Medications    predniSONE  40 mg Oral Daily    pregabalin  100 mg Oral TID    ipratropium-albuterol  1 ampule Inhalation 4x daily    docusate sodium  100 mg Oral BID    pantoprazole  40 mg Oral QAM AC    FLUoxetine  40 mg Oral Daily    hydrocortisone   Rectal BID    levothyroxine  112 mcg Oral Daily    sennosides-docusate sodium  2 tablet Oral Nightly    budesonide-formoterol  2 puff Inhalation BID    sodium chloride flush  5-40 mL IntraVENous 2 times per day    enoxaparin  40 mg SubCUTAneous Nightly    piperacillin-tazobactam  3,375 mg IntraVENous Q8H     PRN Meds: oxyCODONE, naloxegol, oxyCODONE-acetaminophen, sodium chloride flush, sodium chloride, ondansetron **OR** ondansetron, polyethylene glycol, acetaminophen **OR** acetaminophen      Intake/Output Summary (Last 24 hours) at 9/12/2021 1321  Last data filed at 9/12/2021 1305  Gross per 24 hour   Intake 701.9 ml   Output 1875 ml   Net -1173.1 ml       Physical Exam Performed:    BP (!) 140/53   Pulse 63   Temp 97.9 °F (36.6 °C) (Temporal)   Resp 22   Ht 5' 1.5\" (1.562 m)   Wt 243 lb 10.4 oz (110.5 kg)   SpO2 97%   BMI 45.29 kg/m²     General appearance: No apparent distress, appears stated age and cooperative. HEENT: Pupils equal, round, and reactive to light. Conjunctivae/corneas clear. Neck: Supple, with full range of motion. No jugular venous distention. Trachea midline. Respiratory:  Normal respiratory effort. Clear to auscultation, bilaterally without Rales/Wheezes/Rhonchi. Cardiovascular: Regular rate and rhythm with normal S1/S2 without murmurs, rubs or gallops. Abdomen: Soft, non-tender, non-distended with normal bowel sounds. Musculoskeletal: No clubbing, cyanosis or edema bilaterally. Full range of motion without deformity. Skin: Skin color, texture, turgor normal.  No rashes or lesions. Neurologic:  Neurovascularly intact without any focal sensory/motor deficits.  Cranial nerves: II-XII intact, grossly non-focal.  Psychiatric: Alert and oriented, thought content appropriate, normal insight  Capillary Refill: Brisk,3 seconds, normal   Peripheral Pulses: +2 palpable, equal bilaterally       Labs:   Recent Labs     09/10/21  0950 09/11/21  0459   WBC 14.0* 9.9   HGB 8.7* 7.8*   HCT 27.3* 23.1*    145     Recent Labs     09/10/21  0950 09/11/21  0459    138   K 3.8 3.4*    101   CO2 29 25   BUN 16 17   CREATININE 1.4* 1.0   CALCIUM 8.5 8.8     Recent Labs     09/10/21  0950   AST 17   ALT 14   BILITOT <0.2   ALKPHOS 140*     Recent Labs     09/10/21  0950   INR 1.00     Recent Labs     09/10/21  0950   TROPONINI 0.02*       Urinalysis:      Lab Results   Component Value Date    NITRU Negative 09/10/2021    WBCUA 7 09/10/2021    BACTERIA 4+ 06/20/2019    RBCUA 2 09/10/2021 BLOODU Negative 09/10/2021    SPECGRAV 1.018 09/10/2021    GLUCOSEU Negative 09/10/2021    GLUCOSEU NEGATIVE 03/21/2011       Radiology:  CT CHEST PULMONARY EMBOLISM W CONTRAST   Final Result   1. Lower lobe airspace disease. Correlate with presentation for aspiration   or pneumonia. Follow-up to resolution recommended. 2. No acute pulmonary embolism to the proximal segmental arteries. 3. Other findings as described. XR CHEST PORTABLE   Final Result   Endotracheal tube projects in normal position. Low lung volumes, with suspected vascular congestion. Mild bibasilar   atelectasis, versus airspace disease. XR ABDOMEN FOR NG/OG/NE TUBE PLACEMENT   Final Result   Tip of NG tube projects in the region of the gastric fundus         XR CHEST PORTABLE   Final Result   1. Limited exam due to rotation. 2. Bibasilar pulmonary opacities are present, representing infection,   atelectasis or edema. 3. Likely bilateral small pleural fluid and atelectasis. Assessment/Plan:    Active Hospital Problems    Diagnosis     Acute on chronic respiratory failure with hypoxia (HCC) [J96.21]      Acute respiratory failure with hypoxia and hypercarbia  -Initially the patient was on BiPAP. Repeat blood gas showed worsening PCO2 and she was obtunded and not able to respond to anything other than deep pain. Based on this side made the decision to intubate the patient. She was intubated with a 7.5 ET tube. Started on propofol and fentanyl. She has been extubated at this time. She is tolerating this well. Has a component of SHORTY and COPD  - taking dose of steroids down.   - needs to wear CPAP or BiPAP at night.       Possible pneumonia  -Chest x-ray appears to show low lung volumes difficult to tell if there is pneumonia on the right versus just her diaphragm.  -I will cover her with some IV antibiotics.     Morbid obesity  Probably contributing to obstructive sleep apnea versus obesity hypoventilation syndrome contributing to the patient's overall issues.     Anemia  -No sign of acute bleeding  Appears to be chronic in nature.     Possible COVID-19  -The patient has been swabbed  Seems unlikely to be Covid at this point  COVID-19 negative    DVT Prophylaxis: lovenox  Diet: ADULT DIET; Dysphagia - Pureed; Mildly Thick (Nectar); No Drinking Straws  Code Status: Full Code    PT/OT Eval Status: eval and treat     Dispo - doing well, extubated.  Possible d.c in the next 24 hours if she continues to well    Leti Taylor MD

## 2021-09-12 NOTE — PROGRESS NOTES
[facility] Pulmonary/CCM Progress note      Admit Date: 9/10/2021    Chief Complaint: Altered mental status and shortness of breath    Subjective: Interval History: Patient extubated successfully yesterday, awake and following commands. Hemodynamically stable. Scheduled Meds:   predniSONE  40 mg Oral Daily    pregabalin  100 mg Oral TID    ipratropium-albuterol  1 ampule Inhalation 4x daily    docusate sodium  100 mg Oral BID    pantoprazole  40 mg Oral QAM AC    FLUoxetine  40 mg Oral Daily    hydrocortisone   Rectal BID    levothyroxine  112 mcg Oral Daily    sennosides-docusate sodium  2 tablet Oral Nightly    budesonide-formoterol  2 puff Inhalation BID    sodium chloride flush  5-40 mL IntraVENous 2 times per day    enoxaparin  40 mg SubCUTAneous Nightly    piperacillin-tazobactam  3,375 mg IntraVENous Q8H     Continuous Infusions:   dexmedetomidine Stopped (09/12/21 1025)    sodium chloride 25 mL (09/10/21 2157)    propofol Stopped (09/11/21 1053)    fentaNYL Stopped (09/11/21 1059)    norepinephrine       PRN Meds:oxyCODONE, naloxegol, oxyCODONE-acetaminophen, sodium chloride flush, sodium chloride, ondansetron **OR** ondansetron, polyethylene glycol, acetaminophen **OR** acetaminophen    Review of Systems  As per interval history and the rest of the 14 point systems were sought and noted to be negative  Objective:     Patient Vitals for the past 8 hrs:   BP Temp Temp src Pulse Resp SpO2   09/12/21 1200 (!) 140/53 97.9 °F (36.6 °C) Temporal 63 22 97 %   09/12/21 1155     26 91 %   09/12/21 1025    63     09/12/21 0800 (!) 118/45 97.5 °F (36.4 °C) Temporal 68 23 92 %     I/O last 3 completed shifts: In: 701.9 [I.V.:539.2; IV Piggyback:162.7]  Out: 1875 [Urine:1875]  No intake/output data recorded.     General Appearance: Alert, in no acute distress  Skin: warm and dry, no rash or erythema  Head: normocephalic and atraumatic  Eyes: pupils equal, round, and reactive to light, extraocular eye movements intact, conjunctivae normal  ENT: external ear and ear canal normal bilaterally, nose without deformity, nasal mucosa and turbinates normal  Neck: supple and non-tender without mass, no cervical lymphadenopathy  Pulmonary/Chest: clear to auscultation bilaterally- no wheezes, rales or rhonchi, normal air movement, no respiratory distress  Cardiovascular: normal rate, regular rhythm,  no murmurs, rubs, distal pulses intact, no carotid bruits  Abdomen: soft, non-tender, non-distended, normal bowel sounds, no masses or organomegaly  Lymph Nodes: Cervical, supraclavicular normal  Extremities: no cyanosis, clubbing or edema  Musculoskeletal: normal range of motion, no joint swelling, deformity or tenderness  Neurologic: alert, no focal neurologic deficits    Data Review:  CBC:   Lab Results   Component Value Date    WBC 9.9 09/11/2021    RBC 2.51 09/11/2021     BMP:   Lab Results   Component Value Date    GLUCOSE 165 09/11/2021    CO2 25 09/11/2021    BUN 17 09/11/2021    CREATININE 1.0 09/11/2021    CALCIUM 8.8 09/11/2021     ABG:   Lab Results   Component Value Date    WSM2LCC 30.8 09/12/2021    BEART 6.5 09/12/2021    Y0KWWUHO 94.8 09/12/2021    PHART 7.471 09/12/2021    GCE0WTR 42.3 09/12/2021    PO2ART 63.4 09/12/2021    DZB8NCV 71.9 09/12/2021       Radiology: All pertinent images / reports were reviewed as a part of this visit. Narrative   EXAMINATION:   CTA OF THE CHEST 9/10/2021 11:06 pm       TECHNIQUE:   CTA of the chest was performed after the administration of intravenous   contrast.  Multiplanar reformatted images are provided for review.  MIP   images are provided for review.  Dose modulation, iterative reconstruction,   and/or weight based adjustment of the mA/kV was utilized to reduce the   radiation dose to as low as reasonably achievable.       COMPARISON:   None.       HISTORY:   ORDERING SYSTEM PROVIDED HISTORY: assess and r/o PE   TECHNOLOGIST PROVIDED HISTORY:   Reason for exam:->assess and r/o PE   Reason for Exam: assess and r/o PE   Acuity: Acute   Type of Exam: Initial   Relevant Medical/Surgical History: assess and r/o PE       FINDINGS:   Pulmonary Arteries: Pulmonary arteries are upper normal limits in size. .  No   filling defect is identified in the pulmonary arteries to the level of   theproximal segmental arteries.       Mediastinum: Heart is enlarged. No pericardial effusion. Aorta is within   normal limits in size. No luminal defect is appreciated in the visualized   thoracic aorta.       Lungs/pleura: Central airways are patent. Endotracheal tube with tip   terminating in the distal 3rd trachea.  Lower lobe opacities.  No pleural   effusion.  Scattered atelectasis noted.  No pneumothorax.       Soft Tissues/Bones: No adenopathy. Calcified lymph nodes in the mediastinum   and left hilum. Scattered degenerative changes noted in the visualized spine   with spondylolistheses.       Upper Abdomen: Granulomas in the liver and spleen.  Partially visualized   low-attenuation focus in the left kidney.           Impression   1. Lower lobe airspace disease.  Correlate with presentation for aspiration   or pneumonia.  Follow-up to resolution recommended. 2. No acute pulmonary embolism to the proximal segmental arteries. 3. Other findings as described. Problem List:     Acute on chronic hypercapnic respiratory failure  Altered mental status  Obesity hypoventilation syndrome    Assessment/Plan:     Acute on chronic hypercapnic respiratory failure-requiring intubation. Was successfully extubated yesterday. Now requiring between 2 to 4 L O2. Hypercapnia has improved. However patient has not used BiPAP overnight which was ordered-will need to use it every night to avoid hypercapnia. Discussed with patient about need to continue BiPAP, appears not interested in the idea.   Previous history of SHORTY-had either BiPAP or CPAP at home, which was returned since patient was

## 2021-09-12 NOTE — PLAN OF CARE
Ongoing     Problem: Skin Integrity:  Goal: Skin integrity will stabilize  Description: Skin integrity will stabilize  9/12/2021 1502 by Wendee Bloch, RN  Outcome: Ongoing  9/12/2021 0609 by Agnes Saavedra RN  Outcome: Ongoing     Problem: Discharge Planning:  Goal: Patients continuum of care needs are met  Description: Patients continuum of care needs are met  9/12/2021 1502 by Wendee Bloch, RN  Outcome: Ongoing  9/12/2021 0609 by Agnes Saavedra RN  Outcome: Ongoing     Problem: Coping:  Goal: Ability to remain calm will improve  Description: Ability to remain calm will improve  9/12/2021 1502 by Wendee Bloch, RN  Outcome: Ongoing  9/12/2021 0609 by Agnes Saavedra RN  Outcome: Ongoing     Problem: Self-Care:  Goal: Ability to participate in self-care as condition permits will improve  Description: Ability to participate in self-care as condition permits will improve  9/12/2021 1502 by Wendee Bloch, RN  Outcome: Ongoing  9/12/2021 0609 by Agnes Saavedra RN  Outcome: Ongoing     Problem: Nutrition  Goal: Optimal nutrition therapy  9/12/2021 1502 by Wendee Bloch, RN  Outcome: Ongoing  9/12/2021 0609 by Agnes Saavedra RN  Outcome: Ongoing     Problem: Falls - Risk of:  Goal: Will remain free from falls  Description: Will remain free from falls  Outcome: Ongoing  Goal: Absence of physical injury  Description: Absence of physical injury  Outcome: Ongoing

## 2021-09-12 NOTE — PROGRESS NOTES
Speech Language Pathology  Facility/Department: [facility]C  Initial Assessment  DYSPHAGIA BEDSIDE SWALLOW EVALUATION     Patient: Angeline Durant   : 1935   MRN: [id number]      Evaluation Date: 2021   Admitting Diagnosis: Hypoxia [R09.02]  LIZ (acute kidney injury) ([facility] [address] [N17.9]  Demand ischemia of myocardium ([facility] [address] [I24.8]  Acute on chronic respiratory failure with hypoxia (HCC) [J96.21]  Acute respiratory failure with hypoxia and hypercapnia (HCC) [J96.01, J96.02]  Pain: Pt denies pain at this time                        H&P:Mari Day is a 82 y. o. female here today with a chief complaint of shortness of breath. Patient herself cannot give much history.  From what I can ascertain she is on baseline nasal cannula for some reason took it off this morning and was found by family a bit altered and hypoxic. Pt brought in by [facility] from home (lives with daughter). Per squad patient was found without NC on (4L NC at baseline). SpO2 in 70's, Pt placed on non-rebreather at 15L. Pt alert but not talking at this time. hx of COPD and CHF. Respiratory status continued to decline requiring BiPAP and eventual intubation. Pt extubated on 21 and is currently maintaining O2 sats on baseline 4L O2    Chest CT:   Impression   1. Lower lobe airspace disease.  Correlate with presentation for aspiration   or pneumonia.  Follow-up to resolution recommended. 2. No acute pulmonary embolism to the proximal segmental arteries. 3. Other findings as described. History/Prior Level of Function:   Living Status: SNF    Prior Dysphagia History: Prior Clinical Swallow Evaluation at this facility on 19 (see report). Pt discharged from this hospital to SNF on 19 on recommended Dysphagia II diet with thin liquids. Current diet level prior to admission is unknown at this time.     Dysphagia Impressions/Diagnosis: Oropharyngeal Dysphagia   Pt lethargic but is able to be awakened for brief intervals with verbal prompts and repositioning. Pt noted with head lean to the R when positioned upright. Pt intermittently verbally combative when awakened but is accepting of po trials to assess for po tolerance. Frequent verbal prompting currently required to maintain alertness for brief intervals. Pt able to complete OME given verbal and tactile prompts with no overt asymmetry and with mild reduced strength and ROM. Moderately reduced bolus control and A-P propulsion noted with po trials resulting in rapid and premature bolus loss to pharynx with thin liquids and in prolonged mastication reduced oral clearing with soft solids. Clinical symptoms of mild delayed swallow initiation, mild reduced laryngeal excursion and suspected delayed pharyngeal clearing noted with all textures. Intermittent clinical symptoms of penetration/aspiration with delayed cough noted with thin liquids. Improved bolus control and timely swallow initiation with no overt signs of aspiration noted with nectar thick and puree textures. Increased lethargy with reduced sustained alertness increases aspiration potential with thin liquids due to rapid and premature bolus loss to pharynx. Puree diet with mildly thick liquids recommended at this time. Recommended Diet and Intervention 9/12/2021:  Diet Solids Recommendation:  Dysphagia I Pureed  Liquid Consistency Recommendation:  Mildly thick (Nectar) Recommended form of Meds:   Crushed with applesauce     Compensatory Swallowing Strategies:  Upright as possible with all PO intake , No straws , Assist Feed , Eat/feed slowly, Remain upright 30-45 min     SHORT TERM DYSPHAGIA GOALS/PLAN OF CARE: Speech therapy for dysphagia tx 3-5 times per week during acute care stay. 1. Pt will functionally tolerate recommended diet with no overt clinical s/s of aspiration  2. Pt will advance to least restrictive diet as indicated   3.  If clinical s/s of aspiration/penetration continue to be noted, Pt will participate in Modified Barium Swallow Study       Dysphagia Therapeutic Intervention:  Oral Care, Diet Tolerance Monitoring , Patient/Family Education     Discharge Recommendations: Recommend ongoing SLP for dysphagia and speech therapy upon discharge from hospital     Patient Positioning: Upright in bed    Current Diet Level (prior to evaluation):  Unknown    Respiratory Status:   []Room Air   [x]O2 via nasal cannula 4L   []Other:    Dentition:  []Adequate  []Dentures   []Missing Many Teeth  [x]Edentulous  []Other:    Baseline Vocal Quality:  []Normal  []Dysphonic   []Aphonic   [x]Hoarse  []Wet  []Weak  []Other:    Volitional Cough:  []Strong  [x]Weak  []Wet  []Absent  []Congested  []Other:    Volitional Swallow:   []Absent   [x]Delayed     []Adequate     []Required use of drink     Oral Mechanism Exam:  []WFL [x]Mild   [x] Moderate  []Severe  []To be assessed  Impaired:   []Left side      []Right side    [x]Labial ROM/Coordination    []Labial Symmetry   [x]Lingual ROM/Coordination   []Lingual Symmetry  []Gag  []Other:     Oral Phase: []WFL []Mild   [x] Moderate  []Severe  []To be assessed   [x]Impaired/Prolonged Mastication:   []Spillage Left:   []Spillage Right:  []Pocketing Left:   []Pocketing Right:   [x]Decreased Anterior to Posterior Transit:   [x]Suspected Premature Bolus Loss:   [x]Lingual/Palatal Residue:   []Other:     Pharyngeal Phase: []WFL [x]Mild   [x] Moderate  []Severe  []To be assessed   [x]Delayed Swallow:   []Suspected Pharyngeal Pooling:   [x]Decreased Laryngeal Elevation:   []Absent Swallow:  []Wet Vocal Quality:   []Throat Clearing-Immediate:   []Throat Clearing-Delayed:   []Cough-Immediate:   [x]Cough-Delayed:  []Change in Vital Signs:  [x]Suspected Delayed Pharyngeal Clearing:  []Other:       Eating Assistance:  []Independent  []Setup or clean-up assistance   [] Supervision or touching assistance   [x] Partial or moderate assistance   [] Substantial or maximal assistance  [] Dependent EDUCATION:   Provided education regarding role of SLP, results of assessment, recommendations and general speech pathology plan of care. [] Pt verbalized understanding and agreement   [x] Pt requires ongoing learning   [] No evidence of comprehension     If patient discharges prior to next visit, this note will serve as discharge.      Timed Code Minutes: 0 min  Total Treatment Minutes: 30 min    Devin Conde [id number]

## 2021-09-13 VITALS
HEART RATE: 78 BPM | WEIGHT: 245.6 LBS | OXYGEN SATURATION: 96 % | HEIGHT: 62 IN | BODY MASS INDEX: 45.19 KG/M2 | SYSTOLIC BLOOD PRESSURE: 100 MMHG | RESPIRATION RATE: 18 BRPM | DIASTOLIC BLOOD PRESSURE: 61 MMHG | TEMPERATURE: 98.5 F

## 2021-09-13 LAB
BASE EXCESS ARTERIAL: 8.3 MMOL/L (ref -3–3)
CARBOXYHEMOGLOBIN ARTERIAL: 1.4 % (ref 0–1.5)
HCO3 ARTERIAL: 33.9 MMOL/L (ref 21–29)
HEMOGLOBIN, ART, EXTENDED: 7.5 G/DL (ref 12–16)
METHEMOGLOBIN ARTERIAL: 0.1 %
O2 SAT, ARTERIAL: 99.5 %
O2 THERAPY: ABNORMAL
PCO2 ARTERIAL: 53.8 MMHG (ref 35–45)
PH ARTERIAL: 7.41 (ref 7.35–7.45)
PO2 ARTERIAL: 109 MMHG (ref 75–108)
TCO2 ARTERIAL: 79.7 MMOL/L

## 2021-09-13 PROCEDURE — 82803 BLOOD GASES ANY COMBINATION: CPT

## 2021-09-13 PROCEDURE — 6370000000 HC RX 637 (ALT 250 FOR IP): Performed by: INTERNAL MEDICINE

## 2021-09-13 PROCEDURE — 99232 SBSQ HOSP IP/OBS MODERATE 35: CPT | Performed by: INTERNAL MEDICINE

## 2021-09-13 PROCEDURE — 97165 OT EVAL LOW COMPLEX 30 MIN: CPT

## 2021-09-13 PROCEDURE — 94640 AIRWAY INHALATION TREATMENT: CPT

## 2021-09-13 PROCEDURE — 97535 SELF CARE MNGMENT TRAINING: CPT

## 2021-09-13 PROCEDURE — 97530 THERAPEUTIC ACTIVITIES: CPT

## 2021-09-13 PROCEDURE — 2700000000 HC OXYGEN THERAPY PER DAY

## 2021-09-13 PROCEDURE — 94761 N-INVAS EAR/PLS OXIMETRY MLT: CPT

## 2021-09-13 PROCEDURE — 97161 PT EVAL LOW COMPLEX 20 MIN: CPT

## 2021-09-13 PROCEDURE — 2580000003 HC RX 258: Performed by: FAMILY MEDICINE

## 2021-09-13 PROCEDURE — 36600 WITHDRAWAL OF ARTERIAL BLOOD: CPT

## 2021-09-13 PROCEDURE — 6360000002 HC RX W HCPCS: Performed by: FAMILY MEDICINE

## 2021-09-13 PROCEDURE — 92526 ORAL FUNCTION THERAPY: CPT

## 2021-09-13 PROCEDURE — 6370000000 HC RX 637 (ALT 250 FOR IP): Performed by: FAMILY MEDICINE

## 2021-09-13 RX ORDER — PREDNISONE 10 MG/1
TABLET ORAL
Qty: 12 TABLET | Refills: 0 | Status: ON HOLD | OUTPATIENT
Start: 2021-09-13 | End: 2021-10-30

## 2021-09-13 RX ORDER — AMOXICILLIN AND CLAVULANATE POTASSIUM 875; 125 MG/1; MG/1
1 TABLET, FILM COATED ORAL 2 TIMES DAILY
Qty: 14 TABLET | Refills: 0 | Status: SHIPPED | OUTPATIENT
Start: 2021-09-13 | End: 2021-09-17

## 2021-09-13 RX ADMIN — DOCUSATE SODIUM 100 MG: 100 CAPSULE, LIQUID FILLED ORAL at 21:07

## 2021-09-13 RX ADMIN — LEVOTHYROXINE SODIUM 112 MCG: 0.11 TABLET ORAL at 08:50

## 2021-09-13 RX ADMIN — PREGABALIN 100 MG: 100 CAPSULE ORAL at 21:07

## 2021-09-13 RX ADMIN — FLUOXETINE 40 MG: 20 CAPSULE ORAL at 08:50

## 2021-09-13 RX ADMIN — OXYCODONE AND ACETAMINOPHEN 1 TABLET: 10; 325 TABLET ORAL at 11:25

## 2021-09-13 RX ADMIN — IPRATROPIUM BROMIDE AND ALBUTEROL SULFATE 1 AMPULE: .5; 3 SOLUTION RESPIRATORY (INHALATION) at 20:13

## 2021-09-13 RX ADMIN — SODIUM CHLORIDE 25 ML: 9 INJECTION, SOLUTION INTRAVENOUS at 11:43

## 2021-09-13 RX ADMIN — PREDNISONE 40 MG: 20 TABLET ORAL at 08:50

## 2021-09-13 RX ADMIN — ENOXAPARIN SODIUM 40 MG: 40 INJECTION SUBCUTANEOUS at 21:07

## 2021-09-13 RX ADMIN — HYDROCORTISONE: 25 CREAM TOPICAL at 08:51

## 2021-09-13 RX ADMIN — SENNOSIDES AND DOCUSATE SODIUM 2 TABLET: 50; 8.6 TABLET ORAL at 21:06

## 2021-09-13 RX ADMIN — DOCUSATE SODIUM 100 MG: 100 CAPSULE, LIQUID FILLED ORAL at 08:50

## 2021-09-13 RX ADMIN — Medication 10 ML: at 08:51

## 2021-09-13 RX ADMIN — PIPERACILLIN AND TAZOBACTAM 3375 MG: 3; .375 INJECTION, POWDER, LYOPHILIZED, FOR SOLUTION INTRAVENOUS at 04:52

## 2021-09-13 RX ADMIN — PREGABALIN 100 MG: 100 CAPSULE ORAL at 13:30

## 2021-09-13 RX ADMIN — IPRATROPIUM BROMIDE AND ALBUTEROL SULFATE 1 AMPULE: .5; 3 SOLUTION RESPIRATORY (INHALATION) at 08:53

## 2021-09-13 RX ADMIN — HYDROCORTISONE: 25 CREAM TOPICAL at 21:11

## 2021-09-13 RX ADMIN — IPRATROPIUM BROMIDE AND ALBUTEROL SULFATE 1 AMPULE: .5; 3 SOLUTION RESPIRATORY (INHALATION) at 12:11

## 2021-09-13 RX ADMIN — BUDESONIDE AND FORMOTEROL FUMARATE DIHYDRATE 2 PUFF: 160; 4.5 AEROSOL RESPIRATORY (INHALATION) at 08:53

## 2021-09-13 RX ADMIN — OXYCODONE AND ACETAMINOPHEN 1 TABLET: 10; 325 TABLET ORAL at 05:48

## 2021-09-13 RX ADMIN — PIPERACILLIN AND TAZOBACTAM 3375 MG: 3; .375 INJECTION, POWDER, LYOPHILIZED, FOR SOLUTION INTRAVENOUS at 11:44

## 2021-09-13 RX ADMIN — BUDESONIDE AND FORMOTEROL FUMARATE DIHYDRATE 2 PUFF: 160; 4.5 AEROSOL RESPIRATORY (INHALATION) at 20:13

## 2021-09-13 RX ADMIN — OXYCODONE AND ACETAMINOPHEN 1 TABLET: 10; 325 TABLET ORAL at 17:17

## 2021-09-13 RX ADMIN — PANTOPRAZOLE SODIUM 40 MG: 40 TABLET, DELAYED RELEASE ORAL at 05:48

## 2021-09-13 RX ADMIN — PREGABALIN 100 MG: 100 CAPSULE ORAL at 08:50

## 2021-09-13 RX ADMIN — IPRATROPIUM BROMIDE AND ALBUTEROL SULFATE 1 AMPULE: .5; 3 SOLUTION RESPIRATORY (INHALATION) at 16:13

## 2021-09-13 ASSESSMENT — PAIN SCALES - GENERAL
PAINLEVEL_OUTOF10: 3
PAINLEVEL_OUTOF10: 7
PAINLEVEL_OUTOF10: 3
PAINLEVEL_OUTOF10: 10
PAINLEVEL_OUTOF10: 10
PAINLEVEL_OUTOF10: 8
PAINLEVEL_OUTOF10: 7
PAINLEVEL_OUTOF10: 6

## 2021-09-13 ASSESSMENT — PAIN DESCRIPTION - LOCATION
LOCATION: BACK;LEG
LOCATION: LEG

## 2021-09-13 ASSESSMENT — PAIN SCALES - WONG BAKER: WONGBAKER_NUMERICALRESPONSE: 0

## 2021-09-13 ASSESSMENT — PAIN DESCRIPTION - PAIN TYPE
TYPE: CHRONIC PAIN
TYPE: CHRONIC PAIN

## 2021-09-13 ASSESSMENT — PAIN DESCRIPTION - ORIENTATION: ORIENTATION: RIGHT;LEFT

## 2021-09-13 NOTE — DISCHARGE INSTR - COC
Continuity of Care Form    Patient Name: Zaid Sahni   :  1935  MRN:  [id number]    [address] [place] [address] date:  9/10/2021  Discharge date:  2021      Code Status Order: Full Code   Advance Directives:     Admitting Physician:  Amita Chandler MD  PCP: Christian Kwon    Discharging Nurse: [facility] Unit/Room#: [id number]-[id number]/6850-37  Discharging Unit Phone Number: [phone]    Emergency Contact:   Extended Emergency Contact Information  Primary Emergency Contact: [address] [facility]  Home Phone: [phone]  Relation: Child  Secondary Emergency Contact: Simi Sandoval, [address] Phone: [phone]  Relation: Grandchild    Past Surgical History:  Past Surgical History:   Procedure Laterality Date    CHOLECYSTECTOMY      PARTIAL HYSTERECTOMY         Immunization History:   Immunization History   Administered Date(s) Administered    COVID-19, Pfizer, PF, 30mcg/0.3mL 2021, 2021    Influenza Virus Vaccine 10/24/2018    Pneumococcal Conjugate 13-valent Murphy Beer) 2013       Active Problems:  Patient Active Problem List   Diagnosis Code    Pneumonia J18.9    Acute on chronic diastolic heart failure (HCC) I50.33    Peripheral edema R60.9    COPD exacerbation ([address] J44.1    Essential hypertension I10    Hyperlipidemia E78.5    Morbid obesity with BMI of 50.0-59.9, adult ([address] E66.01, Z68.43    Acute on chronic respiratory failure with hypercapnia ([address] J96.22    Acute respiratory failure ([address] J96.00    Acute respiratory failure with hypoxia and hypercapnia (HCC) J96.01, J96.02    Acute kidney injury ([address] N17.9    History of recurrent UTI (urinary tract infection) Z87.440    Chronic obstructive pulmonary disease with acute lower respiratory infection ([address] J44.0    Spinal stenosis S08.26    Complicated UTI (urinary tract infection) N39.0    Acute on chronic respiratory failure with hypoxia and hypercapnia (HCC) J96.21, J96.22    Weight loss counseling, encounter for Z71.3    Fall at home [address] [place], Y92.009    Rhabdomyolysis M62.82    Altered mental state R41.82    Syncope and collapse R55    Acute on chronic respiratory failure with hypoxia (HCC) J96.21       Isolation/Infection:   Isolation          No Isolation        Patient Infection Status     Infection Onset Added Last Indicated Last Indicated By Review Planned Expiration Resolved Resolved By    None active    Resolved    COVID-19 Rule Out 09/10/21 09/10/21 09/10/21 COVID-19 (Ordered)   09/10/21 Rule-Out Test Resulted    COVID-19 Rule Out 10/14/20 10/14/20 10/14/20 COVID-19 (Ordered)   10/14/20 Rule-Out Test Resulted    MDRO (multi-drug resistant organism) 08/11/19 08/15/19 08/11/19 Urine Culture   10/13/20 Vasile Orlando RN          Nurse Assessment:  Last Vital Signs: BP (!) 118/59   Pulse 58   Temp 97.8 °F (36.6 °C) (Axillary)   Resp 18   Ht 5' 1.5\" (1.562 m)   Wt 245 lb 9.6 oz (111.4 kg)   SpO2 96%   BMI 45.65 kg/m²     Last documented pain score (0-10 scale): Pain Level: 7  Last Weight:   Wt Readings from Last 1 Encounters:   09/13/21 245 lb 9.6 oz (111.4 kg)     Mental Status:  disoriented, alert, coherent and able to concentrate and follow conversation    IV Access:  - None    Nursing Mobility/ADLs:  Walking   Assisted  Transfer  Assisted  Bathing  Assisted  Dressing  Assisted  Toileting  Assisted  Feeding  Assisted  Med Admin  Assisted  Med Delivery   crushed and with applesauce    Wound Care Documentation and Therapy:  Wound 04/24/19 Coccyx Mid open  (Active)   Wound Etiology Deep tissue/Injury 09/13/21 1348   Dressing Status New dressing applied 09/13/21 0110   Dressing/Treatment Foam 09/13/21 1348   Wound Assessment Non-blanchable erythema;Dusky 09/12/21 1604   Drainage Amount None 09/12/21 1604   Jessica-wound Assessment Fragile; Non-blanchable erythema 09/12/21 1604   Margins Undefined edges 09/12/21 1604   Number of days: 377       Wound 06/20/19 Buttocks Left Deep tissue injury (Active)   Wound Etiology Deep tissue/Injury 09/13/21 1348   Dressing Status New dressing applied 09/13/21 0110   Wound Cleansed Cleansed with saline 09/12/21 1652   Dressing/Treatment Foam 09/13/21 1348   Wound Assessment Devitalized tissue; Purple/maroon 09/13/21 1348   Drainage Amount Small 09/12/21 1652   Drainage Description Serosanguinous;Green 09/12/21 1652   Odor None 09/12/21 1652   Jessica-wound Assessment Blanchable erythema 09/13/21 1348   Margins Undefined edges 09/12/21 1652   Number of days: 815       Wound 10/10/20 Buttocks Left moisture associated dermatitis left buttocks (Active)   Number of days: 338       Wound 10/10/20 Thigh Right; Inner moisture associated dermatitis, rt inner thigh (Active)   Number of days: 338       Wound 10/10/20 Toe (Comment  which one) Left left great toe bruise (Active)   Number of days: 338       Wound 09/10/21 Ischium Right stage 2  (Active)   Wound Etiology Pressure Stage  2 09/13/21 1351   Dressing/Treatment Foam 09/13/21 1351   Wound Length (cm) 2 cm 09/13/21 1351   Wound Width (cm) 1 cm 09/13/21 1351   Wound Surface Area (cm^2) 2 cm^2 09/13/21 1351   Wound Assessment Pink/red 09/13/21 1351   Jessica-wound Assessment Ecchymosis 09/13/21 1351   Margins Attached edges; Defined edges 09/13/21 1351   Number of days: 2        Elimination:  Continence:   · Bowel: No  · Bladder: No  Urinary Catheter: Removal Date 9/13/2021   Colostomy/Ileostomy/Ileal Conduit: No       Date of Last BM: 9/11/2021    Intake/Output Summary (Last 24 hours) at 9/13/2021 1356  Last data filed at 9/13/2021 0441  Gross per 24 hour   Intake 349.53 ml   Output 1175 ml   Net -825.47 ml     I/O last 3 completed shifts: In: 449.5 [P.O.:340; I.V.:34.5; IV Piggyback:75.1]  Out: 1925 [Urine:1925]    Safety Concerns:      At Risk for Falls and Aspiration Risk    Impairments/Disabilities:      None    Nutrition Therapy:  Current Nutrition Therapy:   - Oral Diet:  Dysphagia 2 mechanically altered    Routes of Feeding: Oral  Liquids: Nectar Thick Liquids  Daily Fluid Restriction: no  Last Modified Barium Swallow with Video (Video Swallowing Test): not done    Treatments at the Time of Hospital Discharge:   Respiratory Treatments: Oxygen therapy via nasal canula- 3L  Oxygen Therapy:  is on oxygen at 2-4 L/min per nasal cannula. Ventilator:    - No ventilator support    Rehab Therapies: Physical Therapy and Occupational Therapy  Weight Bearing Status/Restrictions: No weight bearing restirctions  Other Medical Equipment (for information only, NOT a DME order):  walker  Other Treatments: C-PAP @ night    Patient's personal belongings (please select all that are sent with patient):  None    RN SIGNATURE:  Electronically signed by Deya Bella RN on 9/13/21 at 4:19 PM EDT    CASE MANAGEMENT/SOCIAL WORK SECTION    Inpatient Status Date: ***    Readmission Risk Assessment Score:  Readmission Risk              Risk of Unplanned Readmission:  18           Discharging to Facility/ Agency   Name:  [facility]    [phone]         Fax: [phone]        Dialysis Facility (if applicable)   ·     / signature: Electronically signed by IVANIA Thakkar on 9/13/21 at 1:56 PM EDT    PHYSICIAN SECTION    Prognosis: {Prognosis:0278995384}    Condition at Discharge: Brenna Bullock Patient Condition:[id number]}    Rehab Potential (if transferring to Rehab): {Prognosis:1515956394}    Recommended Labs or Other Treatments After Discharge: ***    Physician Certification: I certify the above information and transfer of Joyce Granger  is necessary for the continuing treatment of the diagnosis listed and that she requires {Admit to Appropriate Level of Care:47921} for {GREATER/LESS:391645783} 30 days.      Update Admission H&P: {CHP DME Changes in GYXTT:067495315}    PHYSICIAN SIGNATURE:  {Esignature:[id number]}

## 2021-09-13 NOTE — CARE COORDINATION
Daughter is aware and agreeable with the 5pm transport home today. Called/Faxed Herminia at [facility] [place]. to resume services per [facility]. order.

## 2021-09-13 NOTE — PROGRESS NOTES
[facility] Pulmonary/CCM Progress note      Admit Date: 9/10/2021    Chief Complaint: Altered mental status and shortness of breath    Subjective: Interval History: Patient awake and alert, denies any shortness of breath or cough. On 3 L O2. Scheduled Meds:   predniSONE  40 mg Oral Daily    pregabalin  100 mg Oral TID    ipratropium-albuterol  1 ampule Inhalation 4x daily    docusate sodium  100 mg Oral BID    pantoprazole  40 mg Oral QAM AC    FLUoxetine  40 mg Oral Daily    hydrocortisone   Rectal BID    levothyroxine  112 mcg Oral Daily    sennosides-docusate sodium  2 tablet Oral Nightly    budesonide-formoterol  2 puff Inhalation BID    sodium chloride flush  5-40 mL IntraVENous 2 times per day    enoxaparin  40 mg SubCUTAneous Nightly    piperacillin-tazobactam  3,375 mg IntraVENous Q8H     Continuous Infusions:   sodium chloride 25 mL (09/13/21 1143)     PRN Meds:oxyCODONE, naloxegol, oxyCODONE-acetaminophen, sodium chloride flush, sodium chloride, ondansetron **OR** ondansetron, polyethylene glycol, acetaminophen **OR** acetaminophen    Review of Systems  As per interval history and the rest of the 14 point systems were sought and noted to be negative  Objective:     Patient Vitals for the past 8 hrs:   BP Temp Temp src Pulse Resp SpO2   09/13/21 1616      98 %   09/13/21 1555 106/68 99.6 °F (37.6 °C) Oral 75 18 94 %   09/13/21 1212     18 96 %   09/13/21 1115 (!) 118/59 97.8 °F (36.6 °C) Axillary 58 18 96 %   09/13/21 1058 104/66 98.5 °F (36.9 °C) Oral 61 18 96 %   09/13/21 0854     18 98 %   09/13/21 0853     18 98 %   09/13/21 0830 137/66 98.4 °F (36.9 °C) Oral 58 18 98 %     I/O last 3 completed shifts: In: 349.5 [P.O.:240; I.V.:34.5; IV Piggyback:75.1]  Out: 1175 [Urine:1175]  No intake/output data recorded.     General Appearance: Alert, in no acute distress  Skin: warm and dry, no rash or erythema  Head: normocephalic and atraumatic  Eyes: pupils equal, round, and reactive to light, extraocular eye movements intact, conjunctivae normal  ENT: external ear and ear canal normal bilaterally, nose without deformity, nasal mucosa and turbinates normal  Neck: supple and non-tender without mass, no cervical lymphadenopathy  Pulmonary/Chest: clear to auscultation bilaterally- no wheezes, rales or rhonchi, normal air movement, no respiratory distress  Cardiovascular: normal rate, regular rhythm,  no murmurs, rubs, distal pulses intact, no carotid bruits  Abdomen: soft, non-tender, non-distended, normal bowel sounds, no masses or organomegaly  Lymph Nodes: Cervical, supraclavicular normal  Extremities: no cyanosis, clubbing or edema  Musculoskeletal: normal range of motion, no joint swelling, deformity or tenderness  Neurologic: alert, no focal neurologic deficits    Data Review:  CBC:   Lab Results   Component Value Date    WBC 9.9 09/11/2021    RBC 2.51 09/11/2021     BMP:   Lab Results   Component Value Date    GLUCOSE 165 09/11/2021    CO2 25 09/11/2021    BUN 17 09/11/2021    CREATININE 1.0 09/11/2021    CALCIUM 8.8 09/11/2021     ABG:   Lab Results   Component Value Date    TPP6PYM 33.9 09/13/2021    BEART 8.3 09/13/2021    S6CVYQVP 99.5 09/13/2021    PHART 7.408 09/13/2021    OVY2TAB 53.8 09/13/2021    PO2ART 109.0 09/13/2021    COR3HAX 79.7 09/13/2021       Radiology: All pertinent images / reports were reviewed as a part of this visit. Narrative   EXAMINATION:   CTA OF THE CHEST 9/10/2021 11:06 pm       TECHNIQUE:   CTA of the chest was performed after the administration of intravenous   contrast.  Multiplanar reformatted images are provided for review.  MIP   images are provided for review.  Dose modulation, iterative reconstruction,   and/or weight based adjustment of the mA/kV was utilized to reduce the   radiation dose to as low as reasonably achievable.       COMPARISON:   None.       HISTORY:   ORDERING SYSTEM PROVIDED HISTORY: assess and r/o PE   TECHNOLOGIST PROVIDED HISTORY:   Reason for exam:->assess and r/o PE   Reason for Exam: assess and r/o PE   Acuity: Acute   Type of Exam: Initial   Relevant Medical/Surgical History: assess and r/o PE       FINDINGS:   Pulmonary Arteries: Pulmonary arteries are upper normal limits in size. .  No   filling defect is identified in the pulmonary arteries to the level of   theproximal segmental arteries.       Mediastinum: Heart is enlarged. No pericardial effusion. Aorta is within   normal limits in size. No luminal defect is appreciated in the visualized   thoracic aorta.       Lungs/pleura: Central airways are patent. Endotracheal tube with tip   terminating in the distal 3rd trachea.  Lower lobe opacities.  No pleural   effusion.  Scattered atelectasis noted.  No pneumothorax.       Soft Tissues/Bones: No adenopathy. Calcified lymph nodes in the mediastinum   and left hilum. Scattered degenerative changes noted in the visualized spine   with spondylolistheses.       Upper Abdomen: Granulomas in the liver and spleen.  Partially visualized   low-attenuation focus in the left kidney.           Impression   1. Lower lobe airspace disease.  Correlate with presentation for aspiration   or pneumonia.  Follow-up to resolution recommended. 2. No acute pulmonary embolism to the proximal segmental arteries. 3. Other findings as described. Problem List:     Acute on chronic hypercapnic respiratory failure  Altered mental status  Obesity hypoventilation syndrome    Assessment/Plan:     Acute on chronic hypercapnic respiratory failure requiring intubation. Patient will benefit from AVAPS for morbid obesity and chronic hypercapnia which will be organized for. Discussed with patient about importance of compliance with machine in order to prevent hospitalization and even death from hypercapnia. Patient agrees to follow instruction. Altered mental status, related to hypercapnia has resolved.   ABG performed today shows PCO2 of 53, pH 7.40.    Okay for discharge from pulmonary standpoint. Patient would need follow-up with Dr. Cristina Pollard about further recommendations and management of SHORTY/OHS.     Bahman Kebede MD

## 2021-09-13 NOTE — PROGRESS NOTES
Decreased ADL status, functional mobility, endurance, and cognition associated with hypoxia  Prognosis: Good  Decision Making: Low Complexity  History: Lives w/dtr, gets assist for ADLs, limited ambulation  Exam: As above  Assistance / Modification: CGA transfers  OT Education: OT Role;Plan of Care;Transfer Training;ADL Adaptive Strategies; Equipment  Patient Education: D/C recommendation. Pt would benefit from reinforcement. Barriers to Learning: Cognition, hearing  REQUIRES OT FOLLOW UP: Yes  Activity Tolerance  Activity Tolerance: Patient Tolerated treatment well  Activity Tolerance: Pt removed her O2, and SPO2 88-90%. Recovered to 95% on 3L, seated on EOB. 95% after transfer. Safety Devices  Safety Devices in place: Yes  Type of devices: All fall risk precautions in place;Call light within reach; Chair alarm in place;Gait belt;Patient at risk for falls; Left in chair;Nurse notified           Patient Diagnosis(es): The primary encounter diagnosis was Hypoxia. Diagnoses of Acute respiratory failure with hypoxia and hypercapnia (HCC), LIZ (acute kidney injury) ([facility] and Demand ischemia of myocardium [facility]) were also pertinent to this visit. has a past medical history of Arthritis, COPD (chronic obstructive pulmonary disease) ([facility] Hemorrhoids, Hernia of unspecified site of abdominal cavity without mention of obstruction or gangrene, Incontinence, Knee pain, bilateral, Spinal stenosis, and Spinal stenosis. has a past surgical history that includes Cholecystectomy and partial hysterectomy (cervix not removed).     Treatment Diagnosis: Decreased ADL status, functional mobility, endurance, and cognition associated with hypoxia      Restrictions  Restrictions/Precautions  Restrictions/Precautions: Fall Risk, Swallowing - Thickened Liquids, Modified Diet (High fall risk, Mildly (nectar) thick liquids - no straw, Dysphagia - pureed)  Position Activity Restriction  Other position/activity restrictions: Pt is an 83 yo female sherif to [address] [place] [address] by [facility] from home, where she lives with daughter. Pt was found without her NC on (4L baseline), hypoxic and lethargic, requiring BiPap enroute. Pt intubated upon arrival on 9/10/21. Wounds also noted on arrival, wound care consulted. Pt extubated on 9/12/21. Subjective   General  Chart Reviewed: Yes  Patient assessed for rehabilitation services?: Yes  Family / Caregiver Present: No  Diagnosis: Hypoxia  Subjective  Subjective: Pt supine in bed on arrival. \"Forget it. I'm not going to rehab. \" Pt reports pain in LEs. 6-7/10. Denies need for pain meds. Pt cooperative with therapy evaluation. Patient Currently in Pain: Yes  Pain Assessment  Pain Assessment: 0-10  Pain Level: 7  Pain Location: Leg  Pain Orientation: Right;Left  Pre Treatment Pain Screening  Intervention List: Patient able to continue with treatment;Nurse/Physician notified  Vital Signs  Oxygen Therapy  SpO2: 94 %  O2 Device: Nasal cannula  O2 Flow Rate (L/min): 3 L/min  Social/Functional History  Social/Functional History  Lives With: Daughter  Type of Home: House  Home Layout: Two level ([facility])  Bathroom [facility]: [address] [facility] [address] [facility], Lift chair, Wheelchair-manual, Oxygen (Pt reports 2.5 - 3 L O2. Per chart, 4 L)  Receives Help From:  Sylvester Ramirez)  ADL Assistance: Needs assistance (sponge bathes; grooms & feeds herself)  Homemaking Responsibilities: No  Ambulation Assistance: Needs assistance (nonambulatory; usually in w/c; someone usually pushes her; later states short distances)  Transfer Assistance: Needs assistance  Additional Comments: Pt very inconsistent historian. Stated she doesn't ambulate, then later stated she ambulates short distance. Pt reports that her daughter left, and she is going to move in with Cole Bell into a place where there are no steps. She reports Cole Bell helps with everything. Cole Bell is her daughter, per pt.        Objective   Vision: Impaired  Vision Exceptions: Wears glasses at all times (Reports glasses need changing)  Hearing: Exceptions to [facility]  Hearing Exceptions: Hard of hearing/hearing concerns; No hearing aid    Orientation  Overall Orientation Status: Within Functional Limits     Balance  Sitting Balance: Supervision (on EOB)  Standing Balance: Contact guard assistance (w/RW)  Standing Balance  Time: ~30 sec, ~20 sec, ~30 sec  Activity: venessa hygiene, stand pivot transfer EOB to recliner; venessa hygiene, place brief  ADL  Grooming: Setup (wash face seated on EOB)  UE Bathing: Verbal cueing; Increased time to complete;Setup;Minimal assistance (assist for bathing under abdominal folds)  LE Bathing: Dependent/Total (venessa hygiene, buttocks)  LE Dressing: Maximum assistance (Mod A socks--pt doffed I, D to don; D to don brief)  Tone RUE  RUE Tone: Normotonic  Tone LUE  LUE Tone: Normotonic  Coordination  Movements Are Fluid And Coordinated: No  Coordination and Movement description: Decreased speed;Fine motor impairments;Decreased accuracy     Bed mobility  Supine to Sit: Minimal assistance (HOB elevated)  Transfers  Stand Pivot Transfers: Contact guard assistance (w/RW)  Sit to stand: Contact guard assistance  Stand to sit: Contact guard assistance     Cognition  Overall Cognitive Status: Exceptions  Following Commands:  Follows one step commands consistently  Attention Span: Appears intact  Memory: Decreased recall of biographical Information (inconsistent/confusing PLOF report)  Safety Judgement: Good awareness of safety precautions  Insights: Fully aware of deficits  Initiation: Does not require cues  Sequencing: Does not require cues  Perception  Overall Perceptual Status: WFL              LUE AROM (degrees)  LUE AROM : WNL  Left Hand AROM (degrees)  Left Hand AROM: WNL  RUE AROM (degrees)  RUE AROM : WNL  Right Hand AROM (degrees)  Right Hand AROM: WNL  LUE Strength  Gross LUE Strength: WFL (elbow 5/5, shoulder grossly 4+/5)  L Hand General: 4/5  RUE Strength  Gross RUE Strength: [facility] (elbow 5/5, shoulder grossly 4+/5)  R Hand General: 4/5                   Plan   Plan  Times per week: 3-5  Times per day: Daily  Current Treatment Recommendations: Safety Education & Training, Self-Care / ADL, Endurance Training, Functional Mobility Training, Patient/Caregiver Education & Training    AM-PAC Score        AM-PAC Inpatient Daily Activity Raw Score: 15 (09/13/21 1617)  AM-PAC Inpatient ADL T-Scale Score : 34.69 (09/13/21 1617)  ADL Inpatient CMS 0-100% Score: 56.46 (09/13/21 1617)  ADL Inpatient CMS G-Code Modifier : CK (09/13/21 1617)    Goals  Short term goals  Time Frame for Short term goals: Discharge  Short term goal 1: SBA for functional transfers to ADL surfaces, including BSC, w/RW  Short term goal 2: SBA for LB bathing/dressing with AE as needed  Short term goal 3: Pt to tolerate standing 3-5 min for ADL activity  Short term goal 4: Mod A for toileting       Therapy Time   Individual Concurrent Group Co-treatment   Time In 1409         Time Out 1522         Minutes 73               Timed Code Treatment Minutes:  58 Minutes    Total Treatment Minutes:  73 min      C/ Ana Cristina [address], OT   Annabel Omalley., OTR/L, [id number]

## 2021-09-13 NOTE — PROGRESS NOTES
Speech Language Pathology  Dysphagia Treatment Note    Name:  Jelani Abreu  :   1935  Medical Diagnosis:  Hypoxia [R09.02]  LIZ (acute kidney injury) ([facility] [address] [N17.9]  Demand ischemia of myocardium ([facility] [address] [I24.8]  Acute on chronic respiratory failure with hypoxia (HCC) [J96.21]  Acute respiratory failure with hypoxia and hypercapnia (HCC) [J96.01, J96.02]  Treatment Diagnosis: Oropharyngeal Dysphagia  Pain level:  Pt denies pain at this time    Diet level prior to treatment:  Dysphagia I Puree diet with nectar thick liquids/no straws/meds with puree    Tolerance of Current Diet Level: Nurse reports good tolerance of current diet level with no overt signs of aspiration reported. Assessment of Texture Tolerance:  -Impressions: Pt sleeping upon my arrival but was easily awakened with verbal prompts and repositioning. When fully awakened Pt was able to maintain alertness with no over facial asymmetry or lean to R noted. Improved bolus control and A-P propulsion noted with all textures with prolonged but improved oral clearing noted with soft solids. Rapid and premature bolus loss to pharynx noted with thin liquids vs mildly thick liquids. Clinical symptoms of mild delayed swallow initiation, reduced laryngeal excursion and suspected delayed pharyngeal clearing noted with all textures. Cough noted with thin liquids in combination with other textures 1/3 trials. Pt tolerated mildly thick liquids in isolation and in combination with other textures with no overt signs of aspiration. Increased SOB noted with variable po textures over successive po trials. Therefore, precautions should be followed to minimize aspiration with thin liquids given in combination with other textures due to reduced airway protection during the swallow and due to current respiratory compromise.     Diet and Treatment Recommendations 2021:  1)Advance diet to Dysphagia II Minced and Moist with Mildly thick (nectar thick) liquids at meals/ No straws/meds with applesauce  2) Allow sips of thin water only, between meals only/no straws    Compensatory strategies: Upright as possible with all PO intake , No straws , Assist Feed , Eat/feed slowly, Remain upright 30-45 min     Dysphagia Goals:   1. Pt will functionally tolerate recommended diet with no overt clinical s/s of aspiration (ongoing 9/13/2021)   2. Pt will advance to least restrictive diet as indicated (ongoing 9/13/2021)   3. If clinical s/s of aspiration/penetration continue to be noted, Pt will participate in Modified Barium Swallow Study (ongoing 9/13/2021)     Plan:  Continued daily Dysphagia treatment with goals per plan of care. Patient/Family Education:Education given to the Pt and nurse, who verbalized understanding    Discharge Recommendations:  Pt will benefit from continued skilled Speech Therapy for Dysphagia services, prior to returning home. Timed Code Treatment: 0 min    Total Treatment Time: 10 min    If patient discharges prior to next session this note will serve as a discharge summary.      Marc Hernández [id number]

## 2021-09-13 NOTE — DISCHARGE SUMMARY
stop  Qty: 12 tablet, Refills: 0      amoxicillin-clavulanate (AUGMENTIN) 875-125 MG per tablet Take 1 tablet by mouth 2 times daily for 4 days  Qty: 14 tablet, Refills: 0           Current Discharge Medication List        Current Discharge Medication List      CONTINUE these medications which have NOT CHANGED    Details   senna-docusate (PERICOLACE) 8.6-50 MG per tablet Take 1 tablet by mouth daily      docusate sodium (COLACE) 100 MG capsule Take 100 mg by mouth 2 times daily      oxyCODONE (OXYCONTIN) 10 MG extended release tablet Take 10 mg by mouth every 12 hours. oxyCODONE-acetaminophen (PERCOCET)  MG per tablet Take 1 tablet by mouth every 6 hours as needed for Pain.      hydrocortisone (ANUSOL-HC) 2.5 % rectal cream Place rectally 2 times daily. Qty: 1 Tube, Refills: 0      ipratropium-albuterol (DUONEB) 0.5-2.5 (3) MG/3ML SOLN nebulizer solution Inhale 1 vial into the lungs every 6 hours as needed for Shortness of Breath      SYMBICORT 160-4.5 MCG/ACT AERO TAKE 2 PUFFS TWICE A DAY  Refills: 3      levothyroxine (SYNTHROID) 112 MCG tablet TAKE 1 TABLET BY MOUTH EVERY DAY  Refills: 3      furosemide (LASIX) 40 MG tablet TAKE 1 TABLET BY MOUTH EVERY DAY  Refills: 3      MOVANTIK 25 MG TABS tablet Take 25 mg by mouth daily as needed  Refills: 5      magnesium hydroxide (MILK OF MAGNESIA) 400 MG/5ML suspension Take 30 mLs by mouth daily as needed for Constipation  Qty: 1 Bottle, Refills: 0      fluoxetine (PROZAC) 20 MG capsule Take 40 mg by mouth daily. pregabalin (LYRICA) 50 MG capsule Take 150 mg by mouth 2 times daily. esomeprazole (NEXIUM) 40 MG capsule Take 40 mg by mouth every morning (before breakfast).              Current Discharge Medication List          Discharge ROS:  A complete review of systems was asked and negative      Discharge Exam:    BP (!) 118/59   Pulse 58   Temp 97.8 °F (36.6 °C) (Axillary)   Resp 18   Ht 5' 1.5\" (1.562 m)   Wt 245 lb 9.6 oz (111.4 kg) SpO2 96%   BMI 45.65 kg/m²   General appearance:  NAD  HEENT:   Normal cephalic, atraumatic, moist mucous membranes, no oropharyngeal erythema or exudate  Heart[de-identified] Normal s1/s2, RRR, no murmurs, gallops, or rubs. no leg edema  Lungs:  Normal respiratory effort. Clear to auscultation, bilaterally without Rales/Wheezes/Rhonchi. Abdomen: Soft, non-tender, non-distended, bowel sounds present, no masses  Musculoskeletal:  No clubbing, no cyanosis, *  Neurologic:  Neurovascularly intact without any focal sensory/motor deficits. Cranial nerves: II-XII intact, grossly non-focal.    Labs: For convenience and continuity at follow-up the following most recent labs are provided:    Lab Results   Component Value Date    WBC 9.9 09/11/2021    HGB 7.8 09/11/2021    HCT 23.1 09/11/2021    MCV 91.9 09/11/2021     09/11/2021     09/11/2021    K 3.4 09/11/2021     09/11/2021    CO2 25 09/11/2021    BUN 17 09/11/2021    CREATININE 1.0 09/11/2021    CALCIUM 8.8 09/11/2021    PHOS 5.6 07/02/2019    BNP 8.1 03/21/2011    ALKPHOS 140 09/10/2021    ALT 14 09/10/2021    AST 17 09/10/2021    BILITOT <0.2 09/10/2021    LABALBU 3.8 09/10/2021    LDLCALC 141 05/11/2019    TRIG 103 05/11/2019     Lab Results   Component Value Date    INR 1.00 09/10/2021    INR 0.95 06/20/2019    INR 0.91 05/10/2019           The patient was seen and examined on day of discharge and this discharge summary is in conjunction with any daily progress note from day of discharge. Time Spent on discharge is 45 minutes  in the examination, evaluation, counseling and review of medications and discharge plan. Note that greater  than 30 minutes was spent in preparing discharge papers, discussing discharge with patient, medication review, etc.       Signed:    Saran Leonardo MD   9/13/2021      Thank you Rajesh Simeon for the opportunity to be involved in this patient's care.  If you have any questions or concerns please feel free to contact me

## 2021-09-13 NOTE — PROGRESS NOTES
Physical Therapy    Facility/Department: [address]  Initial Assessment    NAME: Joyce Granger  : 1935  MRN: [id number]    Date of Service: 2021    Discharge Recommendations:  PT Equipment Recommendations  Equipment Needed: No (Pt appears to have needs met, will defer to home PT assessment)     Joyce Granger scored a 15/24 on the AM-PAC short mobility form. Current research shows that an AM-PAC score of 18 or greater is typically associated with a discharge to the patient's home setting. Based on the patient's AM-PAC score and their current functional mobility deficits, it is recommended that the patient have 2-3 sessions per week of Physical Therapy at d/c to increase the patient's independence. At this time, this patient demonstrates the endurance and safety to discharge home with 24/7 assist and home PT and a follow up treatment frequency of 2-3x/wk. Please see assessment section for further patient specific details. If patient discharges prior to next session this note will serve as a discharge summary. Please see below for the latest assessment towards goals. HOME HEALTH CARE: LEVEL 3 SAFETY  - Initial home health evaluation to occur within 24-48 hours, in patient home   - Therapy evaluations in home within 24-48 hours of discharge; including DME and home safety   - Frontload therapy 5 days, then 3x a week   - Therapy to evaluate if patient has [address] needs for personal care   -  evaluation within 24-48 hours, includes evaluation of resources and insurance to determine AL, [place], LTC, and Medicaid options     Assessment   Body structures, Functions, Activity limitations: Decreased functional mobility ; Decreased balance;Decreased cognition;Decreased endurance;Decreased strength;Decreased posture; Increased pain  Assessment: Pt is an 83 yo female admitted to [facility] due to hypoxia and increased lethargy.  The patient currently requires min A for bed mobility and CGA for transfers with use of RW. The patient was unable to ambulate this date but it is unclear if she walks much at baseline. The patient was A&O x4 but appeared pleasantly confused. The patient appears to be functioning near her baseline with mobility but would benefit from 24/7 supervision/assistance. Recommending continued skilled PT to optimize independence with functional mobility in order to decrease burden of care and decrease risk of falls. Treatment Diagnosis: Impaired functional mobility and decreased tolerance to activity  Prognosis: Good  Decision Making: Low Complexity  History: See below  Clinical Presentation: Stable  PT Education: Goals;PT Role;Plan of Care;Transfer Training  Patient Education: Pt verbalized understanding but would likely benefit from repetition  Barriers to Learning: Cognition  REQUIRES PT FOLLOW UP: Yes  Activity Tolerance  Activity Tolerance: Patient Tolerated treatment well;Patient limited by endurance  Activity Tolerance: SpO2 dropped to 88% when pt removed NC to blow her nose, took ~2 minutes to recover to 95% on 3L. Pt maintained SpO2 >94% on 3L with transfers and standing trials. Patient Diagnosis(es): The primary encounter diagnosis was Hypoxia. Diagnoses of Acute respiratory failure with hypoxia and hypercapnia (HCC), LIZ (acute kidney injury) ([facility] [address] and Demand ischemia of myocardium [facility]) were also pertinent to this visit. has a past medical history of Arthritis, COPD (chronic obstructive pulmonary disease) ([facility] [address] Hemorrhoids, Hernia of unspecified site of abdominal cavity without mention of obstruction or gangrene, Incontinence, Knee pain, bilateral, Spinal stenosis, and Spinal stenosis. has a past surgical history that includes Cholecystectomy and partial hysterectomy (cervix not removed).     Restrictions  Restrictions/Precautions  Restrictions/Precautions: Fall Risk, Swallowing - Thickened Liquids, Modified Diet (High fall risk, Mildly (nectar) thick liquids - no straw, Dysphagia - pureed)  Position Activity Restriction  Other position/activity restrictions: Pt is an 83 yo female sherif to [facility] from home, where she lives with daughter. Pt was found without her NC on (4L baseline), hypoxic and lethargic, requiring BiPap enroute. Pt intubated upon arrival on 9/10/21. Wounds also noted on arrival, wound care consulted. Pt extubated on 9/12/21. Vision/Hearing  Vision: Impaired  Vision Exceptions: Wears glasses at all times (Reports glasses need changing)  Hearing: Exceptions to [facility]  Hearing Exceptions: Hard of hearing/hearing concerns; No hearing aid       Subjective  General  Chart Reviewed: Yes  Patient assessed for rehabilitation services?: Yes  Family / Caregiver Present: No  Diagnosis: Hypoxia  Follows Commands: Within Functional Limits  General Comment  Comments: Pt semi-reclined in bed upon therapist arrival. Agreeable to PT/OT eval.  Subjective  Subjective: Pt appears pleasantly confused at times during evaluation and is a questionable historian. The patient reports 6/10 pain in (B) LEs, denies need for pain medication. Pain Screening  Patient Currently in Pain: Yes  Vital Signs  Patient Currently in Pain: Yes  Pre Treatment Pain Screening  Intervention List: Patient able to continue with treatment    Orientation  Orientation  Overall Orientation Status: Within Functional Limits (Pt A&O x4 with orientation questions but appears confused during full subjective at times)     Social/Functional History  Social/Functional History  Lives With: Daughter  Type of Home: House  Home Layout: Two level (townhouse)  Bathroom Equipment: [address] [facility] bed, Lift chair, Wheelchair-manual, Oxygen (Pt reports 2.5 - 3 L O2.  Per chart, 4 L)  Receives Help From:  Miryam Fragoso)  ADL Assistance: Needs assistance (sponge bathes; grooms & feeds herself)  Homemaking Responsibilities: No  Ambulation Assistance: Needs assistance (nonambulatory; usually in w/c; someone usually pushes her; later states short distances)  Transfer Assistance: Needs assistance  Additional Comments: Pt very inconsistent historian. Stated she doesn't ambulate, then later stated she ambulates short distance. Pt reports that her daughter left, and she is going to move in with Cole Bell into a place where there are no steps. She reports Cole Bell helps with everything. Cole Bell is her daughter, per pt. Cognition   WFL    Objective  AROM RLE (degrees)  RLE AROM: WFL  AROM LLE (degrees)  LLE AROM : WFL  Strength RLE  Strength RLE: WFL  Comment: 4+/5 knee flex/ext, ankle DF  Strength LLE  Strength LLE: WFL  Comment: 3/5 knee flex/ext (limited by pain); 4+/5 ankle DF    Bed mobility  Supine to Sit: Minimal assistance (HOB elevated and use of rail)  Scooting: Moderate assistance;Stand by assistance (Mod A progressing to SBA at EOB; SBA in chair)     Transfers  Sit to Stand: Contact guard assistance  Stand to sit: Contact guard assistance  Bed to Chair: Contact guard assistance (forward flexed posture)  Comment: Pt stood from bed x1 trial and recliner x2 trials. Then transferred bed to chair with RW. Ambulation  Ambulation?: No (deferred due to fatigue following bed>chair transfer)        Balance  Sitting - Static: Good  Sitting - Dynamic: Good;-  Standing - Static: Fair;+  Standing - Dynamic:  (not assessed this date)  Comments: Indep for static sitting on EOB x10 minutes with intermittent UE support PRN, feet unable to reach floor. SBA for dynamic sitting in chair with unilateral UE support for lower body dressing. CGA for static standing at RW 10 sec + 20 sec with (B) UE support and forward flexed posture. Other activity  See OT note for assist with lower body dressing and bathing.     Plan   Plan  Times per week: 2-3x  Current Treatment Recommendations: Strengthening, Transfer Training, Balance Training, Gait Training, Functional Mobility Training, Patient/Caregiver Education & Training, Equipment Evaluation, Education, & procurement, Home Exercise Program, Safety Education & Training, Endurance Training  Safety Devices  Type of devices:  All fall risk precautions in place, Call light within reach, Chair alarm in place, Left in chair, Gait belt, Patient at risk for falls, Nurse notified    AM-PAC Score  AM-PAC Inpatient Mobility Raw Score : 15 (09/13/21 1600)  AM-PAC Inpatient T-Scale Score : 39.45 (09/13/21 1600)  Mobility Inpatient CMS 0-100% Score: 57.7 (09/13/21 1600)  Mobility Inpatient CMS G-Code Modifier : CK (09/13/21 1600)          Goals  Short term goals  Time Frame for Short term goals: Before discharge  Short term goal 1: Pt will complete bed mobility with CGA  Short term goal 2: Pt will complete sit<>stand from multiple surfaces with SBA  Short term goal 3: Pt will complete bed<>chair transfers with LRAD and SBA  Short term goal 4: Pt will stand x5 minutes with (B) UE support on RW and SBA  Short term goal 5: Pt will ambulate 5 ft with RW and CGA  Patient Goals   Patient goals : Go home       Therapy Time   Individual Concurrent Group Co-treatment   Time In 5764         Time Out 1522         Minutes 73         Timed Code Treatment Minutes: Regi Floyd [id number], PT, DPT #[id number]

## 2021-09-13 NOTE — PLAN OF CARE
Problem: Skin Integrity:  Goal: Will show no infection signs and symptoms  Description: Will show no infection signs and symptoms  9/13/2021 0246 by Nora Ordonez RN  Outcome: Ongoing     Problem: Infection:  Goal: Will remain free from infection  Description: Will remain free from infection  9/13/2021 0246 by Nora Ordonez RN  Outcome: Ongoing     Problem: Pain:  Goal: Patient's pain/discomfort is manageable  Description: Patient's pain/discomfort is manageable  9/13/2021 0246 by Nora Ordonez RN  Outcome: Ongoing     Vitals:    09/13/21 0110   BP: 104/64   Pulse: 61   Resp: 18   Temp: 97.4 °F (36.3 °C)   SpO2: 95%

## 2021-09-13 NOTE — CARE COORDINATION
[address] Continence Nurse  Follow-up Progress Note       NAME:  [facility] RECORD NUMBER:  [id number]  AGE:  82 y.o. GENDER:  female  :  1935  TODAY'S DATE:  2021    Subjective:   Wound Identification:  Wound Type: pressure  Contributing Factors: incontinence of urine        Patient Goal of Care:  [] Wound Healing  [] Odor Control  [] Palliative Care  [] Pain Control   [] Other:     Objective:    BP (!) 118/59   Pulse 58   Temp 97.8 °F (36.6 °C) (Axillary)   Resp 18   Ht 5' 1.5\" (1.562 m)   Wt 245 lb 9.6 oz (111.4 kg)   SpO2 96%   BMI 45.65 kg/m²   Jorge Luis Risk Score: Jorge Luis Scale Score: 13  Assessment:   Measurements:  Wound 19 Coccyx Mid open  (Active)   Wound Etiology Deep tissue/Injury 21 1348   Dressing Status New dressing applied 21 0110   Dressing/Treatment Foam 21 1348   Wound Assessment Non-blanchable erythema;Dusky 21 1604   Drainage Amount None 21 1604   Jessica-wound Assessment Fragile; Non-blanchable erythema 21 1604   Margins Undefined edges 21 1604   Number of days: 666       Wound 19 Buttocks Left Deep tissue injury (Active)   Wound Etiology Deep tissue/Injury 21 1348   Dressing Status New dressing applied 21 0110   Wound Cleansed Cleansed with saline 21 1652   Dressing/Treatment Foam 21 1348   Wound Assessment Devitalized tissue; Purple/maroon 21 1348   Drainage Amount Small 21 1652   Drainage Description Serosanguinous;Green 21 1652   Odor None 21 1652   Jessica-wound Assessment Blanchable erythema 21 1348   Margins Undefined edges 21 1652   Number of days: 815       Wound 10/10/20 Buttocks Left moisture associated dermatitis left buttocks (Active)   Number of days: 338       Wound 10/10/20 Thigh Right; Inner moisture associated dermatitis, rt inner thigh (Active)   Number of days: 338       Wound 10/10/20 Toe (Comment which one) Left left great toe bruise (Active)   Number of days: 338       Wound 09/10/21 Ischium Right stage 2  (Active)   Wound Etiology Pressure Stage  2 09/13/21 1351   Dressing/Treatment Foam 09/13/21 1351   Wound Length (cm) 2 cm 09/13/21 1351   Wound Width (cm) 1 cm 09/13/21 1351   Wound Surface Area (cm^2) 2 cm^2 09/13/21 1351   Wound Assessment Pink/red 09/13/21 1351   Venessa-wound Assessment Ecchymosis 09/13/21 1351   Margins Attached edges; Defined edges 09/13/21 1351   Number of days: 2     Patient seen for pre existing wounds to left buttocks, right ischium and coccyx at gluteal cleft. Patient reports she lives at home, has home health, a hospital bed and uses a walker and wheelchair. Patient has issues with incontinence and is wet sometimes. Patient agreeable to visit. Patient has red area gluteal cleft with blanchable redness. There is a open area to left buttocks with ecchymosis to venessa wound. Another wound stage 2 noted to right. ischium with redness to venessa wound area. Patient has indwelling urinary catheter at this time. Daughter at bedside reports they use calmoseptine at home for areas of skin breakdown. Will continue to use zinc paste here and foam dressing. Patient also has redness to skin folds at pannus. Will use interDry for skin folds. Response to treatment:  Well tolerated by patient. Pain Assessment:  Severity:  0 / 10  Quality of pain: N/A  Wound Pain Timing/Severity: none  Premedicated: No  Plan:   Plan of Care: Wound 04/24/19 Coccyx Mid open -Dressing/Treatment: Foam  Wound 06/20/19 Buttocks Left Deep tissue injury-Dressing/Treatment: Foam  Wound 09/10/21 Ischium Right stage 2 -Dressing/Treatment: Foam   Provide venessa care after each episode of incontinence. Apply zinc paste twice daily and as needed. Help patient to turn every two hours and as needed to relieve pressure. If patient is not being discharged, order specialty mattress.      Specialty Bed Required : yes  [x] Low Air Loss   [x] Pressure Redistribution  [] Fluid Immersion  [] Bariatric  [] Total Pressure Relief  [] Other:     Current Diet: ADULT DIET; Dysphagia - Pureed; Mildly Thick (Nectar);  No Drinking Straws  Dietician consult:  Yes    Discharge Plan:  Placement for patient upon discharge: home with support   Patient appropriate for Outpatient [address] [facility] [address] [facility] [address]: No    Referrals:  [x]   [x] [facility]  [] Supplies  [] Other    Patient/Caregiver Teaching:  Level of patient/caregiver understanding able to:   [x] Indicates understanding       [x] Needs reinforcement  [] Unsuccessful      [] Verbal Understanding  [] Demonstrated understanding       [] No evidence of learning  [] Refused teaching         [] N/A       Electronically signed by  JERONIMO Joaquin, RN  Wound/Ostomy Care on 9/13/2021 at 1:56 PM

## 2021-09-13 NOTE — PLAN OF CARE
Problem: ABCDS Injury Assessment  Goal: Absence of physical injury  9/13/2021 0932 by Agnes Zimmerman RN  Outcome: Ongoing  9/13/2021 0246 by Mindi Simon RN  Outcome: Ongoing     Problem: Skin Integrity:  Goal: Will show no infection signs and symptoms  Description: Will show no infection signs and symptoms  9/13/2021 0932 by Agnes Zimmerman RN  Outcome: Ongoing  9/13/2021 0246 by Mindi Simon RN  Outcome: Ongoing  Goal: Absence of new skin breakdown  Description: Absence of new skin breakdown  Outcome: Ongoing     Problem: Infection:  Goal: Will remain free from infection  Description: Will remain free from infection  9/13/2021 0932 by Agnes Zimmerman RN  Outcome: Ongoing  9/13/2021 0246 by Mindi Simon RN  Outcome: Ongoing     Problem: Safety:  Goal: Free from accidental physical injury  Description: Free from accidental physical injury  Outcome: Ongoing  Goal: Free from intentional harm  Description: Free from intentional harm  Outcome: Ongoing  Goal: Ability to remain free from injury will improve  Description: Ability to remain free from injury will improve  Outcome: Ongoing     Problem: Daily Care:  Goal: Daily care needs are met  Description: Daily care needs are met  Outcome: Ongoing     Problem: Pain:  Goal: Patient's pain/discomfort is manageable  Description: Patient's pain/discomfort is manageable  9/13/2021 0932 by Agnes Zimmerman RN  Outcome: Ongoing  9/13/2021 0246 by Mindi Simon RN  Outcome: Ongoing  Goal: Pain level will decrease  Description: Pain level will decrease  Outcome: Ongoing  Goal: Control of acute pain  Description: Control of acute pain  Outcome: Ongoing  Goal: Control of chronic pain  Description: Control of chronic pain  Outcome: Ongoing     Problem: Skin Integrity:  Goal: Skin integrity will stabilize  Description: Skin integrity will stabilize  Outcome: Ongoing     Problem: Discharge Planning:  Goal: Patients continuum of care needs are met  Description: Patients continuum of care needs are met  Outcome: Ongoing     Problem: Coping:  Goal: Ability to remain calm will improve  Description: Ability to remain calm will improve  Outcome: Ongoing     Problem: Self-Care:  Goal: Ability to participate in self-care as condition permits will improve  Description: Ability to participate in self-care as condition permits will improve  Outcome: Ongoing     Problem: Nutrition  Goal: Optimal nutrition therapy  Outcome: Ongoing     Problem: Falls - Risk of:  Goal: Will remain free from falls  Description: Will remain free from falls  Outcome: Ongoing  Goal: Absence of physical injury  Description: Absence of physical injury  Outcome: Ongoing

## 2021-09-13 NOTE — CARE COORDINATION
Discharge Planning Assessment    RN/SW discharge planner met with patient/ (and family member) to discuss reason for admission, current living situation, and potential needs at the time of discharge    Demographics/Insurance verified [employer]    Current type of dwelling: ramped entry into [facility]  Living arrangements: daughter, KARRI ACOSTA    Level of function/Support: Assist of 2, KARRI ACOSTA   assists w/ cooking/laundry/cleaning)    PCP: Enedina Snowden    DME:Rolling walker, Macel Money, Lift chair    Active with any community resources/agencies/skilled home care: [facility]    Medication compliance issues: no    Pharmacy/Financial issues that could impact healthcare: no    Transportation at the time of discharge: ambulance  Met with patient's daughter and she expects patient to discharge home  Possibly today. . or tomorrow. Transport tentatively scheduled for  5pm via [facility] with [facility] pending a home care order. Please complete & sign the GERALDINE/AVS/Prescriptions,  RN please print GERALDINE/AVS once completed.  Thank you, GIOVANNI Tanner, [phone]

## 2021-09-13 NOTE — PROGRESS NOTES
CLINICAL PHARMACY NOTE: MEDS TO BEDS    Total # of Prescriptions Filled: 2   The following medications were delivered to the patient:  · Prednisone 10mg  · Amox/Clav 875/125mg    Additional Documentation:    Medications were delivered to patient's room and patient signed for    Yonny Marie

## 2021-09-14 ENCOUNTER — TELEPHONE (OUTPATIENT)
Dept: PULMONOLOGY | Age: 86
End: 2021-09-14

## 2021-09-14 LAB
BLOOD CULTURE, ROUTINE: NORMAL
CULTURE, BLOOD 2: NORMAL

## 2021-09-14 NOTE — TELEPHONE ENCOUNTER
----- Message from Demond Patel MD sent at 9/13/2021  4:33 PM EDT -----  Please make appt to see Dr Brenden Deleon for sleep apnea in 2 weeks.

## 2021-09-14 NOTE — PROGRESS NOTES
Physician Progress Note      PATIENT:               Toni Reynoso  CSN #:                  [id number]  :                       1935  ADMIT DATE:       9/10/2021 9:19 AM  DISCH DATE:        2021 11:21 PM  RESPONDING  PROVIDER #:        Espinoza Verdin MD        QUERY TEXT:    Type of Anemia: Please provide further specificity, if known. Clinical indicators include: hgb, hct, anemia, bleeding  Options provided:  -- Anemia due to acute blood loss  -- Anemia due to chronic blood loss  -- Anemia due to iron deficiency  -- Anemia due to postoperative blood loss  -- Anemia due to chronic disease  -- Other - I will add my own diagnosis  -- Disagree - Not applicable / Not valid  -- Disagree - Clinically Unable to determine / Unknown        PROVIDER RESPONSE TEXT:    The patient has anemia due to chronic disease.       Electronically signed by:  Espinoza Verdin MD 2021 11:47 AM

## 2021-09-14 NOTE — PROGRESS NOTES
Patient discharging at this time. [facility] by [facility], daughter notified that pt. Is on her way home.

## 2021-09-15 LAB
CULTURE, RESPIRATORY: ABNORMAL
CULTURE, RESPIRATORY: ABNORMAL
GRAM STAIN RESULT: ABNORMAL
ORGANISM: ABNORMAL

## 2021-10-27 ENCOUNTER — HOSPITAL ENCOUNTER (EMERGENCY)
Age: 86
Discharge: HOME | DRG: 871 | End: 2021-10-27
Payer: COMMERCIAL

## 2021-10-27 VITALS
HEART RATE: 70 BPM | RESPIRATION RATE: 18 BRPM | WEIGHT: 245 LBS | SYSTOLIC BLOOD PRESSURE: 121 MMHG | DIASTOLIC BLOOD PRESSURE: 63 MMHG | HEIGHT: 61 IN | OXYGEN SATURATION: 94 % | BODY MASS INDEX: 46.26 KG/M2 | TEMPERATURE: 98.5 F

## 2021-10-27 DIAGNOSIS — M79.18 BUTTOCK PAIN: Primary | ICD-10-CM

## 2021-10-27 PROCEDURE — 99283 EMERGENCY DEPT VISIT LOW MDM: CPT

## 2021-10-27 ASSESSMENT — PAIN SCALES - GENERAL
PAINLEVEL_OUTOF10: 4
PAINLEVEL_OUTOF10: 4

## 2021-10-27 ASSESSMENT — ENCOUNTER SYMPTOMS
NAUSEA: 0
SHORTNESS OF BREATH: 0
VOMITING: 0
DIARRHEA: 0
ABDOMINAL PAIN: 0
RHINORRHEA: 0
COUGH: 0

## 2021-10-27 ASSESSMENT — PAIN DESCRIPTION - DESCRIPTORS: DESCRIPTORS: SORE

## 2021-10-27 ASSESSMENT — PAIN DESCRIPTION - FREQUENCY: FREQUENCY: CONTINUOUS

## 2021-10-27 ASSESSMENT — PAIN DESCRIPTION - LOCATION
LOCATION: BUTTOCKS
LOCATION: BUTTOCKS

## 2021-10-27 NOTE — ED PROVIDER NOTES
[address] [facility]        Pt Name: Huyen Mcguire  MRN: [id number]  [place] 1935  Date of evaluation: 10/27/2021  Provider: Valentin Boyle PA-C  PCP: Omar Ng  Note Started: 9:11 AM EDT       LIVAN. I have evaluated this patient. My supervising physician was available for consultation. CHIEF COMPLAINT       Chief Complaint   Patient presents with    Other     pt states she is bedridden and her caregiver was not able to come care for her today, so she called ems to bring her to ER to care for her today. HISTORY OF PRESENT ILLNESS   (Location, Timing/Onset, Context/Setting, Quality, Duration, Modifying Factors, Severity, Associated Signs and Symptoms)  Note limiting factors. Chief Complaint: Leg pain, buttock pain    Huyen Mcguire is a 82 y.o. female who presents to the emergency department today for evaluation for leg pain as well as buttock pain. The patient states that this is chronic for her, she states that she has not yet received her home pain medications or her Lyrica. The daughter is at bedside, the daughter is the caregiver for the patient, the patient states that she called EMS today because her daughter was not yet at the house to take care of her, and she was concerned for this. The patient states that she otherwise denies any new complaints. The daughter states that the patient is acting at her baseline. The patient states that she has no chest pain. Shortness of breath. There is no been any fever, cough, vomiting or diarrhea. The patient is here for presented to the ED confused, she states that she just like to go home. The daughter states that she is comfortable taking care of the patient is home she states that she is her primary caregiver and she does have nursing to help her as well. Again patient is at her baseline.   And patient has no other complaints, family states that they have decided to take her home. Nursing Notes were all reviewed and agreed with or any disagreements were addressed in the HPI. REVIEW OF SYSTEMS    (2-9 systems for level 4, 10 or more for level 5)     Review of Systems   Constitutional: Negative for activity change, appetite change, chills and fever. HENT: Negative for congestion and rhinorrhea. Respiratory: Negative for cough and shortness of breath. Cardiovascular: Negative for chest pain. Gastrointestinal: Negative for abdominal pain, diarrhea, nausea and vomiting. Genitourinary: Negative for difficulty urinating, dysuria and hematuria. Positives and Pertinent negatives as per HPI. Except as noted above in the ROS, all other systems were reviewed and negative. PAST MEDICAL HISTORY     Past Medical History:   Diagnosis Date    Arthritis     osteo    COPD (chronic obstructive pulmonary disease) ([facility] [address]     Hemorrhoids     Hernia of unspecified site of abdominal cavity without mention of obstruction or gangrene     Incontinence     Knee pain, bilateral     pt states no cartilage    Spinal stenosis     Spinal stenosis          SURGICAL HISTORY     Past Surgical History:   Procedure Laterality Date    CHOLECYSTECTOMY      PARTIAL HYSTERECTOMY           CURRENTMEDICATIONS       Previous Medications    DOCUSATE SODIUM (COLACE) 100 MG CAPSULE    Take 100 mg by mouth 2 times daily    ESOMEPRAZOLE (NEXIUM) 40 MG CAPSULE    Take 40 mg by mouth every morning (before breakfast). FLUOXETINE (PROZAC) 20 MG CAPSULE    Take 40 mg by mouth daily. FUROSEMIDE (LASIX) 40 MG TABLET    TAKE 1 TABLET BY MOUTH EVERY DAY    HYDROCORTISONE (ANUSOL-HC) 2.5 % RECTAL CREAM    Place rectally 2 times daily.     IPRATROPIUM-ALBUTEROL (DUONEB) 0.5-2.5 (3) MG/3ML SOLN NEBULIZER SOLUTION    Inhale 1 vial into the lungs every 6 hours as needed for Shortness of Breath    LEVOTHYROXINE (SYNTHROID) 112 MCG TABLET    TAKE 1 TABLET BY MOUTH EVERY DAY    MAGNESIUM HYDROXIDE (MILK OF MAGNESIA) 400 MG/5ML SUSPENSION    Take 30 mLs by mouth daily as needed for Constipation    MOVANTIK 25 MG TABS TABLET    Take 25 mg by mouth daily as needed    OXYCODONE (OXYCONTIN) 10 MG EXTENDED RELEASE TABLET    Take 10 mg by mouth every 12 hours. OXYCODONE-ACETAMINOPHEN (PERCOCET)  MG PER TABLET    Take 1 tablet by mouth every 6 hours as needed for Pain. PREDNISONE (DELTASONE) 10 MG TABLET    3 tablets daily for 2 days then 2 tablets daily for 2 days then 1 tablet daily for 2 days then stop    PREGABALIN (LYRICA) 50 MG CAPSULE    Take 150 mg by mouth 2 times daily. SENNA-DOCUSATE (PERICOLACE) 8.6-50 MG PER TABLET    Take 1 tablet by mouth daily    SYMBICORT 160-4.5 MCG/ACT AERO    TAKE 2 PUFFS TWICE A DAY         ALLERGIES     Sulfa antibiotics    FAMILYHISTORY     History reviewed. No pertinent family history. SOCIAL HISTORY       Social History     Tobacco Use    Smoking status: Never Smoker    Smokeless tobacco: Never Used   Vaping Use    Vaping Use: Never used   Substance Use Topics    Alcohol use: No    Drug use: No       SCREENINGS             PHYSICAL EXAM    (up to 7 for level 4, 8 or more for level 5)     ED Triage Vitals [10/27/21 0828]   BP Temp Temp Source Pulse Resp SpO2 Height Weight   (!) 115/49 98.5 °F (36.9 °C) Oral 72 18 94 % 5' 1\" (1.549 m) 245 lb (111.1 kg)       Physical Exam  Vitals and nursing note reviewed. Constitutional:       Appearance: She is well-developed. She is not diaphoretic. HENT:      Head: Normocephalic and atraumatic. Right Ear: External ear normal.      Left Ear: External ear normal.      Nose: Nose normal.   Eyes:      General:         Right eye: No discharge. Left eye: No discharge. Neck:      Trachea: No tracheal deviation. Cardiovascular:      Rate and Rhythm: Normal rate and regular rhythm. Pulmonary:      Effort: Pulmonary effort is normal. No respiratory distress. Breath sounds: Normal breath sounds. No wheezing or rales. Abdominal:      General: Bowel sounds are normal. There is no distension. Palpations: Abdomen is soft. Tenderness: There is no abdominal tenderness. There is no guarding. Genitourinary:     Comments: Daughter present for examination, there is mild skin erythema and minimal breakdown noted to the right buttock. Per daughter this is chronic and has had a stable. Musculoskeletal:         General: Normal range of motion. Cervical back: Normal range of motion and neck supple. Skin:     General: Skin is warm and dry. Neurological:      General: No focal deficit present. Mental Status: She is alert. Mental status is at baseline. Psychiatric:         Behavior: Behavior normal.         DIAGNOSTIC RESULTS   LABS:    Labs Reviewed - No data to display    When ordered only abnormal lab results are displayed. All other labs were within normal range or not returned as of this dictation. EKG: When ordered, EKG's are interpreted by the Emergency Department Physician in the absence of a cardiologist.  Please see their note for interpretation of EKG. RADIOLOGY:   Non-plain film images such as CT, Ultrasound and MRI are read by the radiologist. Plain radiographic images are visualized and preliminarily interpreted by the ED Provider with the below findings:        Interpretation per the Radiologist below, if available at the time of this note:    No orders to display     No results found.         PROCEDURES   Unless otherwise noted below, none     Procedures    CRITICAL CARE TIME   N/A    CONSULTS:  None      EMERGENCY DEPARTMENT COURSE and DIFFERENTIAL DIAGNOSIS/MDM:   Vitals:    Vitals:    10/27/21 0828   BP: (!) 115/49   Pulse: 72   Resp: 18   Temp: 98.5 °F (36.9 °C)   TempSrc: Oral   SpO2: 94%   Weight: 245 lb (111.1 kg)   Height: 5' 1\" (1.549 m)       Patient was given the following medications:  Medications - No data to display        Briefly, this is a 82year-old and daughter voiced understanding agreeable plan. Stable for discharge. FINAL IMPRESSION      1.  Buttock pain          DISPOSITION/PLAN   DISPOSITION Decision To Discharge 10/27/2021 08:48:19 AM      PATIENT REFERRED TO:  David Ortiz  [address] [place] [address]  [place] [address] [phone]    Schedule an appointment as soon as possible for a visit in 3 days      [facility] Emergency Department  [address] [place] [address]  [phone]    As needed, If symptoms worsen      DISCHARGE MEDICATIONS:  New Prescriptions    No medications on file       DISCONTINUED MEDICATIONS:  Discontinued Medications    No medications on file              (Please note that portions of this note were completed with a voice recognition program.  Efforts were made to edit the dictations but occasionally words are mis-transcribed.)    Ivanna Jaquez PA-C (electronically signed)            Ivanna Jaquez PA-C  10/27/21 7570

## 2021-10-27 NOTE — ED TRIAGE NOTES
Pt brought to ED from home stating her care giver did not show up today, and she is mostly bedridden and has no one to care for her today so she called ems to come to ED.

## 2021-10-27 NOTE — ED NOTES
Bed: 23  Expected date:   Expected time:   Means of arrival: [facility]  Comments:  [facility] [address], [address] [facility]  10/27/21 6693

## 2021-10-30 ENCOUNTER — HOSPITAL ENCOUNTER (INPATIENT)
Age: 86
LOS: 4 days | Discharge: HOME | DRG: 871 | End: 2021-11-03
Attending: EMERGENCY MEDICINE | Admitting: FAMILY MEDICINE
Payer: COMMERCIAL

## 2021-10-30 ENCOUNTER — APPOINTMENT (OUTPATIENT)
Dept: GENERAL RADIOLOGY | Age: 86
DRG: 871 | End: 2021-10-30
Payer: COMMERCIAL

## 2021-10-30 DIAGNOSIS — R06.00 DYSPNEA, UNSPECIFIED TYPE: ICD-10-CM

## 2021-10-30 DIAGNOSIS — A41.9 SEPTICEMIA (HCC): Primary | ICD-10-CM

## 2021-10-30 DIAGNOSIS — J18.9 PNEUMONIA DUE TO INFECTIOUS ORGANISM, UNSPECIFIED LATERALITY, UNSPECIFIED PART OF LUNG: ICD-10-CM

## 2021-10-30 PROBLEM — J96.20 ACUTE ON CHRONIC RESPIRATORY FAILURE (HCC): Status: ACTIVE | Noted: 2021-10-30

## 2021-10-30 LAB
A/G RATIO: 1 (ref 1.1–2.2)
ALBUMIN SERPL-MCNC: 3.7 G/DL (ref 3.4–5)
ALP BLD-CCNC: 170 U/L (ref 40–129)
ALT SERPL-CCNC: 23 U/L (ref 10–40)
ANION GAP SERPL CALCULATED.3IONS-SCNC: 7 MMOL/L (ref 3–16)
AST SERPL-CCNC: 27 U/L (ref 15–37)
BASE EXCESS ARTERIAL: 10 MMOL/L (ref -3–3)
BASE EXCESS ARTERIAL: 10.1 MMOL/L (ref -3–3)
BASOPHILS ABSOLUTE: 0.1 K/UL (ref 0–0.2)
BASOPHILS RELATIVE PERCENT: 0.3 %
BILIRUB SERPL-MCNC: 0.3 MG/DL (ref 0–1)
BILIRUBIN URINE: NEGATIVE
BLOOD, URINE: NEGATIVE
BUN BLDV-MCNC: 19 MG/DL (ref 7–20)
CALCIUM SERPL-MCNC: 9.6 MG/DL (ref 8.3–10.6)
CARBOXYHEMOGLOBIN ARTERIAL: 1.5 % (ref 0–1.5)
CARBOXYHEMOGLOBIN ARTERIAL: 1.6 % (ref 0–1.5)
CHLORIDE BLD-SCNC: 100 MMOL/L (ref 99–110)
CLARITY: CLEAR
CO2: 33 MMOL/L (ref 21–32)
COLOR: YELLOW
CREAT SERPL-MCNC: 0.8 MG/DL (ref 0.6–1.2)
EOSINOPHILS ABSOLUTE: 0.1 K/UL (ref 0–0.6)
EOSINOPHILS RELATIVE PERCENT: 0.5 %
EPITHELIAL CELLS, UA: 1 /HPF (ref 0–5)
GFR AFRICAN AMERICAN: >60
GFR NON-AFRICAN AMERICAN: >60
GLUCOSE BLD-MCNC: 147 MG/DL (ref 70–99)
GLUCOSE URINE: NEGATIVE MG/DL
HCO3 ARTERIAL: 36.7 MMOL/L (ref 21–29)
HCO3 ARTERIAL: 37.1 MMOL/L (ref 21–29)
HCT VFR BLD CALC: 30 % (ref 36–48)
HEMOGLOBIN, ART, EXTENDED: 8.9 G/DL (ref 12–16)
HEMOGLOBIN, ART, EXTENDED: 9.2 G/DL (ref 12–16)
HEMOGLOBIN: 9.1 G/DL (ref 12–16)
HYALINE CASTS: 4 /LPF (ref 0–8)
KETONES, URINE: NEGATIVE MG/DL
LACTIC ACID: 1 MMOL/L (ref 0.4–2)
LEUKOCYTE ESTERASE, URINE: NEGATIVE
LYMPHOCYTES ABSOLUTE: 0.7 K/UL (ref 1–5.1)
LYMPHOCYTES RELATIVE PERCENT: 4.8 %
MCH RBC QN AUTO: 27.3 PG (ref 26–34)
MCHC RBC AUTO-ENTMCNC: 30.4 G/DL (ref 31–36)
MCV RBC AUTO: 89.8 FL (ref 80–100)
METHEMOGLOBIN ARTERIAL: 0 %
METHEMOGLOBIN ARTERIAL: 0.3 %
MICROSCOPIC EXAMINATION: YES
MONOCYTES ABSOLUTE: 0.8 K/UL (ref 0–1.3)
MONOCYTES RELATIVE PERCENT: 5 %
NEUTROPHILS ABSOLUTE: 13.7 K/UL (ref 1.7–7.7)
NEUTROPHILS RELATIVE PERCENT: 89.4 %
NITRITE, URINE: NEGATIVE
O2 SAT, ARTERIAL: 96.5 %
O2 SAT, ARTERIAL: 97.2 %
O2 THERAPY: ABNORMAL
O2 THERAPY: ABNORMAL
PCO2 ARTERIAL: 62.6 MMHG (ref 35–45)
PCO2 ARTERIAL: 65.2 MMHG (ref 35–45)
PDW BLD-RTO: 16.5 % (ref 12.4–15.4)
PH ARTERIAL: 7.36 (ref 7.35–7.45)
PH ARTERIAL: 7.38 (ref 7.35–7.45)
PH UA: 6 (ref 5–8)
PLATELET # BLD: 172 K/UL (ref 135–450)
PMV BLD AUTO: 10.1 FL (ref 5–10.5)
PO2 ARTERIAL: 77.9 MMHG (ref 75–108)
PO2 ARTERIAL: 82 MMHG (ref 75–108)
POTASSIUM REFLEX MAGNESIUM: 4.1 MMOL/L (ref 3.5–5.1)
PRO-BNP: 423 PG/ML (ref 0–449)
PROTEIN UA: ABNORMAL MG/DL
RAPID INFLUENZA  B AGN: NEGATIVE
RAPID INFLUENZA A AGN: NEGATIVE
RBC # BLD: 3.34 M/UL (ref 4–5.2)
RBC UA: 2 /HPF (ref 0–4)
SODIUM BLD-SCNC: 140 MMOL/L (ref 136–145)
SPECIFIC GRAVITY UA: 1.02 (ref 1–1.03)
TCO2 ARTERIAL: 86.6 MMOL/L
TCO2 ARTERIAL: 87.7 MMOL/L
TOTAL PROTEIN: 7.3 G/DL (ref 6.4–8.2)
TROPONIN: <0.01 NG/ML
URINE REFLEX TO CULTURE: ABNORMAL
URINE TYPE: ABNORMAL
UROBILINOGEN, URINE: 0.2 E.U./DL
WBC # BLD: 15.3 K/UL (ref 4–11)
WBC UA: 2 /HPF (ref 0–5)

## 2021-10-30 PROCEDURE — 94761 N-INVAS EAR/PLS OXIMETRY MLT: CPT

## 2021-10-30 PROCEDURE — 6360000002 HC RX W HCPCS: Performed by: FAMILY MEDICINE

## 2021-10-30 PROCEDURE — 36600 WITHDRAWAL OF ARTERIAL BLOOD: CPT

## 2021-10-30 PROCEDURE — 84484 ASSAY OF TROPONIN QUANT: CPT

## 2021-10-30 PROCEDURE — 6370000000 HC RX 637 (ALT 250 FOR IP): Performed by: FAMILY MEDICINE

## 2021-10-30 PROCEDURE — 80053 COMPREHEN METABOLIC PANEL: CPT

## 2021-10-30 PROCEDURE — 2580000003 HC RX 258: Performed by: EMERGENCY MEDICINE

## 2021-10-30 PROCEDURE — U0005 INFEC AGEN DETEC AMPLI PROBE: HCPCS

## 2021-10-30 PROCEDURE — 83605 ASSAY OF LACTIC ACID: CPT

## 2021-10-30 PROCEDURE — 6370000000 HC RX 637 (ALT 250 FOR IP): Performed by: EMERGENCY MEDICINE

## 2021-10-30 PROCEDURE — 87040 BLOOD CULTURE FOR BACTERIA: CPT

## 2021-10-30 PROCEDURE — 99285 EMERGENCY DEPT VISIT HI MDM: CPT

## 2021-10-30 PROCEDURE — 6360000002 HC RX W HCPCS

## 2021-10-30 PROCEDURE — 87804 INFLUENZA ASSAY W/OPTIC: CPT

## 2021-10-30 PROCEDURE — 82803 BLOOD GASES ANY COMBINATION: CPT

## 2021-10-30 PROCEDURE — 94640 AIRWAY INHALATION TREATMENT: CPT

## 2021-10-30 PROCEDURE — 2700000000 HC OXYGEN THERAPY PER DAY

## 2021-10-30 PROCEDURE — U0003 INFECTIOUS AGENT DETECTION BY NUCLEIC ACID (DNA OR RNA); SEVERE ACUTE RESPIRATORY SYNDROME CORONAVIRUS 2 (SARS-COV-2) (CORONAVIRUS DISEASE [COVID-19]), AMPLIFIED PROBE TECHNIQUE, MAKING USE OF HIGH THROUGHPUT TECHNOLOGIES AS DESCRIBED BY CMS-2020-01-R: HCPCS

## 2021-10-30 PROCEDURE — 93005 ELECTROCARDIOGRAM TRACING: CPT | Performed by: EMERGENCY MEDICINE

## 2021-10-30 PROCEDURE — 6360000002 HC RX W HCPCS: Performed by: EMERGENCY MEDICINE

## 2021-10-30 PROCEDURE — 71045 X-RAY EXAM CHEST 1 VIEW: CPT

## 2021-10-30 PROCEDURE — 83880 ASSAY OF NATRIURETIC PEPTIDE: CPT

## 2021-10-30 PROCEDURE — 6370000000 HC RX 637 (ALT 250 FOR IP)

## 2021-10-30 PROCEDURE — 2060000000 HC ICU INTERMEDIATE R&B

## 2021-10-30 PROCEDURE — 85025 COMPLETE CBC W/AUTO DIFF WBC: CPT

## 2021-10-30 PROCEDURE — 96374 THER/PROPH/DIAG INJ IV PUSH: CPT

## 2021-10-30 PROCEDURE — 81001 URINALYSIS AUTO W/SCOPE: CPT

## 2021-10-30 RX ORDER — ONDANSETRON 2 MG/ML
4 INJECTION INTRAMUSCULAR; INTRAVENOUS EVERY 6 HOURS PRN
Status: DISCONTINUED | OUTPATIENT
Start: 2021-10-30 | End: 2021-11-03 | Stop reason: HOSPADM

## 2021-10-30 RX ORDER — SODIUM CHLORIDE 0.9 % (FLUSH) 0.9 %
5-40 SYRINGE (ML) INJECTION EVERY 12 HOURS SCHEDULED
Status: DISCONTINUED | OUTPATIENT
Start: 2021-10-30 | End: 2021-11-03 | Stop reason: HOSPADM

## 2021-10-30 RX ORDER — FUROSEMIDE 40 MG/1
40 TABLET ORAL DAILY
Status: DISCONTINUED | OUTPATIENT
Start: 2021-10-30 | End: 2021-11-03 | Stop reason: HOSPADM

## 2021-10-30 RX ORDER — PANTOPRAZOLE SODIUM 40 MG/1
40 TABLET, DELAYED RELEASE ORAL
Status: DISCONTINUED | OUTPATIENT
Start: 2021-10-31 | End: 2021-11-03 | Stop reason: HOSPADM

## 2021-10-30 RX ORDER — SODIUM CHLORIDE 0.9 % (FLUSH) 0.9 %
5-40 SYRINGE (ML) INJECTION PRN
Status: DISCONTINUED | OUTPATIENT
Start: 2021-10-30 | End: 2021-11-03 | Stop reason: HOSPADM

## 2021-10-30 RX ORDER — ACETAMINOPHEN 650 MG/1
650 SUPPOSITORY RECTAL EVERY 6 HOURS PRN
Status: DISCONTINUED | OUTPATIENT
Start: 2021-10-30 | End: 2021-11-03 | Stop reason: HOSPADM

## 2021-10-30 RX ORDER — BUDESONIDE AND FORMOTEROL FUMARATE DIHYDRATE 160; 4.5 UG/1; UG/1
2 AEROSOL RESPIRATORY (INHALATION) 2 TIMES DAILY
Status: DISCONTINUED | OUTPATIENT
Start: 2021-10-30 | End: 2021-11-03 | Stop reason: HOSPADM

## 2021-10-30 RX ORDER — SODIUM CHLORIDE 9 MG/ML
25 INJECTION, SOLUTION INTRAVENOUS PRN
Status: DISCONTINUED | OUTPATIENT
Start: 2021-10-30 | End: 2021-10-30

## 2021-10-30 RX ORDER — ONDANSETRON 4 MG/1
4 TABLET, ORALLY DISINTEGRATING ORAL EVERY 8 HOURS PRN
Status: DISCONTINUED | OUTPATIENT
Start: 2021-10-30 | End: 2021-11-03 | Stop reason: HOSPADM

## 2021-10-30 RX ORDER — POLYETHYLENE GLYCOL 3350 17 G/17G
17 POWDER, FOR SOLUTION ORAL DAILY PRN
Status: DISCONTINUED | OUTPATIENT
Start: 2021-10-30 | End: 2021-11-03 | Stop reason: HOSPADM

## 2021-10-30 RX ORDER — ASPIRIN 81 MG/1
324 TABLET, CHEWABLE ORAL ONCE
Status: COMPLETED | OUTPATIENT
Start: 2021-10-30 | End: 2021-10-30

## 2021-10-30 RX ORDER — ACETAMINOPHEN 325 MG/1
650 TABLET ORAL EVERY 6 HOURS PRN
Status: DISCONTINUED | OUTPATIENT
Start: 2021-10-30 | End: 2021-11-03 | Stop reason: HOSPADM

## 2021-10-30 RX ORDER — METHYLPREDNISOLONE SODIUM SUCCINATE 40 MG/ML
40 INJECTION, POWDER, LYOPHILIZED, FOR SOLUTION INTRAMUSCULAR; INTRAVENOUS EVERY 12 HOURS
Status: DISCONTINUED | OUTPATIENT
Start: 2021-10-31 | End: 2021-10-31

## 2021-10-30 RX ORDER — IPRATROPIUM BROMIDE AND ALBUTEROL SULFATE 2.5; .5 MG/3ML; MG/3ML
1 SOLUTION RESPIRATORY (INHALATION)
Status: DISCONTINUED | OUTPATIENT
Start: 2021-10-30 | End: 2021-11-03 | Stop reason: HOSPADM

## 2021-10-30 RX ORDER — SODIUM CHLORIDE 0.9 % (FLUSH) 0.9 %
5-40 SYRINGE (ML) INJECTION PRN
Status: DISCONTINUED | OUTPATIENT
Start: 2021-10-30 | End: 2021-10-30

## 2021-10-30 RX ORDER — PREGABALIN 75 MG/1
150 CAPSULE ORAL 2 TIMES DAILY
Status: DISCONTINUED | OUTPATIENT
Start: 2021-10-30 | End: 2021-11-03 | Stop reason: HOSPADM

## 2021-10-30 RX ORDER — IPRATROPIUM BROMIDE AND ALBUTEROL SULFATE 2.5; .5 MG/3ML; MG/3ML
1 SOLUTION RESPIRATORY (INHALATION) ONCE
Status: COMPLETED | OUTPATIENT
Start: 2021-10-30 | End: 2021-10-30

## 2021-10-30 RX ORDER — SODIUM CHLORIDE 0.9 % (FLUSH) 0.9 %
5-40 SYRINGE (ML) INJECTION EVERY 12 HOURS SCHEDULED
Status: DISCONTINUED | OUTPATIENT
Start: 2021-10-30 | End: 2021-10-30 | Stop reason: SDUPTHER

## 2021-10-30 RX ORDER — SODIUM CHLORIDE 9 MG/ML
25 INJECTION, SOLUTION INTRAVENOUS PRN
Status: DISCONTINUED | OUTPATIENT
Start: 2021-10-30 | End: 2021-11-03 | Stop reason: HOSPADM

## 2021-10-30 RX ORDER — LEVOTHYROXINE SODIUM 112 UG/1
112 TABLET ORAL DAILY
Status: DISCONTINUED | OUTPATIENT
Start: 2021-10-31 | End: 2021-11-03 | Stop reason: HOSPADM

## 2021-10-30 RX ORDER — IPRATROPIUM BROMIDE AND ALBUTEROL SULFATE 2.5; .5 MG/3ML; MG/3ML
SOLUTION RESPIRATORY (INHALATION)
Status: COMPLETED
Start: 2021-10-30 | End: 2021-10-30

## 2021-10-30 RX ORDER — IPRATROPIUM BROMIDE AND ALBUTEROL SULFATE 2.5; .5 MG/3ML; MG/3ML
3 SOLUTION RESPIRATORY (INHALATION) ONCE
Status: DISCONTINUED | OUTPATIENT
Start: 2021-10-30 | End: 2021-10-30

## 2021-10-30 RX ORDER — FLUOXETINE HYDROCHLORIDE 20 MG/1
40 CAPSULE ORAL DAILY
Status: DISCONTINUED | OUTPATIENT
Start: 2021-10-31 | End: 2021-11-03 | Stop reason: HOSPADM

## 2021-10-30 RX ORDER — SODIUM CHLORIDE, SODIUM LACTATE, POTASSIUM CHLORIDE, AND CALCIUM CHLORIDE .6; .31; .03; .02 G/100ML; G/100ML; G/100ML; G/100ML
30 INJECTION, SOLUTION INTRAVENOUS ONCE
Status: COMPLETED | OUTPATIENT
Start: 2021-10-30 | End: 2021-10-30

## 2021-10-30 RX ORDER — OXYCODONE AND ACETAMINOPHEN 10; 325 MG/1; MG/1
1 TABLET ORAL EVERY 6 HOURS PRN
Status: DISCONTINUED | OUTPATIENT
Start: 2021-10-30 | End: 2021-10-31

## 2021-10-30 RX ORDER — METHYLPREDNISOLONE SODIUM SUCCINATE 125 MG/2ML
125 INJECTION, POWDER, LYOPHILIZED, FOR SOLUTION INTRAMUSCULAR; INTRAVENOUS ONCE
Status: COMPLETED | OUTPATIENT
Start: 2021-10-30 | End: 2021-10-30

## 2021-10-30 RX ORDER — ACETAMINOPHEN 650 MG/1
650 SUPPOSITORY RECTAL ONCE
Status: COMPLETED | OUTPATIENT
Start: 2021-10-30 | End: 2021-10-30

## 2021-10-30 RX ADMIN — SODIUM CHLORIDE, POTASSIUM CHLORIDE, SODIUM LACTATE AND CALCIUM CHLORIDE 1434 ML: 600; 310; 30; 20 INJECTION, SOLUTION INTRAVENOUS at 14:28

## 2021-10-30 RX ADMIN — Medication 2 PUFF: at 20:33

## 2021-10-30 RX ADMIN — Medication 10 ML: at 21:45

## 2021-10-30 RX ADMIN — PREGABALIN 150 MG: 75 CAPSULE ORAL at 21:13

## 2021-10-30 RX ADMIN — FUROSEMIDE 40 MG: 40 TABLET ORAL at 21:13

## 2021-10-30 RX ADMIN — IPRATROPIUM BROMIDE AND ALBUTEROL SULFATE 1 AMPULE: .5; 3 SOLUTION RESPIRATORY (INHALATION) at 13:22

## 2021-10-30 RX ADMIN — IPRATROPIUM BROMIDE AND ALBUTEROL SULFATE 1 AMPULE: 2.5; .5 SOLUTION RESPIRATORY (INHALATION) at 13:22

## 2021-10-30 RX ADMIN — ENOXAPARIN SODIUM 40 MG: 100 INJECTION SUBCUTANEOUS at 21:13

## 2021-10-30 RX ADMIN — IPRATROPIUM BROMIDE AND ALBUTEROL SULFATE 1 AMPULE: .5; 3 SOLUTION RESPIRATORY (INHALATION) at 20:33

## 2021-10-30 RX ADMIN — CEFEPIME HYDROCHLORIDE 2000 MG: 2 INJECTION, POWDER, FOR SOLUTION INTRAVENOUS at 14:27

## 2021-10-30 RX ADMIN — Medication 5 MG: at 13:21

## 2021-10-30 RX ADMIN — ASPIRIN 81 MG 324 MG: 81 TABLET ORAL at 13:43

## 2021-10-30 RX ADMIN — METHYLPREDNISOLONE SODIUM SUCCINATE 125 MG: 125 INJECTION, POWDER, FOR SOLUTION INTRAMUSCULAR; INTRAVENOUS at 13:43

## 2021-10-30 RX ADMIN — ACETAMINOPHEN 650 MG: 650 SUPPOSITORY RECTAL at 14:29

## 2021-10-30 RX ADMIN — VANCOMYCIN HYDROCHLORIDE 1750 MG: 10 INJECTION, POWDER, LYOPHILIZED, FOR SOLUTION INTRAVENOUS at 15:12

## 2021-10-30 RX ADMIN — ALBUTEROL SULFATE 5 MG: 2.5 SOLUTION RESPIRATORY (INHALATION) at 13:21

## 2021-10-30 ASSESSMENT — PAIN SCALES - GENERAL
PAINLEVEL_OUTOF10: 8
PAINLEVEL_OUTOF10: 0

## 2021-10-30 NOTE — ED PROVIDER NOTES
[address] PROVIDER NOTE    Patient Identification  Pt Name: Steph Duong  MRN: [id number]  Mickey 1935  Date of evaluation: 10/30/2021  Provider: Maria Teresa Vazquez MD  PCP: Ariana Pinto    Chief Complaint  Shortness of Breath (pt SOB for 3 days, 70s on 4 LPM upon EMS arrival, 86% on NRB, hx of copd)      HPI  History provided by patient   This is a 82 y.o. female who presents to the ED for shortness of breath. Ongoing for the past 3 days. States that she \"hurts all over \", but denies chest discomfort. Has some nausea without vomiting. Has unchanged cough. Typically uses 2.5 L of oxygen at home. Was found saturating in the 76s by EMS. Placed on nonrebreather. Does not know what is causing this. She has peripheral edema in her legs but does not know for how long. Per EMS, she has some element of undiagnosed dementia      ROS  12 systems reviewed, pertinent positives/negatives per HPI otherwise noted to be negative. I have reviewed the following nursing documentation:  Allergies: Sulfa antibiotics    Past medical history:   Past Medical History:   Diagnosis Date    Arthritis     osteo    COPD [facility]ch[facility] obstructive pulmonary disease) ([facility] [address]     Hemorrhoids     Hernia of unspecified site of abdominal cavity without mention of obstruction or gangrene     Incontinence     Knee pain, bilateral     pt states no cartilage    Spinal stenosis     Spinal stenosis      Past surgical history:   Past Surgical History:   Procedure Laterality Date    CHOLECYSTECTOMY      PARTIAL HYSTERECTOMY         Home medications:   Previous Medications    DOCUSATE SODIUM (COLACE) 100 MG CAPSULE    Take 100 mg by mouth 2 times daily    ESOMEPRAZOLE (NEXIUM) 40 MG CAPSULE    Take 40 mg by mouth every morning (before breakfast). FLUOXETINE (PROZAC) 20 MG CAPSULE    Take 40 mg by mouth daily.       FUROSEMIDE (LASIX) 40 MG TABLET    TAKE 1 TABLET BY MOUTH EVERY DAY    HYDROCORTISONE (ANUSOL-HC) 2.5 % RECTAL CREAM    Place rectally 2 times daily. IPRATROPIUM-ALBUTEROL (DUONEB) 0.5-2.5 (3) MG/3ML SOLN NEBULIZER SOLUTION    Inhale 1 vial into the lungs every 6 hours as needed for Shortness of Breath    LEVOTHYROXINE (SYNTHROID) 112 MCG TABLET    TAKE 1 TABLET BY MOUTH EVERY DAY    MAGNESIUM HYDROXIDE (MILK OF MAGNESIA) 400 MG/5ML SUSPENSION    Take 30 mLs by mouth daily as needed for Constipation    MOVANTIK 25 MG TABS TABLET    Take 25 mg by mouth daily as needed    OXYCODONE (OXYCONTIN) 10 MG EXTENDED RELEASE TABLET    Take 10 mg by mouth every 12 hours. OXYCODONE-ACETAMINOPHEN (PERCOCET)  MG PER TABLET    Take 1 tablet by mouth every 6 hours as needed for Pain. PREDNISONE (DELTASONE) 10 MG TABLET    3 tablets daily for 2 days then 2 tablets daily for 2 days then 1 tablet daily for 2 days then stop    PREGABALIN (LYRICA) 50 MG CAPSULE    Take 150 mg by mouth 2 times daily. SENNA-DOCUSATE (PERICOLACE) 8.6-50 MG PER TABLET    Take 1 tablet by mouth daily    SYMBICORT 160-4.5 MCG/ACT AERO    TAKE 2 PUFFS TWICE A DAY       Social history:  reports that she has quit smoking. She has never used smokeless tobacco. She reports that she does not drink alcohol and does not use drugs. Family history:  History reviewed. No pertinent family history. Exam  ED Triage Vitals [10/30/21 1303]   BP Temp Temp src Pulse Resp SpO2 Height Weight   -- -- -- 103 23 (!) 89 % -- --     Nursing note and vitals reviewed. Constitutional: Uncomfortable  HENT:      Head: Normocephalic      Ears: External ears normal.      Nose: Nose normal.     Mouth: Membrane mucosa moist   Eyes: No discharge. Neck: Supple. Trachea midline. Cardiovascular: Regular rate. Warm extremities  Pulmonary/Chest: Effort increased, decreased air movement bilaterally, distant lung sounds  Abdominal: Soft. No distension. Nontender  : Deferred  Rectal: Deferred   Musculoskeletal: Moves all extremities. No gross deformity. Bilateral pitting edema to the upper calves  Neurological: Alert and oriented. Face symmetric. Speech is clear. Skin: Warm and dry. Psychiatric: Normal mood and affect. Behavior is normal.    Procedures      EKG  The Ekg interpreted by me in the absence of a cardiologist shows. Normal sinus rhythm. No specific ST changes appreciated.   HR 98  No significant change from prior EKG dated 9/10/21      Radiology  XR CHEST PORTABLE   Final Result   Right basilar opacity could represent right pleural fluid combined with   atelectasis or infection             Labs  Results for orders placed or performed during the hospital encounter of 10/30/21   CBC Auto Differential   Result Value Ref Range    WBC 15.3 (H) 4.0 - 11.0 K/uL    RBC 3.34 (L) 4.00 - 5.20 M/uL    Hemoglobin 9.1 (L) 12.0 - 16.0 g/dL    Hematocrit 30.0 (L) 36.0 - 48.0 %    MCV 89.8 80.0 - 100.0 fL    MCH 27.3 26.0 - 34.0 pg    MCHC 30.4 (L) 31.0 - 36.0 g/dL    RDW 16.5 (H) 12.4 - 15.4 %    Platelets 230 702 - 992 K/uL    MPV 10.1 5.0 - 10.5 fL    Neutrophils % 89.4 %    Lymphocytes % 4.8 %    Monocytes % 5.0 %    Eosinophils % 0.5 %    Basophils % 0.3 %    Neutrophils Absolute 13.7 (H) 1.7 - 7.7 K/uL    Lymphocytes Absolute 0.7 (L) 1.0 - 5.1 K/uL    Monocytes Absolute 0.8 0.0 - 1.3 K/uL    Eosinophils Absolute 0.1 0.0 - 0.6 K/uL    Basophils Absolute 0.1 0.0 - 0.2 K/uL   Comprehensive Metabolic Panel w/ Reflex to MG   Result Value Ref Range    Sodium 140 136 - 145 mmol/L    Potassium reflex Magnesium 4.1 3.5 - 5.1 mmol/L    Chloride 100 99 - 110 mmol/L    CO2 33 (H) 21 - 32 mmol/L    Anion Gap 7 3 - 16    Glucose 147 (H) 70 - 99 mg/dL    BUN 19 7 - 20 mg/dL    CREATININE 0.8 0.6 - 1.2 mg/dL    GFR Non-African American >60 >60    GFR African American >60 >60    Calcium 9.6 8.3 - 10.6 mg/dL    Total Protein 7.3 6.4 - 8.2 g/dL    Albumin 3.7 3.4 - 5.0 g/dL    Albumin/Globulin Ratio 1.0 (L) 1.1 - 2.2    Total Bilirubin 0.3 0.0 - 1.0 mg/dL    Alkaline Phosphatase 170 (H) 40 - 129 U/L    ALT 23 10 - 40 U/L    AST 27 15 - 37 U/L   Troponin   Result Value Ref Range    Troponin <0.01 <0.01 ng/mL   Brain Natriuretic Peptide   Result Value Ref Range    Pro- 0 - 449 pg/mL   Blood Gas, Arterial   Result Value Ref Range    pH, Arterial 7.364 7.350 - 7.450    pCO2, Arterial 65.2 (H) 35.0 - 45.0 mmHg    pO2, Arterial 77.9 75.0 - 108.0 mmHg    HCO3, Arterial 37.1 (H) 21.0 - 29.0 mmol/L    Base Excess, Arterial 10.1 (H) -3.0 - 3.0 mmol/L    Hemoglobin, Art, Extended 9.2 (L) 12.0 - 16.0 g/dL    O2 Sat, Arterial 96.5 >92 %    Carboxyhgb, Arterial 1.6 (H) 0.0 - 1.5 %    Methemoglobin, Arterial 0.0 <1.5 %    TCO2, Arterial 87.7 Not Established mmol/L    O2 Therapy Unknown    EKG 12 Lead   Result Value Ref Range    Ventricular Rate 98 BPM    Atrial Rate 98 BPM    P-R Interval 186 ms    QRS Duration 114 ms    Q-T Interval 286 ms    QTc Calculation (Bazett) 365 ms    P Axis 30 degrees    R Axis -18 degrees    T Axis 133 degrees    Diagnosis       Normal sinus rhythmIncomplete left bundle branch blockST & T wave abnormality, consider lateral ischemiaAbnormal ECG       Screenings           MDM and ED Course  This is a 82 y.o. female who presents to the ED for dyspnea. Found to be hypoxic to the 70s on her baseline 4 L of oxygen. Has shortness of breath without chest discomfort. May be due to COPD. Giving 3 stacked duo nebs and starting on Solu-Medrol. Placed on a nonrebreather and now saturating at roughly 88%. Will reassess this after nebulizer treatments given. ACS also in differential as well as heart failure. Will obtain BNP and troponin and EKG. she does have generalized body aches, may have infectious etiology including influenza, COVID-19 which we will swab for. Anticipate admission for new hypoxia. Patient is a difficult historian, per EMS has history of dementia. Will speak with family.     ----------    Spoke with the patient's family.   She has been having pneumonia for the past few days. Was being treated with doxycycline. Today, daughter found that she had low oxygen levels in the 70s prompting her to call the ambulance. ------------    Patient found to be febrile. At approximately 1:45 PM, sepsis identified. Likely source is pneumonia refractory to outpatient antibiotics. Starting on vancomycin and cefepime. Will give 30 cc/kg ideal body weight IV fluids. Last echo 2019 showed good ejection fraction however given presence of peripheral edema, will run fluids in over a few hours. patient's work of breathing is normal.  Not convinced that this is purely COPD exacerbation. She is on high flow nasal cannula saturating in the low 90s. She does not appear to be tiring out. The total critical care time spent while evaluating and treating this patient was at least 32 minutes. This excludes time spent doing separately billable procedures. This includes time at the bedside, data interpretation, medication management, obtaining critical history from collateral sources if the patient is unable to provide it directly, and physician consultation. Specifics of interventions taken and potentially life-threatening diagnostic considerations are listed above in the medical decision making. [unfilled]    Final Impression  1. Septicemia ([facility] [address]    2. Dyspnea, unspecified type        Blood pressure (!) 107/59, pulse 95, temperature 100.3 °F (37.9 °C), temperature source Oral, resp. rate 19, height 5' 1\" (1.549 m), weight 245 lb (111.1 kg), SpO2 98 %. Disposition:  DISPOSITION        Patient Referrals:  No follow-up provider specified. Discharge Medications:  New Prescriptions    No medications on file       Discontinued Medications:  Discontinued Medications    No medications on file       This chart was generated using the [address] [place] [address] [employer] system. I created this record but it may contain dictation errors given the limitations of this technology. Cameron Barbosa MD  10/30/21 5486

## 2021-10-30 NOTE — ED NOTES
Pt report given to [facility] Alexei, states no questions or concerns at this time.       [facility], RN  10/30/21 4968 [facility], RN  10/30/21 4025

## 2021-10-30 NOTE — PROGRESS NOTES
Pt to 5902 at this time. Pt confused at times, very [place]. See flowsheet for vitals. Pt admitted with 3 open wounds to [facility]. Placed mepitel on open areas and cocyyx border. will consult wound care. Pt on 6L high juliet, sats 93%, pt coughing a lot, very congested, coughing up yellowish thick sputum.

## 2021-10-30 NOTE — PROGRESS NOTES
Spoke with daughter to complete admission, states pt lives by her self and is bedwridden mostly, able to stand and pivot to commode, however has had recent falls.

## 2021-10-30 NOTE — ED NOTES
Bed: 02  Expected date:   Expected time:   Means of arrival:   Comments:    Nicol Alcazar, RN  10/30/21 7257

## 2021-10-30 NOTE — PROGRESS NOTES
Clinical Pharmacy Note: Pharmacy to Dose Vancomycin    Haseeb Bolden is a 82 y.o. female started on Vancomycin for PNA; consult received from Dr. Juan Matos to manage therapy. Also receiving the following antibiotics: Cefepime. Goal AUC: 400-600 mg/L*hr  Goal Trough Level: 15-20 mcg/mL    Assessment/Plan:  A 1750 mg loading dose was given on 10/30/21 at 1512  Initiate vancomycin 1000 mg IV every 24 hours. Bayesian modeling predicts an AUC of 424 mg/L*hr and a trough of 11.3 mcg/mL at steady state concentration. A vancomycin random level has been ordered for tomorrow AM at 0600  Changes in regimen will be determined based on culture results, renal function, and clinical response. Pharmacy will continue to monitor and adjust regimen as necessary. Allergies:  Sulfa antibiotics     Recent Labs     10/30/21  1322   CREATININE 0.8       Recent Labs     10/30/21  1322   WBC 15.3*       Ht Readings from Last 1 Encounters:   10/30/21 5' 1\" (1.549 m)        Wt Readings from Last 1 Encounters:   10/30/21 250 lb (113.4 kg)         Estimated Creatinine Clearance: 61 mL/min (based on SCr of 0.8 mg/dL).       Thank you for the consult,    Demetrius Rodney, PharmD  [id number]

## 2021-10-31 LAB
ANION GAP SERPL CALCULATED.3IONS-SCNC: 7 MMOL/L (ref 3–16)
BUN BLDV-MCNC: 17 MG/DL (ref 7–20)
C-REACTIVE PROTEIN: 112.8 MG/L (ref 0–5.1)
CALCIUM SERPL-MCNC: 9.5 MG/DL (ref 8.3–10.6)
CHLORIDE BLD-SCNC: 102 MMOL/L (ref 99–110)
CO2: 33 MMOL/L (ref 21–32)
CREAT SERPL-MCNC: 0.7 MG/DL (ref 0.6–1.2)
D DIMER: 525 NG/ML DDU (ref 0–229)
EKG ATRIAL RATE: 98 BPM
EKG DIAGNOSIS: NORMAL
EKG P AXIS: 30 DEGREES
EKG P-R INTERVAL: 186 MS
EKG Q-T INTERVAL: 286 MS
EKG QRS DURATION: 114 MS
EKG QTC CALCULATION (BAZETT): 365 MS
EKG R AXIS: -18 DEGREES
EKG T AXIS: 133 DEGREES
EKG VENTRICULAR RATE: 98 BPM
GFR AFRICAN AMERICAN: >60
GFR NON-AFRICAN AMERICAN: >60
GLUCOSE BLD-MCNC: 163 MG/DL (ref 70–99)
HCT VFR BLD CALC: 25.1 % (ref 36–48)
HEMOGLOBIN: 7.9 G/DL (ref 12–16)
MCH RBC QN AUTO: 27.1 PG (ref 26–34)
MCHC RBC AUTO-ENTMCNC: 31.5 G/DL (ref 31–36)
MCV RBC AUTO: 85.9 FL (ref 80–100)
PDW BLD-RTO: 16.6 % (ref 12.4–15.4)
PLATELET # BLD: 146 K/UL (ref 135–450)
PMV BLD AUTO: 9.6 FL (ref 5–10.5)
POTASSIUM SERPL-SCNC: 3.7 MMOL/L (ref 3.5–5.1)
PROCALCITONIN: 0.17 NG/ML (ref 0–0.15)
RBC # BLD: 2.92 M/UL (ref 4–5.2)
SARS-COV-2, PCR: NOT DETECTED
SODIUM BLD-SCNC: 142 MMOL/L (ref 136–145)
VANCOMYCIN RANDOM: 9.8 UG/ML
WBC # BLD: 25.4 K/UL (ref 4–11)

## 2021-10-31 PROCEDURE — 2580000003 HC RX 258: Performed by: FAMILY MEDICINE

## 2021-10-31 PROCEDURE — 6370000000 HC RX 637 (ALT 250 FOR IP): Performed by: INTERNAL MEDICINE

## 2021-10-31 PROCEDURE — 6370000000 HC RX 637 (ALT 250 FOR IP): Performed by: FAMILY MEDICINE

## 2021-10-31 PROCEDURE — 94660 CPAP INITIATION&MGMT: CPT

## 2021-10-31 PROCEDURE — 85027 COMPLETE CBC AUTOMATED: CPT

## 2021-10-31 PROCEDURE — 6360000002 HC RX W HCPCS: Performed by: FAMILY MEDICINE

## 2021-10-31 PROCEDURE — 93010 ELECTROCARDIOGRAM REPORT: CPT | Performed by: INTERNAL MEDICINE

## 2021-10-31 PROCEDURE — 94761 N-INVAS EAR/PLS OXIMETRY MLT: CPT

## 2021-10-31 PROCEDURE — 6360000002 HC RX W HCPCS: Performed by: INTERNAL MEDICINE

## 2021-10-31 PROCEDURE — 2700000000 HC OXYGEN THERAPY PER DAY

## 2021-10-31 PROCEDURE — 84145 PROCALCITONIN (PCT): CPT

## 2021-10-31 PROCEDURE — 36415 COLL VENOUS BLD VENIPUNCTURE: CPT

## 2021-10-31 PROCEDURE — 2580000003 HC RX 258: Performed by: INTERNAL MEDICINE

## 2021-10-31 PROCEDURE — 99291 CRITICAL CARE FIRST HOUR: CPT | Performed by: INTERNAL MEDICINE

## 2021-10-31 PROCEDURE — 94640 AIRWAY INHALATION TREATMENT: CPT

## 2021-10-31 PROCEDURE — 2580000003 HC RX 258

## 2021-10-31 PROCEDURE — 2060000000 HC ICU INTERMEDIATE R&B

## 2021-10-31 PROCEDURE — 85379 FIBRIN DEGRADATION QUANT: CPT

## 2021-10-31 PROCEDURE — 86140 C-REACTIVE PROTEIN: CPT

## 2021-10-31 PROCEDURE — 80048 BASIC METABOLIC PNL TOTAL CA: CPT

## 2021-10-31 PROCEDURE — 2580000003 HC RX 258: Performed by: EMERGENCY MEDICINE

## 2021-10-31 PROCEDURE — 80202 ASSAY OF VANCOMYCIN: CPT

## 2021-10-31 RX ORDER — SODIUM CHLORIDE 9 MG/ML
INJECTION, SOLUTION INTRAVENOUS
Status: COMPLETED
Start: 2021-10-31 | End: 2021-10-31

## 2021-10-31 RX ORDER — SODIUM CHLORIDE 9 MG/ML
INJECTION, SOLUTION INTRAVENOUS CONTINUOUS
Status: DISCONTINUED | OUTPATIENT
Start: 2021-10-31 | End: 2021-10-31

## 2021-10-31 RX ORDER — OXYCODONE HYDROCHLORIDE AND ACETAMINOPHEN 5; 325 MG/1; MG/1
1 TABLET ORAL EVERY 8 HOURS PRN
Status: DISCONTINUED | OUTPATIENT
Start: 2021-10-31 | End: 2021-10-31

## 2021-10-31 RX ORDER — OXYCODONE HYDROCHLORIDE AND ACETAMINOPHEN 5; 325 MG/1; MG/1
1 TABLET ORAL EVERY 6 HOURS PRN
Status: DISCONTINUED | OUTPATIENT
Start: 2021-10-31 | End: 2021-11-03 | Stop reason: HOSPADM

## 2021-10-31 RX ORDER — METHYLPREDNISOLONE SODIUM SUCCINATE 40 MG/ML
40 INJECTION, POWDER, LYOPHILIZED, FOR SOLUTION INTRAMUSCULAR; INTRAVENOUS EVERY 12 HOURS
Status: DISCONTINUED | OUTPATIENT
Start: 2021-10-31 | End: 2021-11-01

## 2021-10-31 RX ORDER — OXYCODONE HCL 10 MG/1
10 TABLET, FILM COATED, EXTENDED RELEASE ORAL EVERY 12 HOURS SCHEDULED
Status: DISCONTINUED | OUTPATIENT
Start: 2021-10-31 | End: 2021-11-03 | Stop reason: HOSPADM

## 2021-10-31 RX ORDER — DEXAMETHASONE 4 MG/1
6 TABLET ORAL DAILY
Status: DISCONTINUED | OUTPATIENT
Start: 2021-10-31 | End: 2021-10-31

## 2021-10-31 RX ORDER — POLYETHYLENE GLYCOL 3350 17 G/17G
17 POWDER, FOR SOLUTION ORAL DAILY
Status: DISCONTINUED | OUTPATIENT
Start: 2021-11-01 | End: 2021-11-03 | Stop reason: HOSPADM

## 2021-10-31 RX ADMIN — METHYLPREDNISOLONE SODIUM SUCCINATE 40 MG: 40 INJECTION, POWDER, FOR SOLUTION INTRAMUSCULAR; INTRAVENOUS at 09:02

## 2021-10-31 RX ADMIN — OXYCODONE HYDROCHLORIDE AND ACETAMINOPHEN 1 TABLET: 5; 325 TABLET ORAL at 09:03

## 2021-10-31 RX ADMIN — IPRATROPIUM BROMIDE AND ALBUTEROL SULFATE 1 AMPULE: .5; 3 SOLUTION RESPIRATORY (INHALATION) at 15:21

## 2021-10-31 RX ADMIN — SODIUM CHLORIDE: 9 INJECTION, SOLUTION INTRAVENOUS at 02:21

## 2021-10-31 RX ADMIN — SODIUM CHLORIDE: 9 INJECTION, SOLUTION INTRAVENOUS at 09:03

## 2021-10-31 RX ADMIN — PANTOPRAZOLE SODIUM 40 MG: 40 TABLET, DELAYED RELEASE ORAL at 09:02

## 2021-10-31 RX ADMIN — IPRATROPIUM BROMIDE AND ALBUTEROL SULFATE 1 AMPULE: .5; 3 SOLUTION RESPIRATORY (INHALATION) at 08:36

## 2021-10-31 RX ADMIN — ENOXAPARIN SODIUM 40 MG: 100 INJECTION SUBCUTANEOUS at 09:02

## 2021-10-31 RX ADMIN — PREGABALIN 150 MG: 75 CAPSULE ORAL at 21:29

## 2021-10-31 RX ADMIN — IPRATROPIUM BROMIDE AND ALBUTEROL SULFATE 1 AMPULE: .5; 3 SOLUTION RESPIRATORY (INHALATION) at 19:27

## 2021-10-31 RX ADMIN — CEFEPIME HYDROCHLORIDE 2000 MG: 2 INJECTION, POWDER, FOR SOLUTION INTRAVENOUS at 02:18

## 2021-10-31 RX ADMIN — CEFEPIME HYDROCHLORIDE 2000 MG: 2 INJECTION, POWDER, FOR SOLUTION INTRAVENOUS at 17:35

## 2021-10-31 RX ADMIN — OXYCODONE HYDROCHLORIDE 10 MG: 10 TABLET, FILM COATED, EXTENDED RELEASE ORAL at 21:29

## 2021-10-31 RX ADMIN — Medication 2 PUFF: at 08:36

## 2021-10-31 RX ADMIN — PREGABALIN 150 MG: 75 CAPSULE ORAL at 09:02

## 2021-10-31 RX ADMIN — METHYLPREDNISOLONE SODIUM SUCCINATE 40 MG: 40 INJECTION, POWDER, FOR SOLUTION INTRAMUSCULAR; INTRAVENOUS at 00:40

## 2021-10-31 RX ADMIN — Medication 10 ML: at 21:30

## 2021-10-31 RX ADMIN — LEVOTHYROXINE SODIUM 112 MCG: 0.11 TABLET ORAL at 09:02

## 2021-10-31 RX ADMIN — FLUOXETINE 40 MG: 20 CAPSULE ORAL at 09:02

## 2021-10-31 RX ADMIN — VANCOMYCIN HYDROCHLORIDE 1500 MG: 10 INJECTION, POWDER, LYOPHILIZED, FOR SOLUTION INTRAVENOUS at 13:41

## 2021-10-31 RX ADMIN — ACETAMINOPHEN 650 MG: 325 TABLET ORAL at 21:30

## 2021-10-31 RX ADMIN — IPRATROPIUM BROMIDE AND ALBUTEROL SULFATE 1 AMPULE: .5; 3 SOLUTION RESPIRATORY (INHALATION) at 11:39

## 2021-10-31 RX ADMIN — Medication 2 PUFF: at 19:29

## 2021-10-31 RX ADMIN — METHYLPREDNISOLONE SODIUM SUCCINATE 40 MG: 40 INJECTION, POWDER, FOR SOLUTION INTRAMUSCULAR; INTRAVENOUS at 21:29

## 2021-10-31 ASSESSMENT — PAIN SCALES - GENERAL
PAINLEVEL_OUTOF10: 7
PAINLEVEL_OUTOF10: 0
PAINLEVEL_OUTOF10: 7
PAINLEVEL_OUTOF10: 3
PAINLEVEL_OUTOF10: 0
PAINLEVEL_OUTOF10: 7

## 2021-10-31 ASSESSMENT — PAIN DESCRIPTION - ONSET: ONSET: ON-GOING

## 2021-10-31 ASSESSMENT — PAIN DESCRIPTION - PAIN TYPE: TYPE: ACUTE PAIN

## 2021-10-31 ASSESSMENT — PAIN DESCRIPTION - DESCRIPTORS: DESCRIPTORS: SORE

## 2021-10-31 ASSESSMENT — PAIN DESCRIPTION - FREQUENCY: FREQUENCY: CONTINUOUS

## 2021-10-31 ASSESSMENT — PAIN DESCRIPTION - LOCATION: LOCATION: BUTTOCKS

## 2021-10-31 NOTE — PROGRESS NOTES
Hospital Medicine Progress Note    Patient:  Mary Stratton  YOB: 1935  MRN: [id number]   PCP: Heather Conde         Acct: [de-identified]  Unit/Bed: O0V-2604/0019-09    Date of Admission: 10/30/2021    Assessment/plan:   1. Acute on chronic hypoxic and hypercapneic respiratory failure (on 2.5L at home) secondary to sepsis (POA)  with community acquired pneumonia  With COPD exacerbation. Sats dropped to 70s on 4LO2. CXR showed right sided basilar opacities concerning for pneumonia- possibly a small loculated effusion. COVID-19 negative. On Cefepime and vancomycin- consider transitioning to Ceftriaxone and azithromycin or Levaquin after 48 hours Possibly fluid overloaded as well, but clinical picture with leukocytosis, elevated temperature to 100.3F, tachycardia, tachypnea, elevated procalcitonin more representative of infection. Blood cultures negative to date. IV methylprednisolone. Restart furosemide in the am  2. Steroid leukocytosis: continue to wean steroids. Check hemoglobin A1C  3. Normocytic anemia: Hgb trending downwards. May consider 1 unit of PRBCs with current respiratory compromise. Continue to trend . Check iron studies  4. Elevated D-dimer: no further work up for now. Current alternative diagnosis of pneumonia. Continue to monitor. 5. Morbid obesity: BMI 47.20  6. Advanced dementia?: continue to monitor  7. Chronic back pain: on chronic narcotics. Bowel regimen      Anticipated Discharge: in  3 days    Code Status: Full Code    Electronically signed by Gill Oro MD on 10/31/2021 at 5:33 PM      Chief complaint: Shortness of breath  The patient is a 82 y.o.  y.o. female  with a  h/o COPD, chronic hypoxic respiratory failure on 2.5 L O2, essential HTN, and dementia  who  was brought in by EMS secondary to worsening hypoxia. She presented with a 2-day history of O2 desaturation to 70s on 4LO2. WBC count was elevated up to 15,000.  Chest xray showed right sided basilar opacities concerning for pneumonia. The patient was started on Cefepime and vancomycin. COVID-19 was negative. She required up to 8L  high flow oxygen. Subjective:   Patient states that the shortness of breath is improved. She is coughing quite frequently. She states that she has chronic back pain and would like her pain medications to be restarted. COVID-19 negative. Objective:    Medications:  Reviewed    Infusion Medications    sodium chloride       Scheduled Medications    vancomycin  1,500 mg IntraVENous Q24H    methylPREDNISolone  40 mg IntraVENous Q12H    oxyCODONE  10 mg Oral 2 times per day    [START ON 11/1/2021] polyethylene glycol  17 g Oral Daily    sodium chloride flush  5-40 mL IntraVENous 2 times per day    cefepime  2,000 mg IntraVENous Q12H    ipratropium-albuterol  1 ampule Inhalation Q4H WA    furosemide  40 mg Oral Daily    levothyroxine  112 mcg Oral Daily    pregabalin  150 mg Oral BID    FLUoxetine  40 mg Oral Daily    pantoprazole  40 mg Oral QAM AC    enoxaparin  40 mg SubCUTAneous Daily    budesonide-formoterol  2 puff Inhalation BID    influenza virus vaccine  0.5 mL IntraMUSCular Prior to discharge     PRN Meds: oxyCODONE-acetaminophen, sodium chloride flush, sodium chloride, ondansetron **OR** ondansetron, polyethylene glycol, acetaminophen **OR** acetaminophen    Ins and outs:      Intake/Output Summary (Last 24 hours) at 10/31/2021 1733  Last data filed at 10/31/2021 0402  Gross per 24 hour   Intake --   Output 2550 ml   Net -2550 ml       Physical examination:   BP (!) 140/62   Pulse 66   Temp 97.3 °F (36.3 °C) (Temporal)   Resp 22   Ht 5' 1\" (1.549 m)   Wt 249 lb 12.5 oz (113.3 kg)   SpO2 95%   BMI 47.20 kg/m²     General appearance:  No apparent distress. Appears comfortable. Well nourished. Morbidley obese  HEENT: Atraumatic. Pupils equally round. Extraocular motion intact. Conjunctivae clear. Nose symmetric without evidence of discharge.  Oral mucosa moist w/o combined with atelectasis or infection       DVT prophylaxis: [x] Lovenox                                 [x] SCDs                                 [] SQ Heparin                                 [] Encourage ambulation           [] Already on Anticoagulation     Disposition:     [] Home                             [x] Home with home care?                              [] TCU       [] Rehab       [] Psych       [] SNF       [] [place]       [] Other-

## 2021-10-31 NOTE — PROGRESS NOTES
Received consult to respiratory care for use of home O2 and CPAP, Patient says she wears \"CPAP. \" Found recent order from Dr Kimberly Pace for Katelynn Schneider on an [employer].  Placed pt on AVAPS  VT-500mL  Rate-15  EPAP- +5  I time- 1.0  IPAP lo/hi- 10/25  FiO2- 100%

## 2021-10-31 NOTE — CONSULTS
Clinical Pharmacy Note    Pharmacy consulted by Dr. Vesta Robin to start remdesivir. Patient COVID test is pending. When resulted, pharmacy will initiate remdesivir if COVID positive.      Gisselle Edwards, PharmD, [facility]

## 2021-10-31 NOTE — CONSULTS
PULMONARY AND CRITICAL CARE MEDICINE CONSULTATION NOTE    CONSULTING PHYSICIAN:  Ana Bhatti MD    ADMISSION DATE: 10/30/2021  ADMISSION DIAGNOSIS: Septicemia ([facility] [address] [A41.9]  Acute on chronic respiratory failure (HCC) [J96.20]  Dyspnea, unspecified type [R06.00]  Pneumonia due to infectious organism, unspecified laterality, unspecified part of lung [J18.9]    REASON FOR CONSULT:   Chief Complaint   Patient presents with    Shortness of Breath     pt SOB for 3 days, 70s on 4 LPM upon EMS arrival, 86% on NRB, hx of copd       DATE OF CONSULT: 10/30/2021    HISTORY OF PRESENT ILLNESS: 82y.o. year old female with a past medical history significant for COPD, morbid obesity, chronic hypoxic respiratory failure, dementia who presented to the hospital with increasing shortness of breath and hypoxia. Patient has had multiple admission in the recent past with similar complaints. Has been seen to have chronic hypoxic and hypercapnic respiratory failure. .  This time when she presented to the ER she was seen to have oxygen saturation in high 70s on 4 L/min of oxygen. She was placed on a nonrebreather mask. Eventually required BiPAP therapy overnight. Currently on nasal cannula at 8 L/min and saturating in mid 90s. At the time of interview she is lying in bed in no respiratory distress. Reports that her breathing has improved slightly. She is bothered by her back pain, leg pain. Denies any discolored expectoration or fevers. Does appear forgetful. REVIEW OF SYSTEMS:     CONSTITUTIONAL SYMPTOMS: The patient denies fever, fatigue, night sweats, weight loss or weight gain. HEENT: No vision changes. No tinnitus, Denies sinus pain. No hoarseness, or dysphagia. NECK: Patient denies swelling in the neck. CARDIOVASCULAR: Denies chest pain, palpitation, syncope. RESPIRATORY: As per HPI. GASTROINTESTINAL: Denies nausea, abdominal pain or change in bowel function. GENITOURINARY: Denies obstructive symptoms. No history of incontinence. BREASTS: No masses or lumps in the breasts. SKIN: No rashes or itching. MUSCULOSKELETAL: Denies weakness or bone pain. NEUROLOGICAL: No headaches or seizures. PSYCHIATRIC: Denies mood swings or depression. ENDOCRINE: Denies heat or cold intolerance or excessive thirst.  HEMATOLOGIC/LYMPHATIC: Denies easy bruising or lymph node swelling. ALLERGIC/IMMUNOLOGIC: No environmental allergies. PAST MEDICAL HISTORY:   Past Medical History:   Diagnosis Date    Arthritis     osteo    COPD (chronic obstructive pulmonary disease) ([facility] [address]     Hemorrhoids     Hernia of unspecified site of abdominal cavity without mention of obstruction or gangrene     Incontinence     Knee pain, bilateral     pt states no cartilage    Spinal stenosis     Spinal stenosis        PAST SURGICAL HISTORY:   Past Surgical History:   Procedure Laterality Date    CHOLECYSTECTOMY      PARTIAL HYSTERECTOMY         SOCIAL HISTORY:   Social History     Tobacco Use    Smoking status: Former Smoker    Smokeless tobacco: Never Used    Tobacco comment: years ago   Vaping Use    Vaping Use: Never used   Substance Use Topics    Alcohol use: No    Drug use: No       FAMILY HISTORY: History reviewed. No pertinent family history. MEDICATIONS:     No current facility-administered medications on file prior to encounter. Current Outpatient Medications on File Prior to Encounter   Medication Sig Dispense Refill    oxyCODONE (OXYCONTIN) 10 MG extended release tablet Take 10 mg by mouth every 12 hours.  oxyCODONE-acetaminophen (PERCOCET)  MG per tablet Take 1 tablet by mouth every 6 hours as needed for Pain.  hydrocortisone (ANUSOL-HC) 2.5 % rectal cream Place rectally 2 times daily.  1 Tube 0    ipratropium-albuterol (DUONEB) 0.5-2.5 (3) MG/3ML SOLN nebulizer solution Inhale 1 vial into the lungs every 6 hours as needed for Shortness of Breath      SYMBICORT 160-4.5 MCG/ACT AERO TAKE 2 PUFFS TWICE A DAY  3    levothyroxine (SYNTHROID) 112 MCG tablet TAKE 1 TABLET BY MOUTH EVERY DAY  3    furosemide (LASIX) 40 MG tablet 20 mg   3    fluoxetine (PROZAC) 20 MG capsule Take 40 mg by mouth daily.  pregabalin (LYRICA) 50 MG capsule Take 150 mg by mouth 2 times daily.  esomeprazole (NEXIUM) 40 MG capsule Take 40 mg by mouth every morning (before breakfast).  vancomycin  1,500 mg IntraVENous Q24H    methylPREDNISolone  40 mg IntraVENous Q12H    sodium chloride flush  5-40 mL IntraVENous 2 times per day    cefepime  2,000 mg IntraVENous Q12H    ipratropium-albuterol  1 ampule Inhalation Q4H WA    [Held by provider] furosemide  40 mg Oral Daily    levothyroxine  112 mcg Oral Daily    pregabalin  150 mg Oral BID    FLUoxetine  40 mg Oral Daily    pantoprazole  40 mg Oral QAM AC    enoxaparin  40 mg SubCUTAneous Daily    budesonide-formoterol  2 puff Inhalation BID    influenza virus vaccine  0.5 mL IntraMUSCular Prior to discharge      sodium chloride       oxyCODONE-acetaminophen, sodium chloride flush, sodium chloride, ondansetron **OR** ondansetron, polyethylene glycol, acetaminophen **OR** acetaminophen      ALLERGIES:   Allergies as of 10/30/2021 - Fully Reviewed 10/30/2021   Allergen Reaction Noted    Sulfa antibiotics  04/24/2011      OBJECTIVE:   height is 5' 1\" (1.549 m) and weight is 249 lb 12.5 oz (113.3 kg). Her temporal temperature is 97.3 °F (36.3 °C). Her blood pressure is 140/62 (abnormal) and her pulse is 66. Her respiration is 20 and oxygen saturation is 97%. No intake/output data recorded. PHYSICAL EXAM:    CONSTITUTIONAL: She is a 82y.o.-year-old who appears her stated age. She is alert and oriented x 1 and in no  acute distress. HEENT: PERRLA, EOMI. No scleral icterus. No thrush, atraumatic, normocephalic. NECK: Supple, without cervical or supraclavicular lymphadenopathy:  CARDIOVASCULAR: S1 S2 RRR.  Without murmer  RESPIRATORY & CHEST: Bibasilar decreased breath sounds heard. No wheezing heard. GASTROINTESTINAL & ABDOMEN: Soft, nontender, positive bowel sounds in all quadrants, non-distended, without hepatosplenomegaly. GENITOURINARY: Deferred. MUSCULOSKELETAL: No tenderness to palpation of the axial skeleton. There is no clubbing. No cyanosis. No edema of the lower extremities. SKIN OF BODY: No rash or jaundice. PSYCHIATRIC EVALUATION: Normal affect. Patient answers questions appropriately. HEMATOLOGIC/LYMPHATIC/ IMMUNOLOGIC: No palpable lymphadenopathy. NEUROLOGIC: Alert and oriented x 3. Groslly non-focal. Motor strength is 5+/5 in all muscle groups. The patient has a normal sensorium globally.       LABS:  Recent Labs     10/30/21  1322 10/31/21  0333   WBC 15.3* 25.4*   HGB 9.1* 7.9*   HCT 30.0* 25.1*    146   ALT 23  --    AST 27  --     142   K 4.1 3.7    102   CREATININE 0.8 0.7   BUN 19 17   CO2 33* 33*       Recent Labs     10/30/21  1322 10/31/21  0333   GLUCOSE 147* 163*   CALCIUM 9.6 9.5    142   K 4.1 3.7   CO2 33* 33*    102   BUN 19 17   CREATININE 0.8 0.7       Recent Labs     10/30/21  1322 10/30/21  1434   PHART 7.364 7.377   EYF3IDT 65.2* 62.6*   PO2ART 77.9 82.0   ZZB6PZC 37.1* 36.7*   B3OSNLFD 96.5 97.2   BEART 10.1* 10.0*       Lab Results   Component Value Date    INR 1.00 09/10/2021    INR 0.95 06/20/2019    INR 0.91 05/10/2019    PROTIME 11.3 09/10/2021    PROTIME 10.8 06/20/2019    PROTIME 10.4 05/10/2019     Lab Results   Component Value Date    AMYLASE 34 09/10/2021      No results found for: LABA1C  No results found for: EAG  Lab Results   Component Value Date    TSH 1.31 05/10/2019     Lab Results   Component Value Date    CKTOTAL 189 10/11/2020    TROPONINI <0.01 10/30/2021      Lab Results   Component Value Date    .8 (H) 10/31/2021      Lab Results   Component Value Date    BNP 8.1 03/21/2011      Lab Results   Component Value Date    DDIMER 525 (H) 10/31/2021 Lab Results   Component Value Date    FERRITIN 43.4 09/10/2021      Lab Results   Component Value Date    LACTA 1.0 10/30/2021           IMAGING:    Narrative   EXAMINATION:   ONE XRAY VIEW OF THE CHEST       10/30/2021 1:37 pm           FINDINGS:   Cardiomediastinal silhouette stable.  Right basilar opacity.  No   pneumothorax.  Mild pulmonary vascular congestion.           Impression   Right basilar opacity could represent right pleural fluid combined with   atelectasis or infection               IMPRESSION:     1. Acute respiratory distress  2. Acute on chronic hypoxic and hypercapnic respiratory failure  3. Morbid obesity  4. Dementia  5. COVID-19 rule out    RECOMMENDATION:     1. Patient has presented to the hospital with acute respiratory distress. Chest imaging suggestive of right lower lobe pneumonia. 2. Mild leukocytosis seen. 3. Low suspicion for COVID-19 infection. 4. Patient does have COPD at baseline and has had recurrent admission due to chronic hypoxic and hypercapnic respiratory failure. 5. Currently on 8 L/min high flow nasal cannula. Titrate it down to maintain SPO2 between 88 to 92%. 6. Continue with vancomycin and cefepime today. But tomorrow this can be de-escalate it to just Levaquin. 7. Culture results are pending. 8. We will change Decadron to Solu-Medrol 40 mg IV every 12 hours. Can start weaning this by tomorrow. 9. Discontinue IV Remdesivir. 10. Currently on normal saline at 50 cc/h. Check swallowing. If able to take p.o. diet, can discontinue fluids. Total critical care time caring for this patient with life threatening illness, including direct patient contact, management of life support systems, review of data including imaging and labs, discussions with other team members and physicians is at least 32 minutes so far today, excluding procedures.         Deon Rivas MD   Pulmonary Critical Care and Sleep Medicine  [facility] AT [facility] [address], [place], [place] [address]  10/30/2021, 3:00 PM      This note was completed using dragon medical speech recognition software. Grammatical errors, random word insertions, pronoun errors and incomplete sentences are occasional consequences of this technology due to software limitations. If there are questions or concerns about the content of this note of information contained within the body of this dictation they should be addressed with the provider for clarification.

## 2021-10-31 NOTE — H&P
HOSPITALISTS HISTORY AND PHYSICAL    10/30/21  Patient Information:  Kaela Tyler is a 82 y.o. female [id number]  PCP:  Donato Villalta (Tel: None )    Chief complaint:    Chief Complaint   Patient presents with    Shortness of Breath     pt SOB for 3 days, 70s on 4 LPM upon EMS arrival, 86% on NRB, hx of copd        History of Present Illness:  Cait Duffy is a 82 y.o. female with h/o COPD, Chronic respiratory failure, HTn , dementia was brought in by EMS d/t worsening hypoxia. She has been short of breath of the past couple of days . Sats dropped down in 70's on 4 L o2 . She was placed on Non re breather mask  . The pt was febrile and tachycardic. WBC count is elevated to  15 K Chest xray showed right sided basilar opacities concerning for pneumonia     REVIEW OF SYSTEMS:   Constitutional: +ve for fever,chills or night sweats  ENT: Negative for rhinorrhea, epistaxis, hoarseness, sore throat. Respiratory: +Ve for shortness of breath,wheezing  Cardiovascular: Negative for chest pain, palpitations   Gastrointestinal: Negative for nausea, vomiting, diarrhea  Genitourinary: Negative for polyuria, dysuria   Hematologic/Lymphatic: Negative for bleeding tendency, easy bruising  Musculoskeletal: Negative for myalgias and arthralgias  Neurologic: Negative for confusion,dysarthria. Skin: Negative for itching,rash  Psychiatric: Negative for depression,anxiety, agitation. Endocrine: Negative for polydipsia,polyuria,heat /cold intolerance. Past Medical History:   has a past medical history of Arthritis, COPD (chronic obstructive pulmonary disease) ([facility] [address] Hemorrhoids, Hernia of unspecified site of abdominal cavity without mention of obstruction or gangrene, Incontinence, Knee pain, bilateral, Spinal stenosis, and Spinal stenosis.      Past Surgical History:   has a past surgical history that includes Cholecystectomy and partial hysterectomy (cervix not removed). Medications:  No current facility-administered medications on file prior to encounter. Current Outpatient Medications on File Prior to Encounter   Medication Sig Dispense Refill    oxyCODONE (OXYCONTIN) 10 MG extended release tablet Take 10 mg by mouth every 12 hours.  oxyCODONE-acetaminophen (PERCOCET)  MG per tablet Take 1 tablet by mouth every 6 hours as needed for Pain.  hydrocortisone (ANUSOL-HC) 2.5 % rectal cream Place rectally 2 times daily. 1 Tube 0    ipratropium-albuterol (DUONEB) 0.5-2.5 (3) MG/3ML SOLN nebulizer solution Inhale 1 vial into the lungs every 6 hours as needed for Shortness of Breath      SYMBICORT 160-4.5 MCG/ACT AERO TAKE 2 PUFFS TWICE A DAY  3    levothyroxine (SYNTHROID) 112 MCG tablet TAKE 1 TABLET BY MOUTH EVERY DAY  3    furosemide (LASIX) 40 MG tablet 20 mg   3    fluoxetine (PROZAC) 20 MG capsule Take 40 mg by mouth daily.  pregabalin (LYRICA) 50 MG capsule Take 150 mg by mouth 2 times daily.  esomeprazole (NEXIUM) 40 MG capsule Take 40 mg by mouth every morning (before breakfast).          Current Facility-Administered Medications   Medication Dose Route Frequency Provider Last Rate Last Admin    sodium chloride flush 0.9 % injection 5-40 mL  5-40 mL IntraVENous 2 times per day Osiris Vazquez MD   10 mL at 10/30/21 2145    sodium chloride flush 0.9 % injection 5-40 mL  5-40 mL IntraVENous PRN Osiris Vazquez MD        0.9 % sodium chloride infusion  25 mL IntraVENous PRN Osiris Vazquez MD        cefepime (MAXIPIME) 2000 mg IVPB minibag  2,000 mg IntraVENous Q12H Bob Triana MD   Stopped at 10/31/21 0248    ipratropium-albuterol (DUONEB) nebulizer solution 1 ampule  1 ampule Inhalation Q4H WA Bob Triana MD   1 ampule at 10/30/21 2033    furosemide (LASIX) tablet 40 mg  40 mg Oral Daily Bob Triana MD   40 mg at 10/30/21 2113    levothyroxine (SYNTHROID) tablet 112 mcg  112 mcg Oral Daily Bob Triana MD       [facility] oxyCODONE-acetaminophen (PERCOCET)  MG per tablet 1 tablet  1 tablet Oral Q6H PRN Gurmeet Pineda MD        pregabalin (LYRICA) capsule 150 mg  150 mg Oral BID Gurmeet Pineda MD   150 mg at 10/30/21 2113    FLUoxetine (PROZAC) capsule 40 mg  40 mg Oral Daily Gurmete Pineda MD        pantoprazole (PROTONIX) tablet 40 mg  40 mg Oral QAM AC Gurmeet Pindea MD        enoxaparin (LOVENOX) injection 40 mg  40 mg SubCUTAneous Daily Grumeet Pineda MD   40 mg at 10/30/21 2113    ondansetron (ZOFRAN-ODT) disintegrating tablet 4 mg  4 mg Oral Q8H PRN Gurmeet Pineda MD        Or    ondansetron (ZOFRAN) injection 4 mg  4 mg IntraVENous Q6H PRN Gurmeet Pineda MD        polyethylene glycol (GLYCOLAX) packet 17 g  17 g Oral Daily PRN Gurmeet Pineda MD        acetaminophen (TYLENOL) tablet 650 mg  650 mg Oral Q6H PRN Gurmeet Pineda MD        Or   [place] acetaminophen (TYLENOL) suppository 650 mg  650 mg Rectal Q6H PRN Gurmeet Pineda MD        vancomycin 1000 mg IVPB in 250 mL D5W addavial  1,000 mg IntraVENous Q24H Gurmeet Pineda MD        budesonide-formoterol [facility]) 160-4.5 MCG/ACT inhaler 2 puff  2 puff Inhalation BID Gurmeet Pineda MD   2 puff at 10/30/21 2033    influenza quadrivalent split vaccine (FLUZONE;FLUARIX;FLULAVAL;AFLURIA) injection 0.5 mL  0.5 mL IntraMUSCular Prior to discharge Gurmeet Pineda MD           Allergies: Allergies   Allergen Reactions    Sulfa Antibiotics      Unknown reaction        Social History:   reports that she has quit smoking. She has never used smokeless tobacco. She reports that she does not drink alcohol and does not use drugs. Family History:  family history is not on file. , family history reviewed not pertinent to current admission      Physical Exam:  BP (!) 111/49   Pulse 56   Temp 97.1 °F (36.2 °C) (Temporal)   Resp 17   Ht 5' 1\" (1.549 m)   Wt 250 lb (113.4 kg)   SpO2 100%   BMI 47.24 kg/m²     General appearance:  Appears comfortable.  Well nourished  Eyes: Sclera clear, pupils equal  ENT: Moist mucus membranes, no thrush. Trachea midline. Cardiovascular: Regular rhythm, normal S1, S2. No murmur, gallop, rub. No edema in lower extremities  Respiratory: Clear to auscultation bilaterally, no wheeze, good inspiratory effort  Gastrointestinal: Abdomen soft, non-tender, not distended, normal bowel sounds  Musculoskeletal: No cyanosis in digits, neck supple  Neurology: Cranial nerves grossly intact. Alert and oriented in time, place and person. No speech or motor deficits  Psychiatry: Appropriate affect. Not agitated  Skin: Warm, dry, normal turgor, no rash    Labs:  CBC:   Lab Results   Component Value Date    WBC 25.4 10/31/2021    RBC 2.92 10/31/2021    HGB 7.9 10/31/2021    HCT 25.1 10/31/2021    MCV 85.9 10/31/2021    MCH 27.1 10/31/2021    MCHC 31.5 10/31/2021    RDW 16.6 10/31/2021     10/31/2021    MPV 9.6 10/31/2021     BMP:    Lab Results   Component Value Date     10/31/2021    K 3.7 10/31/2021    K 4.1 10/30/2021     10/31/2021    CO2 33 10/31/2021    BUN 17 10/31/2021    CREATININE 0.7 10/31/2021    CALCIUM 9.5 10/31/2021    GFRAA >60 10/31/2021    GFRAA >60 04/30/2013    LABGLOM >60 10/31/2021    GLUCOSE 163 10/31/2021       Chest Xray:   EKG:         Problem List  Active Problems:    Acute on chronic respiratory failure (HCC)  Resolved Problems:    * No resolved hospital problems. *        Assessment/Plan:       1. Sepsis d/t pneumonia with fever, Leucocytosis and tachycardia  Chest xray showed right basilar opacity concerning for Pneumonia  Cultures including COVID are pending  Droplet precautions  Cont IV abx  D-dimer and procalcitonin also ordered     Acute on chronic respiratory failure   Placed on Bipap here   received a dose of solumedrol in the ED   H/o COPD   Will cont breathing treatment  Will get pulmonology to see       Admit as inpatient.  I anticipate hospitalization spanning more than two midnights for investigation and treatment of the

## 2021-10-31 NOTE — PROGRESS NOTES
Clinical Pharmacy Note: Pharmacy to Dose Vancomcyin    Vancomycin Day: 2  Current Dosing Regimen: 1000 mg q24  Dosing Method: Bayesian Modeling    Random: 9.8    Recent Labs     10/30/21  1322 10/31/21  0333   BUN 19 17       Recent Labs     10/30/21  1322 10/31/21  0333   CREATININE 0.8 0.7       Recent Labs     10/30/21  1322 10/31/21  0333   WBC 15.3* 25.4*         Intake/Output Summary (Last 24 hours) at 10/31/2021 0806  Last data filed at 10/31/2021 0402  Gross per 24 hour   Intake --   Output 2550 ml   Net -2550 ml         Ht Readings from Last 1 Encounters:   10/30/21 5' 1\" (1.549 m)        Wt Readings from Last 1 Encounters:   10/31/21 249 lb 12.5 oz (113.3 kg)         Body mass index is 47.2 kg/m². Estimated Creatinine Clearance: 70 mL/min (based on SCr of 0.7 mg/dL). Assessment/Plan:  Increase vancomycin regimen to 1500 mg every 24 hours. Bayesian Modeling predicts an AUC of 518 mg/L*hr and trough of 17.0 mg/L. A vancomycin random level has been ordered on 11/1 at 0600 for follow-up. Changes in regimen will be determined based on culture results, renal function, and clinical response. Pharmacy will continue to monitor and adjust regimen as necessary.     Thank you for the consult,    Fernando Gomez, PharmD, [facility]

## 2021-10-31 NOTE — PROGRESS NOTES
Clinical Pharmacy Note:     COVID negative. Will discontinue consult to dose remdesivir.      Nito Diez, PharmD, [facility]

## 2021-10-31 NOTE — PROGRESS NOTES
2100: During assessment pt is VERY hard of hearing; writer had to touch pt to wake her up. Once awake pt able to answer all questions appropriately. Pt stated several times \"just leave me alone to sleep\", Pt no aggressive just does not want to be bothered. This RN educated pt on POC and asked if she had any questions and she stated \"no, just do what you have to do\". Pt not resistant just does not like being bothered. When sleeping pt BP is low, when aroused BP is better. Pt able to take pills whole with no problem. Pt has bilate wounds on sacrum; wound care consulted. VSS. BP (!) 103/49   Pulse 62   Temp 97.1 °F (36.2 °C) (Temporal)   Resp 18   Ht 5' 1\" (1.549 m)   Wt 250 lb (113.4 kg)   SpO2 93%   BMI 47.24 kg/m²    Ngo in place and working WNL  HFNC 8L  PIV x2; L upper arm/R wrist both WNL. 0000: Re-assessment complete; see flow sheet. No changes at this time. 0130: Pt now on PAP and tolerating fine. See RT note for details. 0400: Re-assessment complete; see flow sheet. Pt remains on PAP and is tolerating well. No further complaints at this time.   BP (!) 111/49   Pulse 56   Temp 97.1 °F (36.2 °C) (Temporal)   Resp 17   Ht 5' 1\" (1.549 m)   Wt 250 lb (113.4 kg)   SpO2 100%   BMI 47.24 kg/m²

## 2021-11-01 LAB
ANION GAP SERPL CALCULATED.3IONS-SCNC: 8 MMOL/L (ref 3–16)
BUN BLDV-MCNC: 25 MG/DL (ref 7–20)
CALCIUM SERPL-MCNC: 9.1 MG/DL (ref 8.3–10.6)
CHLORIDE BLD-SCNC: 103 MMOL/L (ref 99–110)
CO2: 33 MMOL/L (ref 21–32)
CREAT SERPL-MCNC: 1 MG/DL (ref 0.6–1.2)
ESTIMATED AVERAGE GLUCOSE: 111.2 MG/DL
FERRITIN: 35.2 NG/ML (ref 15–150)
FOLATE: 9.81 NG/ML (ref 4.78–24.2)
GFR AFRICAN AMERICAN: >60
GFR NON-AFRICAN AMERICAN: 53
GLUCOSE BLD-MCNC: 168 MG/DL (ref 70–99)
HBA1C MFR BLD: 5.5 %
HCT VFR BLD CALC: 24.5 % (ref 36–48)
HEMOGLOBIN: 7.8 G/DL (ref 12–16)
IRON SATURATION: 6 % (ref 15–50)
IRON: 19 UG/DL (ref 37–145)
MCH RBC QN AUTO: 27.2 PG (ref 26–34)
MCHC RBC AUTO-ENTMCNC: 31.7 G/DL (ref 31–36)
MCV RBC AUTO: 85.9 FL (ref 80–100)
MRSA SCREEN RT-PCR: NORMAL
MRSA SCREEN RT-PCR: NORMAL
PDW BLD-RTO: 16.8 % (ref 12.4–15.4)
PLATELET # BLD: 162 K/UL (ref 135–450)
PMV BLD AUTO: 10.3 FL (ref 5–10.5)
POTASSIUM SERPL-SCNC: 3.9 MMOL/L (ref 3.5–5.1)
PRO-BNP: 1530 PG/ML (ref 0–449)
RBC # BLD: 2.85 M/UL (ref 4–5.2)
SODIUM BLD-SCNC: 144 MMOL/L (ref 136–145)
TOTAL IRON BINDING CAPACITY: 303 UG/DL (ref 260–445)
VANCOMYCIN RANDOM: 14 UG/ML
VITAMIN B-12: 612 PG/ML (ref 211–911)
WBC # BLD: 24.6 K/UL (ref 4–11)

## 2021-11-01 PROCEDURE — 82746 ASSAY OF FOLIC ACID SERUM: CPT

## 2021-11-01 PROCEDURE — 6360000002 HC RX W HCPCS: Performed by: FAMILY MEDICINE

## 2021-11-01 PROCEDURE — 83540 ASSAY OF IRON: CPT

## 2021-11-01 PROCEDURE — 6360000002 HC RX W HCPCS: Performed by: INTERNAL MEDICINE

## 2021-11-01 PROCEDURE — 94761 N-INVAS EAR/PLS OXIMETRY MLT: CPT

## 2021-11-01 PROCEDURE — 2580000003 HC RX 258: Performed by: EMERGENCY MEDICINE

## 2021-11-01 PROCEDURE — 87641 MR-STAPH DNA AMP PROBE: CPT

## 2021-11-01 PROCEDURE — 6370000000 HC RX 637 (ALT 250 FOR IP): Performed by: INTERNAL MEDICINE

## 2021-11-01 PROCEDURE — 83036 HEMOGLOBIN GLYCOSYLATED A1C: CPT

## 2021-11-01 PROCEDURE — 2580000003 HC RX 258: Performed by: FAMILY MEDICINE

## 2021-11-01 PROCEDURE — 6370000000 HC RX 637 (ALT 250 FOR IP): Performed by: FAMILY MEDICINE

## 2021-11-01 PROCEDURE — 82607 VITAMIN B-12: CPT

## 2021-11-01 PROCEDURE — 83880 ASSAY OF NATRIURETIC PEPTIDE: CPT

## 2021-11-01 PROCEDURE — 99233 SBSQ HOSP IP/OBS HIGH 50: CPT | Performed by: INTERNAL MEDICINE

## 2021-11-01 PROCEDURE — 82728 ASSAY OF FERRITIN: CPT

## 2021-11-01 PROCEDURE — 80202 ASSAY OF VANCOMYCIN: CPT

## 2021-11-01 PROCEDURE — 94640 AIRWAY INHALATION TREATMENT: CPT

## 2021-11-01 PROCEDURE — 80048 BASIC METABOLIC PNL TOTAL CA: CPT

## 2021-11-01 PROCEDURE — 2060000000 HC ICU INTERMEDIATE R&B

## 2021-11-01 PROCEDURE — 87205 SMEAR GRAM STAIN: CPT

## 2021-11-01 PROCEDURE — 85027 COMPLETE CBC AUTOMATED: CPT

## 2021-11-01 PROCEDURE — 87070 CULTURE OTHR SPECIMN AEROBIC: CPT

## 2021-11-01 PROCEDURE — 36415 COLL VENOUS BLD VENIPUNCTURE: CPT

## 2021-11-01 PROCEDURE — 83550 IRON BINDING TEST: CPT

## 2021-11-01 PROCEDURE — 87077 CULTURE AEROBIC IDENTIFY: CPT

## 2021-11-01 PROCEDURE — 2580000003 HC RX 258: Performed by: INTERNAL MEDICINE

## 2021-11-01 PROCEDURE — 2700000000 HC OXYGEN THERAPY PER DAY

## 2021-11-01 RX ORDER — METHYLPREDNISOLONE SODIUM SUCCINATE 40 MG/ML
40 INJECTION, POWDER, LYOPHILIZED, FOR SOLUTION INTRAMUSCULAR; INTRAVENOUS DAILY
Status: DISCONTINUED | OUTPATIENT
Start: 2021-11-02 | End: 2021-11-03

## 2021-11-01 RX ADMIN — PREGABALIN 150 MG: 75 CAPSULE ORAL at 21:21

## 2021-11-01 RX ADMIN — OXYCODONE HYDROCHLORIDE 10 MG: 10 TABLET, FILM COATED, EXTENDED RELEASE ORAL at 08:08

## 2021-11-01 RX ADMIN — IPRATROPIUM BROMIDE AND ALBUTEROL SULFATE 1 AMPULE: .5; 3 SOLUTION RESPIRATORY (INHALATION) at 11:41

## 2021-11-01 RX ADMIN — IPRATROPIUM BROMIDE AND ALBUTEROL SULFATE 1 AMPULE: .5; 3 SOLUTION RESPIRATORY (INHALATION) at 19:56

## 2021-11-01 RX ADMIN — SODIUM CHLORIDE 25 ML: 9 INJECTION, SOLUTION INTRAVENOUS at 14:30

## 2021-11-01 RX ADMIN — PREGABALIN 150 MG: 75 CAPSULE ORAL at 08:08

## 2021-11-01 RX ADMIN — IPRATROPIUM BROMIDE AND ALBUTEROL SULFATE 1 AMPULE: .5; 3 SOLUTION RESPIRATORY (INHALATION) at 15:57

## 2021-11-01 RX ADMIN — LEVOTHYROXINE SODIUM 112 MCG: 0.11 TABLET ORAL at 06:31

## 2021-11-01 RX ADMIN — OXYCODONE HYDROCHLORIDE 10 MG: 10 TABLET, FILM COATED, EXTENDED RELEASE ORAL at 21:21

## 2021-11-01 RX ADMIN — CEFEPIME HYDROCHLORIDE 2000 MG: 2 INJECTION, POWDER, FOR SOLUTION INTRAVENOUS at 14:30

## 2021-11-01 RX ADMIN — FUROSEMIDE 40 MG: 40 TABLET ORAL at 09:43

## 2021-11-01 RX ADMIN — CEFEPIME HYDROCHLORIDE 2000 MG: 2 INJECTION, POWDER, FOR SOLUTION INTRAVENOUS at 03:14

## 2021-11-01 RX ADMIN — OXYCODONE AND ACETAMINOPHEN 1 TABLET: 5; 325 TABLET ORAL at 18:58

## 2021-11-01 RX ADMIN — Medication 2 PUFF: at 19:56

## 2021-11-01 RX ADMIN — ENOXAPARIN SODIUM 40 MG: 100 INJECTION SUBCUTANEOUS at 08:08

## 2021-11-01 RX ADMIN — OXYCODONE AND ACETAMINOPHEN 1 TABLET: 5; 325 TABLET ORAL at 12:37

## 2021-11-01 RX ADMIN — SODIUM CHLORIDE 25 ML: 9 INJECTION, SOLUTION INTRAVENOUS at 11:05

## 2021-11-01 RX ADMIN — POLYETHYLENE GLYCOL 3350 17 G: 17 POWDER, FOR SOLUTION ORAL at 08:08

## 2021-11-01 RX ADMIN — PANTOPRAZOLE SODIUM 40 MG: 40 TABLET, DELAYED RELEASE ORAL at 06:31

## 2021-11-01 RX ADMIN — FLUOXETINE 40 MG: 20 CAPSULE ORAL at 08:08

## 2021-11-01 RX ADMIN — IPRATROPIUM BROMIDE AND ALBUTEROL SULFATE 1 AMPULE: .5; 3 SOLUTION RESPIRATORY (INHALATION) at 07:53

## 2021-11-01 RX ADMIN — METHYLPREDNISOLONE SODIUM SUCCINATE 40 MG: 40 INJECTION, POWDER, FOR SOLUTION INTRAMUSCULAR; INTRAVENOUS at 11:08

## 2021-11-01 RX ADMIN — Medication 2 PUFF: at 07:53

## 2021-11-01 RX ADMIN — Medication 10 ML: at 08:13

## 2021-11-01 RX ADMIN — VANCOMYCIN HYDROCHLORIDE 1000 MG: 1 INJECTION, POWDER, LYOPHILIZED, FOR SOLUTION INTRAVENOUS at 11:06

## 2021-11-01 RX ADMIN — Medication 10 ML: at 21:22

## 2021-11-01 ASSESSMENT — PAIN SCALES - GENERAL
PAINLEVEL_OUTOF10: 6
PAINLEVEL_OUTOF10: 0
PAINLEVEL_OUTOF10: 7
PAINLEVEL_OUTOF10: 8
PAINLEVEL_OUTOF10: 8
PAINLEVEL_OUTOF10: 7
PAINLEVEL_OUTOF10: 0
PAINLEVEL_OUTOF10: 0

## 2021-11-01 ASSESSMENT — PAIN DESCRIPTION - LOCATION
LOCATION: GENERALIZED
LOCATION: BUTTOCKS;BACK;KNEE

## 2021-11-01 ASSESSMENT — PAIN DESCRIPTION - DESCRIPTORS: DESCRIPTORS: SORE

## 2021-11-01 ASSESSMENT — PAIN DESCRIPTION - ONSET
ONSET: ON-GOING
ONSET: ON-GOING

## 2021-11-01 ASSESSMENT — PAIN DESCRIPTION - PAIN TYPE
TYPE: CHRONIC PAIN
TYPE: ACUTE PAIN

## 2021-11-01 ASSESSMENT — PAIN DESCRIPTION - FREQUENCY: FREQUENCY: CONTINUOUS

## 2021-11-01 NOTE — PLAN OF CARE
Problem: Falls - Risk of:  Goal: Will remain free from falls  Description: Will remain free from falls  Outcome: Ongoing  Goal: Absence of physical injury  Description: Absence of physical injury  Outcome: Ongoing     Problem: Skin Integrity:  Goal: Will show no infection signs and symptoms  Description: Will show no infection signs and symptoms  Outcome: Ongoing  Goal: Absence of new skin breakdown  Description: Absence of new skin breakdown  Outcome: Ongoing     Problem: Pain:  Goal: Pain level will decrease  Description: Pain level will decrease  Outcome: Ongoing  Goal: Control of acute pain  Description: Control of acute pain  Outcome: Ongoing  Note: Pain reported 7/10 this shift, PRN percocet given, will continue to monitor  Goal: Control of chronic pain  Description: Control of chronic pain  Outcome: Ongoing

## 2021-11-01 NOTE — CARE COORDINATION
Discharge Planning Assessment  Readmission risk score 20%  RN discharge planner met with patient/ (and family member) to discuss reason for admission, current living situation, and potential needs at the time of discharge    Demographics/Insurance verified Yes    Current type of dwellin level home, town house. Daughter in walking distance    Patient from ECF/SW confirmed with:n/a    Living arrangements:lives alone, in the process of being moved into daughter's residence    Level of function/Support: requires assistance    PCP: Mynor Gregory    Last Visit to PCP: unknown    DME: ramp access to home, electric and manual wheelchairs, rolling walker, Oxygen 2.5 liters, BiPAP, lift chair      Active with any community resources/agencies/skilled home care: [facility] nursing, PT OT    Medication compliance issues:not noted    Financial issues that could impact healthcare: dual Medicare/ Medicaid policy        Tentative discharge plan: pending needs--home health vs Skilled placement      Discussed and provided facilities of choice if transition to a skilled nursing facility is required at the time of discharge  Has been to Home at [facility] in the past, daughter reports that patient did not like it    Discussed with patient and/or family that on the day of discharge home tentative time of discharge will be between 10 AM and noon.     Transportation at the time of discharge: CM to facilitate    JERONIMO Gonzales, CCM, RN  [facility]  [phone]

## 2021-11-01 NOTE — PROGRESS NOTES
Shift assessment completed, see flowsheets. Medications administered, see MAR. SPO2 91% on 5L HFNC. Vital signs stable. Ngo catheter in place and draining. Fall precautions in place. Call light and bedside table within reach. Nonskid footwear on. Patient denies further needs at this time. Will continue to monitor.   Raj Molina RN

## 2021-11-01 NOTE — PROGRESS NOTES
Shift assessment completed, VSS, scheduled medications given per [facility], pt refused to be helped in turning in th bed, call light within reach, brakes locked, bed in lowest position.

## 2021-11-01 NOTE — PROGRESS NOTES
bruising, lymphadenopathy and petechiae  Musculoskeletal:negative for arthralgias, bone pain, muscle weakness, neck pain and stiff joints  Neurological: negative for dizziness, gait problems, headaches, seizures, speech problems, tremors and weakness  Behavioral/Psych: negative for anxiety, behavior problems, depression, fatigue and sleep disturbance  Endocrine: negative for diabetic symptoms including none, neuropathy, polyphagia, polyuria, polydipsia, vomiting and diarrhea and temperature intolerance  Allergic/Immunologic: negative for anaphylaxis, angioedema, hay fever and urticaria    Objective:     Patient Vitals for the past 8 hrs:   BP Temp Temp src Pulse Resp SpO2 Weight   11/01/21 1142 -- -- -- -- 18 95 % --   11/01/21 0804 120/61 96.3 °F (35.7 °C) Temporal 73 27 95 % --   11/01/21 0630 -- -- -- -- -- -- 237 lb 14.4 oz (107.9 kg)   11/01/21 0400 (!) 142/52 96.9 °F (36.1 °C) Temporal 70 20 -- --     I/O last 3 completed shifts: In: 545.4 [I.V.:173.6; IV Piggyback:371.8]  Out: 2600 [Urine:2600]  I/O this shift:   In: 56 [P.O.:480; I.V.:10]  Out: -     General Appearance: alert and oriented to person, place and time, well developed and well- nourished, in no acute distress  Skin: warm and dry, no rash or erythema  Head: normocephalic and atraumatic  Eyes: pupils equal, round, and reactive to light, extraocular eye movements intact, conjunctivae normal  ENT: external ear and ear canal normal bilaterally, nose without deformity, nasal mucosa and turbinates normal  Neck: supple and non-tender without mass, no cervical lymphadenopathy  Pulmonary/Chest: clear to auscultation bilaterally- no wheezes, rales or rhonchi, normal air movement, no respiratory distress  Cardiovascular: normal rate, regular rhythm,  no murmurs, rubs, distal pulses intact, no carotid bruits  Abdomen: soft, non-tender, non-distended, normal bowel sounds, no masses or organomegaly  Lymph Nodes: Cervical, supraclavicular normal  Extremities: no cyanosis, clubbing or edema  Musculoskeletal: normal range of motion, no joint swelling, deformity or tenderness  Neurologic: alert, no focal neurologic deficits    Data Review:  CBC:   Lab Results   Component Value Date    WBC 24.6 11/01/2021    RBC 2.85 11/01/2021     BMP:   Lab Results   Component Value Date    GLUCOSE 168 11/01/2021    CO2 33 11/01/2021    BUN 25 11/01/2021    CREATININE 1.0 11/01/2021    CALCIUM 9.1 11/01/2021     ABG:   Lab Results   Component Value Date    TMW4SHR 36.7 10/30/2021    BEART 10.0 10/30/2021    H6WCMUDG 97.2 10/30/2021    PHART 7.377 10/30/2021    BXP8WOP 62.6 10/30/2021    PO2ART 82.0 10/30/2021    RQF1TKS 86.6 10/30/2021       Radiology: All pertinent images / reports were reviewed as a part of this visit. Narrative   EXAMINATION:   ONE XRAY VIEW OF THE CHEST       10/30/2021 1:37 pm       COMPARISON:   09/10/2021       HISTORY:   ORDERING SYSTEM PROVIDED HISTORY: dyspnea   TECHNOLOGIST PROVIDED HISTORY:   Reason for exam:->dyspnea   Acuity: Unknown       FINDINGS:   Cardiomediastinal silhouette stable.  Right basilar opacity.  No   pneumothorax.  Mild pulmonary vascular congestion.           Impression   Right basilar opacity could represent right pleural fluid combined with   atelectasis or infection         Problem List:     RLL pneumonia  Acute on chronic hypoxic/hypercapnic respiratory failure  SHORTY/OHS  ? COPD  Assessment/Plan:     Recurrent hospitalizations for acute on chronic hypoxic/hypercapnic respiratory failure, PCO2 of 67 on admission. Altered mental status has improved with use of BiPAP/AVAPS. Patient does not appear to be compliant with BiPAP machine in the past.    Chest x-ray suggestive of right lower lobe infiltrate which in fact could represent atelectasis. Okay with antibiotics due to productive cough and elevated WBC, currently on cefepime and vancomycin. Patient has recent history of MSSA pneumonia in September, send sputum cultures if able to. Decrease Solu-Medrol to 40 mg daily. Check MRSA nares. Patient will benefit from BiPAP, but has been noncompliant before. Patient has poor understanding of her medical problems. Patient currently lives at home with home health care. Will benefit from social work-up and perhaps placement in a nursing facility. Will obtain PT/OT.     Katie Fong MD

## 2021-11-01 NOTE — PROGRESS NOTES
Clinical Pharmacy Note: Pharmacy to Dose Vancomcyin    Vancomycin Day: 3  Current Dosing Regimen: 1500 mg q24  Dosing Method: Bayesian Modeling    Random: 14    Recent Labs     10/31/21  0333 11/01/21  0518   BUN 17 25*       Recent Labs     10/31/21  0333 11/01/21  0518   CREATININE 0.7 1.0       Recent Labs     10/31/21  0333 11/01/21  0518   WBC 25.4* 24.6*         Intake/Output Summary (Last 24 hours) at 11/1/2021 0642  Last data filed at 11/1/2021 0636  Gross per 24 hour   Intake 545.43 ml   Output 2600 ml   Net -2054.57 ml         Ht Readings from Last 1 Encounters:   10/30/21 5' 1\" (1.549 m)        Wt Readings from Last 1 Encounters:   11/01/21 237 lb 14.4 oz (107.9 kg)         Body mass index is 44.95 kg/m². Estimated Creatinine Clearance: 47 mL/min (based on SCr of 1 mg/dL). Assessment/Plan:  There was a slight increase in serum creatinine from yesterday to today - 0.7>>1.0. Due to increase, will cautiously dose based on levels for now until we can confirm renal is stable. Will give vancomycin 1000 mg x1 today. A vancomycin random level has been ordered on 11/2 at 0600 for follow-up. Changes in regimen will be determined based on culture results, renal function, and clinical response. Pharmacy will continue to monitor and adjust regimen as necessary.     Thank you for the consult,    Nito Diez, PharmD, [facility]

## 2021-11-02 ENCOUNTER — APPOINTMENT (OUTPATIENT)
Dept: CT IMAGING | Age: 86
DRG: 871 | End: 2021-11-02
Payer: COMMERCIAL

## 2021-11-02 ENCOUNTER — APPOINTMENT (OUTPATIENT)
Dept: GENERAL RADIOLOGY | Age: 86
DRG: 871 | End: 2021-11-02
Payer: COMMERCIAL

## 2021-11-02 LAB
ANION GAP SERPL CALCULATED.3IONS-SCNC: 8 MMOL/L (ref 3–16)
BUN BLDV-MCNC: 29 MG/DL (ref 7–20)
CALCIUM SERPL-MCNC: 8.7 MG/DL (ref 8.3–10.6)
CHLORIDE BLD-SCNC: 101 MMOL/L (ref 99–110)
CO2: 34 MMOL/L (ref 21–32)
CREAT SERPL-MCNC: 0.9 MG/DL (ref 0.6–1.2)
GFR AFRICAN AMERICAN: >60
GFR NON-AFRICAN AMERICAN: 60
GLUCOSE BLD-MCNC: 142 MG/DL (ref 70–99)
HCT VFR BLD CALC: 24.3 % (ref 36–48)
HEMOGLOBIN: 7.8 G/DL (ref 12–16)
MCH RBC QN AUTO: 27.2 PG (ref 26–34)
MCHC RBC AUTO-ENTMCNC: 31.9 G/DL (ref 31–36)
MCV RBC AUTO: 85.3 FL (ref 80–100)
PDW BLD-RTO: 16.6 % (ref 12.4–15.4)
PLATELET # BLD: 153 K/UL (ref 135–450)
PMV BLD AUTO: 9.7 FL (ref 5–10.5)
POTASSIUM SERPL-SCNC: 3.6 MMOL/L (ref 3.5–5.1)
RBC # BLD: 2.85 M/UL (ref 4–5.2)
SODIUM BLD-SCNC: 143 MMOL/L (ref 136–145)
VANCOMYCIN RANDOM: 15.6 UG/ML
WBC # BLD: 18 K/UL (ref 4–11)

## 2021-11-02 PROCEDURE — 2580000003 HC RX 258: Performed by: FAMILY MEDICINE

## 2021-11-02 PROCEDURE — 85027 COMPLETE CBC AUTOMATED: CPT

## 2021-11-02 PROCEDURE — 99233 SBSQ HOSP IP/OBS HIGH 50: CPT | Performed by: INTERNAL MEDICINE

## 2021-11-02 PROCEDURE — 36415 COLL VENOUS BLD VENIPUNCTURE: CPT

## 2021-11-02 PROCEDURE — 97530 THERAPEUTIC ACTIVITIES: CPT

## 2021-11-02 PROCEDURE — 97162 PT EVAL MOD COMPLEX 30 MIN: CPT

## 2021-11-02 PROCEDURE — 6360000002 HC RX W HCPCS: Performed by: INTERNAL MEDICINE

## 2021-11-02 PROCEDURE — 2580000003 HC RX 258: Performed by: EMERGENCY MEDICINE

## 2021-11-02 PROCEDURE — 6370000000 HC RX 637 (ALT 250 FOR IP): Performed by: FAMILY MEDICINE

## 2021-11-02 PROCEDURE — 2060000000 HC ICU INTERMEDIATE R&B

## 2021-11-02 PROCEDURE — 2580000003 HC RX 258: Performed by: HOSPITALIST

## 2021-11-02 PROCEDURE — 80202 ASSAY OF VANCOMYCIN: CPT

## 2021-11-02 PROCEDURE — 71045 X-RAY EXAM CHEST 1 VIEW: CPT

## 2021-11-02 PROCEDURE — 94761 N-INVAS EAR/PLS OXIMETRY MLT: CPT

## 2021-11-02 PROCEDURE — 94640 AIRWAY INHALATION TREATMENT: CPT

## 2021-11-02 PROCEDURE — 6370000000 HC RX 637 (ALT 250 FOR IP): Performed by: INTERNAL MEDICINE

## 2021-11-02 PROCEDURE — 2700000000 HC OXYGEN THERAPY PER DAY

## 2021-11-02 PROCEDURE — 6360000002 HC RX W HCPCS: Performed by: HOSPITALIST

## 2021-11-02 PROCEDURE — 97165 OT EVAL LOW COMPLEX 30 MIN: CPT

## 2021-11-02 PROCEDURE — 71250 CT THORAX DX C-: CPT

## 2021-11-02 PROCEDURE — 6360000002 HC RX W HCPCS: Performed by: FAMILY MEDICINE

## 2021-11-02 PROCEDURE — 80048 BASIC METABOLIC PNL TOTAL CA: CPT

## 2021-11-02 RX ADMIN — SODIUM CHLORIDE 25 ML: 9 INJECTION, SOLUTION INTRAVENOUS at 02:27

## 2021-11-02 RX ADMIN — PANTOPRAZOLE SODIUM 40 MG: 40 TABLET, DELAYED RELEASE ORAL at 07:48

## 2021-11-02 RX ADMIN — PREGABALIN 150 MG: 75 CAPSULE ORAL at 21:37

## 2021-11-02 RX ADMIN — CEFEPIME HYDROCHLORIDE 2000 MG: 2 INJECTION, POWDER, FOR SOLUTION INTRAVENOUS at 16:32

## 2021-11-02 RX ADMIN — OXYCODONE HYDROCHLORIDE 10 MG: 10 TABLET, FILM COATED, EXTENDED RELEASE ORAL at 09:43

## 2021-11-02 RX ADMIN — FUROSEMIDE 40 MG: 40 TABLET ORAL at 09:43

## 2021-11-02 RX ADMIN — IPRATROPIUM BROMIDE AND ALBUTEROL SULFATE 1 AMPULE: .5; 3 SOLUTION RESPIRATORY (INHALATION) at 09:20

## 2021-11-02 RX ADMIN — VANCOMYCIN HYDROCHLORIDE 1000 MG: 1 INJECTION, POWDER, LYOPHILIZED, FOR SOLUTION INTRAVENOUS at 10:01

## 2021-11-02 RX ADMIN — OXYCODONE AND ACETAMINOPHEN 1 TABLET: 5; 325 TABLET ORAL at 16:31

## 2021-11-02 RX ADMIN — Medication 10 ML: at 21:31

## 2021-11-02 RX ADMIN — PREGABALIN 150 MG: 75 CAPSULE ORAL at 09:43

## 2021-11-02 RX ADMIN — IPRATROPIUM BROMIDE AND ALBUTEROL SULFATE 1 AMPULE: .5; 3 SOLUTION RESPIRATORY (INHALATION) at 16:08

## 2021-11-02 RX ADMIN — Medication 10 ML: at 09:50

## 2021-11-02 RX ADMIN — OXYCODONE HYDROCHLORIDE 10 MG: 10 TABLET, FILM COATED, EXTENDED RELEASE ORAL at 21:37

## 2021-11-02 RX ADMIN — METHYLPREDNISOLONE SODIUM SUCCINATE 40 MG: 40 INJECTION, POWDER, FOR SOLUTION INTRAMUSCULAR; INTRAVENOUS at 09:44

## 2021-11-02 RX ADMIN — OXYCODONE AND ACETAMINOPHEN 1 TABLET: 5; 325 TABLET ORAL at 04:24

## 2021-11-02 RX ADMIN — FLUOXETINE 40 MG: 20 CAPSULE ORAL at 09:42

## 2021-11-02 RX ADMIN — IPRATROPIUM BROMIDE AND ALBUTEROL SULFATE 1 AMPULE: .5; 3 SOLUTION RESPIRATORY (INHALATION) at 19:11

## 2021-11-02 RX ADMIN — POLYETHYLENE GLYCOL 3350 17 G: 17 POWDER, FOR SOLUTION ORAL at 09:42

## 2021-11-02 RX ADMIN — CEFEPIME HYDROCHLORIDE 2000 MG: 2 INJECTION, POWDER, FOR SOLUTION INTRAVENOUS at 02:28

## 2021-11-02 RX ADMIN — ENOXAPARIN SODIUM 40 MG: 100 INJECTION SUBCUTANEOUS at 09:43

## 2021-11-02 RX ADMIN — IPRATROPIUM BROMIDE AND ALBUTEROL SULFATE 1 AMPULE: .5; 3 SOLUTION RESPIRATORY (INHALATION) at 11:58

## 2021-11-02 RX ADMIN — Medication 2 PUFF: at 19:10

## 2021-11-02 RX ADMIN — LEVOTHYROXINE SODIUM 112 MCG: 0.11 TABLET ORAL at 07:48

## 2021-11-02 RX ADMIN — Medication 2 PUFF: at 09:20

## 2021-11-02 ASSESSMENT — PAIN SCALES - GENERAL
PAINLEVEL_OUTOF10: 4
PAINLEVEL_OUTOF10: 7
PAINLEVEL_OUTOF10: 8
PAINLEVEL_OUTOF10: 8
PAINLEVEL_OUTOF10: 7

## 2021-11-02 ASSESSMENT — PAIN DESCRIPTION - LOCATION
LOCATION: LEG;SACRUM
LOCATION: GENERALIZED
LOCATION: KNEE
LOCATION: GENERALIZED
LOCATION: GENERALIZED
LOCATION: BUTTOCKS
LOCATION: GENERALIZED

## 2021-11-02 ASSESSMENT — PAIN DESCRIPTION - PAIN TYPE
TYPE: CHRONIC PAIN
TYPE: CHRONIC PAIN
TYPE: CHRONIC PAIN;ACUTE PAIN
TYPE: CHRONIC PAIN
TYPE: ACUTE PAIN
TYPE: CHRONIC PAIN

## 2021-11-02 NOTE — PROGRESS NOTES
Physical Therapy    Facility/Department: [address]  Initial Assessment    NAME: Rosette Holter  : 1935  MRN: [id number]    Date of Service: 2021    Discharge Recommendations: Rosette Holter scored a 9/24 on the AM-PAC short mobility form. Current research shows that an AM-PAC score of 17 or less is typically not associated with a discharge to the patient's home setting. Based on the patient's AM-PAC score and their current functional mobility deficits, it is recommended that the patient have 3-5 sessions per week of Physical Therapy at d/c to increase the patient's independence. Please see assessment section for further patient specific details. If patient discharges prior to next session this note will serve as a discharge summary. Please see below for the latest assessment towards goals. PT Equipment Recommendations  Equipment Needed: No    Assessment   Body structures, Functions, Activity limitations: Decreased functional mobility ; Decreased balance;Decreased ADL status; Decreased endurance; Increased pain;Decreased strength;Decreased posture  Assessment: pt was eager to get up and moving upon PT's arrival. Despite the significant amount of assistance required with all activities, pt is motivated to improve her functional status. Pt requested to be moved to a chair later in the day after her CAT scan so that she can reduce the stiffness she feels in her legs. Due to patient's high motivation level and the listed impairments, pt would benefit from continued skilled PT and needs 24 hour assistance. Prognosis: Good  Decision Making: Medium Complexity  PT Education: Goals;Orientation; Functional Mobility Training;PT Role;Transfer Training;Plan of Care;Equipment;General Safety  Patient Education: pt verbalized understanding  Barriers to Learning: none  REQUIRES PT FOLLOW UP: Yes  Activity Tolerance  Activity Tolerance: Patient limited by endurance; Patient limited by pain Patient Diagnosis(es): The primary encounter diagnosis was Septicemia ([facility] [address] Diagnoses of Dyspnea, unspecified type and Pneumonia due to infectious organism, unspecified laterality, unspecified part of lung were also pertinent to this visit. has a past medical history of Arthritis, COPD (chronic obstructive pulmonary disease) ([facility] [address] Hemorrhoids, Hernia of unspecified site of abdominal cavity without mention of obstruction or gangrene, Incontinence, Knee pain, bilateral, Spinal stenosis, and Spinal stenosis. has a past surgical history that includes Cholecystectomy and partial hysterectomy (cervix not removed). Restrictions  Restrictions/Precautions  Restrictions/Precautions: Fall Risk (high fall risk)  Position Activity Restriction  Other position/activity restrictions: pt admitted with septicemia due to pneumonia. Pt has sores on sacral area. Vision/Hearing  Vision: Impaired  Hearing: Exceptions to [facility]  Hearing Exceptions: Hard of hearing/hearing concerns     Subjective  General  Chart Reviewed: Yes  Patient assessed for rehabilitation services?: Yes  Family / Caregiver Present: No  Diagnosis: septicemia  Follows Commands: Within Functional Limits  General Comment  Comments: pt supine in bed upon arrival and agreeable to PT  Subjective  Subjective: pt requested to move to the chair to sit for a while, but she will be receiving a CAT scan and needed to be left in bed. May return later to assist pt to chair.   Pain Screening  Patient Currently in Pain: Yes  Pain Assessment  Pain Level: 7  Pain Location: Knee  Vital Signs  Patient Currently in Pain: Yes  Pre Treatment Pain Screening  Intervention List: Patient able to continue with treatment;Nurse/physician notified  Comments / Details: pt reported significant pain in sacrum toward the end of the session, nursing came in to address the wounds    Orientation  Orientation  Overall Orientation Status: Impaired  Orientation Level: Oriented to place;Oriented to WFL  Bed mobility  Bridging: Stand by assistance  Rolling to Left: Moderate assistance  Rolling to Right: Moderate assistance  Supine to Sit: 2 Person assistance;Dependent/Total (max x2)  Sit to Supine: 2 Person assistance;Dependent/Total (max x2)  Scootin Person assistance;Dependent/Total (max x2)  Transfers  Sit to Stand: Moderate Assistance  Stand to sit: Moderate Assistance  Ambulation  Ambulation?: No  Stairs/Curb  Stairs?: No     Balance  Posture: Fair (rounded shoulders, kyphotic)  Sitting - Static: Fair (SBA)  Sitting - Dynamic: Fair (SBA)  Standing - Static: Poor (Max A to maintain standing)  Standing - Dynamic: Poor (unable)  Comments: pt was able to tolerate standing with max support for 1 minute 2x before requiring a seated break      PT returned in the afternoon to assist the pt to a chair to eat lunch. Pt had returned from her test.  Pt stated her buttocks continued to have pain but she was eager to get OOB. Supine to sit with mod A of 2. Stand pivot transfer with max A plus min A (max A for sit to stand but additional min A needed to complete the turn for the pivot). Pt was left in chair with alarm on, needs in reach. Plan   Plan  Times per week: 3-5  Times per day: Daily  Current Treatment Recommendations: Strengthening, Transfer Training, Endurance Training, ADL/Self-care Training, Pain Management, Balance Training, Wheelchair Mobility Training, Functional Mobility Training, Safety Education & Training, Positioning  Safety Devices  Type of devices:  All fall risk precautions in place, Bed alarm in place, Call light within reach, Patient at risk for falls, Left in bed, Nurse notified, Gait belt  Restraints  Initially in place: No             AM-PAC Score  AM-PAC Inpatient Mobility Raw Score : 9 (21)  AM-PAC Inpatient T-Scale Score : 30.55 (21)  Mobility Inpatient CMS 0-100% Score: 81.38 (21)  Mobility Inpatient CMS G-Code Modifier : CM (21) Goals  Short term goals  Time Frame for Short term goals: discharge  Short term goal 1: pt will perform all bed mobility with mod A  Short term goal 2: pt will perform STS/stand to sit transfers with min A  Short term goal 3: pt will perform SPT to/from chair with max A  Patient Goals   Patient goals : return home safely       Therapy Time   Individual Concurrent Group Co-treatment   Time In 1105         Time Out 1143         Minutes 38               Second Session Therapy Time:   Individual Concurrent Group Co-treatment   Time In 1254         Time Out 1309         Minutes 15           Timed Code Treatment Minutes:  38    Total Treatment Minutes:    [facility], [facility]  Janis Tovar, GONZALO   I agree with the above note. PT directly observed the SPT with the patient.   Leigha Pyle DPT [id number]

## 2021-11-02 NOTE — PROGRESS NOTES
Clinical Pharmacy Note: Pharmacy to Dose Vancomcyin    Dosing Method: Bayesian    Random level  Results for Karishma Mckeon (MRN [id number]) as of 11/2/2021 08:17   Ref. Range 11/2/2021 04:11   Vancomycin Rm Latest Units: ug/mL 15.6         Recent Labs     11/01/21  0518 11/02/21 0411   BUN 25* 29*       Recent Labs     11/01/21  0518 11/02/21  0411   CREATININE 1.0 0.9       Recent Labs     11/01/21  0518 11/02/21  0411   WBC 24.6* 18.0*         Intake/Output Summary (Last 24 hours) at 11/2/2021 0824  Last data filed at 11/2/2021 0300  Gross per 24 hour   Intake 898.07 ml   Output 2125 ml   Net -1226.93 ml         Ht Readings from Last 1 Encounters:   10/30/21 5' 1\" (1.549 m)        Wt Readings from Last 1 Encounters:   11/01/21 237 lb 14.4 oz (107.9 kg)         Body mass index is 44.95 kg/m². Estimated Creatinine Clearance: 53 mL/min (based on SCr of 0.9 mg/dL). Assessment:  Loading dose: N/A  Regimen: 1000 mg IV every 24 hours. Start time: 11:06 on 11/02/2021  Exposure target: AUC24 (range)400-600 mg/L.hr   AUC24,ss: 428 mg/L.hr  Probability of AUC24 > 400: 70 %  Ctrough,ss: 14.4 mg/L  Probability of Ctrough,ss > 20: 2 %  Probability of nephrotoxicity (Lodise SARA 2009): 10 %  for follow-up. Plan:  Change to Vancomycin 1000mg IVPB every 24 hours. Changes in regimen will be determined based on culture results, renal function, and clinical response. Pharmacy will continue to monitor and adjust regimen as necessary. Thank you for allowing pharmacy to participate in the care of this patient.   Pablo Fletcher, AfsanehD

## 2021-11-02 NOTE — PROGRESS NOTES
Hospital Medicine Progress Note    Patient:  Catrina Maier  YOB: 1935  MRN: [id number]   PCP: Judy George         Acct: [de-identified]  Unit/Bed: [id number]-5537/1768-64    Date of Admission: 10/30/2021    Assessment/plan:   1. Acute on chronic hypoxic and hypercapneic respiratory failure (on 2.5L at home) secondary to sepsis (POA)  with community acquired pneumonia  with COPD exacerbation. COVID-19 negative. Sats dropped to 70s on 4LO2. CXR showed right sided basilar opacities concerning for pneumonia- possibly a small loculated effusion. CT chest in am to verify whether a loculated effusion. COVID-19 negative. On Cefepime and vancomycin- consider transitioning to Ceftriaxone and azithromycin or Levaquin after 48 hours Possibly fluid overloaded as well, but clinical picture with leukocytosis, elevated temperature to 100.3F, tachycardia, tachypnea, elevated procalcitonin more representative of infection. Blood cultures negative to date. IV methylprednisolone. On furosemide. Pulmonology following and agree with continuation of Cefepime and vancomycin for now. Getting breathing treatments  2. Steroid leukocytosis: continue to wean steroids. Hemoglobin A1C unremarkable at 5.5  3. Normocytic anemia: Hgb trending downwards. Hgb 7.8. May consider 1 unit of PRBCs with current respiratory compromise. Transfuse for Hgb<7.5. Iron studies show possible anemia of chronic disease. 4. Elevated D-dimer: no further work up for now. Current alternative diagnosis of pneumonia. Continue to monitor. 5. Morbid obesity: BMI 47.20  6. Advanced dementia?: continue to monitor  7. Chronic back pain: on chronic narcotics.  Bowel regimen      Anticipated Discharge: in  3 days    Code Status: Full Code    Electronically signed by Maxine Boudreaux MD on 11/1/2021 at 8:06 PM      Chief complaint: Shortness of breath  The patient is a 82 y.o.  y.o. female  with a  h/o COPD, chronic hypoxic respiratory failure on 2.5 L O2, essential HTN, and dementia  who  was brought in by EMS secondary to worsening hypoxia. She presented with a 2-day history of O2 desaturation to 70s on 4LO2. WBC count was elevated up to 15,000. Chest xray showed right sided basilar opacities concerning for pneumonia. The patient was started on Cefepime and vancomycin. COVID-19 was negative. She required up to 8L  high flow oxygen. Subjective:   Bipap recommended overnight, however patient did not tolerate it. O2 requirement down to 5L this am.    Objective:    Medications:  Reviewed    Infusion Medications    sodium chloride 25 mL (11/01/21 1430)     Scheduled Medications    vancomycin (VANCOCIN) intermittent dosing (placeholder)   Other RX Placeholder    [START ON 11/2/2021] methylPREDNISolone  40 mg IntraVENous Daily    oxyCODONE  10 mg Oral 2 times per day    polyethylene glycol  17 g Oral Daily    sodium chloride flush  5-40 mL IntraVENous 2 times per day    cefepime  2,000 mg IntraVENous Q12H    ipratropium-albuterol  1 ampule Inhalation Q4H WA    furosemide  40 mg Oral Daily    levothyroxine  112 mcg Oral Daily    pregabalin  150 mg Oral BID    FLUoxetine  40 mg Oral Daily    pantoprazole  40 mg Oral QAM AC    enoxaparin  40 mg SubCUTAneous Daily    budesonide-formoterol  2 puff Inhalation BID    influenza virus vaccine  0.5 mL IntraMUSCular Prior to discharge     PRN Meds: oxyCODONE-acetaminophen, sodium chloride flush, sodium chloride, ondansetron **OR** ondansetron, polyethylene glycol, acetaminophen **OR** acetaminophen    Ins and outs:      Intake/Output Summary (Last 24 hours) at 11/1/2021 2006  Last data filed at 11/1/2021 1644  Gross per 24 hour   Intake 1035.43 ml   Output 3225 ml   Net -2189.57 ml       Physical examination:   BP (!) 104/51   Pulse 80   Temp 98.7 °F (37.1 °C) (Oral)   Resp 18   Ht 5' 1\" (1.549 m)   Wt 237 lb 14.4 oz (107.9 kg)   SpO2 94%   BMI 44.95 kg/m²     General appearance:  No apparent distress. Appears comfortable. Well nourished. Morbidly obese  HEENT: Atraumatic. Pupils equally round. Extraocular motion intact. Conjunctivae clear. Nose symmetric without evidence of discharge. Oral mucosa moist w/o erythema or exudate. Neck supple. No JVD. No carotid bruits. Trachea midline. Cardiovascular:  RRR w/ normal S1/S2. No murmurs, rubs or gallops. Respiratory: Clear to auscultation, except for decreased breath sounds at the bilateral lung bases  Gastrointestinal: Abdomen soft, non-tender, non-distended with normal bowel sounds. Musculoskeletal:  Full ROM without deformity. Neurologic:  No speech or focal sensory/motor deficits. Cranial nerves grossly intact. Lymphatic: Deferred  Psychiatric:  Alert. Appropriate affect, not agitated  Vascular: Dorsalis pedis and radial pulses palpable. Mild bilateral LE edema Capillary refill<3 seconds  Genitourinary: Deferred. Skin: No visible rashes or lesions. Warm, dry. Labs:     Recent Labs     10/30/21  1322 10/31/21  0333 11/01/21  0518   WBC 15.3* 25.4* 24.6*   HGB 9.1* 7.9* 7.8*   HCT 30.0* 25.1* 24.5*    146 162     Recent Labs     10/30/21  1322 10/31/21  0333 11/01/21  0518    142 144   K 4.1 3.7 3.9    102 103   CO2 33* 33* 33*   BUN 19 17 25*   CREATININE 0.8 0.7 1.0   CALCIUM 9.6 9.5 9.1     Recent Labs     10/30/21  1322   AST 27   ALT 23   BILITOT 0.3   ALKPHOS 170*     No results for input(s): INR in the last 72 hours.   Recent Labs     10/30/21  1322   TROPONINI <0.01       Urinalysis:      Lab Results   Component Value Date    NITRU Negative 10/30/2021    WBCUA 2 10/30/2021    BACTERIA 4+ 06/20/2019    RBCUA 2 10/30/2021    BLOODU Negative 10/30/2021    SPECGRAV 1.017 10/30/2021    GLUCOSEU Negative 10/30/2021    GLUCOSEU NEGATIVE 03/21/2011       Diagnostic imaging/procedures:    XR CHEST PORTABLE    Result Date: 10/30/2021  EXAMINATION: ONE XRAY VIEW OF THE CHEST 10/30/2021 1:37 pm COMPARISON: 09/10/2021 HISTORY: ORDERING SYSTEM PROVIDED HISTORY: dyspnea TECHNOLOGIST PROVIDED HISTORY: Reason for exam:->dyspnea Acuity: Unknown FINDINGS: Cardiomediastinal silhouette stable. Right basilar opacity. No pneumothorax. Mild pulmonary vascular congestion. Right basilar opacity could represent right pleural fluid combined with atelectasis or infection       DVT prophylaxis: [x] Lovenox                                 [x] SCDs                                 [] SQ Heparin                                 [] Encourage ambulation           [] Already on Anticoagulation     Disposition:     [] Home                             [x] Home with home care?                              [] TCU       [] Rehab       [] Psych       [] SNF       [] [place]       [] Other-

## 2021-11-02 NOTE — PROGRESS NOTES
[facility] PROGRESS NOTE    11/2/2021 2:39 PM        Name: Genia Kaufman . Admitted: 10/30/2021  Primary Care Provider: Guy Oleary (Tel: None)                        Subjective:  . No acute events overnight. Resting well. Pain control. Diet ok. Labs reviewed  Shortness of breath is better still bedbound. Patient still needing 4 L nasal cannula. Oxygen saturation is improving.     Reviewed interval ancillary notes    Current Medications  methylPREDNISolone sodium (SOLU-MEDROL) injection 40 mg, Daily  oxyCODONE-acetaminophen (PERCOCET) 5-325 MG per tablet 1 tablet, Q6H PRN  oxyCODONE (OXYCONTIN) extended release tablet 10 mg, 2 times per day  polyethylene glycol (GLYCOLAX) packet 17 g, Daily  sodium chloride flush 0.9 % injection 5-40 mL, 2 times per day  sodium chloride flush 0.9 % injection 5-40 mL, PRN  0.9 % sodium chloride infusion, PRN  cefepime (MAXIPIME) 2000 mg IVPB minibag, Q12H  ipratropium-albuterol (DUONEB) nebulizer solution 1 ampule, Q4H WA  furosemide (LASIX) tablet 40 mg, Daily  levothyroxine (SYNTHROID) tablet 112 mcg, Daily  pregabalin (LYRICA) capsule 150 mg, BID  FLUoxetine (PROZAC) capsule 40 mg, Daily  pantoprazole (PROTONIX) tablet 40 mg, QAM AC  enoxaparin (LOVENOX) injection 40 mg, Daily  ondansetron (ZOFRAN-ODT) disintegrating tablet 4 mg, Q8H PRN   Or  ondansetron (ZOFRAN) injection 4 mg, Q6H PRN  polyethylene glycol (GLYCOLAX) packet 17 g, Daily PRN  acetaminophen (TYLENOL) tablet 650 mg, Q6H PRN   Or  acetaminophen (TYLENOL) suppository 650 mg, Q6H PRN  budesonide-formoterol (SYMBICORT) 160-4.5 MCG/ACT inhaler 2 puff, BID  influenza quadrivalent split vaccine (FLUZONE;FLUARIX;FLULAVAL;AFLURIA) injection 0.5 mL, Prior to discharge        Objective:  /63   Pulse 61   Temp 97.8 °F (36.6 °C) (Oral)   Resp 18   Ht 5' 1\" (1.549 m) Wt 237 lb 14.4 oz (107.9 kg)   SpO2 95%   BMI 44.95 kg/m²     Intake/Output Summary (Last 24 hours) at 11/2/2021 1439  Last data filed at 11/2/2021 0300  Gross per 24 hour   Intake 418.07 ml   Output 1450 ml   Net -1031.93 ml      Wt Readings from Last 3 Encounters:   11/01/21 237 lb 14.4 oz (107.9 kg)   10/27/21 245 lb (111.1 kg)   09/13/21 245 lb 9.6 oz (111.4 kg)       General appearance:  Appears comfortable  Eyes: Sclera clear. Pupils equal.  ENT: Moist oral mucosa. Trachea midline, no adenopathy. Cardiovascular: Regular rhythm, normal S1, S2. No murmur. No edema in lower extremities  Respiratory: Not using accessory muscles. Good inspiratory effort. Clear to auscultation bilaterally, no wheeze or crackles. GI: Abdomen soft, no tenderness, not distended, normal bowel sounds  Musculoskeletal: No cyanosis in digits, neck supple  Neurology: CN 2-12 grossly intact. No speech or motor deficits  Psych: Normal affect. Alert and oriented in time, place and person  Skin: Warm, dry, normal turgor    Labs and Tests:  CBC:   Recent Labs     10/31/21  0333 11/01/21  0518 11/02/21  0411   WBC 25.4* 24.6* 18.0*   HGB 7.9* 7.8* 7.8*    162 153     BMP:    Recent Labs     10/31/21  0333 11/01/21 0518 11/02/21  0411    144 143   K 3.7 3.9 3.6    103 101   CO2 33* 33* 34*   BUN 17 25* 29*   CREATININE 0.7 1.0 0.9   GLUCOSE 163* 168* 142*     Hepatic: No results for input(s): AST, ALT, ALB, BILITOT, ALKPHOS in the last 72 hours. Discussed care with family and patient             Spent 30  minutes with patient and family at bedside and on unit reviewing medical records and labs, spent greater than 50% time counseling patient and family on diagnosis and plan   Problem List  Active Problems:    Acute on chronic respiratory failure ([address]  Resolved Problems:    * No resolved hospital problems. *       Assessment & Plan:   1.  Acute hypoxic respiratory failure  -Likely multifactorial in setting of underlying COPD and also pneumonia  -Patient likely needs BiPAP but continues to decline.  -We will de-escalate antibiotics. -Wean oxygen down to 4 to 5 L.  -Wean down steroids to 40 IV and then hopefully transition to p.o. Bacterial pneumonia  COPD exacerbation  Morbid obesity    If continues to improve home tomorrow    Diet: ADULT ORAL NUTRITION SUPPLEMENT; Dinner; Low Calorie/High Protein Oral Supplement  ADULT DIET;  Dysphagia - Soft and Bite Sized  Code:Full Code  DVT PPX lovenox       Vane Medina MD   11/2/2021 2:39 PM

## 2021-11-02 NOTE — PLAN OF CARE
Pt remains able to express needs & uses call light appropriately. Pt states she has chronic pain in back, knees, wrists. Pt is able to maintain oxygen saturation on 4.5 L oxygen via nasal cannula. Pt was transferred to specialty bed and expressed that she was already feeling better. Pt benefits from frequent position changes; pt encourage to shift weight and move from side to side. Call light within reach, bed alarm on.       Problem: Falls - Risk of:  Goal: Will remain free from falls  Description: Will remain free from falls  11/1/2021 2342 by Angela Curran RN  Outcome: Ongoing     Problem: Falls - Risk of:  Goal: Absence of physical injury  Description: Absence of physical injury  11/1/2021 2342 by Angela Curran RN  Outcome: Ongoing     Problem: Skin Integrity:  Goal: Will show no infection signs and symptoms  Description: Will show no infection signs and symptoms  11/1/2021 2342 by Angela Curran RN  Outcome: Ongoing     Problem: Skin Integrity:  Goal: Absence of new skin breakdown  Description: Absence of new skin breakdown  11/1/2021 2342 by Angela Curran RN  Outcome: Ongoing     Problem: Pain:  Goal: Pain level will decrease  Description: Pain level will decrease  11/1/2021 2342 by Angela Curran RN  Outcome: Ongoing     Problem: Pain:  Goal: Control of acute pain  Description: Control of acute pain  11/1/2021 2342 by Angela Curran RN  Outcome: Ongoing     Problem: Pain:  Goal: Control of chronic pain  Description: Control of chronic pain  11/1/2021 2342 by Angela Curran RN  Outcome: Ongoing

## 2021-11-02 NOTE — PROGRESS NOTES
Occupational Therapy   Occupational Therapy Initial Assessment  Date: 2021   Patient Name: Haseeb Bolden  MRN: [id number]     : 1935    Date of Service: 2021    Discharge Recommendations:  Haseeb Bolden scored a 14/24 on the AM-PAC ADL Inpatient form. Current research shows that an AM-PAC score of 17 or less is typically not associated with a discharge to the patient's home setting. Based on the patient's AM-PAC score and their current ADL deficits, it is recommended that the patient have 3-5 sessions per week of Occupational Therapy at d/c to increase the patient's independence. Please see assessment section for further patient specific details. If patient discharges prior to next session this note will serve as a discharge summary. Please see below for the latest assessment towards goals. OT Equipment Recommendations  Equipment Needed: No    Assessment   Performance deficits / Impairments: Decreased functional mobility ; Decreased ADL status; Decreased strength  Assessment: pt not at baseline level of functioning, decreased strength impacts pt's ability to perform a stand pivot transfer independently. pt present with decreased ability to complete ADLs. pt not at baseline and would benefit from ongoing skilled OT services in order to return to PLOF  Treatment Diagnosis: PNA  Prognosis: Fair  Decision Making: Low Complexity  History: pt lives alone, reports her daughter is moving in with her. assist for ADL/IADL tasks. independent with SPT to w/c  Assistance / Modification: assist of 1-2  Patient Education: SUNITA reyes, discharge-pt independent  Barriers to Learning: hearing  REQUIRES OT FOLLOW UP: Yes  Activity Tolerance  Activity Tolerance: Patient Tolerated treatment well  Safety Devices  Safety Devices in place: Yes  Type of devices: All fall risk precautions in place;Nurse notified;Call light within reach; Patient at risk for falls; Left in bed;Bed alarm in place           Patient Diagnosis(es): The primary encounter diagnosis was Septicemia ([facility], [place] [address] Diagnoses of Dyspnea, unspecified type and Pneumonia due to infectious organism, unspecified laterality, unspecified part of lung were also pertinent to this visit. has a past medical history of Arthritis, COPD (chronic obstructive pulmonary disease) ([facility] [address] Hemorrhoids, Hernia of unspecified site of abdominal cavity without mention of obstruction or gangrene, Incontinence, Knee pain, bilateral, Spinal stenosis, and Spinal stenosis. has a past surgical history that includes Cholecystectomy and partial hysterectomy (cervix not removed). Treatment Diagnosis: PNA      Restrictions  Restrictions/Precautions  Restrictions/Precautions: Fall Risk (high fall risk)  Position Activity Restriction  Other position/activity restrictions: pt admitted with septicemia due to pneumonia. pt with sacral wound    Subjective   General  Chart Reviewed: Yes  Patient assessed for rehabilitation services?: Yes  Family / Caregiver Present: No  Diagnosis: septicemia from PNA  Subjective  Subjective: pt supine upon arrival, agreeable to OT eval, wishing to get out of bed. reporting sacral pain from wound  Patient Currently in Pain: Yes  Pain Assessment  Pain Assessment: 0-10  Pain Level: 7  Pain Type: Chronic pain;Acute pain  Pain Location: Leg;Sacrum  Pre Treatment Pain Screening  Intervention List: Nurse/Physician notified  Vital Signs  Patient Currently in Pain: Yes  Height and Weight  Height: 5' 1\" (154.9 cm)  Social/Functional History  Social/Functional History  Lives With: Alone (pt lives alone, reports her daughter is moving into her home to assist)  Type of Home: House  Home Layout: Two level, Able to Live on Main level with bedroom/bathroom (pt lives on main level.  pt has a stair lift to shower, reports she now just lives on the main level and does a sponge bath)  Home Access: Level entry  Bathroom Equipment: 3-in-1 commode  Home Equipment: [employer], Wheelchair-electric, Cane, Rolling walker, Oxygen, Lift chair, Alert Button, Hospital bed  Receives Help From: Family, Home health (pt reports she just started therapy, pt has home health RN)  ADL Assistance: Needs assistance (daughter helps with sponge bathing, assist for socks. pt able to dress self)  Homemaking Assistance: Needs assistance (daughter does IADLs)  Ambulation Assistance: Independent (propels s/c. uses manual w/c)  Transfer Assistance: Independent (transfers to [facility]  Active : No  Patient's  Info: daughter takes her to MD guzmán  Occupation: Retired  Additional Comments: pt reports 2 falls in the last 6 months, reports \"I made the wrong move or something and fell\"       Objective   Vision: Impaired  Hearing: Exceptions to [employer]  Hearing Exceptions: Hard of hearing/hearing concerns    Orientation  Overall Orientation Status: Within Functional Limits  Observation/Palpation  Posture: Fair  Observation: sacral wounds, dressed and covered  Balance  Sitting Balance: Supervision  Standing Balance: Moderate assistance  Standing Balance  Time: ~45 seconds x 2`  Activity: static stance EOB. unable to perform SPT this date secondary to pt going downstairs for a CAT scan. returned to bed at end of session  Comment: Michaela Gilman for stand x 2  Functional Mobility  Functional Mobility Comments: w/c level  at baseline  ADL  LE Dressing: Dependent/Total (donning socks.)  Additional Comments: declined all other ADLs, patel present. 550mL dumped from catheter bag.  RN notified  Tone RUE  RUE Tone: Normotonic  Tone LUE  LUE Tone: Normotonic  Coordination  Movements Are Fluid And Coordinated: No  Coordination and Movement description: Right UE;Left UE;Fine motor impairments     Bed mobility  Bridging: Stand by assistance (with VCS)  Rolling to Left: Moderate assistance  Rolling to Right: Moderate assistance  Supine to Sit: 2 Person assistance (maxA of 2)  Sit to Supine: 2 Person assistance (maxA of 2)  Scootin Person assistance (dx 2)  Comment: pt returned to bed for CAT scan  Transfers  Sit to stand:  Moderate assistance  Stand to sit: Moderate assistance     Cognition  Overall Cognitive Status: WFL  Perception  Overall Perceptual Status: WFL     Sensation  Overall Sensation Status: WFL        LUE AROM (degrees)  LUE AROM : WFL  RUE AROM (degrees)  RUE AROM : WFL  LUE Strength  Gross LUE Strength: WFL  RUE Strength  Gross RUE Strength: WFL                   Plan   Plan  Times per week: 3-5  Times per day: Daily  Plan weeks: cotx  Current Treatment Recommendations: Self-Care / ADL, Balance Training, Functional Mobility Training, Safety Education & Training, Strengthening      AM-PAC Score        AM-PAC Inpatient Daily Activity Raw Score: 14 (11/02/21 1548)  AM-PAC Inpatient ADL T-Scale Score : 33.39 (11/02/21 1548)  ADL Inpatient CMS 0-100% Score: 59.67 (11/02/21 1548)  ADL Inpatient CMS G-Code Modifier : CK (11/02/21 1548)    Goals  Short term goals  Time Frame for Short term goals: discharge  Short term goal 1: bed mobility maxA  Short term goal 2: stand pivot transfer Ashlyn  Short term goal 3: UB ADLs set up  Short term goal 4: LB ADLs with AE as needed Ashlyn  Patient Goals   Patient goals : go home       Therapy Time   Individual Concurrent Group Co-treatment   Time In 8678         Time Out 1143         Minutes 38           Timed Code Treatment Minutes:  23 Minutes    Total Treatment Minutes:  38 minutes      Alfred Andrade OTR/L [address], OT

## 2021-11-02 NOTE — PROGRESS NOTES
Comprehensive Nutrition Assessment    Type and Reason for Visit:  Initial    Nutrition Recommendations/Plan:   Modify diet to soft and bite size to help with chewing  Ensure Low Calorie, High Protein 1x/day    Nutrition Assessment:  MST triggered for wounds. Pt has a history of wounds on her right proximal butttocks and left buttocks. PO intake 51-75%. Pt requested a softer diet for ease of chewing. Modified diet to soft and bite size. Recommended the need for increased protein for wound healing. Pt agreed to low calorie, high protein Ensure. Will start one time per day at dinner. Malnutrition Assessment:  Malnutrition Status:  No malnutrition        Nutrition Related Findings:  Glucose: 142, +1 non-pitting BLE,      Wounds:  Moisture Associate Skin Damage       Current Nutrition Therapies:    ADULT ORAL NUTRITION SUPPLEMENT; Dinner; Low Calorie/High Protein Oral Supplement  ADULT DIET;  Dysphagia - Soft and Bite Sized    Anthropometric Measures:  · Height: 5' 1\" (154.9 cm)  · Current Body Weight: 237 lb (107.5 kg)   · Ideal Body Weight: 105 lbs; % Ideal Body Weight 225.7 %   · BMI: 44.8  · BMI Categories: Obese Class 3 (BMI 40.0 or greater)       Nutrition Diagnosis:   · Inadequate protein intake related to inadequate protein-energy intake as evidenced by wounds      Nutrition Interventions:   Food and/or Nutrient Delivery:  Modify Current Diet, Start Oral Nutrition Supplement  Nutrition Education/Counseling:  Education not indicated   Coordination of Nutrition Care:  Continue to monitor while inpatient    Goals:  PO/Supp intake 50-75%       Nutrition Monitoring and Evaluation:   Behavioral-Environmental Outcomes:  None Identified   Food/Nutrient Intake Outcomes:  Food and Nutrient Intake  Physical Signs/Symptoms Outcomes:  Skin     Discharge Planning:    Continue current diet, Continue Oral Nutrition Supplement     Electronically signed by Masoud Jacobson RD, LD on 11/2/21 at 2:19 PM EDT    Contact: [phone]

## 2021-11-02 NOTE — PROGRESS NOTES
PULMONARY AND CRITICAL CARE MEDICINE PROGRESS NOTE    Subjective: Patient lying in bed no apparent distress. Reports feeling better. Currently on 4-1/2 L/min of oxygen supplementation saturating in mid to high 90s. Denies any discolored expectoration, chest pain, chest tightness. REVIEW OF SYSTEMS:   8 point review of systems negative except that mentioned in the subjective portion. PAST MEDICAL HISTORY:   Past Medical History:   Diagnosis Date    Arthritis     osteo    COPD (chronic obstructive pulmonary disease) ([facility] [address]     Hemorrhoids     Hernia of unspecified site of abdominal cavity without mention of obstruction or gangrene     Incontinence     Knee pain, bilateral     pt states no cartilage    Spinal stenosis     Spinal stenosis        PAST SURGICAL HISTORY:   Past Surgical History:   Procedure Laterality Date    CHOLECYSTECTOMY      PARTIAL HYSTERECTOMY         SOCIAL HISTORY:   Social History     Tobacco Use    Smoking status: Former Smoker    Smokeless tobacco: Never Used    Tobacco comment: years ago   Vaping Use    Vaping Use: Never used   Substance Use Topics    Alcohol use: No    Drug use: No       FAMILY HISTORY: History reviewed. No pertinent family history. MEDICATIONS:     No current facility-administered medications on file prior to encounter. Current Outpatient Medications on File Prior to Encounter   Medication Sig Dispense Refill    oxyCODONE (OXYCONTIN) 10 MG extended release tablet Take 10 mg by mouth every 12 hours.  oxyCODONE-acetaminophen (PERCOCET)  MG per tablet Take 1 tablet by mouth every 6 hours as needed for Pain.  hydrocortisone (ANUSOL-HC) 2.5 % rectal cream Place rectally 2 times daily.  1 Tube 0    ipratropium-albuterol (DUONEB) 0.5-2.5 (3) MG/3ML SOLN nebulizer solution Inhale 1 vial into the lungs every 6 hours as needed for Shortness of Breath      SYMBICORT 160-4.5 MCG/ACT AERO TAKE 2 PUFFS TWICE A DAY  3    levothyroxine (SYNTHROID) 112 MCG tablet TAKE 1 TABLET BY MOUTH EVERY DAY  3    furosemide (LASIX) 40 MG tablet 20 mg   3    fluoxetine (PROZAC) 20 MG capsule Take 40 mg by mouth daily.  pregabalin (LYRICA) 50 MG capsule Take 150 mg by mouth 2 times daily.  esomeprazole (NEXIUM) 40 MG capsule Take 40 mg by mouth every morning (before breakfast).  vancomycin  1,000 mg IntraVENous Q24H    methylPREDNISolone  40 mg IntraVENous Daily    oxyCODONE  10 mg Oral 2 times per day    polyethylene glycol  17 g Oral Daily    sodium chloride flush  5-40 mL IntraVENous 2 times per day    cefepime  2,000 mg IntraVENous Q12H    ipratropium-albuterol  1 ampule Inhalation Q4H WA    furosemide  40 mg Oral Daily    levothyroxine  112 mcg Oral Daily    pregabalin  150 mg Oral BID    FLUoxetine  40 mg Oral Daily    pantoprazole  40 mg Oral QAM AC    enoxaparin  40 mg SubCUTAneous Daily    budesonide-formoterol  2 puff Inhalation BID    influenza virus vaccine  0.5 mL IntraMUSCular Prior to discharge      sodium chloride 100 mL/hr at 11/02/21 0300     oxyCODONE-acetaminophen, sodium chloride flush, sodium chloride, ondansetron **OR** ondansetron, polyethylene glycol, acetaminophen **OR** acetaminophen      ALLERGIES:   Allergies as of 10/30/2021 - Fully Reviewed 10/30/2021   Allergen Reaction Noted    Sulfa antibiotics  04/24/2011      OBJECTIVE:   height is 5' 1\" (1.549 m) and weight is 237 lb 14.4 oz (107.9 kg). Her oral temperature is 97.8 °F (36.6 °C). Her blood pressure is 117/63 and her pulse is 61. Her respiration is 18 and oxygen saturation is 98%. No intake/output data recorded. PHYSICAL EXAM:    CONSTITUTIONAL: She is a 82y.o.-year-old who appears her stated age. She is alert and oriented x 1 and in no  acute distress. HEENT: PERRLA, EOMI. No scleral icterus. No thrush, atraumatic, normocephalic. NECK: Supple, without cervical or supraclavicular lymphadenopathy:  CARDIOVASCULAR: S1 S2 RRR. 11/01/2021     Lab Results   Component Value Date    .2 11/01/2021     Lab Results   Component Value Date    TSH 1.31 05/10/2019     Lab Results   Component Value Date    CKTOTAL 189 10/11/2020    TROPONINI <0.01 10/30/2021      Lab Results   Component Value Date    .8 (H) 10/31/2021      Lab Results   Component Value Date    BNP 8.1 03/21/2011      Lab Results   Component Value Date    DDIMER 525 (H) 10/31/2021      Lab Results   Component Value Date    FERRITIN 35.2 11/01/2021      Lab Results   Component Value Date    LACTA 1.0 10/30/2021           IMAGING:    Narrative   EXAMINATION:   ONE XRAY VIEW OF THE CHEST       11/2/2021 6:55 am       FINDINGS:   Increased right basilar pleuroparenchymal opacity is seen.  Patient is   rotated.  No pneumothorax.  Cardiac and mediastinal silhouettes are   reflective of patient rotation.  Upper abdominal clips.           Impression   Small right pleural effusion and right basilar atelectasis or airspace   disease, slightly increased.               IMPRESSION:     1. Acute respiratory distress, resolving  2. Acute on chronic hypoxic and hypercapnic respiratory failure  3. Morbid obesity  4. Dementia    RECOMMENDATION:     1. Patient has presented to the hospital with acute respiratory distress. Chest imaging suggestive of right lower lobe pneumonia. 2. Leukocytosis downtrending and patient remains afebrile. 3. No guiding data on the cultures yet. 4. Patient remains on vancomycin and cefepime empirically. MRSA nares is negative. Can discontinue vancomycin. 5. Patient does have COPD at baseline and has had recurrent admission due to chronic hypoxic and hypercapnic respiratory failure. 6. Oxygen requirements slowly coming down and now at 4.5 L/min. Titrate it down to maintain SPO2 between 88 to 92%. 7. Solu-Medrol down to 40 mg IV every 24 hours. .  By tomorrow she can be changed to p.o. prednisone 40 mg daily. 8. Get PT/OT evaluation.   Check ambulatory oxygen needs. 9. Patient will benefit from being more compliant with the CPAP therapy at home. 10. Anticipate hospital discharge in next 24 to 48 hours. Juani Dodson MD   Pulmonary Critical Care and Sleep Medicine  [facility] [place] [address], [place], [address] [place] [address] Drive  10/30/2021, 11:01 AM      This note was completed using dragon medical speech recognition software. Grammatical errors, random word insertions, pronoun errors and incomplete sentences are occasional consequences of this technology due to software limitations. If there are questions or concerns about the content of this note of information contained within the body of this dictation they should be addressed with the provider for clarification.

## 2021-11-03 VITALS
DIASTOLIC BLOOD PRESSURE: 67 MMHG | RESPIRATION RATE: 18 BRPM | HEART RATE: 78 BPM | OXYGEN SATURATION: 92 % | BODY MASS INDEX: 45.33 KG/M2 | TEMPERATURE: 98 F | WEIGHT: 240.1 LBS | SYSTOLIC BLOOD PRESSURE: 135 MMHG | HEIGHT: 61 IN

## 2021-11-03 LAB
ANION GAP SERPL CALCULATED.3IONS-SCNC: 8 MMOL/L (ref 3–16)
BLOOD CULTURE, ROUTINE: NORMAL
BUN BLDV-MCNC: 31 MG/DL (ref 7–20)
CALCIUM SERPL-MCNC: 8.9 MG/DL (ref 8.3–10.6)
CHLORIDE BLD-SCNC: 98 MMOL/L (ref 99–110)
CO2: 34 MMOL/L (ref 21–32)
CREAT SERPL-MCNC: 1.1 MG/DL (ref 0.6–1.2)
CULTURE, BLOOD 2: NORMAL
CULTURE, RESPIRATORY: ABNORMAL
CULTURE, RESPIRATORY: ABNORMAL
GFR AFRICAN AMERICAN: 57
GFR NON-AFRICAN AMERICAN: 47
GLUCOSE BLD-MCNC: 130 MG/DL (ref 70–99)
GRAM STAIN RESULT: ABNORMAL
HCT VFR BLD CALC: 25.6 % (ref 36–48)
HEMOGLOBIN: 7.7 G/DL (ref 12–16)
MCH RBC QN AUTO: 26.9 PG (ref 26–34)
MCHC RBC AUTO-ENTMCNC: 30.3 G/DL (ref 31–36)
MCV RBC AUTO: 88.8 FL (ref 80–100)
ORGANISM: ABNORMAL
PDW BLD-RTO: 17 % (ref 12.4–15.4)
PLATELET # BLD: 156 K/UL (ref 135–450)
PMV BLD AUTO: 10 FL (ref 5–10.5)
POTASSIUM SERPL-SCNC: 3.6 MMOL/L (ref 3.5–5.1)
RBC # BLD: 2.88 M/UL (ref 4–5.2)
SODIUM BLD-SCNC: 140 MMOL/L (ref 136–145)
WBC # BLD: 13.1 K/UL (ref 4–11)

## 2021-11-03 PROCEDURE — 94761 N-INVAS EAR/PLS OXIMETRY MLT: CPT

## 2021-11-03 PROCEDURE — 99233 SBSQ HOSP IP/OBS HIGH 50: CPT | Performed by: INTERNAL MEDICINE

## 2021-11-03 PROCEDURE — 80048 BASIC METABOLIC PNL TOTAL CA: CPT

## 2021-11-03 PROCEDURE — 6360000002 HC RX W HCPCS: Performed by: INTERNAL MEDICINE

## 2021-11-03 PROCEDURE — 2580000003 HC RX 258: Performed by: EMERGENCY MEDICINE

## 2021-11-03 PROCEDURE — 2580000003 HC RX 258: Performed by: FAMILY MEDICINE

## 2021-11-03 PROCEDURE — 36415 COLL VENOUS BLD VENIPUNCTURE: CPT

## 2021-11-03 PROCEDURE — 85027 COMPLETE CBC AUTOMATED: CPT

## 2021-11-03 PROCEDURE — 6360000002 HC RX W HCPCS: Performed by: FAMILY MEDICINE

## 2021-11-03 PROCEDURE — 6370000000 HC RX 637 (ALT 250 FOR IP): Performed by: FAMILY MEDICINE

## 2021-11-03 PROCEDURE — 94640 AIRWAY INHALATION TREATMENT: CPT

## 2021-11-03 PROCEDURE — 2700000000 HC OXYGEN THERAPY PER DAY

## 2021-11-03 PROCEDURE — 6370000000 HC RX 637 (ALT 250 FOR IP): Performed by: INTERNAL MEDICINE

## 2021-11-03 RX ORDER — FUROSEMIDE 40 MG/1
40 TABLET ORAL DAILY
Qty: 60 TABLET | Refills: 3 | Status: ON HOLD | OUTPATIENT
Start: 2021-11-04 | End: 2021-12-09 | Stop reason: SDUPTHER

## 2021-11-03 RX ORDER — PREDNISONE 20 MG/1
40 TABLET ORAL DAILY
Status: DISCONTINUED | OUTPATIENT
Start: 2021-11-03 | End: 2021-11-03 | Stop reason: HOSPADM

## 2021-11-03 RX ORDER — LEVOFLOXACIN 250 MG/1
250 TABLET ORAL DAILY
Qty: 4 TABLET | Refills: 0 | Status: SHIPPED | OUTPATIENT
Start: 2021-11-03 | End: 2021-11-07

## 2021-11-03 RX ORDER — LEVOFLOXACIN 500 MG/1
500 TABLET, FILM COATED ORAL DAILY
Status: DISCONTINUED | OUTPATIENT
Start: 2021-11-03 | End: 2021-11-03

## 2021-11-03 RX ORDER — PREDNISONE 20 MG/1
20 TABLET ORAL DAILY
Qty: 5 TABLET | Refills: 0 | Status: SHIPPED | OUTPATIENT
Start: 2021-11-03 | End: 2021-11-08

## 2021-11-03 RX ADMIN — CEFEPIME HYDROCHLORIDE 2000 MG: 2 INJECTION, POWDER, FOR SOLUTION INTRAVENOUS at 01:53

## 2021-11-03 RX ADMIN — SODIUM CHLORIDE 25 ML: 9 INJECTION, SOLUTION INTRAVENOUS at 12:53

## 2021-11-03 RX ADMIN — IPRATROPIUM BROMIDE AND ALBUTEROL SULFATE 1 AMPULE: .5; 3 SOLUTION RESPIRATORY (INHALATION) at 11:48

## 2021-11-03 RX ADMIN — OXYCODONE AND ACETAMINOPHEN 1 TABLET: 5; 325 TABLET ORAL at 17:10

## 2021-11-03 RX ADMIN — OXYCODONE AND ACETAMINOPHEN 1 TABLET: 5; 325 TABLET ORAL at 11:52

## 2021-11-03 RX ADMIN — CEFEPIME HYDROCHLORIDE 2000 MG: 2 INJECTION, POWDER, FOR SOLUTION INTRAVENOUS at 12:54

## 2021-11-03 RX ADMIN — LEVOTHYROXINE SODIUM 112 MCG: 0.11 TABLET ORAL at 06:00

## 2021-11-03 RX ADMIN — PANTOPRAZOLE SODIUM 40 MG: 40 TABLET, DELAYED RELEASE ORAL at 06:00

## 2021-11-03 RX ADMIN — IPRATROPIUM BROMIDE AND ALBUTEROL SULFATE 1 AMPULE: .5; 3 SOLUTION RESPIRATORY (INHALATION) at 07:53

## 2021-11-03 RX ADMIN — ENOXAPARIN SODIUM 40 MG: 100 INJECTION SUBCUTANEOUS at 08:40

## 2021-11-03 RX ADMIN — PREDNISONE 40 MG: 20 TABLET ORAL at 12:54

## 2021-11-03 RX ADMIN — Medication 2 PUFF: at 07:54

## 2021-11-03 RX ADMIN — OXYCODONE HYDROCHLORIDE 10 MG: 10 TABLET, FILM COATED, EXTENDED RELEASE ORAL at 08:40

## 2021-11-03 RX ADMIN — Medication 10 ML: at 08:40

## 2021-11-03 RX ADMIN — PREGABALIN 150 MG: 75 CAPSULE ORAL at 08:40

## 2021-11-03 RX ADMIN — METHYLPREDNISOLONE SODIUM SUCCINATE 40 MG: 40 INJECTION, POWDER, FOR SOLUTION INTRAMUSCULAR; INTRAVENOUS at 08:39

## 2021-11-03 RX ADMIN — OXYCODONE AND ACETAMINOPHEN 1 TABLET: 5; 325 TABLET ORAL at 01:57

## 2021-11-03 RX ADMIN — Medication 10 ML: at 08:42

## 2021-11-03 RX ADMIN — LEVOFLOXACIN 750 MG: 500 TABLET, FILM COATED ORAL at 12:54

## 2021-11-03 RX ADMIN — POLYETHYLENE GLYCOL 3350 17 G: 17 POWDER, FOR SOLUTION ORAL at 09:00

## 2021-11-03 RX ADMIN — FLUOXETINE 40 MG: 20 CAPSULE ORAL at 08:40

## 2021-11-03 RX ADMIN — FUROSEMIDE 40 MG: 40 TABLET ORAL at 08:40

## 2021-11-03 ASSESSMENT — PAIN SCALES - GENERAL
PAINLEVEL_OUTOF10: 6
PAINLEVEL_OUTOF10: 7
PAINLEVEL_OUTOF10: 7
PAINLEVEL_OUTOF10: 6

## 2021-11-03 NOTE — CARE COORDINATION
SW informed by RN that pt is discharging home today. Pt and family are refusing SNF. RN reported dtr is moving in with patient for a short period to take care of her. Patient already active with [facility]. Transport set with [facility] at 4pm today. Discharge packet done. Faxed [facility] orders and discharge paperwork to [facility]. All needs met per case mgmt. Discharge Plan:  Home w/continuing HHC from [facility]. Dtr will be staying with patient 24/7. Transport set with [facility] at 4pm today.     Electronically signed by GIOVANNI Beckman, IVANIA on 11/3/2021 at 3:55 PM

## 2021-11-03 NOTE — PROGRESS NOTES
PULMONARY AND CRITICAL CARE MEDICINE PROGRESS NOTE    Subjective: Patient lying in bed no apparent distress. Continues to report feeling better. Reports feeling better. Now on 3 L/min of oxygen saturating in mid to high 90s. .  Denies any discolored expectoration, chest pain, chest tightness. REVIEW OF SYSTEMS:   8 point review of systems negative except that mentioned in the subjective portion. PAST MEDICAL HISTORY:   Past Medical History:   Diagnosis Date    Arthritis     osteo    COPD (chronic obstructive pulmonary disease) ([facility] [address]     Hemorrhoids     Hernia of unspecified site of abdominal cavity without mention of obstruction or gangrene     Incontinence     Knee pain, bilateral     pt states no cartilage    Spinal stenosis     Spinal stenosis        PAST SURGICAL HISTORY:   Past Surgical History:   Procedure Laterality Date    CHOLECYSTECTOMY      PARTIAL HYSTERECTOMY         SOCIAL HISTORY:   Social History     Tobacco Use    Smoking status: Former Smoker    Smokeless tobacco: Never Used    Tobacco comment: years ago   Vaping Use    Vaping Use: Never used   Substance Use Topics    Alcohol use: No    Drug use: No       FAMILY HISTORY: History reviewed. No pertinent family history. MEDICATIONS:     No current facility-administered medications on file prior to encounter. Current Outpatient Medications on File Prior to Encounter   Medication Sig Dispense Refill    oxyCODONE (OXYCONTIN) 10 MG extended release tablet Take 10 mg by mouth every 12 hours.  oxyCODONE-acetaminophen (PERCOCET)  MG per tablet Take 1 tablet by mouth every 6 hours as needed for Pain.  hydrocortisone (ANUSOL-HC) 2.5 % rectal cream Place rectally 2 times daily.  1 Tube 0    ipratropium-albuterol (DUONEB) 0.5-2.5 (3) MG/3ML SOLN nebulizer solution Inhale 1 vial into the lungs every 6 hours as needed for Shortness of Breath      SYMBICORT 160-4.5 MCG/ACT AERO TAKE 2 PUFFS TWICE A DAY  3    CHEST: Bibasilar decreased breath sounds heard. No wheezing heard. GASTROINTESTINAL & ABDOMEN: Soft, nontender, positive bowel sounds in all quadrants, non-distended, without hepatosplenomegaly. GENITOURINARY: Deferred. MUSCULOSKELETAL: No tenderness to palpation of the axial skeleton. There is no clubbing. No cyanosis. No edema of the lower extremities. SKIN OF BODY: No rash or jaundice. PSYCHIATRIC EVALUATION: Normal affect. Patient answers questions appropriately. HEMATOLOGIC/LYMPHATIC/ IMMUNOLOGIC: No palpable lymphadenopathy. NEUROLOGIC: Alert and oriented x 3. Groslly non-focal. Motor strength is 5+/5 in all muscle groups. The patient has a normal sensorium globally. LABS:  Recent Labs     11/01/21 0518 11/02/21 0411 11/03/21  0426   WBC 24.6* 18.0* 13.1*   HGB 7.8* 7.8* 7.7*   HCT 24.5* 24.3* 25.6*    153 156    143 140   K 3.9 3.6 3.6    101 98*   CREATININE 1.0 0.9 1.1   BUN 25* 29* 31*   CO2 33* 34* 34*       Recent Labs     11/01/21 0518 11/02/21 0411 11/03/21  0426   GLUCOSE 168* 142* 130*   CALCIUM 9.1 8.7 8.9    143 140   K 3.9 3.6 3.6   CO2 33* 34* 34*    101 98*   BUN 25* 29* 31*   CREATININE 1.0 0.9 1.1       No results for input(s): PHART, RKK6BNN, PO2ART, YFY7CKN, D5ESYVTL, BEART, Q2CNYOUC in the last 72 hours.     Lab Results   Component Value Date    INR 1.00 09/10/2021    INR 0.95 06/20/2019    INR 0.91 05/10/2019    PROTIME 11.3 09/10/2021    PROTIME 10.8 06/20/2019    PROTIME 10.4 05/10/2019     Lab Results   Component Value Date    AMYLASE 34 09/10/2021      Lab Results   Component Value Date    LABA1C 5.5 11/01/2021     Lab Results   Component Value Date    .2 11/01/2021     Lab Results   Component Value Date    TSH 1.31 05/10/2019     Lab Results   Component Value Date    CKTOTAL 189 10/11/2020    TROPONINI <0.01 10/30/2021      Lab Results   Component Value Date    .8 (H) 10/31/2021      Lab Results   Component Value Date within the body of this dictation they should be addressed with the provider for clarification.

## 2021-11-03 NOTE — PROGRESS NOTES
Data- discharge order received, pt verbalized agreement to discharge, needs for [address] [facility] with [facility] [address] reviewed and signed by MD, to be completed by RN. Action- AVS prepared, discharge instructions prepared and given to pt , medication information packet given r/t NEW or CHANGED prescriptions, pt verbalized understanding further self-review. D/C instruction summary: Diet- Soft and bite sized , Activity- as tolerated , follow up with Primary Care Physician Bebe Cates None appointment 1 week , , medications prescriptions to be filled [facility] in [place]. Response- Case Management/ reported faxing completed GERALDINE and AVS to needed HHC/DME services stated above. Pt belongings gathered, IV removed, pt dressed . Disposition is home with Filippo Francisco as stated above, transported with [facility] care ,     1. WEIGHT: Admit Weight: 245 lb (111.1 kg) (10/30/21 1303)        Today  Weight: 240 lb 1.6 oz (108.9 kg) (11/03/21 0812)       2.  O2 SAT.: SpO2: 92 % (11/03/21 1245)

## 2021-11-03 NOTE — PROGRESS NOTES
Pt reusing to wear bipap and states that she doesn't want to wear it d/t \" constantly spitting up\". Writer tried to re-educate patient on the importance of her bi-pap in relation to her COPD, pt still would like to remain off bi-pap. Respiratory notified. Will continue to monitor. Ingrid Moreno RN.

## 2021-11-03 NOTE — DISCHARGE INSTR - COC
Continuity of Care Form    Patient Name: Brittany Rios   :  1935  MRN:  [id number]    Admit date:  10/30/2021  Discharge date:  11/3/2021    Code Status Order: Full Code   Advance Directives:      Admitting Physician:  Marck Juarez MD  PCP: Kay Corbett    Discharging Nurse: [address] Unit/Room#: 1FY-[id number]/5147-26  Discharging Unit Phone Number: [phone]    Emergency Contact:   Extended Emergency Contact Information  Primary Emergency Contact: Wiley Geiger Phone: [phone]  Relation: Child    Past Surgical History:  Past Surgical History:   Procedure Laterality Date    CHOLECYSTECTOMY      PARTIAL HYSTERECTOMY         Immunization History:   Immunization History   Administered Date(s) Administered    COVID-19, Pfizer, PF, 30mcg/0.3mL 2021, 2021    Influenza Virus Vaccine 10/24/2018    Pneumococcal Conjugate 13-valent Miya Sheikh) 2013       Active Problems:  Patient Active Problem List   Diagnosis Code    Pneumonia J18.9    Acute on chronic diastolic heart failure (HCC) I50.33    Peripheral edema R60.9    COPD exacerbation ([address] J44.1    Essential hypertension I10    Hyperlipidemia E78.5    Morbid obesity with BMI of 50.0-59.9, adult ([address] E66.01, Z68.43    Acute on chronic respiratory failure with hypercapnia ([address] J96.22    Acute respiratory failure ([address] J96.00    Acute respiratory failure with hypoxia and hypercapnia (HCC) J96.01, J96.02    Acute kidney injury ([address] N17.9    History of recurrent UTI (urinary tract infection) Z87.440    Chronic obstructive pulmonary disease with acute lower respiratory infection ([address] J44.0    Spinal stenosis H37.45    Complicated UTI (urinary tract infection) N39.0    Acute on chronic respiratory failure with hypoxia and hypercapnia (HCC) J96.21, J96.22    Weight loss counseling, encounter for Z71.3    Fall at home [address] [facility], Y92.009    Rhabdomyolysis M62.82    Altered mental state R41.82    Syncope and collapse R55    Acute on chronic respiratory failure with hypoxia (HCC) J96.21    Acute on chronic respiratory failure (HCC) J96.20       Isolation/Infection:   Isolation            No Isolation          Patient Infection Status       Infection Onset Added Last Indicated Last Indicated By Review Planned Expiration Resolved Resolved By    None active    Resolved    COVID-19 Rule Out 10/30/21 10/30/21 10/30/21 COVID-19 (Ordered)   10/31/21 Rule-Out Test Resulted    COVID-19 Rule Out 09/10/21 09/10/21 09/10/21 COVID-19 (Ordered)   09/10/21 Rule-Out Test Resulted    COVID-19 Rule Out 10/14/20 10/14/20 10/14/20 COVID-19 (Ordered)   10/14/20 Rule-Out Test Resulted    MDRO (multi-drug resistant organism) 08/11/19 08/15/19 08/11/19 Urine Culture   10/13/20 Lauren King RN            Nurse Assessment:  Last Vital Signs: /65   Pulse 68   Temp 98 °F (36.7 °C) (Oral)   Resp 18   Ht 5' 1\" (1.549 m)   Wt 240 lb 1.6 oz (108.9 kg)   SpO2 95%   BMI 45.37 kg/m²     Last documented pain score (0-10 scale): Pain Level: 6  Last Weight:   Wt Readings from Last 1 Encounters:   11/03/21 240 lb 1.6 oz (108.9 kg)     Mental Status:  oriented and alert    IV Access:  - None    Nursing Mobility/ADLs:  Walking   Assisted  Transfer  Assisted  Bathing  Assisted  Dressing  Assisted  Toileting  Assisted  Feeding  Assisted  Med Admin  Assisted  Med Delivery   whole    Wound Care Documentation and Therapy:  Wound 04/24/19 Coccyx Mid open  (Active)   Number of days: 923       Wound 06/20/19 Buttocks Left Deep tissue injury (Active)   Number of days: 866       Wound 10/10/20 Buttocks Left moisture associated dermatitis left buttocks (Active)   Number of days: 389       Wound 10/10/20 Thigh Right; Inner moisture associated dermatitis, rt inner thigh (Active)   Number of days: 389       Wound 10/10/20 Toe (Comment  which one) Left left great toe bruise (Active)   Number of days: 389       Wound 09/10/21 Ischium Right stage 2  (Active)   Number of days: 53       Wound 10/31/21 Buttocks Proximal;Right Incontinence associated dermatitis (IAD) 11/1/21 (Active)   Wound Etiology Other 11/01/21 0747   Dressing Status Clean;Dry; Intact 11/03/21 0618   Wound Cleansed Soap and water 11/03/21 0618   Dressing/Treatment Foam 11/03/21 0618   Wound Length (cm) 1 cm 10/31/21 2100   Wound Width (cm) 1 cm 10/31/21 2100   Wound Surface Area (cm^2) 1 cm^2 10/31/21 2100   Wound Assessment Subcutaneous;Pink/red 11/01/21 2121   Drainage Amount None 11/01/21 0747   Odor None 11/01/21 0747   Jessica-wound Assessment Fragile;Blanchable erythema; Excoriated 11/01/21 0747   Number of days: 2       Wound 10/31/21 Buttocks Left Incontinence associated dermatitis per WOC RN 11/1/21 (Active)   Wound Etiology Other 11/03/21 0618   Dressing Status Clean;Dry; Intact 11/03/21 0618   Wound Cleansed Soap and water 11/03/21 0618   Dressing/Treatment Foam 11/03/21 0618   Wound Length (cm) 2 cm 10/31/21 2100   Wound Width (cm) 1 cm 10/31/21 2100   Wound Surface Area (cm^2) 2 cm^2 10/31/21 2100   Wound Assessment Pink/red 11/01/21 0747   Drainage Amount None 11/01/21 0747   Odor None 11/01/21 0747   Jessica-wound Assessment Excoriated;Blanchable erythema 11/01/21 0747   Number of days: 2        Elimination:  Continence:   · Bowel: Yes  · Bladder: Yes  Urinary Catheter: None   Colostomy/Ileostomy/Ileal Conduit: Yes       Date of Last BM: 11/2/2021    Intake/Output Summary (Last 24 hours) at 11/3/2021 0927  Last data filed at 11/3/2021 0414  Gross per 24 hour   Intake 240 ml   Output 1350 ml   Net -1110 ml     I/O last 3 completed shifts: In: 360 [P.O.:360]  Out: [address] [Urine:1350]    Safety Concerns: At Risk for Falls    Impairments/Disabilities:      None    Nutrition Therapy:  Current Nutrition Therapy:   - Oral Diet:  Dental Soft    Routes of Feeding: Oral  Liquids:  Thin Liquids  Daily Fluid Restriction: yes - amount 2000  Last Modified Barium Swallow with Video (Video Swallowing Test): not done    Treatments at the Time of Hospital Discharge:   Respiratory Treatments:   Oxygen Therapy:  is on oxygen at 2 L/min per nasal cannula. Ventilator:    - No ventilator support  - BiPAP    , CPAP/EPAP: 5 cmH2O only when sleeping    Rehab Therapies: {THERAPEUTIC INTERVENTION:[id number]}  Weight Bearing Status/Restrictions: No weight bearing restirctions  Other Medical Equipment (for information only, NOT a DME order):  hospital bed  Other Treatments:     Patient's personal belongings (please select all that are sent with patient):  None    RN SIGNATURE:  Electronically signed by Nathen Daley RN on 11/3/21 at 11:38 AM EDT    CASE MANAGEMENT/SOCIAL WORK SECTION    Inpatient Status Date: 10/30/21    Readmission Risk Assessment Score:  Readmission Risk              Risk of Unplanned Readmission:  21           Discharging to Facility/ Agency   · Name:  [facility]  · Phone:  [phone]  · Fax:  [phone]      / signature: Electronically signed by GIOVANNI Patel, LIZZETHW on 11/3/21 at 3:44 PM EDT    PHYSICIAN SECTION    Prognosis: Good    Condition at Discharge: Stable    Rehab Potential (if transferring to Rehab): Good    Recommended Labs or Other Treatments After Discharge:     Physician Certification: I certify the above information and transfer of Lizbeth Ellis  is necessary for the continuing treatment of the diagnosis listed and that she requires Home Care for greater 30 days.      Update Admission H&P: No change in H&P    PHYSICIAN SIGNATURE:  Electronically signed by Melisa Rodriguez MD on 11/3/21 at 9:27 AM EDT

## 2021-11-03 NOTE — PROGRESS NOTES
Patient refused Bipap due to coughing. Patient now sleeping on 3lpm O2 with Spo2 of 95%.  Will continue to monitor

## 2021-11-12 NOTE — DISCHARGE SUMMARY
[facility] DISCHARGE SUMMARY    Patient Demographics    Patient. Eloy Smith  Date of Birth. 1935  MRN. [id number]     Primary care provider. Claire Galo  (Tel: None)    Admit date: 10/30/2021    Discharge date (blank if same as Note Date): 11/3/2021  Note Date: 11/12/2021     Reason for Hospitalization. Chief Complaint   Patient presents with    Shortness of Breath     pt SOB for 3 days, 70s on 4 LPM upon EMS arrival, 86% on NRB, hx of [facility]. Acute hypoxic respiratory failure  -Likely multifactorial in setting of underlying COPD and also pneumonia  -Treated with antibiotics   -DuoNeb breathing was steroids. Patient was weaned down. To p.o. steroids discharged on p.o. antibiotics hemodynamically stable at the time of discharge baseline oxygen requirement    Consults. IP CONSULT TO HOSPITALIST  IP CONSULT TO PULMONOLOGY  IP CONSULT TO SOCIAL WORK  IP CONSULT TO HOME CARE NEEDS    Physical examination on discharge day. /67   Pulse 78   Temp 98 °F (36.7 °C) (Oral)   Resp 18   Ht 5' 1\" (1.549 m)   Wt 240 lb 1.6 oz (108.9 kg)   SpO2 92%   BMI 45.37 kg/m²   General appearance. Alert. Looks comfortable. HEENT. Sclera clear. Moist mucus membranes. Cardiovascular. Regular rate and rhythm, normal S1, S2. No murmur. Respiratory. Not using accessory muscles. Clear to auscultation bilaterally, no wheeze. Gastrointestinal. Abdomen soft, non-tender, not distended, normal bowel sounds  Neurology. Facial symmetry. No speech deficits. Moving all extremities equally. Extremities. No edema in lower extremities. Skin. Warm, dry, normal turgor    Condition at time of discharge stable     Medication instructions provided to patient at discharge.      Medication List      CHANGE how you take these medications    * furosemide 40 MG tablet  Commonly known as: LASIX  What changed: Another medication with the same name was added. Make sure you understand how and when to take each. * furosemide 40 MG tablet  Commonly known as: LASIX  Take 1 tablet by mouth daily  What changed: You were already taking a medication with the same name, and this prescription was added. Make sure you understand how and when to take each. * This list has 2 medication(s) that are the same as other medications prescribed for you. Read the directions carefully, and ask your doctor or other care provider to review them with you. CONTINUE taking these medications    esomeprazole 40 MG delayed release capsule  Commonly known as: NEXIUM     FLUoxetine 20 MG capsule  Commonly known as: PROZAC     hydrocortisone 2.5 % rectal cream  Commonly known as: ANUSOL-HC  Place rectally 2 times daily. ipratropium-albuterol 0.5-2.5 (3) MG/3ML Soln nebulizer solution  Commonly known as: DUONEB     levothyroxine 112 MCG tablet  Commonly known as: SYNTHROID     oxyCODONE-acetaminophen  MG per tablet  Commonly known as: PERCOCET     OxyCONTIN 10 MG extended release tablet  Generic drug: oxyCODONE     pregabalin 50 MG capsule  Commonly known as: LYRICA     Symbicort 160-4.5 MCG/ACT Aero  Generic drug: budesonide-formoterol        ASK your doctor about these medications    levoFLOXacin 250 MG tablet  Commonly known as: Levaquin  Take 1 tablet by mouth daily for 4 days  Ask about: Should I take this medication?     predniSONE 20 MG tablet  Commonly known as: DELTASONE  Take 1 tablet by mouth daily for 5 days  Ask about: Should I take this medication?            Where to Get Your Medications      These medications were sent to [address], [facility], [address] [place] [address]    Phone: [phone]   · furosemide 40 MG tablet  · levoFLOXacin 250 MG tablet  · predniSONE 20 MG tablet         Discharge recommendations given to patient. Follow Up. pcp in 1 week   Disposition. home  Activity. activity as tolerated  Diet: No diet orders on file      Spent 45  minutes in discharge process.     Signed:  Rashad Chan MD     11/12/2021 7:30 AM

## 2021-12-04 ENCOUNTER — APPOINTMENT (OUTPATIENT)
Dept: CT IMAGING | Age: 86
DRG: 871 | End: 2021-12-04
Payer: COMMERCIAL

## 2021-12-04 ENCOUNTER — APPOINTMENT (OUTPATIENT)
Dept: GENERAL RADIOLOGY | Age: 86
DRG: 871 | End: 2021-12-04
Payer: COMMERCIAL

## 2021-12-04 ENCOUNTER — HOSPITAL ENCOUNTER (INPATIENT)
Age: 86
LOS: 5 days | Discharge: HOME HEALTH | DRG: 871 | End: 2021-12-09
Attending: EMERGENCY MEDICINE | Admitting: FAMILY MEDICINE
Payer: COMMERCIAL

## 2021-12-04 DIAGNOSIS — M48.061 SPINAL STENOSIS OF LUMBAR REGION, UNSPECIFIED WHETHER NEUROGENIC CLAUDICATION PRESENT: ICD-10-CM

## 2021-12-04 DIAGNOSIS — A41.9 SEPTICEMIA (HCC): ICD-10-CM

## 2021-12-04 DIAGNOSIS — J44.1 COPD EXACERBATION (HCC): ICD-10-CM

## 2021-12-04 DIAGNOSIS — J18.9 PNEUMONIA OF RIGHT LOWER LOBE DUE TO INFECTIOUS ORGANISM: ICD-10-CM

## 2021-12-04 DIAGNOSIS — S00.83XA CONTUSION OF FACE, INITIAL ENCOUNTER: ICD-10-CM

## 2021-12-04 DIAGNOSIS — J96.02 ACUTE RESPIRATORY FAILURE WITH HYPERCAPNIA (HCC): Primary | ICD-10-CM

## 2021-12-04 LAB
A/G RATIO: 0.9 (ref 1.1–2.2)
ALBUMIN SERPL-MCNC: 3.4 G/DL (ref 3.4–5)
ALP BLD-CCNC: 167 U/L (ref 40–129)
ALT SERPL-CCNC: 14 U/L (ref 10–40)
ANION GAP SERPL CALCULATED.3IONS-SCNC: 9 MMOL/L (ref 3–16)
APTT: 31.7 SEC (ref 26.2–38.6)
AST SERPL-CCNC: 36 U/L (ref 15–37)
BASE EXCESS VENOUS: 10 MMOL/L (ref -3–3)
BASOPHILS ABSOLUTE: 0.2 K/UL (ref 0–0.2)
BASOPHILS RELATIVE PERCENT: 0.7 %
BILIRUB SERPL-MCNC: 0.6 MG/DL (ref 0–1)
BILIRUBIN URINE: NEGATIVE
BLOOD, URINE: NEGATIVE
BUN BLDV-MCNC: 15 MG/DL (ref 7–20)
CALCIUM SERPL-MCNC: 8.6 MG/DL (ref 8.3–10.6)
CARBOXYHEMOGLOBIN: 5.4 % (ref 0–1.5)
CHLORIDE BLD-SCNC: 93 MMOL/L (ref 99–110)
CLARITY: CLEAR
CO2: 35 MMOL/L (ref 21–32)
COLOR: YELLOW
CREAT SERPL-MCNC: 0.9 MG/DL (ref 0.6–1.2)
EOSINOPHILS ABSOLUTE: 0.1 K/UL (ref 0–0.6)
EOSINOPHILS RELATIVE PERCENT: 0.3 %
GFR AFRICAN AMERICAN: >60
GFR NON-AFRICAN AMERICAN: 60
GLUCOSE BLD-MCNC: 128 MG/DL (ref 70–99)
GLUCOSE URINE: NEGATIVE MG/DL
HCO3 VENOUS: 38.6 MMOL/L (ref 23–29)
HCT VFR BLD CALC: 28.7 % (ref 36–48)
HEMOGLOBIN: 8.8 G/DL (ref 12–16)
INR BLD: 1.14 (ref 0.88–1.12)
KETONES, URINE: NEGATIVE MG/DL
LACTIC ACID, SEPSIS: 1 MMOL/L (ref 0.4–1.9)
LEUKOCYTE ESTERASE, URINE: NEGATIVE
LYMPHOCYTES ABSOLUTE: 2.7 K/UL (ref 1–5.1)
LYMPHOCYTES RELATIVE PERCENT: 12.5 %
MCH RBC QN AUTO: 25.8 PG (ref 26–34)
MCHC RBC AUTO-ENTMCNC: 30.8 G/DL (ref 31–36)
MCV RBC AUTO: 83.7 FL (ref 80–100)
METHEMOGLOBIN VENOUS: 0 %
MICROSCOPIC EXAMINATION: NORMAL
MONOCYTES ABSOLUTE: 2 K/UL (ref 0–1.3)
MONOCYTES RELATIVE PERCENT: 9.1 %
NEUTROPHILS ABSOLUTE: 16.8 K/UL (ref 1.7–7.7)
NEUTROPHILS RELATIVE PERCENT: 77.4 %
NITRITE, URINE: NEGATIVE
O2 CONTENT, VEN: 12 VOL %
O2 SAT, VEN: 99 %
O2 THERAPY: ABNORMAL
PCO2, VEN: 80.9 MMHG (ref 40–50)
PDW BLD-RTO: 17.2 % (ref 12.4–15.4)
PH UA: 6 (ref 5–8)
PH VENOUS: 7.29 (ref 7.35–7.45)
PLATELET # BLD: 239 K/UL (ref 135–450)
PMV BLD AUTO: 9.1 FL (ref 5–10.5)
PO2, VEN: 114 MMHG (ref 25–40)
POTASSIUM SERPL-SCNC: 4.6 MMOL/L (ref 3.5–5.1)
PRO-BNP: 1962 PG/ML (ref 0–449)
PROCALCITONIN: 0.21 NG/ML (ref 0–0.15)
PROTEIN UA: NEGATIVE MG/DL
PROTHROMBIN TIME: 12.9 SEC (ref 9.9–12.7)
RBC # BLD: 3.42 M/UL (ref 4–5.2)
SODIUM BLD-SCNC: 137 MMOL/L (ref 136–145)
SPECIFIC GRAVITY UA: 1.01 (ref 1–1.03)
TCO2 CALC VENOUS: 92 MMOL/L
TOTAL PROTEIN: 7.3 G/DL (ref 6.4–8.2)
TROPONIN: 0.02 NG/ML
URINE REFLEX TO CULTURE: NORMAL
URINE TYPE: NORMAL
UROBILINOGEN, URINE: 1 E.U./DL
WBC # BLD: 21.7 K/UL (ref 4–11)

## 2021-12-04 PROCEDURE — 2700000000 HC OXYGEN THERAPY PER DAY

## 2021-12-04 PROCEDURE — 94660 CPAP INITIATION&MGMT: CPT

## 2021-12-04 PROCEDURE — 70450 CT HEAD/BRAIN W/O DYE: CPT

## 2021-12-04 PROCEDURE — 87186 SC STD MICRODIL/AGAR DIL: CPT

## 2021-12-04 PROCEDURE — 84145 PROCALCITONIN (PCT): CPT

## 2021-12-04 PROCEDURE — 94761 N-INVAS EAR/PLS OXIMETRY MLT: CPT

## 2021-12-04 PROCEDURE — 71045 X-RAY EXAM CHEST 1 VIEW: CPT

## 2021-12-04 PROCEDURE — 87040 BLOOD CULTURE FOR BACTERIA: CPT

## 2021-12-04 PROCEDURE — 85730 THROMBOPLASTIN TIME PARTIAL: CPT

## 2021-12-04 PROCEDURE — 94640 AIRWAY INHALATION TREATMENT: CPT

## 2021-12-04 PROCEDURE — 85610 PROTHROMBIN TIME: CPT

## 2021-12-04 PROCEDURE — 84484 ASSAY OF TROPONIN QUANT: CPT

## 2021-12-04 PROCEDURE — 6370000000 HC RX 637 (ALT 250 FOR IP): Performed by: FAMILY MEDICINE

## 2021-12-04 PROCEDURE — 6360000002 HC RX W HCPCS: Performed by: FAMILY MEDICINE

## 2021-12-04 PROCEDURE — 83880 ASSAY OF NATRIURETIC PEPTIDE: CPT

## 2021-12-04 PROCEDURE — 82803 BLOOD GASES ANY COMBINATION: CPT

## 2021-12-04 PROCEDURE — 87150 DNA/RNA AMPLIFIED PROBE: CPT

## 2021-12-04 PROCEDURE — 51702 INSERT TEMP BLADDER CATH: CPT

## 2021-12-04 PROCEDURE — 6360000002 HC RX W HCPCS: Performed by: PHYSICIAN ASSISTANT

## 2021-12-04 PROCEDURE — 81003 URINALYSIS AUTO W/O SCOPE: CPT

## 2021-12-04 PROCEDURE — 85025 COMPLETE CBC W/AUTO DIFF WBC: CPT

## 2021-12-04 PROCEDURE — 2580000003 HC RX 258: Performed by: PHYSICIAN ASSISTANT

## 2021-12-04 PROCEDURE — 6370000000 HC RX 637 (ALT 250 FOR IP): Performed by: PHYSICIAN ASSISTANT

## 2021-12-04 PROCEDURE — 99284 EMERGENCY DEPT VISIT MOD MDM: CPT

## 2021-12-04 PROCEDURE — 83605 ASSAY OF LACTIC ACID: CPT

## 2021-12-04 PROCEDURE — 36415 COLL VENOUS BLD VENIPUNCTURE: CPT

## 2021-12-04 PROCEDURE — 70486 CT MAXILLOFACIAL W/O DYE: CPT

## 2021-12-04 PROCEDURE — 96375 TX/PRO/DX INJ NEW DRUG ADDON: CPT

## 2021-12-04 PROCEDURE — 1200000000 HC SEMI PRIVATE

## 2021-12-04 PROCEDURE — 96374 THER/PROPH/DIAG INJ IV PUSH: CPT

## 2021-12-04 PROCEDURE — 80053 COMPREHEN METABOLIC PANEL: CPT

## 2021-12-04 PROCEDURE — 72125 CT NECK SPINE W/O DYE: CPT

## 2021-12-04 PROCEDURE — 93005 ELECTROCARDIOGRAM TRACING: CPT | Performed by: PHYSICIAN ASSISTANT

## 2021-12-04 RX ORDER — FUROSEMIDE 40 MG/1
20 TABLET ORAL DAILY
Status: DISCONTINUED | OUTPATIENT
Start: 2021-12-04 | End: 2021-12-05

## 2021-12-04 RX ORDER — LEVOFLOXACIN 5 MG/ML
750 INJECTION, SOLUTION INTRAVENOUS EVERY 24 HOURS
Status: DISCONTINUED | OUTPATIENT
Start: 2021-12-04 | End: 2021-12-05

## 2021-12-04 RX ORDER — SODIUM CHLORIDE 0.9 % (FLUSH) 0.9 %
5-40 SYRINGE (ML) INJECTION EVERY 12 HOURS SCHEDULED
Status: DISCONTINUED | OUTPATIENT
Start: 2021-12-04 | End: 2021-12-09 | Stop reason: HOSPADM

## 2021-12-04 RX ORDER — IPRATROPIUM BROMIDE AND ALBUTEROL SULFATE 2.5; .5 MG/3ML; MG/3ML
1 SOLUTION RESPIRATORY (INHALATION)
Status: DISCONTINUED | OUTPATIENT
Start: 2021-12-04 | End: 2021-12-04

## 2021-12-04 RX ORDER — OXYCODONE HCL 10 MG/1
10 TABLET, FILM COATED, EXTENDED RELEASE ORAL 2 TIMES DAILY
Status: DISCONTINUED | OUTPATIENT
Start: 2021-12-04 | End: 2021-12-07

## 2021-12-04 RX ORDER — ONDANSETRON 2 MG/ML
4 INJECTION INTRAMUSCULAR; INTRAVENOUS EVERY 6 HOURS PRN
Status: DISCONTINUED | OUTPATIENT
Start: 2021-12-04 | End: 2021-12-09 | Stop reason: HOSPADM

## 2021-12-04 RX ORDER — METHYLPREDNISOLONE SODIUM SUCCINATE 40 MG/ML
40 INJECTION, POWDER, LYOPHILIZED, FOR SOLUTION INTRAMUSCULAR; INTRAVENOUS EVERY 12 HOURS
Status: DISCONTINUED | OUTPATIENT
Start: 2021-12-05 | End: 2021-12-05

## 2021-12-04 RX ORDER — IPRATROPIUM BROMIDE AND ALBUTEROL SULFATE 2.5; .5 MG/3ML; MG/3ML
1 SOLUTION RESPIRATORY (INHALATION) ONCE
Status: COMPLETED | OUTPATIENT
Start: 2021-12-04 | End: 2021-12-04

## 2021-12-04 RX ORDER — METHYLPREDNISOLONE SODIUM SUCCINATE 125 MG/2ML
125 INJECTION, POWDER, LYOPHILIZED, FOR SOLUTION INTRAMUSCULAR; INTRAVENOUS ONCE
Status: COMPLETED | OUTPATIENT
Start: 2021-12-04 | End: 2021-12-04

## 2021-12-04 RX ORDER — SODIUM CHLORIDE 0.9 % (FLUSH) 0.9 %
5-40 SYRINGE (ML) INJECTION PRN
Status: DISCONTINUED | OUTPATIENT
Start: 2021-12-04 | End: 2021-12-09 | Stop reason: HOSPADM

## 2021-12-04 RX ORDER — ACETAMINOPHEN 650 MG/1
650 SUPPOSITORY RECTAL EVERY 6 HOURS PRN
Status: DISCONTINUED | OUTPATIENT
Start: 2021-12-04 | End: 2021-12-09 | Stop reason: HOSPADM

## 2021-12-04 RX ORDER — ALBUTEROL SULFATE 2.5 MG/3ML
5 SOLUTION RESPIRATORY (INHALATION) ONCE
Status: COMPLETED | OUTPATIENT
Start: 2021-12-04 | End: 2021-12-04

## 2021-12-04 RX ORDER — IPRATROPIUM BROMIDE AND ALBUTEROL SULFATE 2.5; .5 MG/3ML; MG/3ML
1 SOLUTION RESPIRATORY (INHALATION) EVERY 4 HOURS
Status: DISCONTINUED | OUTPATIENT
Start: 2021-12-04 | End: 2021-12-06

## 2021-12-04 RX ORDER — ONDANSETRON 4 MG/1
4 TABLET, ORALLY DISINTEGRATING ORAL EVERY 8 HOURS PRN
Status: DISCONTINUED | OUTPATIENT
Start: 2021-12-04 | End: 2021-12-09 | Stop reason: HOSPADM

## 2021-12-04 RX ORDER — 0.9 % SODIUM CHLORIDE 0.9 %
500 INTRAVENOUS SOLUTION INTRAVENOUS ONCE
Status: COMPLETED | OUTPATIENT
Start: 2021-12-04 | End: 2021-12-04

## 2021-12-04 RX ORDER — FLUOXETINE HYDROCHLORIDE 20 MG/1
40 CAPSULE ORAL DAILY
Status: DISCONTINUED | OUTPATIENT
Start: 2021-12-05 | End: 2021-12-05

## 2021-12-04 RX ORDER — ACETAMINOPHEN 325 MG/1
650 TABLET ORAL EVERY 6 HOURS PRN
Status: DISCONTINUED | OUTPATIENT
Start: 2021-12-04 | End: 2021-12-09 | Stop reason: HOSPADM

## 2021-12-04 RX ORDER — PREGABALIN 100 MG/1
100 CAPSULE ORAL 3 TIMES DAILY
Status: DISCONTINUED | OUTPATIENT
Start: 2021-12-04 | End: 2021-12-07

## 2021-12-04 RX ORDER — SODIUM CHLORIDE 9 MG/ML
25 INJECTION, SOLUTION INTRAVENOUS PRN
Status: DISCONTINUED | OUTPATIENT
Start: 2021-12-04 | End: 2021-12-09 | Stop reason: HOSPADM

## 2021-12-04 RX ORDER — POLYETHYLENE GLYCOL 3350 17 G/17G
17 POWDER, FOR SOLUTION ORAL DAILY PRN
Status: DISCONTINUED | OUTPATIENT
Start: 2021-12-04 | End: 2021-12-09 | Stop reason: HOSPADM

## 2021-12-04 RX ADMIN — METHYLPREDNISOLONE SODIUM SUCCINATE 125 MG: 125 INJECTION, POWDER, FOR SOLUTION INTRAMUSCULAR; INTRAVENOUS at 16:26

## 2021-12-04 RX ADMIN — ENOXAPARIN SODIUM 40 MG: 100 INJECTION SUBCUTANEOUS at 21:17

## 2021-12-04 RX ADMIN — IPRATROPIUM BROMIDE AND ALBUTEROL SULFATE 1 AMPULE: .5; 3 SOLUTION RESPIRATORY (INHALATION) at 15:49

## 2021-12-04 RX ADMIN — SODIUM CHLORIDE 500 ML: 9 INJECTION, SOLUTION INTRAVENOUS at 16:25

## 2021-12-04 RX ADMIN — IPRATROPIUM BROMIDE AND ALBUTEROL SULFATE 1 AMPULE: .5; 3 SOLUTION RESPIRATORY (INHALATION) at 19:58

## 2021-12-04 RX ADMIN — ALBUTEROL SULFATE 5 MG: 2.5 SOLUTION RESPIRATORY (INHALATION) at 15:49

## 2021-12-04 RX ADMIN — OXYCODONE HYDROCHLORIDE 10 MG: 10 TABLET, FILM COATED, EXTENDED RELEASE ORAL at 21:17

## 2021-12-04 RX ADMIN — FUROSEMIDE 20 MG: 40 TABLET ORAL at 20:10

## 2021-12-04 RX ADMIN — VANCOMYCIN HYDROCHLORIDE 1000 MG: 1 INJECTION, POWDER, LYOPHILIZED, FOR SOLUTION INTRAVENOUS at 17:09

## 2021-12-04 RX ADMIN — PREGABALIN 100 MG: 100 CAPSULE ORAL at 21:17

## 2021-12-04 RX ADMIN — CEFEPIME HYDROCHLORIDE 2000 MG: 2 INJECTION, POWDER, FOR SOLUTION INTRAVENOUS at 16:27

## 2021-12-04 RX ADMIN — LEVOFLOXACIN 750 MG: 5 INJECTION, SOLUTION INTRAVENOUS at 21:16

## 2021-12-04 ASSESSMENT — ENCOUNTER SYMPTOMS
COUGH: 1
SHORTNESS OF BREATH: 0
NAUSEA: 0
DIARRHEA: 0
RHINORRHEA: 0
VOMITING: 0
ABDOMINAL PAIN: 0

## 2021-12-04 ASSESSMENT — PAIN SCALES - GENERAL
PAINLEVEL_OUTOF10: 3
PAINLEVEL_OUTOF10: 6
PAINLEVEL_OUTOF10: 6

## 2021-12-04 ASSESSMENT — PAIN DESCRIPTION - LOCATION: LOCATION: GENERALIZED

## 2021-12-04 ASSESSMENT — PAIN DESCRIPTION - PAIN TYPE: TYPE: CHRONIC PAIN

## 2021-12-04 NOTE — ED NOTES
Bed: 18  Expected date:   Expected time:   Means of arrival:   Comments:  Medic 75-renal failure-lethargic     Keke Stevenson RN  12/04/21 1484

## 2021-12-04 NOTE — ED PROVIDER NOTES
I independently performed a history and physical on Mandy Holter. All diagnostic, treatment, and disposition decisions were made by myself in conjunction with the advanced practice provider. Briefly, this is a 82 y.o. female here for 1d of decreased responsiveness, AMS, hypoxia, confusion, cough  No fever  Recent admission for PNA  History mostly from daughter @ bedside. On exam, Obese, ronchorous breath sounds, warm to touch, tachycardiac    EKG   EKG reviewed by myself  Dated today, 1450  Rate 84, NSR, LBBB  No change from 30 Oct 21     Ruth Sky MD  12/04/21 1458            Screenings                   MDM  86yoF BIB EMS from home for increased weakness and confusion noted to be hypercarbic, hypoxic, septic, leukocytotitic  Gestalt for ongoing PNA  Broad ABX, Septic workup  Lactate 1; hold off 30ml/kg bolus unless hypotensive given obesity. 35min critical care time  Indication and intervention as above. Patient Referrals:  No follow-up provider specified. Discharge Medications:  New Prescriptions    No medications on file       FINAL IMPRESSION  1. Acute respiratory failure with hypercapnia (HCC)    2. COPD exacerbation ([facility] [address]    3. Septicemia ([facility] [address]    4. Pneumonia of right lower lobe due to infectious organism    5. Contusion of face, initial encounter        Blood pressure (!) 124/53, pulse 94, temperature 99.8 °F (37.7 °C), temperature source Oral, resp. rate 17, height 5' 4\" (1.626 m), weight 250 lb (113.4 kg), SpO2 99 %. For further details of [facility] emergency department encounter, please see documentation by advanced practice provider, Sandhya Matthews.          Ruth Sky MD  12/04/21 9151

## 2021-12-04 NOTE — PROGRESS NOTES
12/04/21 1859   RT Protocol   History Pulmonary Disease 2   Respiratory pattern 4   Breath sounds 6   Cough 1   Indications for Bronchodilator Therapy Wheezing associated with pulm disorder   Bronchodilator Assessment Score 13

## 2021-12-04 NOTE — ED PROVIDER NOTES
[facility]        Pt Name: Gerardo Bruce  MRN: [id number]  [place] 1935  Date of evaluation: 12/4/2021  Provider: Marcy Wallace PA-C  PCP: Etha Schirmer  Note Started: 3:14 PM EST        I have seen and evaluated this patient with my supervising physician Jimmy Vivar MD.    [facility]       Chief Complaint   Patient presents with    Fatigue     Pt in [facility] EMS, per report by family pt has ARF and they are trying to manage pt at home, but now concerned about pt's lethargy. Frequent falls this week with various stages of bruising noted to face. HISTORY OF PRESENT ILLNESS   (Location, Timing/Onset, Context/Setting, Quality, Duration, Modifying Factors, Severity, Associated Signs and Symptoms)  Note limiting factors. Chief Complaint: Fatigue    Gerardo Bruce is a 82 y.o. female who presents emergency department today for evaluation for general fatigue. The patient lives at home with her daughter. The daughter states that the patient has had increasing confusion, and increasing weakness since Tuesday. The patient does have a history of COPD, as well as CHF. The patient is normally on 2 to 2-1/2 L of oxygen continuously, today is requiring 4 L. The daughter states that the patient fell forward out of her chair on Tuesday, she states that since then, patient has had increasing fatigue. Patient does have multiple bruising noted to her face, she is not reporting any headaches. She is not on any blood thinners, however the daughter states that the patient refused to go to the emergency room. She states that yesterday patient started with a cough which is productive of white sputum, there is been no hemoptysis. Patient's oxygen saturation was 88% today on her normal oxygen, she called EMS and was sent to the ED.   The daughter also adds that the patient saw her primary care physician 2 days ago, and that she did have her drawn, she states that she was in acute kidney failure. She states that they attempted to have the patient increase her fluids at home. The patient has not had any vomiting. No diarrhea. No dysuria. Patient is a full code. No other complaints    Nursing Notes were all reviewed and agreed with or any disagreements were addressed in the HPI. REVIEW OF SYSTEMS    (2-9 systems for level 4, 10 or more for level 5)     Review of Systems   Constitutional: Positive for fatigue. Negative for activity change, appetite change, chills and fever. HENT: Negative for congestion and rhinorrhea. Respiratory: Positive for cough. Negative for shortness of breath. Cardiovascular: Negative for chest pain. Gastrointestinal: Negative for abdominal pain, diarrhea, nausea and vomiting. Genitourinary: Negative for difficulty urinating, dysuria and hematuria. Psychiatric/Behavioral: Positive for confusion. Positives and Pertinent negatives as per HPI. Except as noted above in the ROS, all other systems were reviewed and negative. PAST MEDICAL HISTORY     Past Medical History:   Diagnosis Date    Arthritis     osteo    COPD (chronic obstructive pulmonary disease) ([facility] [address]     Hemorrhoids     Hernia of unspecified site of abdominal cavity without mention of obstruction or gangrene     Incontinence     Knee pain, bilateral     pt states no cartilage    Spinal stenosis     Spinal stenosis          SURGICAL HISTORY     Past Surgical History:   Procedure Laterality Date    CHOLECYSTECTOMY      PARTIAL HYSTERECTOMY           CURRENTMEDICATIONS       Previous Medications    ESOMEPRAZOLE (NEXIUM) 40 MG CAPSULE    Take 40 mg by mouth every morning (before breakfast). FLUOXETINE (PROZAC) 20 MG CAPSULE    Take 40 mg by mouth daily.       FUROSEMIDE (LASIX) 40 MG TABLET    20 mg     FUROSEMIDE (LASIX) 40 MG TABLET    Take 1 tablet by mouth daily    HYDROCORTISONE (ANUSOL-HC) 2.5 round, and reactive to light. Neck:      Trachea: No tracheal deviation. Cardiovascular:      Rate and Rhythm: Normal rate and regular rhythm. Comments: 1+ edema to bilateral lower legs  Pulmonary:      Effort: Pulmonary effort is normal. No respiratory distress. Breath sounds: Rales present. No wheezing. Comments: Diminished aeration throughout all lung fields with rales to bilateral bases  Abdominal:      General: Bowel sounds are normal. There is no distension. Palpations: Abdomen is soft. Tenderness: There is no abdominal tenderness. There is no guarding. Musculoskeletal:         General: Normal range of motion. Cervical back: Normal range of motion and neck supple. Skin:     General: Skin is warm and dry. Neurological:      General: No focal deficit present. Mental Status: She is alert.    Psychiatric:         Behavior: Behavior normal.         DIAGNOSTIC RESULTS   LABS:    Labs Reviewed   BLOOD GAS, VENOUS - Abnormal; Notable for the following components:       Result Value    pH, Jad 7.287 (*)     pCO2, Jad 80.9 (*)     pO2, Jad 114.0 (*)     HCO3, Venous 38.6 (*)     Base Excess, Jad 10.0 (*)     Carboxyhemoglobin 5.4 (*)     All other components within normal limits    Narrative:     Performed at:  [facility] [address], [place], [address]   Phone [phone]   CBC WITH AUTO DIFFERENTIAL - Abnormal; Notable for the following components:    WBC 21.7 (*)     RBC 3.42 (*)     Hemoglobin 8.8 (*)     Hematocrit 28.7 (*)     MCH 25.8 (*)     MCHC 30.8 (*)     RDW 17.2 (*)     Neutrophils Absolute 16.8 (*)     Monocytes Absolute 2.0 (*)     All other components within normal limits    Narrative:     Performed at:  [facility] [address], [place], [address]   Phone [phone]   COMPREHENSIVE METABOLIC PANEL - Abnormal; Notable for the following components:    Chloride 93 (*)     CO2 35 (*)     Glucose 128 (*)     GFR Non-African American 60 (*)     Albumin/Globulin Ratio 0.9 (*)     Alkaline Phosphatase 167 (*)     All other components within normal limits    Narrative:     Performed at:  [facility] [address], [facility], [address] [place] [address]   Phone [phone]   BRAIN NATRIURETIC PEPTIDE - Abnormal; Notable for the following components:    Pro-BNP 1,962 (*)     All other components within normal limits    Narrative:     Performed at:  [facility] [address], [facility], [address] [place] [address]   Phone [phone]   PROTIME-INR - Abnormal; Notable for the following components:    Protime 12.9 (*)     INR 1.14 (*)     All other components within normal limits    Narrative:     Performed at:  [facility] [address], [facility], [facility]   Phone [phone]   PROCALCITONIN - Abnormal; Notable for the following components:    Procalcitonin 0.21 (*)     All other components within normal limits    Narrative:     Performed at:  [facility] [address], [facility], [address] [place] [address]   Phone [phone]   TROPONIN - Abnormal; Notable for the following components:    Troponin 0.02 (*)     All other components within normal limits    Narrative:     Performed at:  [facility] [address], [facility], [facility]   Phone [phone]   CULTURE, BLOOD 2   CULTURE, BLOOD 1   APTT    Narrative:     Performed at:  [facility] [address], [facility], [facility]   Phone [phone]   LACTATE, SEPSIS    Narrative:     Performed at:  [facility] [address], [facility], [facility] [employer]   Phone [phone]   URINE RT REFLEX TO CULTURE       When ordered only abnormal lab results are displayed. All other labs were within normal range or not returned as of this dictation. EKG:  When ordered, EKG's are interpreted by the Emergency Department Physician in the absence of a cardiologist.  Please see their note for interpretation of EKG. RADIOLOGY:   Non-plain film images such as CT, Ultrasound and MRI are read by the radiologist. Plain radiographic images are visualized and preliminarily interpreted by the ED Provider with the below findings:        Interpretation per the Radiologist below, if available at the time of this note:    XR CHEST PORTABLE   Final Result   Improving right-sided pneumonia when compared to 11/02/2021. Questionable   small right pleural effusion with persistent bibasilar opacities which could   reflect residual pneumonia or atelectasis. CT CERVICAL SPINE WO CONTRAST   Final Result   No acute fracture or subluxation. Advanced multilevel spondylosis, including moderate narrowing of the central   canal at C5-6. CT FACIAL BONES WO CONTRAST   Final Result   Left mandibular condyle is in open mouth position. Clinical correlation is   recommended. No acute facial fractures are identified. Left frontal scalp contusion. CT HEAD WO CONTRAST   Preliminary Result   1. Hypodensity in the left occipital lobe, suspected to be subacute or   chronic infarct. If it would alter patient's management, MR would be more   sensitive. 2. Left frontal soft tissue swelling. No associated calvarial abnormality. No results found. PROCEDURES   Unless otherwise noted below, none     Procedures    CRITICAL CARE TIME   Critical Care  There was a high probability of life-threatening clinical deterioration in the patient's condition requiring my urgent intervention. Total critical care time with the patient was 45 minutes excluding separately reportable procedures.   Critical care required due to patients respiratory failure with hypercapnia, hypoxemia, requiring BiPAP, breathing treatments, steroids, as an admission      CONSULTS:  None      EMERGENCY DEPARTMENT COURSE and DIFFERENTIAL DIAGNOSIS/MDM:   Vitals:    Vitals:    12/04/21 1358 12/04/21 1553   BP: (!) 124/53    Pulse: 94    Resp: 17    Temp: 99.8 °F (37.7 °C)    TempSrc: Oral    SpO2: 96% 99%   Weight: 250 lb (113.4 kg)    Height: 5' 4\" (1.626 m)        Patient was given the following medications:  Medications   albuterol (PROVENTIL) (5 MG/ML) 0.5% nebulizer solution (has no administration in time range)   methylPREDNISolone sodium (SOLU-MEDROL) injection 125 mg (has no administration in time range)   vancomycin 1000 mg IVPB in 250 mL D5W addavial (has no administration in time range)   cefepime (MAXIPIME) 2000 mg IVPB minibag (has no administration in time range)   ipratropium-albuterol (DUONEB) nebulizer solution 1 ampule (1 ampule Inhalation Given 12/4/21 1549)   albuterol (PROVENTIL) nebulizer solution 5 mg (5 mg Nebulization Given 12/4/21 1549)           Meagan Stager, this is a 27-year-old female who presents emergency department today with her daughter for evaluation for frequent falls, increasing fatigue which has been ongoing since Tuesday. Normally on 2 L of oxygen secondary history of COPD/CHF, and was requiring 4 L of oxygen here. On examination, the patient is overall ill-appearing, she does have multiple bruises noted to her face. She is alert to person, although there are no neurological deficits. She is diminished aeration throughout all lung fields with rales to bilateral bases. EKG as documented by my attending, please see his note for further details. VBG does show a pH of 7.28, the PCO2 of 80.9, patient be given breathing treatments, steroids, and will be placed on BiPAP. CBC shows leukocytosis of 21.7, she is anemic although improved from baseline. Her CMP is otherwise unremarkable.   Procalcitonin is normal.  Lactic acid is normal.  BNP is elevated, chest x-ray does show possible pneumonia, will place on vancomycin and cefepime for concern of hospital-acquired pneumonia. CT of head cervical spine and facial bones show no acute fracture    Patient will need to be mated for sepsis, as well as a COPD exacerbation, hypercapnia, hospitalist paged for admission, the patient is otherwise stable for admission. SEP-1 CORE MEASURE DATA    Classification: sepsis    Amount of fluids ordered: less than 30mL/kg because of a history of CHF NYHA III or IV with symptoms with minimal exertion/at rest.  Instead, 500 ml was ordered. More fluid initially would be potentially detrimental to the patient    Time at which sepsis was identified: 4:15 PM    Broad-spectrum antibiotics chosen: vancomycin and cefepime based on suspected source of: Pulmonary - Nosocomial    Repeat lactate level: not indicated due to initial lactate < 2    On reassessment after fluid resuscitation:   pending, to be completed by inpatient team    FINAL IMPRESSION      1. Acute respiratory failure with hypercapnia (HCC)    2. COPD exacerbation ([facility] [address]    3. Septicemia ([facility] [address]    4. Pneumonia of right lower lobe due to infectious organism    5. Contusion of face, initial encounter          DISPOSITION/PLAN   DISPOSITION Decision To Admit 12/04/2021 04:06:43 PM      PATIENT REFERRED TO:  No follow-up provider specified.     DISCHARGE MEDICATIONS:  New Prescriptions    No medications on file       DISCONTINUED MEDICATIONS:  Discontinued Medications    No medications on file              (Please note that portions of this note were completed with a voice recognition program.  Efforts were made to edit the dictations but occasionally words are mis-transcribed.)    Fernanda Aschoff, PA-C (electronically signed)            Fernanda Aschoff, PA-C  12/04/21 0168

## 2021-12-04 NOTE — PROGRESS NOTES
4 Eyes Skin Assessment     NAME:  Grace Garza  YOB: 1935  MEDICAL RECORD NUMBER:  [id number]    The patient is being assess for  Admission    I agree that 2 RN's have performed a thorough Head to Toe Skin Assessment on the patient. ALL assessment sites listed below have been assessed. Areas assessed by both nurses:    Head, Face, Ears, Shoulders, Back, Chest, Arms, Elbows, Hands, Sacrum. Buttock, Coccyx, Ischium and Legs. Feet and Heels        Does the Patient have a Wound? Yes wound(s) were present on assessment. LDA wound assessment was Initiated and completed         Jorge Luis Prevention initiated:  Yes   Wound Care Orders initiated:  No    Pressure Injury (Stage 3,4, Unstageable, DTI, NWPT, and Complex wounds) if present place consult order under [de-identified] Yes buttocks, sacrum, post thighs.   New and Established Ostomies if present place consult order under : No      Nurse 1 eSignature: Electronically signed by Walter Emerson RN on 12/4/21 at 7:32 PM EST    **SHARE this note so that the co-signing nurse is able to place an eSignature**    Nurse 2 eSignature: Electronically signed by Biju Hutton RN on 12/4/21 at 6:55 PM EST

## 2021-12-04 NOTE — PROGRESS NOTES
Pt arrived to unit on 15 L o2 NR, RT notified to place pt on Bi-pap. Ngo placed, immediate alejandrina urine ret, UA collected per order. Lethargic, responds to touch and intermittently moans when spoken to or moved. Pt arrived with pressure injuries to B posterior thighs and sacrum, will get wound consult order.

## 2021-12-05 LAB
ANION GAP SERPL CALCULATED.3IONS-SCNC: 5 MMOL/L (ref 3–16)
BUN BLDV-MCNC: 17 MG/DL (ref 7–20)
CALCIUM SERPL-MCNC: 8.4 MG/DL (ref 8.3–10.6)
CHLORIDE BLD-SCNC: 93 MMOL/L (ref 99–110)
CO2: 35 MMOL/L (ref 21–32)
CREAT SERPL-MCNC: 0.8 MG/DL (ref 0.6–1.2)
EKG ATRIAL RATE: 84 BPM
EKG DIAGNOSIS: NORMAL
EKG P AXIS: 46 DEGREES
EKG P-R INTERVAL: 196 MS
EKG Q-T INTERVAL: 394 MS
EKG QRS DURATION: 124 MS
EKG QTC CALCULATION (BAZETT): 465 MS
EKG R AXIS: -26 DEGREES
EKG T AXIS: 67 DEGREES
EKG VENTRICULAR RATE: 84 BPM
GFR AFRICAN AMERICAN: >60
GFR NON-AFRICAN AMERICAN: >60
GLUCOSE BLD-MCNC: 153 MG/DL (ref 70–99)
HCT VFR BLD CALC: 25.9 % (ref 36–48)
HEMOGLOBIN: 8 G/DL (ref 12–16)
MCH RBC QN AUTO: 25.7 PG (ref 26–34)
MCHC RBC AUTO-ENTMCNC: 30.9 G/DL (ref 31–36)
MCV RBC AUTO: 83.2 FL (ref 80–100)
PDW BLD-RTO: 17.2 % (ref 12.4–15.4)
PLATELET # BLD: 210 K/UL (ref 135–450)
PMV BLD AUTO: 9.2 FL (ref 5–10.5)
POTASSIUM SERPL-SCNC: 4.5 MMOL/L (ref 3.5–5.1)
RBC # BLD: 3.11 M/UL (ref 4–5.2)
REPORT: NORMAL
SODIUM BLD-SCNC: 133 MMOL/L (ref 136–145)
WBC # BLD: 23.4 K/UL (ref 4–11)

## 2021-12-05 PROCEDURE — 6370000000 HC RX 637 (ALT 250 FOR IP): Performed by: FAMILY MEDICINE

## 2021-12-05 PROCEDURE — 1200000000 HC SEMI PRIVATE

## 2021-12-05 PROCEDURE — 6360000002 HC RX W HCPCS: Performed by: INTERNAL MEDICINE

## 2021-12-05 PROCEDURE — 94761 N-INVAS EAR/PLS OXIMETRY MLT: CPT

## 2021-12-05 PROCEDURE — 87081 CULTURE SCREEN ONLY: CPT

## 2021-12-05 PROCEDURE — 93010 ELECTROCARDIOGRAM REPORT: CPT | Performed by: INTERNAL MEDICINE

## 2021-12-05 PROCEDURE — 6360000002 HC RX W HCPCS: Performed by: FAMILY MEDICINE

## 2021-12-05 PROCEDURE — 94640 AIRWAY INHALATION TREATMENT: CPT

## 2021-12-05 PROCEDURE — 94660 CPAP INITIATION&MGMT: CPT

## 2021-12-05 PROCEDURE — 85027 COMPLETE CBC AUTOMATED: CPT

## 2021-12-05 PROCEDURE — 80048 BASIC METABOLIC PNL TOTAL CA: CPT

## 2021-12-05 PROCEDURE — 2700000000 HC OXYGEN THERAPY PER DAY

## 2021-12-05 PROCEDURE — 2580000003 HC RX 258: Performed by: INTERNAL MEDICINE

## 2021-12-05 RX ORDER — METHYLPREDNISOLONE SODIUM SUCCINATE 40 MG/ML
40 INJECTION, POWDER, LYOPHILIZED, FOR SOLUTION INTRAMUSCULAR; INTRAVENOUS EVERY 24 HOURS
Status: DISCONTINUED | OUTPATIENT
Start: 2021-12-06 | End: 2021-12-05

## 2021-12-05 RX ORDER — FUROSEMIDE 10 MG/ML
20 INJECTION INTRAMUSCULAR; INTRAVENOUS 2 TIMES DAILY
Status: DISCONTINUED | OUTPATIENT
Start: 2021-12-05 | End: 2021-12-05

## 2021-12-05 RX ORDER — METHYLPREDNISOLONE SODIUM SUCCINATE 40 MG/ML
40 INJECTION, POWDER, LYOPHILIZED, FOR SOLUTION INTRAMUSCULAR; INTRAVENOUS EVERY 12 HOURS
Status: DISCONTINUED | OUTPATIENT
Start: 2021-12-05 | End: 2021-12-06

## 2021-12-05 RX ORDER — 0.9 % SODIUM CHLORIDE 0.9 %
1000 INTRAVENOUS SOLUTION INTRAVENOUS ONCE
Status: COMPLETED | OUTPATIENT
Start: 2021-12-05 | End: 2021-12-05

## 2021-12-05 RX ORDER — LINEZOLID 2 MG/ML
600 INJECTION, SOLUTION INTRAVENOUS EVERY 12 HOURS
Status: DISCONTINUED | OUTPATIENT
Start: 2021-12-05 | End: 2021-12-07

## 2021-12-05 RX ORDER — BUDESONIDE AND FORMOTEROL FUMARATE DIHYDRATE 160; 4.5 UG/1; UG/1
2 AEROSOL RESPIRATORY (INHALATION) 2 TIMES DAILY
Status: DISCONTINUED | OUTPATIENT
Start: 2021-12-05 | End: 2021-12-09 | Stop reason: HOSPADM

## 2021-12-05 RX ADMIN — IPRATROPIUM BROMIDE AND ALBUTEROL SULFATE 1 AMPULE: .5; 3 SOLUTION RESPIRATORY (INHALATION) at 00:04

## 2021-12-05 RX ADMIN — FLUOXETINE 40 MG: 20 CAPSULE ORAL at 08:13

## 2021-12-05 RX ADMIN — IPRATROPIUM BROMIDE AND ALBUTEROL SULFATE 1 AMPULE: .5; 3 SOLUTION RESPIRATORY (INHALATION) at 11:54

## 2021-12-05 RX ADMIN — IPRATROPIUM BROMIDE AND ALBUTEROL SULFATE 1 AMPULE: .5; 3 SOLUTION RESPIRATORY (INHALATION) at 19:58

## 2021-12-05 RX ADMIN — OXYCODONE HYDROCHLORIDE 10 MG: 10 TABLET, FILM COATED, EXTENDED RELEASE ORAL at 08:13

## 2021-12-05 RX ADMIN — METHYLPREDNISOLONE SODIUM SUCCINATE 40 MG: 40 INJECTION, POWDER, FOR SOLUTION INTRAMUSCULAR; INTRAVENOUS at 21:20

## 2021-12-05 RX ADMIN — ACETAMINOPHEN 650 MG: 325 TABLET ORAL at 19:59

## 2021-12-05 RX ADMIN — PREGABALIN 100 MG: 100 CAPSULE ORAL at 08:14

## 2021-12-05 RX ADMIN — LINEZOLID 600 MG: 600 INJECTION, SOLUTION INTRAVENOUS at 11:00

## 2021-12-05 RX ADMIN — IPRATROPIUM BROMIDE AND ALBUTEROL SULFATE 1 AMPULE: .5; 3 SOLUTION RESPIRATORY (INHALATION) at 02:32

## 2021-12-05 RX ADMIN — METHYLPREDNISOLONE SODIUM SUCCINATE 40 MG: 40 INJECTION, POWDER, FOR SOLUTION INTRAMUSCULAR; INTRAVENOUS at 15:31

## 2021-12-05 RX ADMIN — ENOXAPARIN SODIUM 40 MG: 100 INJECTION SUBCUTANEOUS at 19:58

## 2021-12-05 RX ADMIN — PIPERACILLIN AND TAZOBACTAM 3375 MG: 3; .375 INJECTION, POWDER, LYOPHILIZED, FOR SOLUTION INTRAVENOUS at 12:39

## 2021-12-05 RX ADMIN — SODIUM CHLORIDE 1000 ML: 9 INJECTION, SOLUTION INTRAVENOUS at 10:53

## 2021-12-05 RX ADMIN — LINEZOLID 600 MG: 600 INJECTION, SOLUTION INTRAVENOUS at 23:36

## 2021-12-05 RX ADMIN — PIPERACILLIN AND TAZOBACTAM 3375 MG: 3; .375 INJECTION, POWDER, LYOPHILIZED, FOR SOLUTION INTRAVENOUS at 21:20

## 2021-12-05 RX ADMIN — IPRATROPIUM BROMIDE AND ALBUTEROL SULFATE 1 AMPULE: .5; 3 SOLUTION RESPIRATORY (INHALATION) at 17:17

## 2021-12-05 RX ADMIN — METHYLPREDNISOLONE SODIUM SUCCINATE 40 MG: 40 INJECTION, POWDER, FOR SOLUTION INTRAMUSCULAR; INTRAVENOUS at 05:11

## 2021-12-05 RX ADMIN — Medication 2 PUFF: at 19:58

## 2021-12-05 ASSESSMENT — PAIN SCALES - GENERAL
PAINLEVEL_OUTOF10: 3
PAINLEVEL_OUTOF10: 0
PAINLEVEL_OUTOF10: 2
PAINLEVEL_OUTOF10: 4
PAINLEVEL_OUTOF10: 6
PAINLEVEL_OUTOF10: 3
PAINLEVEL_OUTOF10: 1

## 2021-12-05 ASSESSMENT — PAIN DESCRIPTION - PAIN TYPE
TYPE: CHRONIC PAIN
TYPE: CHRONIC PAIN

## 2021-12-05 ASSESSMENT — PAIN DESCRIPTION - ORIENTATION: ORIENTATION: RIGHT;LEFT

## 2021-12-05 ASSESSMENT — PAIN DESCRIPTION - LOCATION
LOCATION: LEG
LOCATION: GENERALIZED

## 2021-12-05 NOTE — PROGRESS NOTES
Pt awakens confused and forgetful, pulls tubes, lines, gown, needs re-orientation, becomes cooperative and pleasant at that time, bipap now 60%, will monitor.

## 2021-12-05 NOTE — PROGRESS NOTES
EVENTS:12/4 admit for increase confusion,fatigue, hypoxia. Hx of copd,chf  12/5 R side PNA on pcxr improved                      Todays wt:243 lbs  Admission wt:250lbs. NEURO:Awake,alert    CARDIAC:NSR 70s              S1,S2 k4.5    RESP: 02 7 L NC. PCXR R sided pneumonia w small R pl effusion    GI:active BS    : patel intact    SKIN:multiple abrasions, bruising on face    MUSCUSKELETAL:weak extrem. LINES RPIV            See flowsheets for details, VS, MAR for meds and titration.

## 2021-12-05 NOTE — H&P
HOSPITALISTS HISTORY AND PHYSICAL  12/04/21  Patient Information:  Jess Pratt is a 82 y.o. female [id number]  PCP:  Thor Persaud (Tel: None )    Chief complaint:    Chief Complaint   Patient presents with    Fatigue     Pt in [facility] EMS, per report by family pt has ARF and they are trying to manage pt at home, but now concerned about pt's lethargy. Frequent falls this week with various stages of bruising noted to face. History of Present Illness:  Nathaly Hall is a 82 y.o. female with h/o COPD, chronic respiratory failure ( wears 2 L at baseline) , CHF obesity is brought in d/t confusion, weakness and  frequent falls . Per daughter the pt fell forward while she was sitting on the chair. She has multiple bruises on face. She is also hypoxic and is placed on bipap. Chest xray showed right sided pneumonia. Trauma work up including CT head and neck and face is neg for acute fracture and hemorrhage . The pt was admitted at [facility] in 10/21 due to respiratory failure and pneumonia. She was treated with IV antibiotics during that admission . She was then dc on po antibiotics and taperd dose of steroids. Was tested neg for COVID . She is immunized. REVIEW OF SYSTEMS:   Constitutional: +Ve for fever,chills or night sweats  ENT: Negative for rhinorrhea, epistaxis, hoarseness, sore throat. Respiratory: +ve for shortness of breath,wheezing  Cardiovascular: Negative for chest pain, palpitations   Gastrointestinal: Negative for nausea, vomiting, diarrhea  Genitourinary: Negative for polyuria, dysuria   Hematologic/Lymphatic: Negative for bleeding tendency, easy bruising  Musculoskeletal: Negative for myalgias and arthralgias  Neurologic: Negative for confusion,dysarthria. Skin: Negative for itching,rash  Psychiatric: Negative for depression,anxiety, agitation.   Endocrine: Negative for polydipsia,polyuria,heat Stuart Nims intolerance. Past Medical History:   has a past medical history of Arthritis, COPD (chronic obstructive pulmonary disease) ([facility] [address] Hemorrhoids, Hernia of unspecified site of abdominal cavity without mention of obstruction or gangrene, Incontinence, Knee pain, bilateral, Spinal stenosis, and Spinal stenosis. Past Surgical History:   has a past surgical history that includes Cholecystectomy and partial hysterectomy (cervix not removed). Medications:  No current facility-administered medications on file prior to encounter. Current Outpatient Medications on File Prior to Encounter   Medication Sig Dispense Refill    furosemide (LASIX) 40 MG tablet Take 1 tablet by mouth daily 60 tablet 3    oxyCODONE (OXYCONTIN) 10 MG extended release tablet Take 10 mg by mouth every 12 hours.  oxyCODONE-acetaminophen (PERCOCET)  MG per tablet Take 1 tablet by mouth every 6 hours as needed for Pain.  hydrocortisone (ANUSOL-HC) 2.5 % rectal cream Place rectally 2 times daily. 1 Tube 0    ipratropium-albuterol (DUONEB) 0.5-2.5 (3) MG/3ML SOLN nebulizer solution Inhale 1 vial into the lungs every 6 hours as needed for Shortness of Breath      SYMBICORT 160-4.5 MCG/ACT AERO TAKE 2 PUFFS TWICE A DAY  3    levothyroxine (SYNTHROID) 112 MCG tablet TAKE 1 TABLET BY MOUTH EVERY DAY  3    fluoxetine (PROZAC) 20 MG capsule Take 40 mg by mouth daily.  pregabalin (LYRICA) 50 MG capsule Take 100 mg by mouth 3 times daily. new dosage per daughter upon med list review.  esomeprazole (NEXIUM) 40 MG capsule Take 40 mg by mouth every morning (before breakfast).          Current Facility-Administered Medications   Medication Dose Route Frequency Provider Last Rate Last Admin    budesonide-formoterol (SYMBICORT) 160-4.5 MCG/ACT inhaler 2 puff  2 puff Inhalation BID Lois Doe MD        methylPREDNISolone sodium (SOLU-MEDROL) injection 40 mg  40 mg IntraVENous Q12H Lois Doe MD   40 mg at 12/05/21 5186  levoFLOXacin (LEVAQUIN) 750 MG/150ML infusion 750 mg  750 mg IntraVENous Q24H Lenin Paul MD   Stopped at 12/04/21 2250    oxyCODONE (OXYCONTIN) extended release tablet 10 mg  10 mg Oral BID Lenin Paul MD   10 mg at 12/04/21 2117    pregabalin (LYRICA) capsule 100 mg  100 mg Oral TID Lenin Paul MD   100 mg at 12/04/21 2117    FLUoxetine (PROZAC) capsule 40 mg  40 mg Oral Daily Lenin Paul MD        sodium chloride flush 0.9 % injection 5-40 mL  5-40 mL IntraVENous 2 times per day Lenin Paul MD        sodium chloride flush 0.9 % injection 5-40 mL  5-40 mL IntraVENous PRN Lenin Paul MD        0.9 % sodium chloride infusion  25 mL IntraVENous PRN Lenin Paul MD        enoxaparin (LOVENOX) injection 40 mg  40 mg SubCUTAneous Nightly Lenin Paul MD   40 mg at 12/04/21 2117    ondansetron (ZOFRAN-ODT) disintegrating tablet 4 mg  4 mg Oral Q8H PRN Lenin Paul MD        Or    ondansetron (ZOFRAN) injection 4 mg  4 mg IntraVENous Q6H PRN Lenin Paul MD        polyethylene glycol (GLYCOLAX) packet 17 g  17 g Oral Daily PRN Lenin Paul MD        acetaminophen (TYLENOL) tablet 650 mg  650 mg Oral Q6H PRN Lenin Paul MD        Or   [employer] acetaminophen (TYLENOL) suppository 650 mg  650 mg Rectal Q6H PRN Lenin Paul MD        ipratropium-albuterol (DUONEB) nebulizer solution 1 ampule  1 ampule Inhalation Q4H Lenin Paul MD   1 ampule at 12/05/21 0232     Allergies: Allergies   Allergen Reactions    Sulfa Antibiotics      Unknown reaction        Social History:   reports that she has quit smoking. She has never used smokeless tobacco. She reports that she does not drink alcohol and does not use drugs. Family History:  family history is not on file.  , family history reviewed not pertinent to current admission      Physical Exam:  /65   Pulse 85   Temp 97.1 °F (36.2 °C) (Temporal)   Resp 14   Ht 5' 4\" (1.626 m)   Wt 243 lb 2.7 oz (110.3 kg)   SpO2 94%   BMI 41.74 kg/m² General appearance:  Appears comfortable. Well nourished  Eyes: Sclera clear, pupils equal  ENT: Moist mucus membranes, no thrush. Trachea midline. Cardiovascular: Regular rhythm, normal S1, S2. No murmur, gallop, rub. No edema in lower extremities  Respiratory: Clear to auscultation bilaterally, no wheeze, good inspiratory effort  Gastrointestinal: Abdomen soft, non-tender, not distended, normal bowel sounds  Musculoskeletal: No cyanosis in digits, neck supple  Neurology: Cranial nerves grossly intact. Confused and lethargic   Psychiatry: Appropriate affect. Not agitated  Skin: Warm, dry, normal turgor, no rash    Labs:  CBC:   Lab Results   Component Value Date    WBC 23.4 12/05/2021    RBC 3.11 12/05/2021    HGB 8.0 12/05/2021    HCT 25.9 12/05/2021    MCV 83.2 12/05/2021    MCH 25.7 12/05/2021    MCHC 30.9 12/05/2021    RDW 17.2 12/05/2021     12/05/2021    MPV 9.2 12/05/2021     BMP:    Lab Results   Component Value Date     12/05/2021    K 4.5 12/05/2021    K 4.1 10/30/2021    CL 93 12/05/2021    CO2 35 12/05/2021    BUN 17 12/05/2021    CREATININE 0.8 12/05/2021    CALCIUM 8.4 12/05/2021    GFRAA >60 12/05/2021    GFRAA >60 04/30/2013    LABGLOM >60 12/05/2021    GLUCOSE 153 12/05/2021       Chest Xray:   EKG:        Problem List  Active Problems:    Acute on chronic respiratory failure (HCC)  Resolved Problems:    * No resolved hospital problems. *        Assessment/Plan:         1. Acute on chronic respiratory failure  On BIPAP    Pneumonia  Chest xray showed right sided pneumonia and pleural effusion   COVID neg in 10,21, up to date with  vaccination   Cont IV antibiotics  Cultures pending     COPD   Cont breathing treatments and IV solumedrol     Admit as inpatient. I anticipate hospitalization spanning more than two midnights for investigation and treatment of the above medically necessary diagnoses.       Inna Blackwell MD    12/04/21

## 2021-12-05 NOTE — PROGRESS NOTES
Rec'd call from microbiology: Staph seen in one of the aerobic blood culture vials. Primary RN Mary notified of called result.

## 2021-12-06 ENCOUNTER — APPOINTMENT (OUTPATIENT)
Dept: CT IMAGING | Age: 86
DRG: 871 | End: 2021-12-06
Payer: COMMERCIAL

## 2021-12-06 ENCOUNTER — APPOINTMENT (OUTPATIENT)
Dept: MRI IMAGING | Age: 86
DRG: 871 | End: 2021-12-06
Payer: COMMERCIAL

## 2021-12-06 LAB
ANION GAP SERPL CALCULATED.3IONS-SCNC: 8 MMOL/L (ref 3–16)
BASE EXCESS ARTERIAL: 14.5 MMOL/L (ref -3–3)
BUN BLDV-MCNC: 27 MG/DL (ref 7–20)
CALCIUM SERPL-MCNC: 8.9 MG/DL (ref 8.3–10.6)
CARBOXYHEMOGLOBIN ARTERIAL: 1.5 % (ref 0–1.5)
CHLORIDE BLD-SCNC: 94 MMOL/L (ref 99–110)
CO2: 33 MMOL/L (ref 21–32)
CREAT SERPL-MCNC: 0.9 MG/DL (ref 0.6–1.2)
GFR AFRICAN AMERICAN: >60
GFR NON-AFRICAN AMERICAN: 60
GLUCOSE BLD-MCNC: 154 MG/DL (ref 70–99)
GLUCOSE BLD-MCNC: 203 MG/DL (ref 70–99)
GLUCOSE BLD-MCNC: 216 MG/DL (ref 70–99)
GLUCOSE BLD-MCNC: 233 MG/DL (ref 70–99)
HCO3 ARTERIAL: 37.6 MMOL/L (ref 21–29)
HCT VFR BLD CALC: 24.6 % (ref 36–48)
HEMOGLOBIN, ART, EXTENDED: 7.4 G/DL (ref 12–16)
HEMOGLOBIN: 7.6 G/DL (ref 12–16)
LV EF: 63 %
LVEF MODALITY: NORMAL
MAGNESIUM: 2.1 MG/DL (ref 1.8–2.4)
MCH RBC QN AUTO: 25.2 PG (ref 26–34)
MCHC RBC AUTO-ENTMCNC: 30.9 G/DL (ref 31–36)
MCV RBC AUTO: 81.5 FL (ref 80–100)
METHEMOGLOBIN ARTERIAL: 0.1 %
O2 SAT, ARTERIAL: 100 %
O2 THERAPY: ABNORMAL
PCO2 ARTERIAL: 39.6 MMHG (ref 35–45)
PDW BLD-RTO: 17.7 % (ref 12.4–15.4)
PERFORMED ON: ABNORMAL
PH ARTERIAL: 7.59 (ref 7.35–7.45)
PLATELET # BLD: 228 K/UL (ref 135–450)
PMV BLD AUTO: 9.6 FL (ref 5–10.5)
PO2 ARTERIAL: 125 MMHG (ref 75–108)
POTASSIUM SERPL-SCNC: 4.1 MMOL/L (ref 3.5–5.1)
RBC # BLD: 3.02 M/UL (ref 4–5.2)
SODIUM BLD-SCNC: 135 MMOL/L (ref 136–145)
TCO2 ARTERIAL: 87 MMOL/L
TROPONIN: <0.01 NG/ML
WBC # BLD: 22.1 K/UL (ref 4–11)

## 2021-12-06 PROCEDURE — 2700000000 HC OXYGEN THERAPY PER DAY

## 2021-12-06 PROCEDURE — 84484 ASSAY OF TROPONIN QUANT: CPT

## 2021-12-06 PROCEDURE — 80048 BASIC METABOLIC PNL TOTAL CA: CPT

## 2021-12-06 PROCEDURE — 6360000004 HC RX CONTRAST MEDICATION: Performed by: INTERNAL MEDICINE

## 2021-12-06 PROCEDURE — 6360000002 HC RX W HCPCS: Performed by: FAMILY MEDICINE

## 2021-12-06 PROCEDURE — 6370000000 HC RX 637 (ALT 250 FOR IP): Performed by: FAMILY MEDICINE

## 2021-12-06 PROCEDURE — 2580000003 HC RX 258: Performed by: FAMILY MEDICINE

## 2021-12-06 PROCEDURE — 71260 CT THORAX DX C+: CPT

## 2021-12-06 PROCEDURE — 6370000000 HC RX 637 (ALT 250 FOR IP): Performed by: INTERNAL MEDICINE

## 2021-12-06 PROCEDURE — 70551 MRI BRAIN STEM W/O DYE: CPT

## 2021-12-06 PROCEDURE — 85027 COMPLETE CBC AUTOMATED: CPT

## 2021-12-06 PROCEDURE — 1200000000 HC SEMI PRIVATE

## 2021-12-06 PROCEDURE — 99223 1ST HOSP IP/OBS HIGH 75: CPT | Performed by: INTERNAL MEDICINE

## 2021-12-06 PROCEDURE — 82803 BLOOD GASES ANY COMBINATION: CPT

## 2021-12-06 PROCEDURE — 83735 ASSAY OF MAGNESIUM: CPT

## 2021-12-06 PROCEDURE — 94660 CPAP INITIATION&MGMT: CPT

## 2021-12-06 PROCEDURE — 2580000003 HC RX 258: Performed by: INTERNAL MEDICINE

## 2021-12-06 PROCEDURE — 6360000002 HC RX W HCPCS: Performed by: INTERNAL MEDICINE

## 2021-12-06 PROCEDURE — 94640 AIRWAY INHALATION TREATMENT: CPT

## 2021-12-06 PROCEDURE — 94761 N-INVAS EAR/PLS OXIMETRY MLT: CPT

## 2021-12-06 PROCEDURE — C8929 TTE W OR WO FOL WCON,DOPPLER: HCPCS

## 2021-12-06 RX ORDER — IPRATROPIUM BROMIDE AND ALBUTEROL SULFATE 2.5; .5 MG/3ML; MG/3ML
1 SOLUTION RESPIRATORY (INHALATION) EVERY 4 HOURS PRN
Status: DISCONTINUED | OUTPATIENT
Start: 2021-12-06 | End: 2021-12-09 | Stop reason: HOSPADM

## 2021-12-06 RX ORDER — DEXTROSE MONOHYDRATE 25 G/50ML
12.5 INJECTION, SOLUTION INTRAVENOUS PRN
Status: DISCONTINUED | OUTPATIENT
Start: 2021-12-06 | End: 2021-12-09 | Stop reason: HOSPADM

## 2021-12-06 RX ORDER — DEXTROSE MONOHYDRATE 50 MG/ML
100 INJECTION, SOLUTION INTRAVENOUS PRN
Status: DISCONTINUED | OUTPATIENT
Start: 2021-12-06 | End: 2021-12-09 | Stop reason: HOSPADM

## 2021-12-06 RX ORDER — IPRATROPIUM BROMIDE AND ALBUTEROL SULFATE 2.5; .5 MG/3ML; MG/3ML
1 SOLUTION RESPIRATORY (INHALATION) 3 TIMES DAILY
Status: DISCONTINUED | OUTPATIENT
Start: 2021-12-06 | End: 2021-12-09 | Stop reason: HOSPADM

## 2021-12-06 RX ORDER — NICOTINE POLACRILEX 4 MG
15 LOZENGE BUCCAL PRN
Status: DISCONTINUED | OUTPATIENT
Start: 2021-12-06 | End: 2021-12-09 | Stop reason: HOSPADM

## 2021-12-06 RX ORDER — METHYLPREDNISOLONE SODIUM SUCCINATE 40 MG/ML
40 INJECTION, POWDER, LYOPHILIZED, FOR SOLUTION INTRAMUSCULAR; INTRAVENOUS DAILY
Status: DISCONTINUED | OUTPATIENT
Start: 2021-12-07 | End: 2021-12-08

## 2021-12-06 RX ORDER — INSULIN LISPRO 100 [IU]/ML
0-6 INJECTION, SOLUTION INTRAVENOUS; SUBCUTANEOUS
Status: DISCONTINUED | OUTPATIENT
Start: 2021-12-06 | End: 2021-12-09 | Stop reason: HOSPADM

## 2021-12-06 RX ORDER — INSULIN LISPRO 100 [IU]/ML
0-3 INJECTION, SOLUTION INTRAVENOUS; SUBCUTANEOUS NIGHTLY
Status: DISCONTINUED | OUTPATIENT
Start: 2021-12-06 | End: 2021-12-09 | Stop reason: HOSPADM

## 2021-12-06 RX ADMIN — PIPERACILLIN AND TAZOBACTAM 3375 MG: 3; .375 INJECTION, POWDER, LYOPHILIZED, FOR SOLUTION INTRAVENOUS at 13:58

## 2021-12-06 RX ADMIN — ACETAMINOPHEN 650 MG: 325 TABLET ORAL at 19:54

## 2021-12-06 RX ADMIN — Medication 10 ML: at 09:02

## 2021-12-06 RX ADMIN — INSULIN LISPRO 1 UNITS: 100 INJECTION, SOLUTION INTRAVENOUS; SUBCUTANEOUS at 20:30

## 2021-12-06 RX ADMIN — IOPAMIDOL 75 ML: 755 INJECTION, SOLUTION INTRAVENOUS at 16:19

## 2021-12-06 RX ADMIN — INSULIN LISPRO 2 UNITS: 100 INJECTION, SOLUTION INTRAVENOUS; SUBCUTANEOUS at 12:51

## 2021-12-06 RX ADMIN — PIPERACILLIN AND TAZOBACTAM 3375 MG: 3; .375 INJECTION, POWDER, LYOPHILIZED, FOR SOLUTION INTRAVENOUS at 19:57

## 2021-12-06 RX ADMIN — INSULIN LISPRO 2 UNITS: 100 INJECTION, SOLUTION INTRAVENOUS; SUBCUTANEOUS at 16:54

## 2021-12-06 RX ADMIN — IPRATROPIUM BROMIDE AND ALBUTEROL SULFATE 1 AMPULE: .5; 3 SOLUTION RESPIRATORY (INHALATION) at 21:25

## 2021-12-06 RX ADMIN — Medication 2 PUFF: at 21:25

## 2021-12-06 RX ADMIN — ACETAMINOPHEN 650 MG: 325 TABLET ORAL at 13:57

## 2021-12-06 RX ADMIN — Medication 2 PUFF: at 08:10

## 2021-12-06 RX ADMIN — ENOXAPARIN SODIUM 40 MG: 100 INJECTION SUBCUTANEOUS at 19:55

## 2021-12-06 RX ADMIN — LINEZOLID 600 MG: 600 INJECTION, SOLUTION INTRAVENOUS at 23:57

## 2021-12-06 RX ADMIN — Medication 10 ML: at 19:59

## 2021-12-06 RX ADMIN — IPRATROPIUM BROMIDE AND ALBUTEROL SULFATE 1 AMPULE: .5; 3 SOLUTION RESPIRATORY (INHALATION) at 08:09

## 2021-12-06 RX ADMIN — PIPERACILLIN AND TAZOBACTAM 3375 MG: 3; .375 INJECTION, POWDER, LYOPHILIZED, FOR SOLUTION INTRAVENOUS at 04:30

## 2021-12-06 RX ADMIN — LINEZOLID 600 MG: 600 INJECTION, SOLUTION INTRAVENOUS at 10:42

## 2021-12-06 RX ADMIN — IPRATROPIUM BROMIDE AND ALBUTEROL SULFATE 1 AMPULE: .5; 3 SOLUTION RESPIRATORY (INHALATION) at 14:14

## 2021-12-06 RX ADMIN — METHYLPREDNISOLONE SODIUM SUCCINATE 40 MG: 40 INJECTION, POWDER, FOR SOLUTION INTRAMUSCULAR; INTRAVENOUS at 08:21

## 2021-12-06 ASSESSMENT — PAIN SCALES - GENERAL
PAINLEVEL_OUTOF10: 3
PAINLEVEL_OUTOF10: 0
PAINLEVEL_OUTOF10: 0
PAINLEVEL_OUTOF10: 3
PAINLEVEL_OUTOF10: 1
PAINLEVEL_OUTOF10: 0
PAINLEVEL_OUTOF10: 6
PAINLEVEL_OUTOF10: 0
PAINLEVEL_OUTOF10: 1

## 2021-12-06 ASSESSMENT — PAIN DESCRIPTION - ORIENTATION: ORIENTATION: RIGHT;LEFT

## 2021-12-06 ASSESSMENT — PAIN DESCRIPTION - LOCATION
LOCATION: LEG
LOCATION: BACK

## 2021-12-06 ASSESSMENT — PAIN DESCRIPTION - PAIN TYPE
TYPE: CHRONIC PAIN
TYPE: CHRONIC PAIN

## 2021-12-06 NOTE — PROGRESS NOTES
Hospitalist Progress Note      PCP: Lissy Garcia    Date of Admission: 12/4/2021    Chief Complaint: Dyspnea and fall    Hospital Course:  Patient feels better  Denies any shortness of breath  Down to 5 L saturating around 91 her baseline requirement is around 3 L  No fevers  No cough  No diarrhea        Medications:  Reviewed      Exam:    BP (!) 104/45   Pulse 80   Temp 97.3 °F (36.3 °C) (Temporal)   Resp 20   Ht 5' 4\" (1.626 m)   Wt 243 lb 2.7 oz (110.3 kg)   SpO2 90%   BMI 41.74 kg/m²     General appearance: No apparent distress, appears stated age and cooperative. HEENT: Pupils equal, round, and reactive to light. Conjunctivae/corneas clear. Neck: Supple, with full range of motion. No jugular venous distention. Trachea midline. Respiratory: Bilateral decreased air entry with few crepitations on the right base  Cardiovascular: Regular rate and rhythm with normal S1/S2 without MURMURS, rubs or gallops. Abdomen: Soft, non-tender, non-distended with normal bowel sounds. Musculoskeletal: No clubbing, cyanosis or EDEMA bilaterally. Full range of motion without deformity. Skin: Skin color, texture, turgor normal.  No rashes or lesions. Neurologic:  Neurovascularly intact without any focal sensory/motor deficits.  Cranial nerves: II-XII intact, grossly non-focal.  Physical exam remains unchanged from 12/5    Labs:   Recent Labs     12/04/21  1357 12/05/21  0535 12/06/21  0210   WBC 21.7* 23.4* 22.1*   HGB 8.8* 8.0* 7.6*   HCT 28.7* 25.9* 24.6*    210 228     Recent Labs     12/04/21  1357 12/05/21  0535 12/06/21  0210    133* 135*   K 4.6 4.5 4.1   CL 93* 93* 94*   CO2 35* 35* 33*   BUN 15 17 27*   CREATININE 0.9 0.8 0.9   CALCIUM 8.6 8.4 8.9     Recent Labs     12/04/21  1357   AST 36   ALT 14   BILITOT 0.6   ALKPHOS 167*     Recent Labs     12/04/21  1357   INR 1.14*     Recent Labs     12/04/21  1357 12/06/21  0210   TROPONINI 0.02* <0.01       Urinalysis:      Lab Results Component Value Date    NITRU Negative 12/04/2021    WBCUA 2 10/30/2021    BACTERIA 4+ 06/20/2019    RBCUA 2 10/30/2021    BLOODU Negative 12/04/2021    SPECGRAV 1.014 12/04/2021    GLUCOSEU Negative 12/04/2021    GLUCOSEU NEGATIVE 03/21/2011       Radiology:  MRI BRAIN WO CONTRAST   Final Result   1. No evidence of acute infarct, hemorrhage, mass effect/midline shift, or   ventriculomegaly. 2. Chronic left posterior parietal and left occipital ischemic changes. 3. Small left frontal scalp hematoma/edema. CT CHEST PULMONARY EMBOLISM W CONTRAST         XR CHEST PORTABLE   Final Result   Improving right-sided pneumonia when compared to 11/02/2021. Questionable   small right pleural effusion with persistent bibasilar opacities which could   reflect residual pneumonia or atelectasis. CT CERVICAL SPINE WO CONTRAST   Final Result   No acute fracture or subluxation. Advanced multilevel spondylosis, including moderate narrowing of the central   canal at C5-6. CT FACIAL BONES WO CONTRAST   Final Result   Left mandibular condyle is in open mouth position. Clinical correlation is   recommended. No acute facial fractures are identified. Left frontal scalp contusion. CT HEAD WO CONTRAST   Final Result   1. Hypodensity in the left occipital lobe, suspected to be subacute or   chronic infarct. If it would alter patient's management, MR would be more   sensitive. 2. Left frontal soft tissue swelling. No associated calvarial abnormality.              Assessment/Plan:    Active Hospital Problems    Diagnosis Date Noted    Acute on chronic respiratory failure ([facility] [address] [J96.20] 10/30/2021       Acute Medical Issues Being Addressed:    81-year-old admitted to the hospital with dyspnea, confusion and weakness    Acute encephalopathy metabolic with fall  Has some periorbital bruising  CT of the head was negative  MRI of the brain showed chronic infarcts nothing acute  Currently patient much more awake alert oriented in time place and person  CT of the head and neck and face no for acute fracture or hemorrhage  Patient on pregabalin and oral medications including oxycodone currently on hold we may need to start a lower dose in the next 24 hours to avoid any withdrawal    Acute on chronic respiratory failure secondary to pneumonia suspected aspiration gram-positive/gram-negative with sepsis and possible mild acute exacerbation of COPD and underlying SHORTY/OHS  Nasal MRSA pending  On Zyvox and Zosyn  If a nasal MRSA negative will discontinue Zyvox and then quickly taper down antibiotics to levofloxacin to complete a 7-day course  CTPA to rule out PE-pending  She is on IV methylprednisone will decrease frequency to daily if she continues to do well will change over to oral prednisone tomorrow and quickly taper her  Pulmonary consult appreciated  Maintain sats between 88 and 92 baseline 4 L oxygen      Facial contusion secondary to fall    Hyperglycemia secondary to steroids  A1c only 5.5  Blood sugars should continue to improve as we wean and taper down the steroids    All of the above discussed with patient and her granddaughter at the bedside  If she continues to do well CTPA is negative and she remains around 4 to 5 L and she can possibly be discharged tomorrow    DVT Prophylaxis: Subcu Lovenox  Diet: ADULT DIET;  Regular  Code Status: Full Code      Dispo - once acute medical processes have resolved    Kyle Barrera MD

## 2021-12-06 NOTE — PROGRESS NOTES
New PIV successfully placed, #20 Lt hand, multiple attempts needed, was able to draw labs from PIV, also obtained ABG, bipap at 60% at this time, will schedule CT this am as well as MRI with transport, will monitor.

## 2021-12-06 NOTE — RT PROTOCOL NOTE
RT Inhaler-Nebulizer Bronchodilator Protocol Note    There is a bronchodilator order in the chart from a provider indicating to follow the RT Bronchodilator Protocol and there is an Initiate RT Inhaler-Nebulizer Bronchodilator Protocol order as well (see protocol at bottom of note). CXR Findings:  XR CHEST PORTABLE    Result Date: 12/4/2021  Improving right-sided pneumonia when compared to 11/02/2021. Questionable small right pleural effusion with persistent bibasilar opacities which could reflect residual pneumonia or atelectasis. The findings from the last RT Protocol Assessment were as follows:   History Pulmonary Disease: Chronic pulmonary disease  Respiratory Pattern: Regular pattern and RR 12-20 bpm  Breath Sounds: Slightly diminished and/or crackles  Cough: Strong, productive  Indication for Bronchodilator Therapy: On home bronchodilators  Bronchodilator Assessment Score: 5    Aerosolized bronchodilator medication orders have been revised according to the RT Inhaler-Nebulizer Bronchodilator Protocol below. Respiratory Therapist to perform RT Therapy Protocol Assessment initially then follow the protocol. Repeat RT Therapy Protocol Assessment PRN for score 0-3 or on second treatment, BID, and PRN for scores above 3. No Indications - adjust the frequency to every 6 hours PRN wheezing or bronchospasm, if no treatments needed after 48 hours then discontinue using Per Protocol order mode. If indication present, adjust the RT bronchodilator orders based on the Bronchodilator Assessment Score as indicated below. Use Inhaler orders unless patient has one or more of the following: on home nebulizer, not able to hold breath for 10 seconds, is not alert and oriented, cannot activate and use MDI correctly, or respiratory rate 25 breaths per minute or more, then use the equivalent nebulizer order(s) with same Frequency and PRN reasons based on the score.   If a patient is on this medication at home then do not decrease Frequency below that used at home. 0-3 - enter or revise RT bronchodilator order(s) to equivalent RT Bronchodilator order with Frequency of every 4 hours PRN for wheezing or increased work of breathing using Per Protocol order mode. 4-6 - enter or revise RT Bronchodilator order(s) to two equivalent RT bronchodilator orders with one order with BID Frequency and one order with Frequency of every 4 hours PRN wheezing or increased work of breathing using Per Protocol order mode. 7-10 - enter or revise RT Bronchodilator order(s) to two equivalent RT bronchodilator orders with one order with TID Frequency and one order with Frequency of every 4 hours PRN wheezing or increased work of breathing using Per Protocol order mode. 11-13 - enter or revise RT Bronchodilator order(s) to one equivalent RT bronchodilator order with QID Frequency and an Albuterol order with Frequency of every 4 hours PRN wheezing or increased work of breathing using Per Protocol order mode. Greater than 13 - enter or revise RT Bronchodilator order(s) to one equivalent RT bronchodilator order with every 4 hours Frequency and an Albuterol order with Frequency of every 2 hours PRN wheezing or increased work of breathing using Per Protocol order mode. RT to enter RT Home Evaluation for COPD & MDI Assessment order using Per Protocol order mode.     Electronically signed by MOLLY BAY RCP on 12/6/2021 at 12:56 AM

## 2021-12-06 NOTE — CARE COORDINATION
Discharge Planning Assessment  Readmission risk score 24%  RN discharge planner met with patient/ (and family member) to discuss reason for admission, current living situation, and potential needs at the time of discharge    Demographics/Insurance verified Yes    Current type of dwellin level home stays on first floor    Patient from ECF/SW confirmed with:n/a    Living arrangements: daughter staying with patient    Level of function/Support: requires assistance    PCP: Hilda Kovacs    Last Visit to PCP: few months    DME: Oxygen, bipap, lift chair, wheel chair    Active with any community resources/agencies/skilled home care: [facility]    Medication compliance issues: not identified    Financial issues that could impact healthcare: [employer] [facility]    Tentative discharge plan:daughter states patient will refuse SNF placement      Discussed and provided facilities of choice if transition to a skilled nursing facility is required at the time of discharge  Family refuses skilled facility  placement      Discussed with patient and/or family that on the day of discharge home tentative time of discharge will be between 10 AM and noon.     Transportation at the time of discharge:     Heydi Malhotra BSN, CCM, RN  [facility]  [phone]

## 2021-12-06 NOTE — PROGRESS NOTES
Hospitalist Progress Note      PCP: Judy George    Date of Admission: 12/4/2021    Chief Complaint: Dyspnea and fall    Hospital Course:  Patient feels better  Denies any shortness of breath  Currently on 6 L oxygen saturating around 91  No nausea vomiting        Medications:  Reviewed      Exam:    BP (!) 98/37   Pulse 80   Temp 97.6 °F (36.4 °C) (Temporal)   Resp 27   Ht 5' 4\" (1.626 m)   Wt 243 lb 2.7 oz (110.3 kg)   SpO2 92%   BMI 41.74 kg/m²     General appearance: No apparent distress, appears stated age and cooperative. HEENT: Pupils equal, round, and reactive to light. Conjunctivae/corneas clear. Neck: Supple, with full range of motion. No jugular venous distention. Trachea midline. Respiratory: Bilateral decreased air entry with few crepitations on the right base  Cardiovascular: Regular rate and rhythm with normal S1/S2 without MURMURS, rubs or gallops. Abdomen: Soft, non-tender, non-distended with normal bowel sounds. Musculoskeletal: No clubbing, cyanosis or EDEMA bilaterally. Full range of motion without deformity. Skin: Skin color, texture, turgor normal.  No rashes or lesions. Neurologic:  Neurovascularly intact without any focal sensory/motor deficits.  Cranial nerves: II-XII intact, grossly non-focal.      Labs:   Recent Labs     12/04/21  1357 12/05/21  0535   WBC 21.7* 23.4*   HGB 8.8* 8.0*   HCT 28.7* 25.9*    210     Recent Labs     12/04/21  1357 12/05/21  0535    133*   K 4.6 4.5   CL 93* 93*   CO2 35* 35*   BUN 15 17   CREATININE 0.9 0.8   CALCIUM 8.6 8.4     Recent Labs     12/04/21  1357   AST 36   ALT 14   BILITOT 0.6   ALKPHOS 167*     Recent Labs     12/04/21  1357   INR 1.14*     Recent Labs     12/04/21  1357   TROPONINI 0.02*       Urinalysis:      Lab Results   Component Value Date    NITRU Negative 12/04/2021    WBCUA 2 10/30/2021    BACTERIA 4+ 06/20/2019    RBCUA 2 10/30/2021    BLOODU Negative 12/04/2021    SPECGRAV 1.014 12/04/2021    GLUCOSEU Negative 12/04/2021    GLUCOSEU NEGATIVE 03/21/2011       Radiology:  XR CHEST PORTABLE   Final Result   Improving right-sided pneumonia when compared to 11/02/2021. Questionable   small right pleural effusion with persistent bibasilar opacities which could   reflect residual pneumonia or atelectasis. CT CERVICAL SPINE WO CONTRAST   Final Result   No acute fracture or subluxation. Advanced multilevel spondylosis, including moderate narrowing of the central   canal at C5-6. CT FACIAL BONES WO CONTRAST   Final Result   Left mandibular condyle is in open mouth position. Clinical correlation is   recommended. No acute facial fractures are identified. Left frontal scalp contusion. CT HEAD WO CONTRAST   Final Result   1. Hypodensity in the left occipital lobe, suspected to be subacute or   chronic infarct. If it would alter patient's management, MR would be more   sensitive. 2. Left frontal soft tissue swelling. No associated calvarial abnormality.              Assessment/Plan:    Active Hospital Problems    Diagnosis Date Noted    Acute on chronic respiratory failure ([facility] [address] [J96.20] 10/30/2021       Acute Medical Issues Being Addressed:    82-year-old admitted to the hospital with dyspnea, confusion and weakness    Acute encephalopathy metabolic with fall  Has some periorbital bruising  CT of the head was negative  It did show a subacute chronic infarct  We will get MRI of the brain  Currently patient much more awake alert oriented in time place and person  CT of the head and neck and face no for acute fracture or hemorrhage  Patient on pregabalin and oral medications including oxycodone for now we will keep it on hold    Acute on chronic respiratory failure secondary to pneumonia suspected aspiration gram-positive/gram-negative with sepsis and possible mild acute exacerbation of COPD and underlying SHORTY/OHS  Nasal MRSA  Change antibiotics to Zyvox and Zosyn  We will give 1 L fluid bolus  CTPA to rule out PE  IV methylprednisone every 12  Pulmonary consult  Maintain sats between 88 and 92 baseline 4 L oxygen      Facial contusion secondary to fall    DVT Prophylaxis: Subcu Lovenox  Diet: ADULT DIET;  Regular  Code Status: Full Code      Dispo - once acute medical processes have resolved    Sammie Clement MD

## 2021-12-06 NOTE — CONSULTS
P Pulmonary and Critical Care   Consult Note      Reason for Consult: Acute on chronic hypoxemic respiratory failure aspiration pneumonia, sepsis, COPD, SHORTY  Requesting Physician: Dr Shanks Sensor:   [facility]:                The patient is a 82 y.o. female with significant past medical history of:      Diagnosis Date    Arthritis     osteo    COPD (chronic obstructive pulmonary disease) ([address]     Hemorrhoids     Hernia of unspecified site of abdominal cavity without mention of obstruction or gangrene     Incontinence     Knee pain, bilateral     pt states no cartilage    Spinal stenosis     Spinal stenosis      Patient presented to the hospital 12/4/2021 with altered mental status after a fall at home. She had significant facial bruising. MRI of the brain was negative, however. She has had frequent hospitalizations related to aspiration pneumonia COPD and SHORTY. She is on treated for SHORTY at home.       Past Surgical History:        Procedure Laterality Date    CHOLECYSTECTOMY      PARTIAL HYSTERECTOMY       Current Medications:    Current Facility-Administered Medications: ipratropium-albuterol (DUONEB) nebulizer solution 1 ampule, 1 ampule, Inhalation, TID  ipratropium-albuterol (DUONEB) nebulizer solution 1 ampule, 1 ampule, Inhalation, Q4H PRN  iopamidol (ISOVUE-370) 76 % injection 75 mL, 75 mL, IntraVENous, ONCE PRN  insulin lispro (1 Unit Dial) 0-6 Units, 0-6 Units, SubCUTAneous, TID WC  insulin lispro (1 Unit Dial) 0-3 Units, 0-3 Units, SubCUTAneous, Nightly  glucose (GLUTOSE) 40 % oral gel 15 g, 15 g, Oral, PRN  dextrose 50 % IV solution, 12.5 g, IntraVENous, PRN  glucagon (rDNA) injection 1 mg, 1 mg, IntraMUSCular, PRN  dextrose 5 % solution, 100 mL/hr, IntraVENous, PRN  budesonide-formoterol (SYMBICORT) 160-4.5 MCG/ACT inhaler 2 puff, 2 puff, Inhalation, BID  linezolid (ZYVOX) IVPB 600 mg, 600 mg, IntraVENous, Q12H  piperacillin-tazobactam (ZOSYN) 3,375 mg in dextrose 5 % 50 mL IVPB extended infusion (mini-bag), 3,375 mg, IntraVENous, Q8H  perflutren lipid microspheres (DEFINITY) injection 1.65 mg, 1.5 mL, IntraVENous, ONCE PRN  methylPREDNISolone sodium (SOLU-MEDROL) injection 40 mg, 40 mg, IntraVENous, Q12H  [Held by provider] oxyCODONE (OXYCONTIN) extended release tablet 10 mg, 10 mg, Oral, BID  [Held by provider] pregabalin (LYRICA) capsule 100 mg, 100 mg, Oral, TID  sodium chloride flush 0.9 % injection 5-40 mL, 5-40 mL, IntraVENous, 2 times per day  sodium chloride flush 0.9 % injection 5-40 mL, 5-40 mL, IntraVENous, PRN  0.9 % sodium chloride infusion, 25 mL, IntraVENous, PRN  enoxaparin (LOVENOX) injection 40 mg, 40 mg, SubCUTAneous, Nightly  ondansetron (ZOFRAN-ODT) disintegrating tablet 4 mg, 4 mg, Oral, Q8H PRN **OR** ondansetron (ZOFRAN) injection 4 mg, 4 mg, IntraVENous, Q6H PRN  polyethylene glycol (GLYCOLAX) packet 17 g, 17 g, Oral, Daily PRN  acetaminophen (TYLENOL) tablet 650 mg, 650 mg, Oral, Q6H PRN **OR** acetaminophen (TYLENOL) suppository 650 mg, 650 mg, Rectal, Q6H PRN    Allergies   Allergen Reactions    Sulfa Antibiotics      Unknown reaction       Social History:    TOBACCO:   reports that she has quit smoking. She has never used smokeless tobacco.  ETOH:   reports no history of alcohol use. Patient currently lives independently  Environmental/chemical exposure: None known    Family History:   History reviewed. No pertinent family history. REVIEW OF SYSTEMS:    CONSTITUTIONAL:  negative for fevers, chills, diaphoresis, activity change, appetite change, fatigue, night sweats and unexpected weight change.    EYES:  negative for blurred vision, eye discharge, visual disturbance and icterus  HEENT:  negative for hearing loss, tinnitus, ear drainage, sinus pressure, nasal congestion, epistaxis and snoring  RESPIRATORY:  See HPI  CARDIOVASCULAR:  negative for chest pain, palpitations, exertional chest pressure/discomfort, edema, syncope  GASTROINTESTINAL:  negative for nausea, vomiting, diarrhea, constipation, blood in stool and abdominal pain  GENITOURINARY:  negative for frequency, dysuria, urinary incontinence, decreased urine volume, and hematuria  HEMATOLOGIC/LYMPHATIC:  negative for easy bruising, bleeding and lymphadenopathy  ALLERGIC/IMMUNOLOGIC:  negative for recurrent infections, angioedema, anaphylaxis and drug reactions  ENDOCRINE:  negative for weight changes and diabetic symptoms including polyuria, polydipsia and polyphagia  MUSCULOSKELETAL:  negative for  pain, joint swelling, decreased range of motion and muscle weakness  NEUROLOGICAL:  negative for headaches, slurred speech, unilateral weakness  PSYCHIATRIC/BEHAVIORAL: negative for hallucinations, behavioral problems, confusion and agitation. Objective:   PHYSICAL EXAM:      VITALS:  BP (!) 113/49   Pulse 79   Temp 97.4 °F (36.3 °C) (Temporal)   Resp 25   Ht 5' 4\" (1.626 m)   Wt 243 lb 2.7 oz (110.3 kg)   SpO2 90%   BMI 41.74 kg/m²      24HR INTAKE/OUTPUT:      Intake/Output Summary (Last 24 hours) at 12/6/2021 4729  Last data filed at 12/6/2021 0400  Gross per 24 hour   Intake 720 ml   Output 725 ml   Net -5 ml     CONSTITUTIONAL:  awake, alert, cooperative, no apparent distress, and appears stated age  NECK:  Supple, symmetrical, trachea midline, no adenopathy, thyroid symmetric, not enlarged and no tenderness, skin normal  LUNGS:  no increased work of breathing and clear to auscultation. No accessory muscle use  CARDIOVASCULAR: S1 and S2, no edema and no JVD  ABDOMEN:  normal bowel sounds, non-distended and no masses palpated, and no tenderness to palpation. No hepatospleenomegaly  LYMPHADENOPATHY:  no axillary or supraclavicular adenopathy. No cervical adnenopathy  PSYCHIATRIC: Oriented to person place and time. No obvious depression or anxiety. MUSCULOSKELETAL: No obvious misalignment or effusion of the joints. No clubbing, cyanosis of the digits.   SKIN:  normal skin color, texture, turgor staph aureus DNA. She is on Zosyn and Zyvox at the moment.   Symbicort 160 p.o. twice daily  Scheduled DuoNeb, 3 times daily  Solu-Medrol 40 every 12  Start her on insulin sliding scale as her blood sugars are up a little bit

## 2021-12-06 NOTE — PROGRESS NOTES
12/06/21 0056   RT Protocol   History Pulmonary Disease 2   Respiratory pattern 0   Breath sounds 2   Cough 1   Indications for Bronchodilator Therapy On home bronchodilators   Bronchodilator Assessment Score 5

## 2021-12-06 NOTE — PLAN OF CARE
Problem: Falls - Risk of:  Goal: Will remain free from falls  Description: Will remain free from falls  Outcome: Ongoing     Problem: Falls - Risk of:  Goal: Absence of physical injury  Description: Absence of physical injury  Outcome: Ongoing     Problem: Pain:  Goal: Pain level will decrease  Description: Pain level will decrease  Outcome: Ongoing     Problem: Pain:  Goal: Control of acute pain  Description: Control of acute pain  Outcome: Ongoing     Problem: Pain:  Goal: Control of chronic pain  Description: Control of chronic pain  Outcome: Ongoing     Problem: Skin Integrity:  Goal: Will show no infection signs and symptoms  Description: Will show no infection signs and symptoms  Outcome: Ongoing     Problem: Skin Integrity:  Goal: Absence of new skin breakdown  Description: Absence of new skin breakdown  Outcome: Ongoing

## 2021-12-06 NOTE — CARE COORDINATION
[facility]  Consult Note       NAME:  [facility] RECORD NUMBER:  [id number]  AGE: 82 y.o. GENDER: female  : 1935  TODAY'S DATE:  2021    Subjective   Reason for WOCN Evaluation and Assessment: wounds to buttocks      Claribel Webster is a 82 y.o. female referred by:   [x] Physician  [x] Nursing  [] Other:     Wound Identification:  Wound Type: Incontinence Assoicated Damage  Contributing Factors: decreased mobility and incontinence of urine    Wound History: Patient unable to give history on skin damage to buttocks/sacrum  Current Wound Care Treatment:  Zinc paste and sacral foam    Patient Goal of Care:  [x] Wound Healing  [] Odor Control  [] Palliative Care  [x] Pain Control   [] Other:         PAST MEDICAL HISTORY        Diagnosis Date    Arthritis     osteo    COPD (chronic obstructive pulmonary disease) (HCC)     Hemorrhoids     Hernia of unspecified site of abdominal cavity without mention of obstruction or gangrene     Incontinence     Knee pain, bilateral     pt states no cartilage    Spinal stenosis     Spinal stenosis        PAST SURGICAL HISTORY    Past Surgical History:   Procedure Laterality Date    CHOLECYSTECTOMY      PARTIAL HYSTERECTOMY         FAMILY HISTORY    History reviewed. No pertinent family history. SOCIAL HISTORY    Social History     Tobacco Use    Smoking status: Former Smoker    Smokeless tobacco: Never Used    Tobacco comment: years ago   Vaping Use    Vaping Use: Never used   Substance Use Topics    Alcohol use: No    Drug use: No       ALLERGIES    Allergies   Allergen Reactions    Sulfa Antibiotics      Unknown reaction       MEDICATIONS    No current facility-administered medications on file prior to encounter.      Current Outpatient Medications on File Prior to Encounter   Medication Sig Dispense Refill    furosemide (LASIX) 40 MG tablet Take 1 tablet by mouth daily 60 tablet 3    oxyCODONE (OXYCONTIN) 10 MG extended release tablet Take 10 mg by mouth every 12 hours.  oxyCODONE-acetaminophen (PERCOCET)  MG per tablet Take 1 tablet by mouth every 6 hours as needed for Pain.  hydrocortisone (ANUSOL-HC) 2.5 % rectal cream Place rectally 2 times daily. 1 Tube 0    ipratropium-albuterol (DUONEB) 0.5-2.5 (3) MG/3ML SOLN nebulizer solution Inhale 1 vial into the lungs every 6 hours as needed for Shortness of Breath      SYMBICORT 160-4.5 MCG/ACT AERO TAKE 2 PUFFS TWICE A DAY  3    levothyroxine (SYNTHROID) 112 MCG tablet TAKE 1 TABLET BY MOUTH EVERY DAY  3    fluoxetine (PROZAC) 20 MG capsule Take 40 mg by mouth daily.  pregabalin (LYRICA) 50 MG capsule Take 100 mg by mouth 3 times daily. new dosage per daughter upon med list review.  esomeprazole (NEXIUM) 40 MG capsule Take 40 mg by mouth every morning (before breakfast).            Objective    BP (!) 104/45   Pulse 80   Temp 97.3 °F (36.3 °C) (Temporal)   Resp 26   Ht 5' 4\" (1.626 m)   Wt 243 lb 2.7 oz (110.3 kg)   SpO2 (!) 88%   BMI 41.74 kg/m²     LABS:  WBC:    Lab Results   Component Value Date    WBC 22.1 12/06/2021     H/H:    Lab Results   Component Value Date    HGB 7.6 12/06/2021    HCT 24.6 12/06/2021     PTT:    Lab Results   Component Value Date    APTT 31.7 12/04/2021   [APTT}  PT/INR:    Lab Results   Component Value Date    PROTIME 12.9 12/04/2021    INR 1.14 12/04/2021     HgBA1c:    Lab Results   Component Value Date    LABA1C 5.5 11/01/2021       Assessment   Jorge Luis Risk Score: Jorge Luis Scale Score: 15    Patient Active Problem List   Diagnosis Code    Pneumonia J18.9    Acute on chronic diastolic heart failure (HCC) I50.33    Peripheral edema R60.9    COPD exacerbation (HCC) J44.1    Essential hypertension I10    Hyperlipidemia E78.5    Morbid obesity with BMI of 50.0-59.9, adult ([facility] [address] E66.01, Z68.43    Acute on chronic respiratory failure with hypercapnia (HCC) J96.22    Acute respiratory failure ([facility] [address] J96.00    Acute respiratory failure with hypoxia and hypercapnia (HCC) J96.01, J96.02    Acute kidney injury ([facility] [address] N17.9    History of recurrent UTI (urinary tract infection) Z87.440    Chronic obstructive pulmonary disease with acute lower respiratory infection (HCC) J44.0    Spinal stenosis Z72.05    Complicated UTI (urinary tract infection) N39.0    Acute on chronic respiratory failure with hypoxia and hypercapnia (HCC) J96.21, J96.22    Weight loss counseling, encounter for Z71.3    Fall at home W19. Osorio Galo, Y92.009    Rhabdomyolysis M62.82    Altered mental state R41.82    Syncope and collapse R55    Acute on chronic respiratory failure with hypoxia (HCC) J96.21    Acute on chronic respiratory failure (HCC) J96.20       Measurements:  Wound 04/24/19 Coccyx Mid open  (Active)   Number of days: 956       Wound 06/20/19 Buttocks Left Deep tissue injury (Active)   Number of days: 899       Wound 10/10/20 Buttocks Left moisture associated dermatitis left buttocks (Active)   Number of days: 422       Wound 10/10/20 Thigh Right; Inner moisture associated dermatitis, rt inner thigh (Active)   Number of days: 422       Wound 10/10/20 Toe (Comment  which one) Left left great toe bruise (Active)   Number of days: 422       Wound 09/10/21 Ischium Right stage 2  (Active)   Number of days: 86       Wound 10/31/21 Buttocks Proximal;Right Incontinence associated dermatitis (IAD) 11/1/21 (Active)   Number of days: 35       Wound 10/31/21 Buttocks Left Incontinence associated dermatitis per WOC RN 11/1/21 (Active)   Number of days: 35       Wound 12/05/21 Buttocks Left; Right; Mid Moisture breakdown-IAD (incontinence associated damage (Active)   Wound Image   12/06/21 1100   Wound Etiology Other 12/06/21 1100   Dressing Status Clean; Dry; Intact 12/06/21 1100   Wound Cleansed Soap and water 12/06/21 1100   Dressing/Treatment Foam; Zinc paste 12/06/21 1100   Dressing Change Due 12/07/21 12/06/21 1100

## 2021-12-07 LAB
ANION GAP SERPL CALCULATED.3IONS-SCNC: 14 MMOL/L (ref 3–16)
BASOPHILS ABSOLUTE: 0 K/UL (ref 0–0.2)
BASOPHILS RELATIVE PERCENT: 0.1 %
BLOOD CULTURE, ROUTINE: ABNORMAL
BUN BLDV-MCNC: 24 MG/DL (ref 7–20)
CALCIUM SERPL-MCNC: 8.6 MG/DL (ref 8.3–10.6)
CHLORIDE BLD-SCNC: 99 MMOL/L (ref 99–110)
CO2: 28 MMOL/L (ref 21–32)
CREAT SERPL-MCNC: 1 MG/DL (ref 0.6–1.2)
EOSINOPHILS ABSOLUTE: 0 K/UL (ref 0–0.6)
EOSINOPHILS RELATIVE PERCENT: 0 %
GFR AFRICAN AMERICAN: >60
GFR NON-AFRICAN AMERICAN: 53
GLUCOSE BLD-MCNC: 131 MG/DL (ref 70–99)
GLUCOSE BLD-MCNC: 139 MG/DL (ref 70–99)
GLUCOSE BLD-MCNC: 140 MG/DL (ref 70–99)
GLUCOSE BLD-MCNC: 189 MG/DL (ref 70–99)
GLUCOSE BLD-MCNC: 209 MG/DL (ref 70–99)
HCT VFR BLD CALC: 25.2 % (ref 36–48)
HEMOGLOBIN: 7.9 G/DL (ref 12–16)
LYMPHOCYTES ABSOLUTE: 1.9 K/UL (ref 1–5.1)
LYMPHOCYTES RELATIVE PERCENT: 9.5 %
MAGNESIUM: 2.1 MG/DL (ref 1.8–2.4)
MCH RBC QN AUTO: 25.5 PG (ref 26–34)
MCHC RBC AUTO-ENTMCNC: 31.3 G/DL (ref 31–36)
MCV RBC AUTO: 81.5 FL (ref 80–100)
MONOCYTES ABSOLUTE: 1.6 K/UL (ref 0–1.3)
MONOCYTES RELATIVE PERCENT: 8.1 %
NEUTROPHILS ABSOLUTE: 16.6 K/UL (ref 1.7–7.7)
NEUTROPHILS RELATIVE PERCENT: 82.3 %
ORGANISM: ABNORMAL
PDW BLD-RTO: 17.8 % (ref 12.4–15.4)
PERFORMED ON: ABNORMAL
PHOSPHORUS: 2.1 MG/DL (ref 2.5–4.9)
PLATELET # BLD: 254 K/UL (ref 135–450)
PMV BLD AUTO: 9.1 FL (ref 5–10.5)
POTASSIUM SERPL-SCNC: 3.1 MMOL/L (ref 3.5–5.1)
RBC # BLD: 3.1 M/UL (ref 4–5.2)
SODIUM BLD-SCNC: 141 MMOL/L (ref 136–145)
WBC # BLD: 20.1 K/UL (ref 4–11)

## 2021-12-07 PROCEDURE — 2700000000 HC OXYGEN THERAPY PER DAY

## 2021-12-07 PROCEDURE — 80048 BASIC METABOLIC PNL TOTAL CA: CPT

## 2021-12-07 PROCEDURE — 6370000000 HC RX 637 (ALT 250 FOR IP): Performed by: FAMILY MEDICINE

## 2021-12-07 PROCEDURE — 94660 CPAP INITIATION&MGMT: CPT

## 2021-12-07 PROCEDURE — 97166 OT EVAL MOD COMPLEX 45 MIN: CPT

## 2021-12-07 PROCEDURE — 97530 THERAPEUTIC ACTIVITIES: CPT

## 2021-12-07 PROCEDURE — 6360000002 HC RX W HCPCS: Performed by: INTERNAL MEDICINE

## 2021-12-07 PROCEDURE — 99233 SBSQ HOSP IP/OBS HIGH 50: CPT | Performed by: INTERNAL MEDICINE

## 2021-12-07 PROCEDURE — 6370000000 HC RX 637 (ALT 250 FOR IP): Performed by: INTERNAL MEDICINE

## 2021-12-07 PROCEDURE — 36415 COLL VENOUS BLD VENIPUNCTURE: CPT

## 2021-12-07 PROCEDURE — 97162 PT EVAL MOD COMPLEX 30 MIN: CPT

## 2021-12-07 PROCEDURE — 2580000003 HC RX 258: Performed by: FAMILY MEDICINE

## 2021-12-07 PROCEDURE — 83735 ASSAY OF MAGNESIUM: CPT

## 2021-12-07 PROCEDURE — 94640 AIRWAY INHALATION TREATMENT: CPT

## 2021-12-07 PROCEDURE — 2140000000 HC CCU INTERMEDIATE R&B

## 2021-12-07 PROCEDURE — 6360000002 HC RX W HCPCS: Performed by: FAMILY MEDICINE

## 2021-12-07 PROCEDURE — 85025 COMPLETE CBC W/AUTO DIFF WBC: CPT

## 2021-12-07 PROCEDURE — 94761 N-INVAS EAR/PLS OXIMETRY MLT: CPT

## 2021-12-07 PROCEDURE — 2580000003 HC RX 258: Performed by: INTERNAL MEDICINE

## 2021-12-07 PROCEDURE — 2100000000 HC CCU R&B

## 2021-12-07 PROCEDURE — 84100 ASSAY OF PHOSPHORUS: CPT

## 2021-12-07 RX ORDER — PREGABALIN 25 MG/1
25 CAPSULE ORAL 3 TIMES DAILY
Status: DISCONTINUED | OUTPATIENT
Start: 2021-12-07 | End: 2021-12-09 | Stop reason: HOSPADM

## 2021-12-07 RX ORDER — OXYCODONE HYDROCHLORIDE AND ACETAMINOPHEN 5; 325 MG/1; MG/1
1 TABLET ORAL EVERY 8 HOURS PRN
Status: DISCONTINUED | OUTPATIENT
Start: 2021-12-07 | End: 2021-12-09 | Stop reason: HOSPADM

## 2021-12-07 RX ADMIN — LINEZOLID 600 MG: 600 INJECTION, SOLUTION INTRAVENOUS at 09:41

## 2021-12-07 RX ADMIN — IPRATROPIUM BROMIDE AND ALBUTEROL SULFATE 1 AMPULE: .5; 3 SOLUTION RESPIRATORY (INHALATION) at 07:40

## 2021-12-07 RX ADMIN — INSULIN LISPRO 1 UNITS: 100 INJECTION, SOLUTION INTRAVENOUS; SUBCUTANEOUS at 16:39

## 2021-12-07 RX ADMIN — ENOXAPARIN SODIUM 40 MG: 100 INJECTION SUBCUTANEOUS at 19:54

## 2021-12-07 RX ADMIN — OXYCODONE AND ACETAMINOPHEN 1 TABLET: 5; 325 TABLET ORAL at 12:06

## 2021-12-07 RX ADMIN — Medication 2 PUFF: at 19:45

## 2021-12-07 RX ADMIN — PIPERACILLIN AND TAZOBACTAM 3375 MG: 3; .375 INJECTION, POWDER, LYOPHILIZED, FOR SOLUTION INTRAVENOUS at 04:05

## 2021-12-07 RX ADMIN — PREGABALIN 25 MG: 25 CAPSULE ORAL at 19:54

## 2021-12-07 RX ADMIN — IPRATROPIUM BROMIDE AND ALBUTEROL SULFATE 1 AMPULE: .5; 3 SOLUTION RESPIRATORY (INHALATION) at 13:08

## 2021-12-07 RX ADMIN — POTASSIUM BICARBONATE 40 MEQ: 782 TABLET, EFFERVESCENT ORAL at 19:54

## 2021-12-07 RX ADMIN — INSULIN LISPRO 2 UNITS: 100 INJECTION, SOLUTION INTRAVENOUS; SUBCUTANEOUS at 12:10

## 2021-12-07 RX ADMIN — PIPERACILLIN AND TAZOBACTAM 3375 MG: 3; .375 INJECTION, POWDER, LYOPHILIZED, FOR SOLUTION INTRAVENOUS at 19:59

## 2021-12-07 RX ADMIN — OXYCODONE AND ACETAMINOPHEN 1 TABLET: 5; 325 TABLET ORAL at 19:54

## 2021-12-07 RX ADMIN — PIPERACILLIN AND TAZOBACTAM 3375 MG: 3; .375 INJECTION, POWDER, LYOPHILIZED, FOR SOLUTION INTRAVENOUS at 12:07

## 2021-12-07 RX ADMIN — ACETAMINOPHEN 650 MG: 325 TABLET ORAL at 19:54

## 2021-12-07 RX ADMIN — Medication 2 PUFF: at 07:40

## 2021-12-07 RX ADMIN — Medication 10 ML: at 08:11

## 2021-12-07 RX ADMIN — POTASSIUM BICARBONATE 40 MEQ: 782 TABLET, EFFERVESCENT ORAL at 12:05

## 2021-12-07 RX ADMIN — PREGABALIN 25 MG: 25 CAPSULE ORAL at 15:41

## 2021-12-07 RX ADMIN — METHYLPREDNISOLONE SODIUM SUCCINATE 40 MG: 40 INJECTION, POWDER, FOR SOLUTION INTRAMUSCULAR; INTRAVENOUS at 08:11

## 2021-12-07 RX ADMIN — IPRATROPIUM BROMIDE AND ALBUTEROL SULFATE 1 AMPULE: .5; 3 SOLUTION RESPIRATORY (INHALATION) at 19:45

## 2021-12-07 ASSESSMENT — PAIN DESCRIPTION - ORIENTATION: ORIENTATION: RIGHT;LEFT

## 2021-12-07 ASSESSMENT — PAIN SCALES - GENERAL
PAINLEVEL_OUTOF10: 7
PAINLEVEL_OUTOF10: 0
PAINLEVEL_OUTOF10: 4
PAINLEVEL_OUTOF10: 8
PAINLEVEL_OUTOF10: 0
PAINLEVEL_OUTOF10: 0
PAINLEVEL_OUTOF10: 7

## 2021-12-07 ASSESSMENT — PAIN DESCRIPTION - DESCRIPTORS: DESCRIPTORS: ACHING;SORE

## 2021-12-07 ASSESSMENT — PAIN DESCRIPTION - LOCATION
LOCATION: BACK;LEG
LOCATION: GENERALIZED

## 2021-12-07 ASSESSMENT — PAIN DESCRIPTION - FREQUENCY: FREQUENCY: INTERMITTENT

## 2021-12-07 ASSESSMENT — PAIN DESCRIPTION - ONSET: ONSET: ON-GOING

## 2021-12-07 ASSESSMENT — PAIN DESCRIPTION - PAIN TYPE: TYPE: CHRONIC PAIN

## 2021-12-07 NOTE — PROGRESS NOTES
Comprehensive Nutrition Assessment    Type and Reason for Visit:  Initial    Nutrition Recommendations/Plan:   Rico for wound healing BID  Ensure Low Calorie, High Protein 1 time per day  Monitor PO/ONS intake     Nutrition Assessment:  Consult triggered for stage 2 pressure injury on buttocks. PO intake in hospital % of meals. Suggested Rico for wound healing and Pt accepted. Instructed her on how to mix Rico packet into sugar free lemonade at lunch and dinner. Will add Ensure low calorie, high protein ONS to breakfast meal to add extra protein for wound healing. Continue to monitor PO and supplement intake throughout hospital stay. Malnutrition Assessment:  Malnutrition Status:  No malnutrition    Context:  Acute Illness     Findings of the 6 clinical characteristics of malnutrition:  Energy Intake:  No significant decrease in energy intake  Weight Loss:  No significant weight loss     Body Fat Loss:  No significant body fat loss     Muscle Mass Loss:  No significant muscle mass loss    Fluid Accumulation:  No significant fluid accumulation     Strength:  Not Performed    Estimated Daily Nutrient Needs:  Energy (kcal):  3820-9201 (10-15kcal/109.7kg); Weight Used for Energy Requirements:  Current     Protein (g):  109 grams (2.0g/55kg); Weight Used for Protein Requirements:  Ideal        Fluid (ml/day):   ; Method Used for Fluid Requirements:  1 ml/kcal      Nutrition Related Findings:  Glucose 139, K 3.1, LBM 12/3, Generalized edema +1 pitting BLE      Wounds:  Stage II       Current Nutrition Therapies:    ADULT DIET; Regular  ADULT ORAL NUTRITION SUPPLEMENT; Lunch, Dinner;  Wound Healing Oral Supplement  ADULT ORAL NUTRITION SUPPLEMENT; Breakfast; Low Calorie/High Protein Oral Supplement    Anthropometric Measures:  · Height: 5' 4\" (162.6 cm)  · Current Body Weight: 241 lb (109.3 kg)   · Ideal Body Weight: 120 lbs; % Ideal Body Weight 200.8 %   · BMI: 41.3    Nutrition Diagnosis:   · Increased nutrient needs related to inadequate protein-energy intake as evidenced by wounds    Nutrition Interventions:   Food and/or Nutrient Delivery:  Continue Current Diet, Start Oral Nutrition Supplement  Nutrition Education/Counseling:  No recommendation at this time   Coordination of Nutrition Care:  Continue to monitor while inpatient    Goals:  PO intake 75% or greater at meals       Nutrition Monitoring and Evaluation:   Behavioral-Environmental Outcomes:  None Identified   Food/Nutrient Intake Outcomes:  Food and Nutrient Intake, Supplement Intake  Physical Signs/Symptoms Outcomes:  Skin     Discharge Planning:    No discharge needs at this time     Electronically signed by Barak Loving RD, LD on 12/7/21 at 12:03 PM EST    Contact: [phone]

## 2021-12-07 NOTE — PROGRESS NOTES
Hospitalist Progress Note      PCP: Tierra Chanel    Date of Admission: 12/4/2021    Chief Complaint: Dyspnea and fall    Hospital Course:  Patient feels better  Denies any shortness of breath  No fevers  No cough  No diarrhea        Medications:  Reviewed      Exam:    /61   Pulse 76   Temp 96.7 °F (35.9 °C) (Temporal)   Resp 24   Ht 5' 4\" (1.626 m)   Wt 241 lb 13.5 oz (109.7 kg)   SpO2 93%   BMI 41.51 kg/m²     General appearance: No apparent distress, appears stated age and cooperative. HEENT: Pupils equal, round, and reactive to light. Conjunctivae/corneas clear. Neck: Supple, with full range of motion. No jugular venous distention. Trachea midline. Respiratory: Bilateral decreased air entry with few crepitations on the right base  Cardiovascular: Regular rate and rhythm with normal S1/S2 without MURMURS, rubs or gallops. Abdomen: Soft, non-tender, non-distended with normal bowel sounds. Musculoskeletal: No clubbing, cyanosis or EDEMA bilaterally. Full range of motion without deformity. Skin: Skin color, texture, turgor normal.  No rashes or lesions. Neurologic:  Neurovascularly intact without any focal sensory/motor deficits. Cranial nerves: II-XII intact, grossly non-focal.  Physical exam remains unchanged from 12/5    Labs:   Recent Labs     12/05/21  0535 12/06/21  0210 12/07/21  0405   WBC 23.4* 22.1* 20.1*   HGB 8.0* 7.6* 7.9*   HCT 25.9* 24.6* 25.2*    228 254     Recent Labs     12/05/21  0535 12/06/21  0210 12/07/21  0405   * 135* 141   K 4.5 4.1 3.1*   CL 93* 94* 99   CO2 35* 33*  --    BUN 17 27* 24*   CREATININE 0.8 0.9 1.0   CALCIUM 8.4 8.9 8.6   PHOS  --   --  2.1*     No results for input(s): AST, ALT, BILIDIR, BILITOT, ALKPHOS in the last 72 hours. No results for input(s): INR in the last 72 hours.   Recent Labs     12/06/21 0210   TROPONINI <0.01       Urinalysis:      Lab Results   Component Value Date    NITRU Negative 12/04/2021    WBCUA 2 10/30/2021 BACTERIA 4+ 06/20/2019    RBCUA 2 10/30/2021    BLOODU Negative 12/04/2021    SPECGRAV 1.014 12/04/2021    GLUCOSEU Negative 12/04/2021    GLUCOSEU NEGATIVE 03/21/2011       Radiology:  CT CHEST PULMONARY EMBOLISM W CONTRAST   Final Result   Limited evaluation for pulmonary embolism due to motion artifact and bolus   timing. No proximal filling defect to suggest pulmonary embolism. Multifocal pneumonia, asymmetric on the right. Findings in the right upper   lung are slightly improved compared to prior while findings on the left are   mild but new. Continued follow-up to complete resolution recommended. MRI BRAIN WO CONTRAST   Final Result   1. No evidence of acute infarct, hemorrhage, mass effect/midline shift, or   ventriculomegaly. 2. Chronic left posterior parietal and left occipital ischemic changes. 3. Small left frontal scalp hematoma/edema. XR CHEST PORTABLE   Final Result   Improving right-sided pneumonia when compared to 11/02/2021. Questionable   small right pleural effusion with persistent bibasilar opacities which could   reflect residual pneumonia or atelectasis. CT CERVICAL SPINE WO CONTRAST   Final Result   No acute fracture or subluxation. Advanced multilevel spondylosis, including moderate narrowing of the central   canal at C5-6. CT FACIAL BONES WO CONTRAST   Final Result   Left mandibular condyle is in open mouth position. Clinical correlation is   recommended. No acute facial fractures are identified. Left frontal scalp contusion. CT HEAD WO CONTRAST   Final Result   1. Hypodensity in the left occipital lobe, suspected to be subacute or   chronic infarct. If it would alter patient's management, MR would be more   sensitive. 2. Left frontal soft tissue swelling. No associated calvarial abnormality.              Assessment/Plan:    Active Hospital Problems    Diagnosis Date Noted    Acute on chronic respiratory failure [facility]) [J96.20] 10/30/2021       Acute Medical Issues Being Addressed:    82-year-old admitted to the hospital with dyspnea, confusion and weakness    Acute encephalopathy metabolic with fall  Has some periorbital bruising  CT of the head was negative  MRI of the brain showed chronic infarcts nothing acute  Currently patient much more awake alert oriented in time place and person  CT of the head and neck and face no for acute fracture or hemorrhage  Resumed lower dose pregabalin and oxycodone. Acute on chronic respiratory failure secondary to pneumonia suspected aspiration gram-positive/gram-negative with sepsis and possible mild acute exacerbation of COPD and underlying SHORTY/OHS. CT negative for PE. Multifocal PNA. Nasal MRSA negative. Off Zyvox. Continue Zosyn  She is on IV methylprednisone will decrease frequency to daily if she continues to do well will change over to oral prednisone tomorrow and quickly taper her  Pulmonary consult appreciated  Maintain sats between 88 and 92 baseline 4 L oxygen      Facial contusion secondary to fall    Hyperglycemia secondary to steroids  A1c only 5.5  Blood sugars should continue to improve as we wean and taper down the steroids    CoNS bacteremia:  Likely contaminant. Follow up repeat cultures. DVT Prophylaxis: Subcu Lovenox  Diet: ADULT DIET; Regular  ADULT ORAL NUTRITION SUPPLEMENT; Lunch, Dinner;  Wound Healing Oral Supplement  ADULT ORAL NUTRITION SUPPLEMENT; Breakfast; Low Calorie/High Protein Oral Supplement  Code Status: Full Code      Dispo - once acute medical processes have resolved    Madhavi Redmond MD

## 2021-12-07 NOTE — PLAN OF CARE
Problem: Falls - Risk of:  Goal: Will remain free from falls  Description: Will remain free from falls  12/7/2021 0733 by Sharon Hernandez RN  Outcome: Ongoing     Problem: Falls - Risk of:  Goal: Absence of physical injury  Description: Absence of physical injury  12/7/2021 0733 by Sharon Hernandez RN  Outcome: Ongoing     Problem: Pain:  Goal: Pain level will decrease  Description: Pain level will decrease  12/7/2021 0733 by Sharon Hernandez RN  Outcome: Ongoing     Problem: Pain:  Goal: Control of acute pain  Description: Control of acute pain  12/7/2021 0733 by Sharon Hernandez RN  Outcome: Ongoing     Problem: Pain:  Goal: Control of chronic pain  Description: Control of chronic pain  12/7/2021 0733 by Sharon Hernandez RN  Outcome: Ongoing     Problem: Skin Integrity:  Goal: Will show no infection signs and symptoms  Description: Will show no infection signs and symptoms  12/7/2021 0733 by Sharon Hernandez RN  Outcome: Ongoing     Problem: Skin Integrity:  Goal: Absence of new skin breakdown  Description: Absence of new skin breakdown  12/7/2021 0733 by Sharon Hernandez RN  Outcome: Ongoing     Problem: Breathing Pattern - Ineffective:  Goal: Ability to achieve and maintain a regular respiratory rate will improve  Description: Ability to achieve and maintain a regular respiratory rate will improve  Outcome: Ongoing

## 2021-12-07 NOTE — PROGRESS NOTES
Occupational Therapy   Occupational Therapy Initial Assessment/Treatment   Date: 2021   Patient Name: Singh Dewitt  MRN: [id number]     : 1935    Date of Service: 2021    Discharge Recommendations:  3-5 sessions per week  OT Equipment Recommendations  Other: defer to d/c facility     Singh Dewitt scored a  on the AM-PAC ADL Inpatient form. Current research shows that an AM-PAC score of 17 or less is typically not associated with a discharge to the patient's home setting. Based on the patient's AM-PAC score and their current ADL deficits, it is recommended that the patient have 3-5 sessions per week of Occupational Therapy at d/c to increase the patient's independence. Please see assessment section for further patient specific details. If patient discharges prior to next session this note will serve as a discharge summary. Please see below for the latest assessment towards goals. Assessment   Performance deficits / Impairments: Decreased functional mobility ; Decreased balance; Decreased ADL status; Decreased endurance; Decreased strength  Assessment: Pt functioning below baseline due to deficits listed above. Pt requires x2 person dasha for bed mobility and transfers. OT recommending ongoing therapy in order to increase functional status. Prognosis: Guarded; Fair  Decision Making: Medium Complexity  OT Education: OT Role; Plan of Care; Precautions; ADL Adaptive Strategies; Transfer Training; Orientation  Patient Education: d/c recommendations  REQUIRES OT FOLLOW UP: Yes  Activity Tolerance  Activity Tolerance: Patient limited by pain; Patient limited by fatigue  Safety Devices  Safety Devices in place: Yes  Type of devices: All fall risk precautions in place; Patient at risk for falls; Left in chair; Chair alarm in place; Call light within reach           Patient Diagnosis(es): The primary encounter diagnosis was Acute respiratory failure with hypercapnia ([facility] [address]  Diagnoses of COPD exacerbation ([facility] [address] Septicemia ([facility] [address] Pneumonia of right lower lobe due to infectious organism, and Contusion of face, initial encounter were also pertinent to this visit. has a past medical history of Arthritis, COPD (chronic obstructive pulmonary disease) ([address] Hemorrhoids, Hernia of unspecified site of abdominal cavity without mention of obstruction or gangrene, Incontinence, Knee pain, bilateral, Spinal stenosis, and Spinal stenosis. has a past surgical history that includes Cholecystectomy and partial hysterectomy (cervix not removed). Restrictions  Restrictions/Precautions  Restrictions/Precautions: Fall Risk  Position Activity Restriction  Other position/activity restrictions: Cecilio Aschoff is a 82 y.o. female who presents emergency department today for evaluation for general fatigue. The patient lives at home with her daughter. The daughter states that the patient has had increasing confusion, and increasing weakness since Tuesday. The patient does have a history of COPD, as well as CHF. The patient is normally on 2 to 2-1/2 L of oxygen continuously, today is requiring 4 L. The daughter states that the patient fell forward out of her chair on Tuesday, she states that since then, patient has had increasing fatigue. Patient does have multiple bruising noted to her face, she is not reporting any headaches. She is not on any blood thinners, however the daughter states that the patient refused to go to the emergency room. She states that yesterday patient started with a cough which is productive of white sputum, there is been no hemoptysis. Patient's oxygen saturation was 88% today on her normal oxygen, she called EMS and was sent to the ED. The daughter also adds that the patient saw her primary care physician 2 days ago, and that she did have her drawn, she states that she was in acute kidney failure.   She states that they attempted to have the patient increase her fluids at home. The patient has not had any vomiting. No diarrhea. No dysuria. Patient is a full code. No other complaints    Subjective   General  Chart Reviewed: Yes, Orders, Progress Notes, History and Physical, Imaging  Patient assessed for rehabilitation services?: Yes  Family / Caregiver Present: No  Referring Practitioner: Jad Mckeon MD  Diagnosis: Falls, SOB  Subjective  Subjective: Pt in bed, agreeable to OT evaluation. General Comment  Comments: RN okay for therapy  Patient Currently in Pain: Yes  Pain Assessment  Pain Assessment: Respiratory Rate >10  Pain Level: 0  Patient's Stated Pain Goal: No pain  Pain Type: Chronic pain  Pain Location: Generalized  Pain Orientation: Right; Left  Pain Descriptors: Aching;  Sore  Pain Frequency: Intermittent  Pain Onset: On-going  Non-Pharmaceutical Pain Intervention(s): Rest; Repositioned  Response to Pain Intervention: Asleep with RR greater than 10  Vital Signs  Temp: 97.4 °F (36.3 °C)  Temp Source: Temporal  Pulse: 75  Heart Rate Source: Monitor  Resp: 22  BP: (!) 144/55  BP Location: Left upper arm  MAP (mmHg): 80  Patient Position: Up in chair  Level of Consciousness: Alert (0)  MEWS Score: 1  Patient Currently in Pain: Yes  Height and Weight  Height: 5' 4\" (162.6 cm)  Oxygen Therapy  SpO2: 93 %  Pulse Oximeter Device Mode: Continuous  Pulse Oximeter Device Location: Finger  O2 Device: High flow nasal cannula  O2 Flow Rate (L/min): 4 L/min  Social/Functional History  Social/Functional History  Lives With: Alone (daughter lives next door)  Type of Home: House  Home Layout: Two level (pt lives on first level)  Home Access: Level entry  Bathroom Shower/Tub: Tub/Shower unit  Bathroom Toilet: Bedside commode  Bathroom Equipment: Grab bars in shower, Shower chair  Home Equipment: Rolling walker, [employer], Lift chair (hospital bed)  Receives Help From: Family (daughter helps with most ADLs)  ADL Assistance: Needs assistance  Homemaking Assistance: Needs events; Decreased recall of biographical Information  Safety Judgement: Decreased awareness of need for assistance; Decreased awareness of need for safety  Problem Solving: Assistance required to generate solutions; Assistance required to identify errors made; Assistance required to implement solutions; Assistance required to correct errors made  Initiation: Requires cues for some                    LUE Strength  Gross LUE Strength: Exceptions to [facility]  RUE Strength  Gross RUE Strength: Exceptions to [facility]  Times per week: 3-5x  Current Treatment Recommendations: Strengthening, ROM, Balance Training, Functional Mobility Training, Endurance Training, Safety Education & Training, Patient/Caregiver Education & Training, Self-Care / ADL             AM-PAC Score        AM-[facility] Inpatient Daily Activity Raw Score: 11 (12/07/21 1313)  AM-PAC Inpatient ADL T-Scale Score : 29.04 (12/07/21 1313)  ADL Inpatient CMS 0-100% Score: 70.42 (12/07/21 1313)  ADL Inpatient CMS G-Code Modifier : CL (12/07/21 1313)    Goals  Short term goals  Time Frame for Short term goals: by d/c  Short term goal 1: Complete functional transfers with mod A x1  Short term goal 2: Complete toileting with mod A  Short term goal 3: Tolerate x1 min of static standing up in RW in order to decreased caregiver burden with standing ADLs  Short term goal 4: Tolerate x15 reps of BUE exercises in order to increase functional strength for ADLs/transfers       Therapy Time   Individual Concurrent Group Co-treatment   Time In 1116         Time Out 1200         Minutes 44         Timed Code Treatment Minutes: [facility], OTR/L

## 2021-12-07 NOTE — PROGRESS NOTES
Pt Dsats while sleeping on HFNC 4-6 L, will wear bipap for short periods of time, then repeatedly self removes, pt 86% on 6 L HFNC at this time, applied bipap again and explained to pt the importance of wearing it while sleeping, pt agreed to wear it and O2  sats now 100% on 60% bipap, will monitor. Micky Bui

## 2021-12-07 NOTE — PLAN OF CARE
Nutrition Problem #1: Increased nutrient needs  Intervention: Food and/or Nutrient Delivery: Continue Current Diet, Start Oral Nutrition Supplement  Nutritional Goals: PO intake 75% or greater at meals

## 2021-12-07 NOTE — PROGRESS NOTES
Physical Therapy    Facility/Department: [facility]  Initial Assessment    NAME: Corinne Oro  : 1935  MRN: [id number]    Date of Service: 2021    Discharge Recommendations: Corinne Oro scored a 10/24 on the AM-PAC short mobility form. Current research shows that an AM-PAC score of 17 or less is typically not associated with a discharge to the patient's home setting. Based on the patient's AM-PAC score and their current functional mobility deficits, it is recommended that the patient have 3-5 sessions per week of Physical Therapy at d/c to increase the patient's independence. Please see assessment section for further patient specific details. If patient discharges prior to next session this note will serve as a discharge summary. Please see below for the latest assessment towards goals. If pt refuses 3-5 sessions per week recommendation, recommend 2-3 sessions per week HHPT S3 with 24/ supervision       PT Equipment Recommendations  Other: defer to next level of care    Assessment   Body structures, Functions, Activity limitations: Decreased functional mobility ; Decreased ADL status; Decreased strength; Decreased safe awareness; Decreased cognition; Decreased endurance; Decreased balance; Increased pain  Assessment: Pt presents with decreased functional mobility and strength. Pt lives home alone with daughter next door and at baseline recieves help from daughter for ADLs and functional mobility. Pt requires max Ax2 for bed mobility and mod Ax2 for transfers. Pt reports several falls at home in the past 6 months and sacral wounds from prolonged bedrest. At this time it would not bed safe for pt to return home. Pt would continue to benefit from skilled PT in order to safely achieve more independence and prevent future falls.   Treatment Diagnosis: decreased functional mobility  Prognosis: Fair  Decision Making: Medium Complexity  Clinical Presentation: stable  PT Education: Goals; PT Role; however the daughter states that the patient refused to go to the emergency room. She states that yesterday patient started with a cough which is productive of white sputum, there is been no hemoptysis. Patient's oxygen saturation was 88% today on her normal oxygen, she called EMS and was sent to the ED. The daughter also adds that the patient saw her primary care physician 2 days ago, and that she did have her drawn, she states that she was in acute kidney failure. She states that they attempted to have the patient increase her fluids at home. The patient has not had any vomiting. No diarrhea. No dysuria. Patient is a full code.   No other complaints  Vision/Hearing  Vision: Impaired  Vision Exceptions: Wears glasses at all times  Hearing: Exceptions to [facility] Exceptions: Hard of hearing/hearing concerns     Subjective  General  Chart Reviewed: Yes  Patient assessed for rehabilitation services?: Yes  Response To Previous Treatment: Not applicable  Family / Caregiver Present: No  General Comment  Comments: Pt supine in bed upon arrival. Pt agreeable to PT/OT eval  Subjective  Subjective: Pt reports 8/10 pain in back and LEs, nursing notfied  Pain Screening  Patient Currently in Pain: Yes  Pain Assessment  Pain Level: 8  Pain Location: Back; Leg  Pain Orientation: Right; Left  Vital Signs  Patient Currently in Pain: Yes       Orientation  Orientation  Overall Orientation Status: Impaired  Orientation Level: Oriented to place; Oriented to situation; Oriented to person; Disoriented to time  Social/Functional History  Social/Functional History  Lives With: Alone (daughter lives next door)  Type of Home: House  Home Layout: Two level (pt lives on first level)  Home Access: Level entry  Bathroom Shower/Tub: Tub/Shower unit  Bathroom Toilet: Bedside commode  Bathroom Equipment: Grab bars in shower, Shower chair  Home Equipment: Rolling walker, Flores Joe, Lift chair (hospital bed)  Receives Help From: Family (daughter helps with most ADLs)  ADL Assistance: Needs assistance  Homemaking Assistance: Needs assistance  Ambulation Assistance: Needs assistance (pt reports she has not walked \"in awhile\")  Transfer Assistance: Needs assistance (uses [facility])  Active : No  Additional Comments: Has tried getting therapy at home. One fall that lead to this hospital admission. Reports a couple other falls in last 5 months  Cognition   Cognition  Overall Cognitive Status: Exceptions ([place])  Arousal/Alertness: Appropriate responses to stimuli  Following Commands: Follows one step commands with increased time; Follows one step commands with repetition  Attention Span: Appears intact  Memory: Decreased recall of recent events; Decreased recall of biographical Information  Safety Judgement: Decreased awareness of need for assistance; Decreased awareness of need for safety  Problem Solving: Assistance required to generate solutions; Assistance required to identify errors made; Assistance required to implement solutions; Assistance required to correct errors made  Initiation: Requires cues for some    Objective     Observation/Palpation  Edema: B LE swelling which is reported to be chronic    AROM RLE (degrees)  RLE AROM: WFL  AROM LLE (degrees)  LLE AROM : WFL  Strength RLE  Strength RLE: Exception  Comment: Gross MMT 3+/5  Strength LLE  Strength LLE: Exception  Comment: Gross MMT 3+/5  Tone RLE  RLE Tone: Normotonic  Tone LLE  LLE Tone: Normotonic  Sensation  Overall Sensation Status: WFL  Bed mobility  Supine to Sit: 2 Person assistance; Maximum assistance  Sit to Supine: Unable to assess (Pt in recliner at EOS)  Scootin Person assistance; Maximal assistance  Comment: HOB elevated, grab bars, increased time to complete  Transfers  Sit to Stand:  Moderate Assistance; 2 Person Assistance  Stand to sit: Moderate Assistance; 2 Person Assistance  Bed to Chair: 2 Person Assistance; Minimal assistance  Comment: Pt able to complete stand step to transfer from EOB to recliner with RW and min A x2  Ambulation  Ambulation?: Yes  More Ambulation?: No  Ambulation 1  Surface: level tile  Device: Rolling Walker  Assistance: Minimal assistance; 2 Person assistance  Quality of Gait: forward flexed posture, decreases step length, decreased step height  Gait Deviations: Slow Marifer; Decreased step length; Decreased step height  Distance: 5 ft  Comments: Pt was able to ambulate 5 ft from EOB to recliner with min A x2 and RW     Balance  Posture: Fair  Sitting - Static: Good  Sitting - Dynamic: Fair; +  Standing - Static: Fair; - (RW)  Standing - Dynamic: Poor (RW)  Comments: Pt able to maintain half standing position for ~30 seconds in order to adjust recliner padding. Plan   Plan  Times per week: 3-5x  Times per day: Daily  Current Treatment Recommendations: Strengthening, ROM, Balance Training, Functional Mobility Training, Transfer Training, Gait Training, ADL/Self-care Training, Endurance Training, Pain Management, Safety Education & Training  Safety Devices  Type of devices:  All fall risk precautions in place, Call light within reach, Chair alarm in place, Gait belt, Patient at risk for falls, Left in chair, Nurse notified  Restraints  Initially in place: No    G-Code       OutComes Score                                                  AM-PAC Score  AM-PAC Inpatient Mobility Raw Score : 10 (12/07/21 1332)  AM-PAC Inpatient T-Scale Score : 32.29 (12/07/21 1332)  Mobility Inpatient CMS 0-100% Score: 76.75 (12/07/21 1332)  Mobility Inpatient CMS G-Code Modifier : CL (12/07/21 1332)          Goals  Short term goals  Time Frame for Short term goals: upon discharge  Short term goal 1: Pt will be able to complete bed mobility with min A  Short term goal 2: Pt will be able to complete transfers with CGA  Short term goal 3: Pt will be able to ambulate 15 ft with LRAD and min A       Therapy Time   Individual Concurrent Group Co-treatment   Time In [address] [facility]         Minutes 42              Timed Code Treatment Minutes:   27    Total Treatment Minutes:  [address], [address], [place], [place] [address]

## 2021-12-07 NOTE — PROGRESS NOTES
P Pulmonary and Critical Care  Progress note      Reason for Consult: Acute on chronic hypoxemic respiratory failure aspiration pneumonia, sepsis, COPD, SHORTY  Requesting Physician: Dr Yovanny Romero:   [address] [facility]:                The patient is a 82 y.o. female with significant past medical history of:      Diagnosis Date    Arthritis     osteo    COPD (chronic obstructive pulmonary disease) ([address]     Hemorrhoids     Hernia of unspecified site of abdominal cavity without mention of obstruction or gangrene     Incontinence     Knee pain, bilateral     pt states no cartilage    Spinal stenosis     Spinal stenosis      Interval history: Patient remains arousable. She is on 3L supplemental oxygen. No respiratory distress.       Past Surgical History:        Procedure Laterality Date    CHOLECYSTECTOMY      PARTIAL HYSTERECTOMY       Current Medications:    Current Facility-Administered Medications: ipratropium-albuterol (DUONEB) nebulizer solution 1 ampule, 1 ampule, Inhalation, TID  ipratropium-albuterol (DUONEB) nebulizer solution 1 ampule, 1 ampule, Inhalation, Q4H PRN  insulin lispro (1 Unit Dial) 0-6 Units, 0-6 Units, SubCUTAneous, TID WC  insulin lispro (1 Unit Dial) 0-3 Units, 0-3 Units, SubCUTAneous, Nightly  glucose (GLUTOSE) 40 % oral gel 15 g, 15 g, Oral, PRN  dextrose 50 % IV solution, 12.5 g, IntraVENous, PRN  glucagon (rDNA) injection 1 mg, 1 mg, IntraMUSCular, PRN  dextrose 5 % solution, 100 mL/hr, IntraVENous, PRN  methylPREDNISolone sodium (SOLU-MEDROL) injection 40 mg, 40 mg, IntraVENous, Daily  budesonide-formoterol (SYMBICORT) 160-4.5 MCG/ACT inhaler 2 puff, 2 puff, Inhalation, BID  linezolid (ZYVOX) IVPB 600 mg, 600 mg, IntraVENous, Q12H  piperacillin-tazobactam (ZOSYN) 3,375 mg in dextrose 5 % 50 mL IVPB extended infusion (mini-bag), 3,375 mg, IntraVENous, Q8H  perflutren lipid microspheres (DEFINITY) injection 1.65 mg, 1.5 mL, IntraVENous, ONCE PRN  [Held by provider] oxyCODONE (OXYCONTIN) extended release tablet 10 mg, 10 mg, Oral, BID  [Held by provider] pregabalin (LYRICA) capsule 100 mg, 100 mg, Oral, TID  sodium chloride flush 0.9 % injection 5-40 mL, 5-40 mL, IntraVENous, 2 times per day  sodium chloride flush 0.9 % injection 5-40 mL, 5-40 mL, IntraVENous, PRN  0.9 % sodium chloride infusion, 25 mL, IntraVENous, PRN  enoxaparin (LOVENOX) injection 40 mg, 40 mg, SubCUTAneous, Nightly  ondansetron (ZOFRAN-ODT) disintegrating tablet 4 mg, 4 mg, Oral, Q8H PRN **OR** ondansetron (ZOFRAN) injection 4 mg, 4 mg, IntraVENous, Q6H PRN  polyethylene glycol (GLYCOLAX) packet 17 g, 17 g, Oral, Daily PRN  acetaminophen (TYLENOL) tablet 650 mg, 650 mg, Oral, Q6H PRN **OR** acetaminophen (TYLENOL) suppository 650 mg, 650 mg, Rectal, Q6H PRN    Allergies   Allergen Reactions    Sulfa Antibiotics      Unknown reaction       Social History:    TOBACCO:   reports that she has quit smoking. She has never used smokeless tobacco.  ETOH:   reports no history of alcohol use. Patient currently lives independently  Environmental/chemical exposure: None known    Family History:   History reviewed. No pertinent family history. REVIEW OF SYSTEMS:    CONSTITUTIONAL:  negative for fevers, chills, diaphoresis, activity change, appetite change, fatigue, night sweats and unexpected weight change.    EYES:  negative for blurred vision, eye discharge, visual disturbance and icterus  HEENT:  negative for hearing loss, tinnitus, ear drainage, sinus pressure, nasal congestion, epistaxis and snoring  RESPIRATORY:  See HPI  CARDIOVASCULAR:  negative for chest pain, palpitations, exertional chest pressure/discomfort, edema, syncope  GASTROINTESTINAL:  negative for nausea, vomiting, diarrhea, constipation, blood in stool and abdominal pain  GENITOURINARY:  negative for frequency, dysuria, urinary incontinence, decreased urine volume, and hematuria  HEMATOLOGIC/LYMPHATIC:  negative for easy bruising, bleeding and lymphadenopathy  ALLERGIC/IMMUNOLOGIC:  negative for recurrent infections, angioedema, anaphylaxis and drug reactions  ENDOCRINE:  negative for weight changes and diabetic symptoms including polyuria, polydipsia and polyphagia  MUSCULOSKELETAL:  negative for  pain, joint swelling, decreased range of motion and muscle weakness  NEUROLOGICAL:  negative for headaches, slurred speech, unilateral weakness  PSYCHIATRIC/BEHAVIORAL: negative for hallucinations, behavioral problems, confusion and agitation. Objective:   PHYSICAL EXAM:      VITALS:  /61   Pulse 67   Temp 97.6 °F (36.4 °C) (Temporal)   Resp 27   Ht 5' 4\" (1.626 m)   Wt 241 lb 13.5 oz (109.7 kg)   SpO2 94%   BMI 41.51 kg/m²      24HR INTAKE/OUTPUT:      Intake/Output Summary (Last 24 hours) at 12/7/2021 0931  Last data filed at 12/7/2021 4540  Gross per 24 hour   Intake 480 ml   Output 2300 ml   Net -1820 ml     CONSTITUTIONAL:  awake, alert, cooperative, no apparent distress, and appears stated age  NECK:  Supple, symmetrical, trachea midline, no adenopathy, thyroid symmetric, not enlarged and no tenderness, skin normal  LUNGS:  no increased work of breathing and clear to auscultation. No accessory muscle use  CARDIOVASCULAR: S1 and S2, no edema and no JVD  ABDOMEN:  normal bowel sounds, non-distended and no masses palpated, and no tenderness to palpation. No hepatospleenomegaly  LYMPHADENOPATHY:  no axillary or supraclavicular adenopathy. No cervical adnenopathy  PSYCHIATRIC: Oriented to person place and time. No obvious depression or anxiety. MUSCULOSKELETAL: No obvious misalignment or effusion of the joints. No clubbing, cyanosis of the digits. SKIN:  normal skin color, texture, turgor and no redness, warmth, or swelling.  No palpable nodules    DATA:    Old records have been reviewed    CBC:  Recent Labs     12/05/21  0535 12/06/21  0210 12/07/21  0405   WBC 23.4* 22.1* 20.1*   RBC 3.11* 3.02* 3.10*   HGB 8.0* 7.6* 7.9*   HCT 25.9* 24.6* 25.2*    228 254   MCV 83.2 81.5 81.5   MCH 25.7* 25.2* 25.5*   MCHC 30.9* 30.9* 31.3   RDW 17.2* 17.7* 17.8*      BMP:  Recent Labs     12/04/21  1357 12/04/21  1357 12/05/21  0535 12/06/21  0210 12/07/21  0405      < > 133* 135* 141   K 4.6   < > 4.5 4.1 3.1*   CL 93*   < > 93* 94* 99   CO2 35*  --  35* 33*  --    BUN 15   < > 17 27* 24*   CREATININE 0.9   < > 0.8 0.9 1.0   CALCIUM 8.6   < > 8.4 8.9 8.6   GLUCOSE 128*   < > 153* 203* 139*    < > = values in this interval not displayed. ABG:  Recent Labs     12/06/21  0225   PHART 7.586*   [id number] 39.6   PO2ART 125.0*   KEP8PXR 37.6*   Y7NJHCOQ 100.0   BEART 14.5*     Procalcitonin  Recent Labs     12/04/21  1357   PROCAL 0.21*       Lab Results   Component Value Date    BNP 8.1 03/21/2011     Lab Results   Component Value Date    CKTOTAL 189 10/11/2020    TROPONINI <0.01 12/06/2021           Radiology Review:  All pertinent images / reports were reviewed as a part of this visit. Assessment:     1. Acute on chronic hypoxemic respiratory failure  2. Aspiration pneumonia  3. COPD  4. SHORTY  5. Elevated diaphragm  6. Frequent falls      Plan:     I have reviewed laboratories, medical records and images for this visit  Chest imaging reveals right-sided airspace disease. This is actually improved in comparison to imaging from November. CT imaging reveals patchy bilateral airspace disease. Compared to previous imaging from November 2021, some areas are similar, some areas have improved in some areas are new. This very likely represents ongoing aspiration pneumonia. She is tolerating now 3 L/min HFNC. Growing coagulase-negative staph aureus from 1 of 2 bottles and only 1 set of blood cultures.   This is likely contaminant  Stop Zyvox  Continue Zosyn

## 2021-12-08 PROBLEM — J96.21 ACUTE ON CHRONIC RESPIRATORY FAILURE WITH HYPOXIA (HCC): Status: ACTIVE | Noted: 2019-06-20

## 2021-12-08 PROBLEM — R13.10 DYSPHAGIA: Status: ACTIVE | Noted: 2021-12-08

## 2021-12-08 PROBLEM — E87.6 HYPOKALEMIA: Status: ACTIVE | Noted: 2021-12-08

## 2021-12-08 PROBLEM — R53.81 DEBILITY: Status: ACTIVE | Noted: 2021-12-08

## 2021-12-08 PROBLEM — R73.9 STEROID-INDUCED HYPERGLYCEMIA: Status: ACTIVE | Noted: 2021-12-08

## 2021-12-08 PROBLEM — R29.6 RECURRENT FALLS: Status: ACTIVE | Noted: 2021-12-08

## 2021-12-08 PROBLEM — T38.0X5A STEROID-INDUCED HYPERGLYCEMIA: Status: ACTIVE | Noted: 2021-12-08

## 2021-12-08 PROBLEM — G93.41 METABOLIC ENCEPHALOPATHY: Status: ACTIVE | Noted: 2021-12-08

## 2021-12-08 PROBLEM — D64.9 CHRONIC ANEMIA: Status: ACTIVE | Noted: 2021-12-08

## 2021-12-08 PROBLEM — J69.0 ASPIRATION PNEUMONIA (HCC): Status: ACTIVE | Noted: 2019-04-24

## 2021-12-08 LAB
ANION GAP SERPL CALCULATED.3IONS-SCNC: 9 MMOL/L (ref 3–16)
BUN BLDV-MCNC: 20 MG/DL (ref 7–20)
CALCIUM SERPL-MCNC: 8.6 MG/DL (ref 8.3–10.6)
CHLORIDE BLD-SCNC: 103 MMOL/L (ref 99–110)
CO2: 32 MMOL/L (ref 21–32)
CREAT SERPL-MCNC: 0.9 MG/DL (ref 0.6–1.2)
CULTURE, BLOOD 2: NORMAL
FERRITIN: 51.8 NG/ML (ref 15–150)
FOLATE: >20 NG/ML (ref 4.78–24.2)
GFR AFRICAN AMERICAN: >60
GFR NON-AFRICAN AMERICAN: 60
GLUCOSE BLD-MCNC: 102 MG/DL (ref 70–99)
GLUCOSE BLD-MCNC: 109 MG/DL (ref 70–99)
GLUCOSE BLD-MCNC: 121 MG/DL (ref 70–99)
GLUCOSE BLD-MCNC: 166 MG/DL (ref 70–99)
GLUCOSE BLD-MCNC: 271 MG/DL (ref 70–99)
HCT VFR BLD CALC: 24 % (ref 36–48)
HEMOGLOBIN: 7.5 G/DL (ref 12–16)
IRON SATURATION: 11 % (ref 15–50)
IRON: 33 UG/DL (ref 37–145)
MAGNESIUM: 2.3 MG/DL (ref 1.8–2.4)
MCH RBC QN AUTO: 25.2 PG (ref 26–34)
MCHC RBC AUTO-ENTMCNC: 31.4 G/DL (ref 31–36)
MCV RBC AUTO: 80.4 FL (ref 80–100)
MRSA CULTURE ONLY: NORMAL
PDW BLD-RTO: 17.6 % (ref 12.4–15.4)
PERFORMED ON: ABNORMAL
PHOSPHORUS: 2.8 MG/DL (ref 2.5–4.9)
PLATELET # BLD: 232 K/UL (ref 135–450)
PMV BLD AUTO: 8.3 FL (ref 5–10.5)
POTASSIUM SERPL-SCNC: 3.4 MMOL/L (ref 3.5–5.1)
RBC # BLD: 2.99 M/UL (ref 4–5.2)
SODIUM BLD-SCNC: 144 MMOL/L (ref 136–145)
TOTAL IRON BINDING CAPACITY: 301 UG/DL (ref 260–445)
VITAMIN B-12: 743 PG/ML (ref 211–911)
WBC # BLD: 13.1 K/UL (ref 4–11)

## 2021-12-08 PROCEDURE — 84100 ASSAY OF PHOSPHORUS: CPT

## 2021-12-08 PROCEDURE — 94640 AIRWAY INHALATION TREATMENT: CPT

## 2021-12-08 PROCEDURE — 6360000002 HC RX W HCPCS: Performed by: INTERNAL MEDICINE

## 2021-12-08 PROCEDURE — 94761 N-INVAS EAR/PLS OXIMETRY MLT: CPT

## 2021-12-08 PROCEDURE — 2140000000 HC CCU INTERMEDIATE R&B

## 2021-12-08 PROCEDURE — 82746 ASSAY OF FOLIC ACID SERUM: CPT

## 2021-12-08 PROCEDURE — 99233 SBSQ HOSP IP/OBS HIGH 50: CPT | Performed by: INTERNAL MEDICINE

## 2021-12-08 PROCEDURE — 92610 EVALUATE SWALLOWING FUNCTION: CPT

## 2021-12-08 PROCEDURE — 82728 ASSAY OF FERRITIN: CPT

## 2021-12-08 PROCEDURE — 80048 BASIC METABOLIC PNL TOTAL CA: CPT

## 2021-12-08 PROCEDURE — 6370000000 HC RX 637 (ALT 250 FOR IP): Performed by: INTERNAL MEDICINE

## 2021-12-08 PROCEDURE — 87040 BLOOD CULTURE FOR BACTERIA: CPT

## 2021-12-08 PROCEDURE — 2700000000 HC OXYGEN THERAPY PER DAY

## 2021-12-08 PROCEDURE — 82607 VITAMIN B-12: CPT

## 2021-12-08 PROCEDURE — 36415 COLL VENOUS BLD VENIPUNCTURE: CPT

## 2021-12-08 PROCEDURE — 83735 ASSAY OF MAGNESIUM: CPT

## 2021-12-08 PROCEDURE — 92526 ORAL FUNCTION THERAPY: CPT

## 2021-12-08 PROCEDURE — 85027 COMPLETE CBC AUTOMATED: CPT

## 2021-12-08 PROCEDURE — 2580000003 HC RX 258: Performed by: INTERNAL MEDICINE

## 2021-12-08 PROCEDURE — 94660 CPAP INITIATION&MGMT: CPT

## 2021-12-08 PROCEDURE — 6360000002 HC RX W HCPCS: Performed by: FAMILY MEDICINE

## 2021-12-08 PROCEDURE — 2580000003 HC RX 258: Performed by: FAMILY MEDICINE

## 2021-12-08 PROCEDURE — 83550 IRON BINDING TEST: CPT

## 2021-12-08 PROCEDURE — 83540 ASSAY OF IRON: CPT

## 2021-12-08 RX ORDER — FUROSEMIDE 20 MG/1
20 TABLET ORAL DAILY
Status: DISCONTINUED | OUTPATIENT
Start: 2021-12-09 | End: 2021-12-09 | Stop reason: HOSPADM

## 2021-12-08 RX ORDER — FUROSEMIDE 10 MG/ML
20 INJECTION INTRAMUSCULAR; INTRAVENOUS ONCE
Status: COMPLETED | OUTPATIENT
Start: 2021-12-08 | End: 2021-12-08

## 2021-12-08 RX ORDER — FUROSEMIDE 20 MG/1
20 TABLET ORAL DAILY
Status: DISCONTINUED | OUTPATIENT
Start: 2021-12-08 | End: 2021-12-08

## 2021-12-08 RX ORDER — PREDNISONE 20 MG/1
40 TABLET ORAL DAILY
Status: DISCONTINUED | OUTPATIENT
Start: 2021-12-09 | End: 2021-12-09

## 2021-12-08 RX ADMIN — PREGABALIN 25 MG: 25 CAPSULE ORAL at 21:03

## 2021-12-08 RX ADMIN — PREGABALIN 25 MG: 25 CAPSULE ORAL at 07:55

## 2021-12-08 RX ADMIN — Medication 2 PUFF: at 07:48

## 2021-12-08 RX ADMIN — PIPERACILLIN AND TAZOBACTAM 3375 MG: 3; .375 INJECTION, POWDER, LYOPHILIZED, FOR SOLUTION INTRAVENOUS at 21:13

## 2021-12-08 RX ADMIN — OXYCODONE AND ACETAMINOPHEN 1 TABLET: 5; 325 TABLET ORAL at 18:43

## 2021-12-08 RX ADMIN — PREGABALIN 25 MG: 25 CAPSULE ORAL at 13:45

## 2021-12-08 RX ADMIN — Medication 10 ML: at 07:55

## 2021-12-08 RX ADMIN — INSULIN LISPRO 2 UNITS: 100 INJECTION, SOLUTION INTRAVENOUS; SUBCUTANEOUS at 11:17

## 2021-12-08 RX ADMIN — OXYCODONE AND ACETAMINOPHEN 1 TABLET: 5; 325 TABLET ORAL at 02:52

## 2021-12-08 RX ADMIN — PIPERACILLIN AND TAZOBACTAM 3375 MG: 3; .375 INJECTION, POWDER, LYOPHILIZED, FOR SOLUTION INTRAVENOUS at 05:31

## 2021-12-08 RX ADMIN — IRON SUCROSE 200 MG: 20 INJECTION, SOLUTION INTRAVENOUS at 18:00

## 2021-12-08 RX ADMIN — METHYLPREDNISOLONE SODIUM SUCCINATE 40 MG: 40 INJECTION, POWDER, FOR SOLUTION INTRAMUSCULAR; INTRAVENOUS at 07:55

## 2021-12-08 RX ADMIN — PIPERACILLIN AND TAZOBACTAM 3375 MG: 3; .375 INJECTION, POWDER, LYOPHILIZED, FOR SOLUTION INTRAVENOUS at 13:40

## 2021-12-08 RX ADMIN — ENOXAPARIN SODIUM 40 MG: 100 INJECTION SUBCUTANEOUS at 21:02

## 2021-12-08 RX ADMIN — OXYCODONE AND ACETAMINOPHEN 1 TABLET: 5; 325 TABLET ORAL at 10:45

## 2021-12-08 RX ADMIN — Medication 10 ML: at 21:15

## 2021-12-08 RX ADMIN — FUROSEMIDE 20 MG: 10 INJECTION, SOLUTION INTRAMUSCULAR; INTRAVENOUS at 18:43

## 2021-12-08 RX ADMIN — POTASSIUM BICARBONATE 40 MEQ: 782 TABLET, EFFERVESCENT ORAL at 21:03

## 2021-12-08 RX ADMIN — IPRATROPIUM BROMIDE AND ALBUTEROL SULFATE 1 AMPULE: .5; 3 SOLUTION RESPIRATORY (INHALATION) at 07:48

## 2021-12-08 RX ADMIN — IPRATROPIUM BROMIDE AND ALBUTEROL SULFATE 1 AMPULE: .5; 3 SOLUTION RESPIRATORY (INHALATION) at 16:02

## 2021-12-08 RX ADMIN — INSULIN LISPRO 1 UNITS: 100 INJECTION, SOLUTION INTRAVENOUS; SUBCUTANEOUS at 17:20

## 2021-12-08 RX ADMIN — POTASSIUM BICARBONATE 40 MEQ: 782 TABLET, EFFERVESCENT ORAL at 07:55

## 2021-12-08 ASSESSMENT — PAIN SCALES - GENERAL
PAINLEVEL_OUTOF10: 0
PAINLEVEL_OUTOF10: 0
PAINLEVEL_OUTOF10: 8
PAINLEVEL_OUTOF10: 7
PAINLEVEL_OUTOF10: 3
PAINLEVEL_OUTOF10: 10
PAINLEVEL_OUTOF10: 0
PAINLEVEL_OUTOF10: 7
PAINLEVEL_OUTOF10: 0
PAINLEVEL_OUTOF10: 0
PAINLEVEL_OUTOF10: 7
PAINLEVEL_OUTOF10: 6

## 2021-12-08 ASSESSMENT — PAIN DESCRIPTION - PAIN TYPE
TYPE: CHRONIC PAIN
TYPE: CHRONIC PAIN

## 2021-12-08 ASSESSMENT — PAIN DESCRIPTION - DESCRIPTORS
DESCRIPTORS: BURNING;DISCOMFORT
DESCRIPTORS: ACHING;DISCOMFORT

## 2021-12-08 ASSESSMENT — PAIN DESCRIPTION - ORIENTATION
ORIENTATION: LEFT
ORIENTATION: LOWER

## 2021-12-08 ASSESSMENT — PAIN DESCRIPTION - LOCATION
LOCATION: LEG;BACK
LOCATION: BACK;BUTTOCKS

## 2021-12-08 ASSESSMENT — PAIN DESCRIPTION - FREQUENCY: FREQUENCY: INTERMITTENT

## 2021-12-08 ASSESSMENT — PAIN DESCRIPTION - ONSET: ONSET: ON-GOING

## 2021-12-08 NOTE — CARE COORDINATION
SW contacted RN who stated that pt did not void and discharge was cancelled. RN cancelled the transport with [facility]. SW will attempt to reschedule transport tomorrow if pt is ok to discharge.     Electronically signed by GIOVANNI Bowen, IVANIA on 12/8/2021 at 6:39 PM

## 2021-12-08 NOTE — PROGRESS NOTES
P Pulmonary and Critical Care  Progress note      Reason for Consult: Acute on chronic hypoxemic respiratory failure aspiration pneumonia, sepsis, COPD, SHORTY  Requesting Physician: Dr Ghosh Iba:   [address] [facility] / [facility] [place] [facility]":                The patient is a 82 y.o. female with significant past medical history of:      Diagnosis Date    Arthritis     osteo    COPD (chronic obstructive pulmonary disease) ([address]     Hemorrhoids     Hernia of unspecified site of abdominal cavity without mention of obstruction or gangrene     Incontinence     Knee pain, bilateral     pt states no cartilage    Spinal stenosis     Spinal stenosis      Interval history: She is much more alert today. Absolutely refusing any discussion of going to an ECF for rehab.       Past Surgical History:        Procedure Laterality Date    CHOLECYSTECTOMY      PARTIAL HYSTERECTOMY       Current Medications:    Current Facility-Administered Medications: potassium bicarb-citric acid (EFFER-K) effervescent tablet 40 mEq, 40 mEq, Oral, BID  pregabalin (LYRICA) capsule 25 mg, 25 mg, Oral, TID  oxyCODONE-acetaminophen (PERCOCET) 5-325 MG per tablet 1 tablet, 1 tablet, Oral, Q8H PRN  ipratropium-albuterol (DUONEB) nebulizer solution 1 ampule, 1 ampule, Inhalation, TID  ipratropium-albuterol (DUONEB) nebulizer solution 1 ampule, 1 ampule, Inhalation, Q4H PRN  insulin lispro (1 Unit Dial) 0-6 Units, 0-6 Units, SubCUTAneous, TID WC  insulin lispro (1 Unit Dial) 0-3 Units, 0-3 Units, SubCUTAneous, Nightly  glucose (GLUTOSE) 40 % oral gel 15 g, 15 g, Oral, PRN  dextrose 50 % IV solution, 12.5 g, IntraVENous, PRN  glucagon (rDNA) injection 1 mg, 1 mg, IntraMUSCular, PRN  dextrose 5 % solution, 100 mL/hr, IntraVENous, PRN  methylPREDNISolone sodium (SOLU-MEDROL) injection 40 mg, 40 mg, IntraVENous, Daily  budesonide-formoterol (SYMBICORT) 160-4.5 MCG/ACT inhaler 2 puff, 2 puff, Inhalation, BID  piperacillin-tazobactam (ZOSYN) 3,375 mg in dextrose 5 % 50 mL IVPB extended infusion (mini-bag), 3,375 mg, IntraVENous, Q8H  perflutren lipid microspheres (DEFINITY) injection 1.65 mg, 1.5 mL, IntraVENous, ONCE PRN  sodium chloride flush 0.9 % injection 5-40 mL, 5-40 mL, IntraVENous, 2 times per day  sodium chloride flush 0.9 % injection 5-40 mL, 5-40 mL, IntraVENous, PRN  0.9 % sodium chloride infusion, 25 mL, IntraVENous, PRN  enoxaparin (LOVENOX) injection 40 mg, 40 mg, SubCUTAneous, Nightly  ondansetron (ZOFRAN-ODT) disintegrating tablet 4 mg, 4 mg, Oral, Q8H PRN **OR** ondansetron (ZOFRAN) injection 4 mg, 4 mg, IntraVENous, Q6H PRN  polyethylene glycol (GLYCOLAX) packet 17 g, 17 g, Oral, Daily PRN  acetaminophen (TYLENOL) tablet 650 mg, 650 mg, Oral, Q6H PRN **OR** acetaminophen (TYLENOL) suppository 650 mg, 650 mg, Rectal, Q6H PRN    Allergies   Allergen Reactions    Sulfa Antibiotics      Unknown reaction       Social History:    TOBACCO:   reports that she has quit smoking. She has never used smokeless tobacco.  ETOH:   reports no history of alcohol use. Patient currently lives independently  Environmental/chemical exposure: None known    Family History:   History reviewed. No pertinent family history. REVIEW OF SYSTEMS:    CONSTITUTIONAL:  negative for fevers, chills, diaphoresis, activity change, appetite change, fatigue, night sweats and unexpected weight change.    EYES:  negative for blurred vision, eye discharge, visual disturbance and icterus  HEENT:  negative for hearing loss, tinnitus, ear drainage, sinus pressure, nasal congestion, epistaxis and snoring  RESPIRATORY:  See HPI  CARDIOVASCULAR:  negative for chest pain, palpitations, exertional chest pressure/discomfort, edema, syncope  GASTROINTESTINAL:  negative for nausea, vomiting, diarrhea, constipation, blood in stool and abdominal pain  GENITOURINARY:  negative for frequency, dysuria, urinary incontinence, decreased urine volume, and hematuria  HEMATOLOGIC/LYMPHATIC:  negative for easy bruising, bleeding and lymphadenopathy  ALLERGIC/IMMUNOLOGIC:  negative for recurrent infections, angioedema, anaphylaxis and drug reactions  ENDOCRINE:  negative for weight changes and diabetic symptoms including polyuria, polydipsia and polyphagia  MUSCULOSKELETAL:  negative for  pain, joint swelling, decreased range of motion and muscle weakness  NEUROLOGICAL:  negative for headaches, slurred speech, unilateral weakness  PSYCHIATRIC/BEHAVIORAL: negative for hallucinations, behavioral problems, confusion and agitation. Objective:   PHYSICAL EXAM:      VITALS:  BP (!) 136/54   Pulse 60   Temp 97 °F (36.1 °C) (Temporal)   Resp 23   Ht 5' 4\" (1.626 m)   Wt 240 lb 8.4 oz (109.1 kg)   SpO2 98%   BMI 41.29 kg/m²      24HR INTAKE/OUTPUT:      Intake/Output Summary (Last 24 hours) at 12/8/2021 8365  Last data filed at 12/8/2021 0535  Gross per 24 hour   Intake 780 ml   Output 1250 ml   Net -470 ml     CONSTITUTIONAL:  awake, alert, cooperative, no apparent distress, and appears stated age  NECK:  Supple, symmetrical, trachea midline, no adenopathy, thyroid symmetric, not enlarged and no tenderness, skin normal  LUNGS:  no increased work of breathing and clear to auscultation. No accessory muscle use  CARDIOVASCULAR: S1 and S2, no edema and no JVD  ABDOMEN:  normal bowel sounds, non-distended and no masses palpated, and no tenderness to palpation. No hepatospleenomegaly  LYMPHADENOPATHY:  no axillary or supraclavicular adenopathy. No cervical adnenopathy  PSYCHIATRIC: Oriented to person place and time. No obvious depression or anxiety. MUSCULOSKELETAL: No obvious misalignment or effusion of the joints. No clubbing, cyanosis of the digits. SKIN:  normal skin color, texture, turgor and no redness, warmth, or swelling.  No palpable nodules    DATA:    Old records have been reviewed    CBC:  Recent Labs     12/06/21  0210 12/07/21  0405 12/08/21  0607   WBC 22.1* 20.1* 13.1*   RBC 3.02* 3.10* 2.99*   HGB 7.6* 7.9* 7.5*   HCT 24.6* 25.2* 24.0*    254 232   MCV 81.5 81.5 80.4   MCH 25.2* 25.5* 25.2*   MCHC 30.9* 31.3 31.4   RDW 17.7* 17.8* 17.6*      BMP:  Recent Labs     12/06/21  0210 12/07/21  0405 12/08/21  0607   * 141 144   K 4.1 3.1* 3.4*   CL 94* 99 103   CO2 33* 28 32   BUN 27* 24* 20   CREATININE 0.9 1.0 0.9   CALCIUM 8.9 8.6 8.6   GLUCOSE 203* 139* 102*      ABG:  Recent Labs     12/06/21  0225   PHART 7.586*   CLU7OIS 39.6   PO2ART 125.0*   COO2QAX 37.6*   [id number] 100.0   BEART 14.5*     Procalcitonin  No results for input(s): PROCAL in the last 72 hours. Lab Results   Component Value Date    BNP 8.1 03/21/2011     Lab Results   Component Value Date    CKTOTAL 189 10/11/2020    TROPONINI <0.01 12/06/2021           Radiology Review:  All pertinent images / reports were reviewed as a part of this visit. Assessment:     1. Acute on chronic hypoxemic respiratory failure  2. Aspiration pneumonia  3. COPD  4. SHORTY  5. Elevated diaphragm  6. Frequent falls      Plan:     I have reviewed laboratories, medical records and images for this visit  Chest imaging reveals right-sided airspace disease. This is actually improved in comparison to imaging from November. CT imaging reveals patchy bilateral airspace disease. Compared to previous imaging from November 2021, some areas are similar, some areas have improved in some areas are new. This very likely represents ongoing aspiration pneumonia. We will repeat her chest x-ray today  Tolerating 3 L/min oxygen supplement. At home normally wears 2 to 2.5 L/min. Her chest sounds pretty clear on examination  She remains on Zosyn. Positive blood cultures for coagulase-negative staph was thought to be a contaminant and Zyvox has been stopped  Refusing any consideration of ECF placement. She only wants to go back home again.  This seems to be part of the problem for her

## 2021-12-08 NOTE — DISCHARGE INSTR - COC
Continuity of Care Form    Patient Name: Charito Pratt   :  1935  MRN:  [id number]    Admit date:  2021  Discharge date:  2021    Code Status Order: Full Code   Advance Directives:      Admitting Physician:  Sobeida Vicente MD  PCP: Ayush Hidalgo    Discharging Nurse: Gerry Contreras Unit/Room#: AWG-0699/4713-71  Discharging Unit Phone Number: [phone]    Emergency Contact:   Extended Emergency Contact Information  Primary Emergency Contact: Elliefareedjosé miguel Geiger Phone: [phone]  Relation: Child    Past Surgical History:  Past Surgical History:   Procedure Laterality Date    CHOLECYSTECTOMY      PARTIAL HYSTERECTOMY         Immunization History:   Immunization History   Administered Date(s) Administered    COVID-19, Pfizer, PF, 30mcg/0.3mL 2021, 2021    Influenza Virus Vaccine 10/24/2018    Pneumococcal Conjugate 13-valent [place] [employer]) 2013       Active Problems:  Patient Active Problem List   Diagnosis Code    Pneumonia J18.9    Acute on chronic diastolic heart failure ([address] I50.33    Peripheral edema R60.9    COPD exacerbation ([address] J44.1    Essential hypertension I10    Hyperlipidemia E78.5    Morbid obesity with BMI of 50.0-59.9, adult ([address] E66.01, Z68.43    Acute on chronic respiratory failure with hypercapnia ([address] J96.22    Acute respiratory failure ([address] J96.00    Acute respiratory failure with hypoxia and hypercapnia (HCC) J96.01, J96.02    Acute kidney injury ([address] N17.9    History of recurrent UTI (urinary tract infection) Z87.440    Chronic obstructive pulmonary disease with acute lower respiratory infection ([address] J44.0    Spinal stenosis O53.20    Complicated UTI (urinary tract infection) N39.0    Acute on chronic respiratory failure with hypoxia and hypercapnia (HCC) J96.21, J96.22    Weight loss counseling, encounter for Z71.3    Fall at home [address] XXXA, Y92.009    Rhabdomyolysis M62.82    Altered mental state R41.82    Syncope and collapse R55    Acute on chronic respiratory failure with hypoxia ([facility]) J96.21    Acute on chronic respiratory failure ([facility]) J96.20       Isolation/Infection:   Isolation            No Isolation          Patient Infection Status       Infection Onset Added Last Indicated Last Indicated By Review Planned Expiration Resolved Resolved By    None active    Resolved    COVID-19 (Rule Out) 10/30/21 10/30/21 10/30/21 COVID-19 (Ordered)   10/31/21 Rule-Out Test Resulted    COVID-19 (Rule Out) 09/10/21 09/10/21 09/10/21 COVID-19 (Ordered)   09/10/21 Rule-Out Test Resulted    COVID-19 (Rule Out) 10/14/20 10/14/20 10/14/20 COVID-19 (Ordered)   10/14/20 Rule-Out Test Resulted    MDRO (multi-drug resistant organism) 08/11/19 08/15/19 08/11/19 Urine Culture   10/13/20 Sharan Velasquez RN            Nurse Assessment:  Last Vital Signs: BP (!) 150/59   Pulse 73   Temp 98 °F (36.7 °C) (Temporal)   Resp 16   Ht 5' 4\" (1.626 m)   Wt 240 lb 8.4 oz (109.1 kg)   SpO2 90%   BMI 41.29 kg/m²     Last documented pain score (0-10 scale): Pain Level: 0  Last Weight:   Wt Readings from Last 1 Encounters:   12/08/21 240 lb 8.4 oz (109.1 kg)     Mental Status:  oriented    IV Access:  - Peripheral IV - site  L Antecubital, insertion date: 12/4/2021    Nursing Mobility/ADLs:  Walking   Dependent  Transfer  Dependent  Bathing  Dependent  Dressing  Dependent  Toileting  Dependent  Feeding  Independent  Med Admin  Assisted  Med Delivery   whole    Wound Care Documentation and Therapy:  Wound 04/24/19 Coccyx Mid open  (Active)   Number of days: 958       Wound 06/20/19 Buttocks Left Deep tissue injury (Active)   Number of days: 901       Wound 10/10/20 Buttocks Left moisture associated dermatitis left buttocks (Active)   Number of days: 424       Wound 10/10/20 Thigh Right; Inner moisture associated dermatitis, rt inner thigh (Active)   Number of days: 424       Wound 10/10/20 Toe (Comment  which one) Left left great toe bruise (Active)   Number of days: 424       Wound 09/10/21 Ischium Right stage 2  (Active)   Number of days: 88       Wound 10/31/21 Buttocks Proximal;Right Incontinence associated dermatitis (IAD) 11/1/21 (Active)   Number of days: 37       Wound 10/31/21 Buttocks Left Incontinence associated dermatitis per WOC RN 11/1/21 (Active)   Number of days: 37       Wound 12/05/21 Buttocks Left; Right; Mid Moisture breakdown-IAD (incontinence associated damage (Active)   Wound Image   12/06/21 1100   Wound Etiology Other 12/08/21 0800   Dressing Status Clean; Dry; Intact 12/08/21 0800   Wound Cleansed Wound cleanser 12/08/21 0800   Dressing/Treatment Foam; Protective barrier 12/08/21 0800   Dressing Change Due 12/08/21 12/08/21 0800   Wound Assessment Epithelialization; Pink/red; Superficial 12/08/21 0800   Drainage Amount None 12/08/21 0800   Drainage Description Yellow 12/08/21 0800   Odor None 12/08/21 0800   Jessica-wound Assessment Excoriated; Fragile 12/08/21 0800   Number of days: 3        Elimination:  Continence: Bowel: No  Bladder: Yes  Urinary Catheter: Removal Date 12/8/2021    Colostomy/Ileostomy/Ileal Conduit: No       Date of Last BM: 12/8/2021    Intake/Output Summary (Last 24 hours) at 12/8/2021 1639  Last data filed at 12/8/2021 1623  Gross per 24 hour   Intake 660 ml   Output 1950 ml   Net -1290 ml     I/O last 3 completed shifts: In: 65 [P.O.:660]  Out: 750 [Urine:750]    Safety Concerns: At risk for falls, fall in last 30 days    Impairments/Disabilities:      None    Nutrition Therapy:  Current Nutrition Therapy:   - Oral Diet:  General    Routes of Feeding: Oral  Liquids: Nectar Thick Liquids  Daily Fluid Restriction: no  Last Modified Barium Swallow with Video (Video Swallowing Test): Not done    Treatments at the Time of Hospital Discharge:   Respiratory Treatments: PRN  Oxygen Therapy:  is on oxygen at 2 L/min per nasal cannula.   Ventilator:    - No ventilator support    Rehab Therapies: Physical Therapy  Weight Bearing Status/Restrictions: No weight bearing restirctions  Other Medical Equipment (for information only, NOT a DME order):  wheelchair  Other Treatments: ***    Patient's personal belongings (please select all that are sent with patient):  None    RN SIGNATURE:  Electronically signed by Charito Roman RN on 12/8/21 at 4:44 PM EST    CASE MANAGEMENT/SOCIAL WORK SECTION    Inpatient Status Date: 12/4/2021    Readmission Risk Assessment Score:  Readmission Risk              Risk of Unplanned Readmission:  25           Discharging to Facility/ Agency   Name: [address] [place] [address]  Phone: [phone]  Fax: [phone]          / signature: JERONIMO Ascencio, CCM, RN  [facility]  [phone]     PHYSICIAN SECTION    Prognosis: Fair    Condition at Discharge: Stable    Rehab Potential (if transferring to Rehab): Fair    Recommended Labs or Other Treatments After Discharge: None    Physician Certification: I certify the above information and transfer of Shira Mensah  is necessary for the continuing treatment of the diagnosis listed and that she requires Home Care for greater 30 days.      Update Admission H&P: No change in H&P    PHYSICIAN SIGNATURE:  Electronically signed by Randal Diaz MD on 12/8/21 at 4:45 PM EST

## 2021-12-08 NOTE — PROGRESS NOTES
Facility/Department: [facility]  Initial Assessment  DYSPHAGIA BEDSIDE SWALLOW EVALUATION     Patient: Lizbeth Ellis   : 1935   MRN: [id number]      Evaluation Date: 2021   Admitting Diagnosis: Septicemia ([facility] [A41.9]  COPD exacerbation ([facility] [J44.1]  Acute respiratory failure with hypercapnia ([facility] [J96.02]  Contusion of face, initial encounter [S00.83XA]  Acute on chronic respiratory failure ([facility] [J96.20]  Pneumonia of right lower lobe due to infectious organism [J18.9]  Pain: Pt did not report pain                        H&P: Lizbeth Ellis is a 82 y.o. female with h/o COPD, chronic respiratory failure ( wears 2 L at baseline) , CHF obesity is brought in d/t confusion, weakness and  frequent falls . Per daughter the pt fell forward while she was sitting on the chair. She has multiple bruises on face. She is also hypoxic and is placed on bipap. Chest xray showed right sided pneumonia. Trauma work up including CT head and neck and face is neg for acute fracture and hemorrhage . The pt was admitted at [facility] in 10/21 due to respiratory failure and pneumonia. She was treated with IV antibiotics during that admission . She was then dc on po antibiotics and taperd dose of steroids. Was tested neg for COVID . She is immunized. Chest CT from 21:  Limited evaluation for pulmonary embolism due to motion artifact and bolus   timing.  No proximal filling defect to suggest pulmonary embolism.       Multifocal pneumonia, asymmetric on the right.  Findings in the right upper   lung are slightly improved compared to prior while findings on the left are   mild but new.  Continued follow-up to complete resolution recommended. MRI Brain:   1.  No evidence of acute infarct, hemorrhage, mass effect/midline shift, or   ventriculomegaly. 2. Chronic left posterior parietal and left occipital ischemic changes. 3. Small left frontal scalp hematoma/edema.      Modified Barium Swallow Study: None per chart History/Prior Level of Function:   Living Status: Pt lives at home   Prior Dysphagia History: Pt has been seen by speech therapy in the past. Most recently she was seen 9/13/21 with recommendations for a Dysphagia II Minced and Moist diet with Mildly thick (nectar thick) liquids at meals/ No straws/meds with applesauce, Allow sips of thin water only, between meals only/no straws. Dysphagia Impressions/Diagnosis: Moderate Oropharyngeal Dysphagia   Pt was seen sitting upright in bed on 3L O2 via nasal cannula (baseline O2 needs). Pt reported no difficulty with swallowing and stated she consumes soft foods (due to edentulous state) at home with thin liquids. Various textures were provided to assess swallow function. Pt presents with moderate oropharyngeal dysphagia characterized by prolonged/reduced mastication, decreased A-P propulsion, suspected premature bolus loss to pharynx, delayed swallow initiation, reduced laryngeal elevation upon manual palpation, signs of reduced pharyngeal clearing, and intermittent signs of aspiration/penetration with thin liquids. Thin liquids via cup and straw revealed intermittent belching post swallow with use of 2-3 swallows and 1-2 instances of delayed coughing. Improved bolus control was noted with nectar thick liquids with no overt clinical s/s of aspiration/penetration. Puree textures revealed mildly reduced A-P propulsion, adequate tolerance noted. With soft solids, prolonged and inefficient mastication was noted with Pt expelling portion of bolus. Based on current respiratory compromise, frequent pneumonia, and intermittent signs of aspiration, recommend diet downgrade to Dysphagia II Minced and Moist with Mildly Thick (Nectar) Liquids with ongoing use of aspiration precautions. Recommend Modified Barium Swallow Study to further assess the pharyngeal phase of swallowing and to assess for aspiration.      Recommended Diet and Intervention 12/8/2021:  Diet Solids Recommendation:  Dysphagia II Minced and Moist Liquid Consistency Recommendation:  Mildly Thick (Nectar) Liquids   Recommended form of Meds:   Meds in puree      Compensatory Swallowing Strategies: Alternate solids/liquids , Upright as possible with all PO intake , Small bites/sips , Eat/feed slowly, Remain upright 30-45 min     SHORT TERM DYSPHAGIA GOALS/PLAN OF CARE: Speech therapy for dysphagia tx 3-5 times per week during acute care stay.     1.) Pt will functionally tolerate recommended diet with no overt clinical s/s of aspiration  2.) Pt will demonstrate understanding of aspiration risk and precautions via education/demonstration with occasional prompting  3.) Pt will advance to least restrictive diet as indicated   4.) Pt will participate in Modified Barium swallow study to further assess pharyngeal phase of swallow, assist with diet recommendations and to further direct plan of care     Dysphagia Therapeutic Intervention:  Diet Tolerance Monitoring , Patient/Family Education , Therapeutic Trials with SLP , Instrumental assessment of swallow function (Modified Barium Swallow Study)     Discharge Recommendations: Recommend ongoing SLP for dysphagia therapy upon discharge from hospital     Patient Positioning: Upright in bed    Current Diet Level (prior to evaluation): Regular texture diet with  Thin liquids      Respiratory Status:   []Room Air   [x]O2 via nasal cannula (3L)   []Other:    Dentition:  []Adequate  []Dentures   []Missing Many Teeth  [x]Edentulous  []Other:    Baseline Vocal Quality:  [x]Normal  []Dysphonic   []Aphonic   []Hoarse  []Wet  []Weak  []Other:    Volitional Swallow:   []Absent   []Delayed     []Adequate     [x]Required use of drink     Oral Mechanism Exam:  []WFL [x]Mild   [] Moderate  []Severe  []To be assessed  Impaired:   []Left side      []Right side    [x]Labial ROM/Coordination    []Labial Symmetry   [x]Lingual ROM/Coordination   []Lingual Symmetry  []Gag  []Other:     Oral Phase: []WFL []Mild   [x] Moderate  []Severe  []To be assessed   [x]Impaired/Prolonged Mastication:   []Spillage Left:   []Spillage Right:  []Pocketing Left:   []Pocketing Right:   [x]Decreased Anterior to Posterior Transit:   [x]Suspected Premature Bolus Loss:   []Lingual/Palatal Residue:   []Other:     Pharyngeal Phase: []WFL []Mild   [x] Moderate  []Severe  []To be assessed   [x]Delayed Swallow:   [x]Suspected Pharyngeal Pooling:   [x]Decreased Laryngeal Elevation:   []Absent Swallow:  []Wet Vocal Quality:   []Throat Clearing-Immediate:   []Throat Clearing-Delayed:   []Cough-Immediate:   [x]Cough-Delayed:  []Change in Vital Signs:  [x]Suspected Delayed Pharyngeal Clearing:  [x]Other:  Belching     Eating Assistance:  []Independent  []Setup or clean-up assistance   [x] Supervision or touching assistance   [] Partial or moderate assistance   [] Substantial or maximal assistance  [] Dependent       EDUCATION:   Provided education regarding role of SLP, results of assessment, recommendations and general speech pathology plan of care. [] Pt verbalized understanding and agreement   [x] Pt requires ongoing learning   [] No evidence of comprehension     If patient discharges prior to next visit, this note will serve as discharge.      Timed Code Minutes: 0 minutes  Total Treatment Minutes: 25 minutes     Electronically signed by:   Lacey Mcelroy M.A., [address] [place] [address]  Speech-Language Pathologist

## 2021-12-08 NOTE — PROGRESS NOTES
Summary (Last 24 hours) at 12/8/2021 1720  Last data filed at 12/8/2021 1623  Gross per 24 hour   Intake 660 ml   Output 1950 ml   Net -1290 ml       Physical Exam Performed:    BP (!) 150/59   Pulse 73   Temp 98 °F (36.7 °C) (Temporal)   Resp 16   Ht 5' 4\" (1.626 m)   Wt 240 lb 8.4 oz (109.1 kg)   SpO2 90%   BMI 41.29 kg/m²     General appearance: No apparent distress, appears stated age and cooperative. HEENT: Pupils equal, round, and reactive to light. Conjunctivae clear. Neck: Supple, with full range of motion. Trachea midline. Respiratory:  Normal respiratory effort. CTAB. Cardiovascular: Regular rate and rhythm with normal S1/S2 without MURMURS, rubs or gallops. Abdomen: Soft, non-tender, non-distended with normal bowel sounds. Musculoskeletal: No clubbing, cyanosis or EDEMA bilaterally. Full range of motion without deformity. Skin: Skin color, texture, turgor normal. Facial bruising. Neurologic:  Neurovascularly intact without any focal sensory/motor deficits. Cranial nerves: II-XII intact, grossly non-focal.      Ngo in place: Clear alejandrina urine    Labs:   Recent Labs     12/06/21  0210 12/07/21  0405 12/08/21  0607   WBC 22.1* 20.1* 13.1*   HGB 7.6* 7.9* 7.5*   HCT 24.6* 25.2* 24.0*    254 232     Recent Labs     12/06/21  0210 12/07/21  0405 12/08/21  0607   * 141 144   K 4.1 3.1* 3.4*   CL 94* 99 103   CO2 33* 28 32   BUN 27* 24* 20   CREATININE 0.9 1.0 0.9   CALCIUM 8.9 8.6 8.6   PHOS  --  2.1* 2.8     No results for input(s): AST, ALT, BILIDIR, BILITOT, ALKPHOS in the last 72 hours. No results for input(s): INR in the last 72 hours.   Recent Labs     12/06/21 0210   TROPONINI <0.01       Urinalysis:      Lab Results   Component Value Date    NITRU Negative 12/04/2021    WBCUA 2 10/30/2021    BACTERIA 4+ 06/20/2019    RBCUA 2 10/30/2021    BLOODU Negative 12/04/2021    SPECGRAV 1.014 12/04/2021    GLUCOSEU Negative 12/04/2021    GLUCOSEU NEGATIVE 03/21/2011 Radiology:  CT CHEST PULMONARY EMBOLISM W CONTRAST   Final Result   Limited evaluation for pulmonary embolism due to motion artifact and bolus   timing. No proximal filling defect to suggest pulmonary embolism. Multifocal pneumonia, asymmetric on the right. Findings in the right upper   lung are slightly improved compared to prior while findings on the left are   mild but new. Continued follow-up to complete resolution recommended. MRI BRAIN WO CONTRAST   Final Result   1. No evidence of acute infarct, hemorrhage, mass effect/midline shift, or   ventriculomegaly. 2. Chronic left posterior parietal and left occipital ischemic changes. 3. Small left frontal scalp hematoma/edema. XR CHEST PORTABLE   Final Result   Improving right-sided pneumonia when compared to 11/02/2021. Questionable   small right pleural effusion with persistent bibasilar opacities which could   reflect residual pneumonia or atelectasis. CT CERVICAL SPINE WO CONTRAST   Final Result   No acute fracture or subluxation. Advanced multilevel spondylosis, including moderate narrowing of the central   canal at C5-6. CT FACIAL BONES WO CONTRAST   Final Result   Left mandibular condyle is in open mouth position. Clinical correlation is   recommended. No acute facial fractures are identified. Left frontal scalp contusion. CT HEAD WO CONTRAST   Final Result   1. Hypodensity in the left occipital lobe, suspected to be subacute or   chronic infarct. If it would alter patient's management, MR would be more   sensitive. 2. Left frontal soft tissue swelling. No associated calvarial abnormality.          XR CHEST PORTABLE    (Results Pending)           Assessment/Plan:    Active Hospital Problems    Diagnosis     Dysphagia [R13.10]     Debility [Q01.66]     Metabolic encephalopathy [F54.01]     Steroid-induced hyperglycemia [R73.9, T38.0X5A]     Recurrent falls [R29.6]     Hypokalemia [E87.6]     Chronic anemia [D64.9]     Acute on chronic respiratory failure with hypoxia (HCC) [J96.21]     Aspiration pneumonia (HCC) [J69.0]        Acute encephalopathy metabolic due to hypoxia and aspiration pneumonia:  CT of the head was negative  MRI of the brain showed chronic infarcts nothing acute  Currently patient much more awake alert oriented in time place and person  CT of the head and neck and face negative for acute fracture or hemorrhage. Resolved. Patient currently at baseline. Hypokalemia: Replaced. Monitor potassium and magnesium level. Acute on chronic hypoxic respiratory failure secondary to suspected aspiration PNA with  gram-positive/gram-negative with sepsis and possible mild acute exacerbation of COPD and underlying SHORTY/OHS:  CT negative for PE. Multifocal PNA. Nasal MRSA negative. Off Zyvox. Continue Zosyn. Discharged home on Augmentin to complete course of antibiotics. SLP evaluation done: Dysphagia 2 diet. Follow-up repeat chest x-ray. Continue IV methylprednisone and transition to oral prednisone tomorrow to complete a short course. Continue inhalers, BiPAP nightly and supplemental oxygen. Pulmonary consult appreciated: Recommendations noted. Maintain sats between 88 and 92. Baseline 2-2.5 L oxygen        Facial contusion: Secondary to fall. Continue local skin care.     Steroid induced hypoglycemia:  A1c 5.5% 11/1/21. Blood sugars should continue to improve as we wean and taper down the steroids     CoNS bacteremia:  Likely contaminant. Follow up repeat cultures. Zyvox discontinued. Voiding trial today. Ngo placed on admission and currently has no indication to continue Ngo. Discontinued for voiding trial prior to discharge home. Frequent falls: PT OT evaluation done. SNF recommended however patient refused. She will be discharged home with services. Chronic anemia:  Work-up shows ACD/ACD. Started on iron replacement.   Will need outpatient follow-up. B12 and folate pending. Chronic diastolic CHF:  Resumed home dose of Lasix. No acute exacerbation. Chronic pain:  Resumed pain medications at a lower dose. Outpatient follow-up with pain management/PCP. Hypothyroidism: Continue Synthroid. Follow-up TSH. DVT Prophylaxis: Lovenox  Diet: ADULT ORAL NUTRITION SUPPLEMENT; Lunch, Dinner; Wound Healing Oral Supplement  ADULT ORAL NUTRITION SUPPLEMENT; Breakfast; Low Calorie/High Protein Oral Supplement  ADULT DIET; Dysphagia - Minced and Moist; Mildly Thick (Nectar)  Code Status: Full Code    PT/OT Eval Status: SNF recommended but patient refused. Dispo -Home with home care.     David Roca MD

## 2021-12-09 ENCOUNTER — APPOINTMENT (OUTPATIENT)
Dept: GENERAL RADIOLOGY | Age: 86
DRG: 871 | End: 2021-12-09
Payer: COMMERCIAL

## 2021-12-09 VITALS
DIASTOLIC BLOOD PRESSURE: 52 MMHG | RESPIRATION RATE: 20 BRPM | OXYGEN SATURATION: 96 % | HEART RATE: 64 BPM | TEMPERATURE: 97.4 F | SYSTOLIC BLOOD PRESSURE: 122 MMHG | BODY MASS INDEX: 40.08 KG/M2 | WEIGHT: 234.79 LBS | HEIGHT: 64 IN

## 2021-12-09 LAB
A/G RATIO: 0.9 (ref 1.1–2.2)
ALBUMIN SERPL-MCNC: 3.1 G/DL (ref 3.4–5)
ALP BLD-CCNC: 97 U/L (ref 40–129)
ALT SERPL-CCNC: 12 U/L (ref 10–40)
ANION GAP SERPL CALCULATED.3IONS-SCNC: 8 MMOL/L (ref 3–16)
AST SERPL-CCNC: 18 U/L (ref 15–37)
BILIRUB SERPL-MCNC: 0.4 MG/DL (ref 0–1)
BUN BLDV-MCNC: 17 MG/DL (ref 7–20)
CALCIUM SERPL-MCNC: 9.1 MG/DL (ref 8.3–10.6)
CHLORIDE BLD-SCNC: 102 MMOL/L (ref 99–110)
CO2: 31 MMOL/L (ref 21–32)
CREAT SERPL-MCNC: 0.9 MG/DL (ref 0.6–1.2)
GFR AFRICAN AMERICAN: >60
GFR NON-AFRICAN AMERICAN: 60
GLUCOSE BLD-MCNC: 107 MG/DL (ref 70–99)
GLUCOSE BLD-MCNC: 107 MG/DL (ref 70–99)
HCT VFR BLD CALC: 27.8 % (ref 36–48)
HEMOGLOBIN: 8.6 G/DL (ref 12–16)
MAGNESIUM: 2.3 MG/DL (ref 1.8–2.4)
MCH RBC QN AUTO: 25.1 PG (ref 26–34)
MCHC RBC AUTO-ENTMCNC: 31 G/DL (ref 31–36)
MCV RBC AUTO: 81 FL (ref 80–100)
PDW BLD-RTO: 18.5 % (ref 12.4–15.4)
PERFORMED ON: ABNORMAL
PHOSPHORUS: 3.3 MG/DL (ref 2.5–4.9)
PLATELET # BLD: 260 K/UL (ref 135–450)
PMV BLD AUTO: 8.4 FL (ref 5–10.5)
POTASSIUM SERPL-SCNC: 4 MMOL/L (ref 3.5–5.1)
RBC # BLD: 3.44 M/UL (ref 4–5.2)
SODIUM BLD-SCNC: 141 MMOL/L (ref 136–145)
T4 FREE: 1 NG/DL (ref 0.9–1.8)
TOTAL PROTEIN: 6.4 G/DL (ref 6.4–8.2)
TSH REFLEX FT4: 6.83 UIU/ML (ref 0.27–4.2)
WBC # BLD: 14.7 K/UL (ref 4–11)

## 2021-12-09 PROCEDURE — 84443 ASSAY THYROID STIM HORMONE: CPT

## 2021-12-09 PROCEDURE — 84100 ASSAY OF PHOSPHORUS: CPT

## 2021-12-09 PROCEDURE — 80053 COMPREHEN METABOLIC PANEL: CPT

## 2021-12-09 PROCEDURE — 2580000003 HC RX 258: Performed by: FAMILY MEDICINE

## 2021-12-09 PROCEDURE — 2700000000 HC OXYGEN THERAPY PER DAY

## 2021-12-09 PROCEDURE — 71045 X-RAY EXAM CHEST 1 VIEW: CPT

## 2021-12-09 PROCEDURE — 6360000002 HC RX W HCPCS: Performed by: INTERNAL MEDICINE

## 2021-12-09 PROCEDURE — 85027 COMPLETE CBC AUTOMATED: CPT

## 2021-12-09 PROCEDURE — 6370000000 HC RX 637 (ALT 250 FOR IP): Performed by: INTERNAL MEDICINE

## 2021-12-09 PROCEDURE — 94660 CPAP INITIATION&MGMT: CPT

## 2021-12-09 PROCEDURE — 99233 SBSQ HOSP IP/OBS HIGH 50: CPT | Performed by: INTERNAL MEDICINE

## 2021-12-09 PROCEDURE — 2580000003 HC RX 258: Performed by: INTERNAL MEDICINE

## 2021-12-09 PROCEDURE — 94761 N-INVAS EAR/PLS OXIMETRY MLT: CPT

## 2021-12-09 PROCEDURE — 84439 ASSAY OF FREE THYROXINE: CPT

## 2021-12-09 PROCEDURE — 94640 AIRWAY INHALATION TREATMENT: CPT

## 2021-12-09 PROCEDURE — 83735 ASSAY OF MAGNESIUM: CPT

## 2021-12-09 RX ORDER — IPRATROPIUM BROMIDE AND ALBUTEROL SULFATE 2.5; .5 MG/3ML; MG/3ML
1 SOLUTION RESPIRATORY (INHALATION) 4 TIMES DAILY
Qty: 360 ML | Refills: 2 | Status: SHIPPED | OUTPATIENT
Start: 2021-12-09 | End: 2022-03-09

## 2021-12-09 RX ORDER — AMOXICILLIN AND CLAVULANATE POTASSIUM 875; 125 MG/1; MG/1
1 TABLET, FILM COATED ORAL EVERY 12 HOURS SCHEDULED
Qty: 9 TABLET | Refills: 0 | Status: SHIPPED | OUTPATIENT
Start: 2021-12-09 | End: 2021-12-14

## 2021-12-09 RX ORDER — AMOXICILLIN AND CLAVULANATE POTASSIUM 875; 125 MG/1; MG/1
1 TABLET, FILM COATED ORAL EVERY 12 HOURS SCHEDULED
Status: DISCONTINUED | OUTPATIENT
Start: 2021-12-09 | End: 2021-12-09 | Stop reason: HOSPADM

## 2021-12-09 RX ORDER — PREGABALIN 50 MG/1
50 CAPSULE ORAL 3 TIMES DAILY
Qty: 90 CAPSULE | Refills: 0
Start: 2021-12-09 | End: 2022-01-08

## 2021-12-09 RX ORDER — OXYCODONE AND ACETAMINOPHEN 10; 325 MG/1; MG/1
0.5 TABLET ORAL EVERY 6 HOURS PRN
Qty: 30 TABLET | Refills: 0
Start: 2021-12-09 | End: 2022-01-08

## 2021-12-09 RX ORDER — FUROSEMIDE 20 MG/1
20 TABLET ORAL DAILY
Qty: 30 TABLET | Refills: 0 | Status: SHIPPED | OUTPATIENT
Start: 2021-12-09 | End: 2022-01-08

## 2021-12-09 RX ADMIN — OXYCODONE AND ACETAMINOPHEN 1 TABLET: 5; 325 TABLET ORAL at 03:18

## 2021-12-09 RX ADMIN — PIPERACILLIN AND TAZOBACTAM 3375 MG: 3; .375 INJECTION, POWDER, LYOPHILIZED, FOR SOLUTION INTRAVENOUS at 04:00

## 2021-12-09 RX ADMIN — OXYCODONE AND ACETAMINOPHEN 1 TABLET: 5; 325 TABLET ORAL at 09:23

## 2021-12-09 RX ADMIN — IPRATROPIUM BROMIDE AND ALBUTEROL SULFATE 1 AMPULE: .5; 3 SOLUTION RESPIRATORY (INHALATION) at 08:28

## 2021-12-09 RX ADMIN — Medication 2 PUFF: at 08:28

## 2021-12-09 RX ADMIN — IRON SUCROSE 200 MG: 20 INJECTION, SOLUTION INTRAVENOUS at 09:37

## 2021-12-09 RX ADMIN — FUROSEMIDE 20 MG: 20 TABLET ORAL at 09:23

## 2021-12-09 RX ADMIN — PREGABALIN 25 MG: 25 CAPSULE ORAL at 09:23

## 2021-12-09 RX ADMIN — AMOXICILLIN AND CLAVULANATE POTASSIUM 1 TABLET: 875; 125 TABLET, FILM COATED ORAL at 09:23

## 2021-12-09 RX ADMIN — Medication 10 ML: at 09:23

## 2021-12-09 ASSESSMENT — PAIN SCALES - GENERAL
PAINLEVEL_OUTOF10: 7
PAINLEVEL_OUTOF10: 7
PAINLEVEL_OUTOF10: 4
PAINLEVEL_OUTOF10: 4
PAINLEVEL_OUTOF10: 0

## 2021-12-09 ASSESSMENT — PAIN DESCRIPTION - FREQUENCY: FREQUENCY: CONTINUOUS

## 2021-12-09 ASSESSMENT — PAIN DESCRIPTION - LOCATION: LOCATION: GENERALIZED

## 2021-12-09 ASSESSMENT — PAIN DESCRIPTION - PAIN TYPE: TYPE: CHRONIC PAIN

## 2021-12-09 ASSESSMENT — PAIN DESCRIPTION - DESCRIPTORS: DESCRIPTORS: ACHING

## 2021-12-09 ASSESSMENT — PAIN DESCRIPTION - ONSET: ONSET: ON-GOING

## 2021-12-09 ASSESSMENT — PAIN DESCRIPTION - PROGRESSION: CLINICAL_PROGRESSION: NOT CHANGED

## 2021-12-09 NOTE — DISCHARGE SUMMARY
frontal scalp hematoma/edema. XR CHEST PORTABLE   Final Result   Improving right-sided pneumonia when compared to 11/02/2021. Questionable   small right pleural effusion with persistent bibasilar opacities which could   reflect residual pneumonia or atelectasis. CT CERVICAL SPINE WO CONTRAST   Final Result   No acute fracture or subluxation. Advanced multilevel spondylosis, including moderate narrowing of the central   canal at C5-6. CT FACIAL BONES WO CONTRAST   Final Result   Left mandibular condyle is in open mouth position. Clinical correlation is   recommended. No acute facial fractures are identified. Left frontal scalp contusion. CT HEAD WO CONTRAST   Final Result   1. Hypodensity in the left occipital lobe, suspected to be subacute or   chronic infarct. If it would alter patient's management, MR would be more   sensitive. 2. Left frontal soft tissue swelling. No associated calvarial abnormality. Invasive procedures and treatments. 1. None     [address] [facility].  82 y. o. female with h/o COPD, chronic respiratory failure ( wears 2 L at baseline), chronic diastolic, CHF obesity brought in d/t confusion, weakness and  frequent falls. Per daughter pt fell forward while she was sitting on the chair resulting in multiple bruising on the face. She was also noted to be hypoxic and was placed on BiPAP. Chest xray showed right sided pneumonia. Trauma work up including CT head and neck and face was neg for acute fracture and hemorrhage . The pt was admitted at [facility] in 10/21 due to respiratory failure and pneumonia. She was treated with IV antibiotics during that admission . She was then dc on po antibiotics and tapered dose of steroids. Was tested neg for COVID .  She is immunized.     Acute encephalopathy metabolic due to hypoxia and aspiration pneumonia:  CT of the head was negative  MRI of the brain showed chronic infarcts nothing acute  Currently patient much more awake alert oriented in time place and person  CT of the head and neck and face negative for acute fracture or hemorrhage. Resolved. Patient currently at baseline.     Hypokalemia: Replaced.     Acute on chronic hypoxic respiratory failure secondary to suspected aspiration PNA with  gram-positive/gram-negative with sepsis and possible mild acute exacerbation of COPD and underlying SHORTY/OHS:  CT negative for PE. Multifocal PNA. Nasal MRSA negative. Off Zyvox. Continued Zosyn until discharge. Discharged home on Augmentin for 5 days to complete course of antibiotics. SLP evaluation done: Dysphagia 2 diet recommended. Repeat chest x-ray 12/9: Interval development of left basilar pleural and parenchymal disease. Continued IV methylprednisone and discontinued on discharge. Continued inhalers, BiPAP nightly and supplemental oxygen. Pulmonary consult appreciated:  Patient will benefit from nebulizer treatment. Repeat chest x-ray to ensure complete resolution of left basilar airspace disease. Maintained sats between 88 and 92 on baseline 2-2.5 L oxygen        Facial contusion: Secondary to fall. Continued local skin care.     Steroid induced hypoglycemia:  A1c 5.5% 11/1/21. Blood sugars should continue discontinuation of steroids.       CoNS bacteremia:  Likely contaminant. Repeat cultures, ngtd. Zyvox discontinued.      Frequent falls: PT OT evaluation done. SNF recommended however patient refused insisting on being discharged home. She was discharged home with services.     Chronic anemia:  Work-up shows ACD/ACD. Started on iron replacement. Will need outpatient follow-up. B12 and folate wnl.     Chronic diastolic CHF:  Resumed home dose of Lasix. No acute exacerbation.     Chronic lower back pain:  Resumed pain medications at a lower dose.   Outpatient follow-up with pain management/PCP.     Hypothyroidism: Continued Synthroid.        Ngo placed on admission and had no indication to continue Ngo. Discontinued for voiding trial 12/08 prior to discharge home. Patient voiding without any retention. Consults. IP CONSULT TO PULMONOLOGY  IP CONSULT TO HOME CARE NEEDS    Physical examination on discharge day. BP (!) 122/52   Pulse 64   Temp 97.4 °F (36.3 °C) (Temporal)   Resp 20   Ht 5' 4\" (1.626 m)   Wt 234 lb 12.6 oz (106.5 kg)   SpO2 96%   BMI 40.30 kg/m²   General appearance. Alert. Looks comfortable. HEENT. Sclera clear. Moist mucus membranes. Cardiovascular. Regular rate and rhythm, normal S1, S2. No murmur. Respiratory. Not using accessory muscles. Clear to auscultation bilaterally, no wheeze. Gastrointestinal. Abdomen soft, non-tender, not distended, normal bowel sounds  Neurology. Facial symmetry. No speech deficits. Moving all extremities equally. Extremities. No edema in lower extremities. Skin. Warm, dry, normal turgor    Condition at time of discharge: Stable    Medication instructions provided to patient at discharge. Medication List      START taking these medications    amoxicillin-clavulanate 875-125 MG per tablet  Commonly known as: AUGMENTIN  Take 1 tablet by mouth every 12 hours for 9 doses        CHANGE how you take these medications    furosemide 20 MG tablet  Commonly known as: LASIX  Take 1 tablet by mouth daily  What changed:   · medication strength  · how much to take     * ipratropium-albuterol 0.5-2.5 (3) MG/3ML Soln nebulizer solution  Commonly known as: DUONEB  Take 3 mLs by nebulization 4 times daily  What changed: You were already taking a medication with the same name, and this prescription was added. Make sure you understand how and when to take each. * ipratropium-albuterol 0.5-2.5 (3) MG/3ML Soln nebulizer solution  Commonly known as: DUONEB  What changed: Another medication with the same name was added. Make sure you understand how and when to take each.      oxyCODONE-acetaminophen  MG per tablet  Commonly known as: PERCOCET  Take 0.5 tablets by mouth every 6 hours as needed for Pain for up to 30 days. What changed: how much to take     pregabalin 50 MG capsule  Commonly known as: LYRICA  Take 1 capsule by mouth 3 times daily for 30 days. new dosage per daughter upon med list review. What changed: how much to take         * This list has 2 medication(s) that are the same as other medications prescribed for you. Read the directions carefully, and ask your doctor or other care provider to review them with you. CONTINUE taking these medications    esomeprazole 40 MG delayed release capsule  Commonly known as: NEXIUM     FLUoxetine 20 MG capsule  Commonly known as: PROZAC     hydrocortisone 2.5 % rectal cream  Commonly known as: ANUSOL-HC  Place rectally 2 times daily. levothyroxine 112 MCG tablet  Commonly known as: SYNTHROID     Symbicort 160-4.5 MCG/ACT Aero  Generic drug: budesonide-formoterol        STOP taking these medications    OxyCONTIN 10 MG extended release tablet  Generic drug: oxyCODONE           Where to Get Your Medications      These medications were sent to [address], [address] [place] [address] [address], [address] [place] [address] Road    Phone: [phone]   · amoxicillin-clavulanate 875-125 MG per tablet  · furosemide 20 MG tablet  · ipratropium-albuterol 0.5-2.5 (3) MG/3ML Soln nebulizer solution     Information about where to get these medications is not yet available    Ask your nurse or doctor about these medications  · oxyCODONE-acetaminophen  MG per tablet  · pregabalin 50 MG capsule         Discharge recommendations given to patient. Follow Up. With PCP in 1 week   Disposition. Home with home care  Activity. activity as tolerated  Diet: ADULT ORAL NUTRITION SUPPLEMENT; Lunch, Dinner;  Wound Healing Oral Supplement  ADULT ORAL NUTRITION SUPPLEMENT; Breakfast; Low Calorie/High Protein Oral Supplement  ADULT DIET;

## 2021-12-09 NOTE — PROGRESS NOTES
Physician Progress Note      PATIENT:               Sherlyn Kline  CSN #:                  [id number]  :                       1935  ADMIT DATE:       2021 1:42 PM  [address] [place] DATE:        2021 12:24 PM  RESPONDING  PROVIDER #:        Osker Ace          QUERY TEXT:    Pt admitted with Acute on Chronic Respiratory failure. Pt noted to have   Sepsis with pneumonia. If possible, please document in the progress notes and   discharge summary if you are evaluating and/or treating any of the following: The medical record reflects the following:  Risk Factors: Dysphagia, Age  Clinical Indicators: Presented to ED with concerns for falls, fatigue, mental   status change, increased Oxygen requirement, noted PCO2 of 80 and placed on   BiPAP, leukocytosis. CXR with elevated right hemidiaphragm with right sided pneumonia, persistent   basilar opacities. IM progress note of  suspected aspiration pneumonia,   gram positive, gram negative with sepsis, antibiotics changed to Zyvox and   Zosyn. Nasal MRSA then negative and Zyvox stopped. Pulmonary consult with   assessment of Aspiration pneumonia, COPD, SHORTY, elevated diaphragm. Speech   evaluation shows Moderate Oropharyngeal dysphagia. Treatment: ICU, Oxygen therapy, Imaging, Zosyn, Lab work, Pulmonary consult,   SLP evaluation. Options provided:  -- Aspiration pneumonia  -- Gram negative pneumonia  -- Bacterial pneumonia  -- Other - I will add my own diagnosis  -- Disagree - Not applicable / Not valid  -- Disagree - Clinically unable to determine / Unknown  -- Refer to Clinical Documentation Reviewer    PROVIDER RESPONSE TEXT:    This patient has aspiration pneumonia.     Query created by: Renzo Ambrosio on 2021 12:12 PM      Electronically signed by:  Bautista White 2021 2:09 PM

## 2021-12-09 NOTE — PROGRESS NOTES
Pt is bladder and bowel incontinent, diaper and lift pad are wet and soiled, estimated urine output >300 ml. Get pt bathed, gown, diaper and complete bed linen changed. Purewick external catheter placed to monitor accurate UO. Pt is A&O, VSS, NSR, on 3LNC (biPAP when sleep). Bed locked in lowest position with bed alarm on, call light within reach.

## 2021-12-09 NOTE — CARE COORDINATION
Patient discharged 12/9/2021 to home with [facility]  Phone: [phone]  Fax: [phone]. Order/ AVS faxed and agency notifed. No other CM needs at this time. A nebulizer was delivered to patient room from [address] [place] [address].     Transport arranged with Herlinda Muhammad, daughter aware of ETA for  from hospital.    All discharge needs met per case management    JERONIMO Antonio, CCM, RN  [facility]  [phone]

## 2021-12-09 NOTE — PROGRESS NOTES
P Pulmonary and Critical Care  Progress note      Reason for Consult: Acute on chronic hypoxemic respiratory failure aspiration pneumonia, sepsis, COPD, SHORTY  Requesting Physician: Dr Osman Cornea:   [facility]:                The patient is a 82 y.o. female with significant past medical history of:      Diagnosis Date    Arthritis     osteo    COPD (chronic obstructive pulmonary disease) ([address]     Hemorrhoids     Hernia of unspecified site of abdominal cavity without mention of obstruction or gangrene     Incontinence     Knee pain, bilateral     pt states no cartilage    Spinal stenosis     Spinal stenosis      Interval history: Patient is alert. She is asking to be discharged home. I suspect she is at her baseline. Tolerating 3 L/min nasal cannula oxygen supplement during the day and BiPAP mask at night.       Past Surgical History:        Procedure Laterality Date    CHOLECYSTECTOMY      PARTIAL HYSTERECTOMY       Current Medications:    Current Facility-Administered Medications: iron sucrose (VENOFER) 200 mg in sodium chloride 0.9 % 100 mL IVPB, 200 mg, IntraVENous, Once  amoxicillin-clavulanate (AUGMENTIN) 875-125 MG per tablet 1 tablet, 1 tablet, Oral, 2 times per day  furosemide (LASIX) tablet 20 mg, 20 mg, Oral, Daily  pregabalin (LYRICA) capsule 25 mg, 25 mg, Oral, TID  oxyCODONE-acetaminophen (PERCOCET) 5-325 MG per tablet 1 tablet, 1 tablet, Oral, Q8H PRN  ipratropium-albuterol (DUONEB) nebulizer solution 1 ampule, 1 ampule, Inhalation, TID  ipratropium-albuterol (DUONEB) nebulizer solution 1 ampule, 1 ampule, Inhalation, Q4H PRN  insulin lispro (1 Unit Dial) 0-6 Units, 0-6 Units, SubCUTAneous, TID WC  insulin lispro (1 Unit Dial) 0-3 Units, 0-3 Units, SubCUTAneous, Nightly  glucose (GLUTOSE) 40 % oral gel 15 g, 15 g, Oral, PRN  dextrose 50 % IV solution, 12.5 g, IntraVENous, PRN  glucagon (rDNA) injection 1 mg, 1 mg, IntraMUSCular, PRN  dextrose 5 % solution, 100 mL/hr, IntraVENous, PRN  budesonide-formoterol (SYMBICORT) 160-4.5 MCG/ACT inhaler 2 puff, 2 puff, Inhalation, BID  perflutren lipid microspheres (DEFINITY) injection 1.65 mg, 1.5 mL, IntraVENous, ONCE PRN  sodium chloride flush 0.9 % injection 5-40 mL, 5-40 mL, IntraVENous, 2 times per day  sodium chloride flush 0.9 % injection 5-40 mL, 5-40 mL, IntraVENous, PRN  0.9 % sodium chloride infusion, 25 mL, IntraVENous, PRN  enoxaparin (LOVENOX) injection 40 mg, 40 mg, SubCUTAneous, Nightly  ondansetron (ZOFRAN-ODT) disintegrating tablet 4 mg, 4 mg, Oral, Q8H PRN **OR** ondansetron (ZOFRAN) injection 4 mg, 4 mg, IntraVENous, Q6H PRN  polyethylene glycol (GLYCOLAX) packet 17 g, 17 g, Oral, Daily PRN  acetaminophen (TYLENOL) tablet 650 mg, 650 mg, Oral, Q6H PRN **OR** acetaminophen (TYLENOL) suppository 650 mg, 650 mg, Rectal, Q6H PRN    Allergies   Allergen Reactions    Sulfa Antibiotics      Unknown reaction       Social History:    TOBACCO:   reports that she has quit smoking. She has never used smokeless tobacco.  ETOH:   reports no history of alcohol use. Patient currently lives independently  Environmental/chemical exposure: None known    Family History:   History reviewed. No pertinent family history. REVIEW OF SYSTEMS:    CONSTITUTIONAL:  negative for fevers, chills, diaphoresis, activity change, appetite change, fatigue, night sweats and unexpected weight change.    EYES:  negative for blurred vision, eye discharge, visual disturbance and icterus  HEENT:  negative for hearing loss, tinnitus, ear drainage, sinus pressure, nasal congestion, epistaxis and snoring  RESPIRATORY:  See HPI  CARDIOVASCULAR:  negative for chest pain, palpitations, exertional chest pressure/discomfort, edema, syncope  GASTROINTESTINAL:  negative for nausea, vomiting, diarrhea, constipation, blood in stool and abdominal pain  GENITOURINARY:  negative for frequency, dysuria, urinary incontinence, decreased urine volume, and hematuria  HEMATOLOGIC/LYMPHATIC:  negative for easy bruising, bleeding and lymphadenopathy  ALLERGIC/IMMUNOLOGIC:  negative for recurrent infections, angioedema, anaphylaxis and drug reactions  ENDOCRINE:  negative for weight changes and diabetic symptoms including polyuria, polydipsia and polyphagia  MUSCULOSKELETAL:  negative for  pain, joint swelling, decreased range of motion and muscle weakness  NEUROLOGICAL:  negative for headaches, slurred speech, unilateral weakness  PSYCHIATRIC/BEHAVIORAL: negative for hallucinations, behavioral problems, confusion and agitation. Objective:   PHYSICAL EXAM:      VITALS:  BP (!) 122/52   Pulse 64   Temp 97.4 °F (36.3 °C) (Temporal)   Resp 20   Ht 5' 4\" (1.626 m)   Wt 234 lb 12.6 oz (106.5 kg)   SpO2 96%   BMI 40.30 kg/m²      24HR INTAKE/OUTPUT:      Intake/Output Summary (Last 24 hours) at 12/9/2021 0934  Last data filed at 12/9/2021 0403  Gross per 24 hour   Intake 240 ml   Output 2300 ml   Net -2060 ml     CONSTITUTIONAL:  awake, alert, cooperative, no apparent distress, and appears stated age  NECK:  Supple, symmetrical, trachea midline, no adenopathy, thyroid symmetric, not enlarged and no tenderness, skin normal  LUNGS:  no increased work of breathing and clear to auscultation. No accessory muscle use  CARDIOVASCULAR: S1 and S2, no edema and no JVD  ABDOMEN:  normal bowel sounds, non-distended and no masses palpated, and no tenderness to palpation. No hepatospleenomegaly  LYMPHADENOPATHY:  no axillary or supraclavicular adenopathy. No cervical adnenopathy  PSYCHIATRIC: Oriented to person place and time. No obvious depression or anxiety. MUSCULOSKELETAL: No obvious misalignment or effusion of the joints. No clubbing, cyanosis of the digits. SKIN:  normal skin color, texture, turgor and no redness, warmth, or swelling.  No palpable nodules    DATA:    Old records have been reviewed    CBC:  Recent Labs     12/07/21  0405 12/08/21  0607 12/09/21  0545   WBC 20.1* 13.1* 14.7*   RBC 3.10* 2.99* 3.44*   HGB 7.9* 7.5* 8.6*   HCT 25.2* 24.0* 27.8*    232 260   MCV 81.5 80.4 81.0   MCH 25.5* 25.2* 25.1*   MCHC 31.3 31.4 31.0   RDW 17.8* 17.6* 18.5*      BMP:  Recent Labs     12/07/21  0405 12/08/21  0607 12/09/21  0545    144 141   K 3.1* 3.4* 4.0   CL 99 103 102   CO2 28 32 31   BUN 24* 20 17   CREATININE 1.0 0.9 0.9   CALCIUM 8.6 8.6 9.1   GLUCOSE 139* 102* 107*      ABG:  No results for input(s): PHART, KFC8JOV, PO2ART, QRO2RGT, K3MUTBXP, BEART in the last 72 hours. Procalcitonin  No results for input(s): PROCAL in the last 72 hours. Lab Results   Component Value Date    BNP 8.1 03/21/2011     Lab Results   Component Value Date    CKTOTAL 189 10/11/2020    TROPONINI <0.01 12/06/2021           Radiology Review:  All pertinent images / reports were reviewed as a part of this visit. Assessment:     1. Acute on chronic hypoxemic respiratory failure  2. Aspiration pneumonia  3. COPD  4. SHORTY  5. Elevated diaphragm  6. Frequent falls      Plan:     I have reviewed laboratories, medical records and images for this visit  Chest X from yesterday did show evidence of new left lower lobe airspace disease. Patient looks just the same, however. She is an aspiration risk and has modified diet recommended by speech therapy  She would benefit from nebulizer treatments at home as well  Would benefit from getting up out of the bed and doing some therapy. I had recommended ECF but she has refused that  Still has a leukocytosis of 14,000. This is, however, improved compared to admission. She has received 5 days of Zosyn  Give her an additional 5 days Augmentin as an outpatient  Would recommend outpatient follow-up chest imaging to document resolution of the infiltrate  The patient is pretty insistent on discharge.   I think she is a high risk for readmission as we have seen similar admission pattern with her in the past.  However, okay with me for discharge home

## 2021-12-12 LAB
BLOOD CULTURE, ROUTINE: NORMAL
CULTURE, BLOOD 2: NORMAL

## 2021-12-12 NOTE — PLAN OF CARE
Nutrition Problem: Increased nutrient needs  Intervention: Food and/or Nutrient Delivery: Continue current diet, Start ONS  Nutritional Goals: po intake at least 50% of meals & supplements The patient is a 35y year old Female complaining of cough.